# Patient Record
Sex: FEMALE | Race: WHITE | Employment: OTHER | URBAN - METROPOLITAN AREA
[De-identification: names, ages, dates, MRNs, and addresses within clinical notes are randomized per-mention and may not be internally consistent; named-entity substitution may affect disease eponyms.]

---

## 2019-06-03 ENCOUNTER — TELEPHONE (OUTPATIENT)
Dept: CARDIAC CATH/INVASIVE PROCEDURES | Age: 84
End: 2019-06-03

## 2019-06-04 ENCOUNTER — ANESTHESIA (OUTPATIENT)
Dept: CARDIAC CATH/INVASIVE PROCEDURES | Age: 84
End: 2019-06-04

## 2019-06-04 ENCOUNTER — HOSPITAL ENCOUNTER (OUTPATIENT)
Dept: CARDIAC CATH/INVASIVE PROCEDURES | Age: 84
Discharge: HOME | End: 2019-06-04
Payer: MEDICARE

## 2019-06-04 ENCOUNTER — ANESTHESIA EVENT (OUTPATIENT)
Dept: CARDIAC CATH/INVASIVE PROCEDURES | Age: 84
End: 2019-06-04

## 2019-06-04 VITALS — OXYGEN SATURATION: 91 % | DIASTOLIC BLOOD PRESSURE: 74 MMHG | SYSTOLIC BLOOD PRESSURE: 103 MMHG

## 2019-06-04 VITALS
WEIGHT: 234 LBS | HEART RATE: 84 BPM | TEMPERATURE: 98.3 F | DIASTOLIC BLOOD PRESSURE: 80 MMHG | SYSTOLIC BLOOD PRESSURE: 128 MMHG | HEIGHT: 60 IN | BODY MASS INDEX: 45.94 KG/M2 | RESPIRATION RATE: 20 BRPM

## 2019-06-04 LAB
INR BLD: 2.1
PROTHROMBIN TIME: 23.9 SEC (ref 9.3–12.4)

## 2019-06-04 PROCEDURE — 3700000000 HC ANESTHESIA ATTENDED CARE

## 2019-06-04 PROCEDURE — 85610 PROTHROMBIN TIME: CPT

## 2019-06-04 PROCEDURE — 36415 COLL VENOUS BLD VENIPUNCTURE: CPT

## 2019-06-04 PROCEDURE — C1786 PMKR, SINGLE, RATE-RESP: HCPCS

## 2019-06-04 PROCEDURE — 2580000003 HC RX 258

## 2019-06-04 PROCEDURE — 6360000002 HC RX W HCPCS: Performed by: NURSE ANESTHETIST, CERTIFIED REGISTERED

## 2019-06-04 PROCEDURE — 3700000001 HC ADD 15 MINUTES (ANESTHESIA)

## 2019-06-04 PROCEDURE — 2580000003 HC RX 258: Performed by: INTERNAL MEDICINE

## 2019-06-04 PROCEDURE — 2580000003 HC RX 258: Performed by: NURSE ANESTHETIST, CERTIFIED REGISTERED

## 2019-06-04 PROCEDURE — 6360000002 HC RX W HCPCS

## 2019-06-04 PROCEDURE — 33227 REMOVE&REPLACE PM GEN SINGL: CPT | Performed by: INTERNAL MEDICINE

## 2019-06-04 PROCEDURE — 2709999900 HC NON-CHARGEABLE SUPPLY

## 2019-06-04 PROCEDURE — 2500000003 HC RX 250 WO HCPCS

## 2019-06-04 RX ORDER — CEFAZOLIN SODIUM 1 G/3ML
INJECTION, POWDER, FOR SOLUTION INTRAMUSCULAR; INTRAVENOUS PRN
Status: DISCONTINUED | OUTPATIENT
Start: 2019-06-04 | End: 2019-06-04 | Stop reason: SDUPTHER

## 2019-06-04 RX ORDER — SODIUM CHLORIDE 9 MG/ML
INJECTION, SOLUTION INTRAVENOUS ONCE
Status: COMPLETED | OUTPATIENT
Start: 2019-06-04 | End: 2019-06-04

## 2019-06-04 RX ORDER — SODIUM CHLORIDE 9 MG/ML
INJECTION, SOLUTION INTRAVENOUS CONTINUOUS PRN
Status: DISCONTINUED | OUTPATIENT
Start: 2019-06-04 | End: 2019-06-04 | Stop reason: SDUPTHER

## 2019-06-04 RX ORDER — PROPOFOL 10 MG/ML
INJECTION, EMULSION INTRAVENOUS CONTINUOUS PRN
Status: DISCONTINUED | OUTPATIENT
Start: 2019-06-04 | End: 2019-06-04 | Stop reason: SDUPTHER

## 2019-06-04 RX ORDER — FENTANYL CITRATE 50 UG/ML
INJECTION, SOLUTION INTRAMUSCULAR; INTRAVENOUS PRN
Status: DISCONTINUED | OUTPATIENT
Start: 2019-06-04 | End: 2019-06-04 | Stop reason: SDUPTHER

## 2019-06-04 RX ORDER — PROPOFOL 10 MG/ML
INJECTION, EMULSION INTRAVENOUS PRN
Status: DISCONTINUED | OUTPATIENT
Start: 2019-06-04 | End: 2019-06-04 | Stop reason: SDUPTHER

## 2019-06-04 RX ADMIN — PROPOFOL 40 MG: 10 INJECTION, EMULSION INTRAVENOUS at 11:40

## 2019-06-04 RX ADMIN — CEFAZOLIN 2000 MG: 1 INJECTION, POWDER, FOR SOLUTION INTRAMUSCULAR; INTRAVENOUS; PARENTERAL at 11:40

## 2019-06-04 RX ADMIN — SODIUM CHLORIDE: 9 INJECTION, SOLUTION INTRAVENOUS at 10:21

## 2019-06-04 RX ADMIN — SODIUM CHLORIDE: 9 INJECTION, SOLUTION INTRAVENOUS at 09:57

## 2019-06-04 RX ADMIN — PROPOFOL 15 MCG/KG/MIN: 10 INJECTION, EMULSION INTRAVENOUS at 11:40

## 2019-06-04 RX ADMIN — FENTANYL CITRATE 50 MCG: 50 INJECTION, SOLUTION INTRAMUSCULAR; INTRAVENOUS at 11:40

## 2019-06-04 NOTE — PROCEDURES
Patient previously informed the risks discomfort benefits alternatives of single chamber permanent pacemaker generator replacement informed in the office and she agreed to proceed at that time. Today she again agrees to proceed. Throughout the procedure pulse ox telemetry lead pressure and telemetry monitor. Patient sedated by anesthesia. Patient sedated by anesthesia. The left subclavicular region had been prepped and draped in subcutaneous lidocaine . An incision was made in the old pacemaker was extracted from the pocket, it was implanted April 28, 2010 as was also the Guidant bipolar ventricular lead. The ventricular lead had appropriate pacing and sensing ,  is used with the new pacemaker. Pacemaker is a new single chamber [employer] Accolade SR I S1 L3 100 device. The pacemaker was placed in the pocket. Pocket was flushed with vancomycin solution and the subcutaneous tissue was closed with 2 layers of running Vicryl suture. Skin was closed by staples. #1 the new single chamber permanent pacemaker is a [employer] Accolade [address] [facility] serial [id number]    #2 previously placed Guidant lead was implanted April 28, 2010 in the right ventricular apex model 19 dextrose I S1 bipolar positive fixation right ventricular apex 53 cm. Model [id number] serial [id number]. Measured R-wave 21.5 mV threshold 1 V at 0.4 ms impedance 645 ohms. #3 the removed single-chamber permanent pacemaker was implanted April 28, 2010 and was a [employer] model I185 serial #[id number].

## 2019-06-04 NOTE — ANESTHESIA PRE PROCEDURE
Department of Anesthesiology  Preprocedure Note       Name:  Candida Fontana   Age:  81 y.o.  :  1935                                          MRN:  [id number]         Date:  2019      Surgeon: Aubrie Moyer    Procedure: PACEMAKER GENERATOR CHANGE W ANESTHESIA    Medications prior to admission:   Prior to Admission medications    Medication Sig Start Date End Date Taking? Authorizing Provider   metoprolol (LOPRESSOR) 100 MG tablet Take 100 mg by mouth 2 times daily   Yes Historical Provider, MD   simvastatin (ZOCOR) 20 MG tablet Take 20 mg by mouth nightly   Yes Historical Provider, MD   levothyroxine (SYNTHROID) 100 MCG tablet Take 100 mcg by mouth Daily   Yes Historical Provider, MD   WARFARIN SODIUM PO Take by mouth Alternate 3 mg and 4.5 mg daily   Yes Historical Provider, MD   furosemide (LASIX) 40 MG tablet Take 40 mg by mouth 2 times daily   Yes Historical Provider, MD   potassium chloride (KLOR-CON) 20 MEQ packet Take 20 mEq by mouth 2 times daily   Yes Historical Provider, MD   diltiazem (CARDIZEM) 120 MG tablet Take 120 mg by mouth daily   Yes Historical Provider, MD   metolazone (ZAROXOLYN) 2.5 MG tablet Take 2.5 mg by mouth daily   Yes Historical Provider, MD       Current medications:    Current Outpatient Medications   Medication Sig Dispense Refill    metoprolol (LOPRESSOR) 100 MG tablet Take 100 mg by mouth 2 times daily      simvastatin (ZOCOR) 20 MG tablet Take 20 mg by mouth nightly      levothyroxine (SYNTHROID) 100 MCG tablet Take 100 mcg by mouth Daily      WARFARIN SODIUM PO Take by mouth Alternate 3 mg and 4.5 mg daily      furosemide (LASIX) 40 MG tablet Take 40 mg by mouth 2 times daily      potassium chloride (KLOR-CON) 20 MEQ packet Take 20 mEq by mouth 2 times daily      diltiazem (CARDIZEM) 120 MG tablet Take 120 mg by mouth daily      metolazone (ZAROXOLYN) 2.5 MG tablet Take 2.5 mg by mouth daily       No current facility-administered medications for this encounter. Facility-Administered Medications Ordered in Other Encounters   Medication Dose Route Frequency Provider Last Rate Last Dose    0.9 % sodium chloride infusion    Continuous PRN Doyce Sessions, APRN - CRNA           Allergies:  No Known Allergies    Problem List:    Patient Active Problem List   Diagnosis Code    Venous ulcer of left lower extremity with varicose veins (HCC) I83.029, L97.929    Venous insufficiency (chronic) (peripheral) I87.2       Past Medical History:        Diagnosis Date    Arthritis     Atrial fibrillation ([address]     Hyperlipidemia     Hypertension     Thyroid disease        Past Surgical History:        Procedure Laterality Date    PACEMAKER INSERTION         Social History:    Social History     Tobacco Use    Smoking status: Never Smoker   Substance Use Topics    Alcohol use: No                                Counseling given: Not Answered      Vital Signs (Current):   Vitals:    06/04/19 0949   BP: 128/80   Pulse: 84   Resp: 20   Temp: 98.3 °F (36.8 °C)   Weight: 234 lb (106.1 kg)   Height: 5' (1.524 m)                                              BP Readings from Last 3 Encounters:   06/04/19 128/80   06/04/19 112/60   10/02/15 106/60       NPO Status:                            >8 hrs                                                      BMI:   Wt Readings from Last 3 Encounters:   06/04/19 234 lb (106.1 kg)   09/04/15 235 lb (106.6 kg)     Body mass index is 45.7 kg/m². CBC: No results found for: WBC, RBC, HGB, HCT, MCV, RDW, PLT    CMP: No results found for: NA, K, CL, CO2, BUN, CREATININE, GFRAA, AGRATIO, LABGLOM, GLUCOSE, PROT, CALCIUM, BILITOT, ALKPHOS, AST, ALT    POC Tests: No results for input(s): POCGLU, POCNA, POCK, POCCL, POCBUN, POCHEMO, POCHCT in the last 72 hours.     Coags:   Lab Results   Component Value Date    PROTIME 23.9 06/04/2019    INR 2.1 06/04/2019       HCG (If Applicable): No results found for: PREGTESTUR, PREGSERUM, HCG, HCGQUANT     ABGs: No results found for: PHART, PO2ART, WKH7VUH, IQY6MTG, BEART, F4CMLUYD     Type & Screen (If Applicable):  No results found for: [facility]    Anesthesia Evaluation  Patient summary reviewed and Nursing notes reviewed no history of anesthetic complications:   Airway: Mallampati: II  TM distance: >3 FB   Neck ROM: full  Mouth opening: > = 3 FB Dental: normal exam         Pulmonary:normal exam  breath sounds clear to auscultation                             Cardiovascular:  Exercise tolerance: poor (<4 METS),   (+) hypertension:, pacemaker (generator depleted): pacemaker, dysrhythmias: atrial fibrillation,         Rhythm: regular  Rate: normal                    Neuro/Psych:               GI/Hepatic/Renal:             Endo/Other:    (+) hypothyroidism, blood dyscrasia: anticoagulation therapy:., .          Pt had no PAT visit       Abdominal:   (+) obese,     Abdomen: soft. Vascular:           ROS comment: Peripheral venous insufficiency. Noninvasive bilateral lower extremity evaluation including:   segmental pressures and Doppler waveforms, ankle brachial and toe   brachial indices, and bilateral toe pulse volume recordings. Date of service: September 10, 2015. Comparison: None. History: Ulceration near the left ankle.  Hypertension       Findings:       Segmental pressures:      Right   Left   Brachial  161   155       Femoral  200   220       Popliteal  CNO   176       Posterior Tibial 191   169       Dorsalis Pedis 154   161       Great Toe  137   114       MELLISA   1.19   1.05       TBI   0.85   0.71           Doppler waveforms:      Right   Left   Common Femoral triphasic  triphasic       Superficial Femoral biphasic  monophasic       Popliteal  biphasic  biphasic       Posterior Tibial biphasic  monophasic       Dorsalis Pedis monophasic  biphasic           Pulse volume recordings (relative amplitude measurements):      Right   Left   Great toe  40   40 Impression   IMPRESSION:   1. Diffuse mild to moderate arterial vascular disease is suggested   within the right lower extremity based on the abnormal arterial   wave forms. 2. Diffuse moderate arterial vascular disease is suggested within   the left lower extremity based on the abnormal arterial waveforms. Anesthesia Plan      MAC     ASA 3       Induction: intravenous. Anesthetic plan and risks discussed with patient. Use of blood products discussed with patient whom. Plan discussed with attending.                   Misa Lopez DO   6/4/2019

## 2019-06-04 NOTE — ANESTHESIA POSTPROCEDURE EVALUATION
Department of Anesthesiology  Postprocedure Note    Patient: Liborio Conde  MRN: [id number]  YOB: 1935  Date of evaluation: 6/4/2019  Time:  1:31 PM     Procedure Summary     Date:  06/04/19 [place]  [facility] [place]    Anesthesia Start:  1130 Anesthesia Stop:  8571    Procedure:  PACEMAKER GENERATOR CHANGE W ANESTHESIA Diagnosis:  Chest pain    Scheduled Providers:  HIMANSHU Mattson - MARLYN; Alonso King DO Responsible Provider:  Alonso King DO    Anesthesia Type:  MAC ASA Status:  3          Anesthesia Type: MAC    Natali Phase I:      Natali Phase II:      Last vitals: Reviewed and per EMR flowsheets.        Anesthesia Post Evaluation    Patient location during evaluation: bedside  Patient participation: complete - patient cannot participate  Level of consciousness: awake and alert  Airway patency: patent  Nausea & Vomiting: no nausea and no vomiting  Complications: no  Cardiovascular status: blood pressure returned to baseline  Respiratory status: acceptable  Hydration status: euvolemic

## 2019-07-29 ENCOUNTER — PROCEDURE VISIT (OUTPATIENT)
Dept: PODIATRY | Age: 84
End: 2019-07-29
Payer: MEDICARE

## 2019-07-29 VITALS
BODY MASS INDEX: 50.92 KG/M2 | RESPIRATION RATE: 22 BRPM | SYSTOLIC BLOOD PRESSURE: 138 MMHG | DIASTOLIC BLOOD PRESSURE: 86 MMHG | HEIGHT: 57 IN | WEIGHT: 236 LBS

## 2019-07-29 DIAGNOSIS — I87.2 VENOUS INSUFFICIENCY (CHRONIC) (PERIPHERAL): Chronic | ICD-10-CM

## 2019-07-29 DIAGNOSIS — R26.2 DIFFICULTY WALKING: ICD-10-CM

## 2019-07-29 DIAGNOSIS — M79.675 PAIN OF TOE OF LEFT FOOT: ICD-10-CM

## 2019-07-29 DIAGNOSIS — M79.674 PAIN OF TOE OF RIGHT FOOT: ICD-10-CM

## 2019-07-29 DIAGNOSIS — B35.1 ONYCHOMYCOSIS: Primary | ICD-10-CM

## 2019-07-29 DIAGNOSIS — I73.9 PVD (PERIPHERAL VASCULAR DISEASE) (HCC): ICD-10-CM

## 2019-07-29 PROCEDURE — 11721 DEBRIDE NAIL 6 OR MORE: CPT | Performed by: PODIATRIST

## 2019-07-29 RX ORDER — TRIAMCINOLONE ACETONIDE 1 MG/G
CREAM TOPICAL
COMMUNITY
Start: 2017-04-19 | End: 2019-10-28 | Stop reason: SDUPTHER

## 2019-10-28 ENCOUNTER — PROCEDURE VISIT (OUTPATIENT)
Dept: PODIATRY | Age: 84
End: 2019-10-28
Payer: MEDICARE

## 2019-10-28 VITALS — WEIGHT: 235 LBS | RESPIRATION RATE: 20 BRPM | BODY MASS INDEX: 50.85 KG/M2

## 2019-10-28 DIAGNOSIS — I87.2 VENOUS INSUFFICIENCY (CHRONIC) (PERIPHERAL): Primary | Chronic | ICD-10-CM

## 2019-10-28 DIAGNOSIS — R26.2 DIFFICULTY WALKING: ICD-10-CM

## 2019-10-28 DIAGNOSIS — I73.9 PVD (PERIPHERAL VASCULAR DISEASE) (HCC): ICD-10-CM

## 2019-10-28 DIAGNOSIS — B35.1 ONYCHOMYCOSIS: ICD-10-CM

## 2019-10-28 DIAGNOSIS — M79.675 PAIN OF TOE OF LEFT FOOT: ICD-10-CM

## 2019-10-28 DIAGNOSIS — M79.674 PAIN OF TOE OF RIGHT FOOT: ICD-10-CM

## 2019-10-28 PROCEDURE — 11721 DEBRIDE NAIL 6 OR MORE: CPT | Performed by: PODIATRIST

## 2019-10-28 RX ORDER — TRIAMCINOLONE ACETONIDE 1 MG/G
CREAM TOPICAL
Qty: 5 G | Refills: 1 | Status: SHIPPED | OUTPATIENT
Start: 2019-10-28 | End: 2020-01-27

## 2019-12-30 ENCOUNTER — APPOINTMENT (OUTPATIENT)
Dept: CT IMAGING | Age: 84
End: 2019-12-30
Payer: MEDICARE

## 2019-12-30 ENCOUNTER — HOSPITAL ENCOUNTER (EMERGENCY)
Age: 84
Discharge: HOME | End: 2019-12-30
Attending: EMERGENCY MEDICINE
Payer: MEDICARE

## 2019-12-30 VITALS
TEMPERATURE: 96.3 F | HEIGHT: 57 IN | RESPIRATION RATE: 18 BRPM | WEIGHT: 235 LBS | SYSTOLIC BLOOD PRESSURE: 124 MMHG | HEART RATE: 86 BPM | DIASTOLIC BLOOD PRESSURE: 61 MMHG | BODY MASS INDEX: 50.7 KG/M2 | OXYGEN SATURATION: 94 %

## 2019-12-30 LAB
INR BLD: 2
PROTHROMBIN TIME: 23.1 SEC (ref 9.3–12.4)

## 2019-12-30 PROCEDURE — 70450 CT HEAD/BRAIN W/O DYE: CPT

## 2019-12-30 PROCEDURE — 85610 PROTHROMBIN TIME: CPT

## 2019-12-30 PROCEDURE — 99284 EMERGENCY DEPT VISIT MOD MDM: CPT

## 2019-12-30 PROCEDURE — 36415 COLL VENOUS BLD VENIPUNCTURE: CPT

## 2019-12-30 PROCEDURE — 72125 CT NECK SPINE W/O DYE: CPT

## 2019-12-30 ASSESSMENT — ENCOUNTER SYMPTOMS
ABDOMINAL PAIN: 0
COUGH: 0
TROUBLE SWALLOWING: 0
ABDOMINAL DISTENTION: 0
VOMITING: 0
WHEEZING: 0
BACK PAIN: 0
SHORTNESS OF BREATH: 0
STRIDOR: 0
SORE THROAT: 0
PHOTOPHOBIA: 0
NAUSEA: 0
COLOR CHANGE: 0
DIARRHEA: 0

## 2019-12-30 NOTE — ED PROVIDER NOTES
Chief Complaint   Patient presents with    Fall     Tripped and fell over curb with her walker at cardiologist office about 1 hr ago and hit her head, -LOC, +thinners (patient takes coumadin), +hematoma, denies any other injuries or pain. Abhishek Elias is a 78-year-old female who presents today after ground-level fall. Patient states she usually uses a walker for assistance. she was walking to her cardiologist office, when she states her walker got caught on something on the ground, she walked into it, and started to fall. Patient states she landed on her left side and bumped her head. Patient has a small hematoma on the left side. Patient states she is on blood thinners, takes Coumadin for A. fib. After the fall patient went up to her cardiologist for her appointment. He was concerned with the hematoma and the fact that the patient is on blood thinners, and called EMS for her to come to the hospital for further evaluation. Patient denies neck pain, chest pain, shortness of breath, changes in vision or weakness. Review of Systems   Constitutional: Negative for activity change, appetite change, chills and fever. HENT: Negative for congestion, drooling, sore throat and trouble swallowing. Eyes: Negative for photophobia and visual disturbance. Respiratory: Negative for cough, shortness of breath, wheezing and stridor. Cardiovascular: Negative for chest pain, palpitations and leg swelling. Gastrointestinal: Negative for abdominal distention, abdominal pain, diarrhea, nausea and vomiting. Genitourinary: Negative for dysuria. Musculoskeletal: Negative for arthralgias, back pain, neck pain and neck stiffness. Skin: Positive for wound (small scalp hematoma). Negative for color change and pallor. Neurological: Negative for dizziness, syncope, weakness, light-headedness, numbness and headaches. Psychiatric/Behavioral: Negative for behavioral problems and confusion.         Physical microvascular ischemic disease. Left maxillary   sinusitis. CT CERVICAL SPINE WO CONTRAST   Final Result   1. No fracture or joint dislocation. 2. Degenerative changes. Labs Reviewed   PROTIME-INR - Abnormal; Notable for the following components:       Result Value    Protime 23.1 (*)     All other components within normal limits       MDM  Number of Diagnoses or Management Options  Fall, initial encounter:   Injury of head, initial encounter:   Diagnosis management comments: Marco A Delgadillo is a 58-year-old female who presented today via EMS for a ground-level fall. Fall was mechanical in nature. Patient is on blood thinners. No midline tenderness, no weakness, no changes in vision. Physical exam positive for small hematoma on the left side of her head. Due patient's age and being on blood thinners head CT and neck was ordered. CT of the head and neck showed no acute processes, were unremarkable. PT/INR was obtained due to the fact the patient is on Coumadin, was therapeutic. Patient has no complaints in the department. Patient will be discharged home, follow-up with PCP. Patient understands and agrees this plan.                    --------------------------------------------- PAST HISTORY ---------------------------------------------  Past Medical History:  has a past medical history of Arthritis, Atrial fibrillation ([address] Hyperlipidemia, Hypertension, and Thyroid disease. Past Surgical History:  has a past surgical history that includes Pacemaker insertion (06/04/2019). Social History:  reports that she has never smoked. She has never used smokeless tobacco. She reports that she does not drink alcohol or use drugs. Family History: family history is not on file. The patients home medications have been reviewed. Allergies: Patient has no known allergies.     -------------------------------------------------- RESULTS -------------------------------------------------  Labs:  Results

## 2020-01-27 ENCOUNTER — OFFICE VISIT (OUTPATIENT)
Dept: PODIATRY | Age: 85
End: 2020-01-27
Payer: MEDICARE

## 2020-01-27 VITALS — WEIGHT: 235 LBS | HEIGHT: 60 IN | BODY MASS INDEX: 46.13 KG/M2

## 2020-01-27 PROCEDURE — 99999 PR OFFICE/OUTPT VISIT,PROCEDURE ONLY: CPT | Performed by: PODIATRIST

## 2020-01-27 PROCEDURE — 11721 DEBRIDE NAIL 6 OR MORE: CPT | Performed by: PODIATRIST

## 2020-01-27 RX ORDER — TRIAMCINOLONE ACETONIDE 1 MG/G
CREAM TOPICAL
Qty: 90 G | Refills: 5 | Status: ON HOLD
Start: 2020-01-27 | End: 2022-01-01 | Stop reason: HOSPADM

## 2020-01-27 NOTE — PROGRESS NOTES
20     Ketan Cox    : 1935  Sex: female  Age: 81 y.o. Subjective: The patient is seen today for evaluation regarding foot evaluation and mycotic nail care. No other complaints noted. Chief Complaint   Patient presents with    Nail Problem     nail care       Current Medications:    Current Outpatient Medications:     triamcinolone (KENALOG) 0.1 % cream, Apply topically 2 times daily. , Disp: 90 g, Rfl: 5    metoprolol (LOPRESSOR) 100 MG tablet, Take 100 mg by mouth 2 times daily, Disp: , Rfl:     simvastatin (ZOCOR) 20 MG tablet, Take 20 mg by mouth nightly, Disp: , Rfl:     levothyroxine (SYNTHROID) 100 MCG tablet, Take 100 mcg by mouth Daily, Disp: , Rfl:     WARFARIN SODIUM PO, Take by mouth Alternate 3 mg and 4.5 mg daily, Disp: , Rfl:     furosemide (LASIX) 40 MG tablet, Take 40 mg by mouth 2 times daily, Disp: , Rfl:     potassium chloride (KLOR-CON) 20 MEQ packet, Take 20 mEq by mouth 2 times daily, Disp: , Rfl:     diltiazem (CARDIZEM) 120 MG tablet, Take 120 mg by mouth daily, Disp: , Rfl:     metolazone (ZAROXOLYN) 2.5 MG tablet, Take 2.5 mg by mouth daily, Disp: , Rfl:     Allergies:  No Known Allergies    Past Surgical History:   Procedure Laterality Date    PACEMAKER INSERTION  2019    PPM [facility])    [facility]     Past Medical History:   Diagnosis Date    Arthritis     Atrial fibrillation ([facility] [address]     Hyperlipidemia     Hypertension     Thyroid disease        Vitals:    20 1346   Weight: 235 lb (106.6 kg)   Height: 5' (1.524 m)       Exam:  Neurovascular status unchanged. At this time the nail/s 1, 2, 3, 4, 5 right foot and nail/s 1, 2, 3, 4, 5 left foot are noted to be thickened, dystrophic and discolored with subungual debris present. Tenderness noted to palpation. Absence of hair growth is noted to both lower extremities. Edematous issues noted to both lower extremities with varicosities and stasis skin changes noted bilaterally. Coolness is noted to the digital regions to palpation. Capillary fill time delayed digital areas bilateral foot. No heel fissuring or macerations of the web spaces. No plantar calluses and/or ulcerative areas are noted. Patient is having difficulty with gait/walking. Plan Per Assessment  Alfredo Celestin was seen today for nail problem. Diagnoses and all orders for this visit:    Onychomycosis    Pain of toe of right foot    Pain of toe of left foot    PVD (peripheral vascular disease) (HCC)    Venous insufficiency (chronic) (peripheral)  -     triamcinolone (KENALOG) 0.1 % cream; Apply topically 2 times daily. Difficulty walking        1. Evaluation and Management  2. Manual and electrical debridement of the mycotic nails was performed for thickness and length to prevent injection and/or ulceration. 3. I recommended antifungal cream to the nails daily. 4. It was discussed in detail with the patient proper caring for the vascular compromised foot. The fact that they have compromised blood flow put the patient at risk for infection/gangrene/amputation. The patient should not walk barefoot. Shoe gear should fit properly and socks should be worn with shoes. Exercise is very important to prevent worsening of the disease process but before performing an exercise program should check with their family physician first.  If any skin lesions are noted, they are instructed to contact the office immediately. 5. We will see the patient back at a later date for continued podiatric management and care. Patient was advised to call the office with any questions or concerns prior to their next appointment if needed. Seen By:    Argentina Falk DPM    Electronically signed by Argentina Falk DPM on 1/27/2020 at 1:59 PM      This note was created using voice recognition software. The note was reviewed however may contain grammatical errors.

## 2020-07-27 ENCOUNTER — OFFICE VISIT (OUTPATIENT)
Dept: PODIATRY | Age: 85
End: 2020-07-27
Payer: MEDICARE

## 2020-07-27 VITALS — TEMPERATURE: 97.6 F

## 2020-07-27 PROCEDURE — G8427 DOCREV CUR MEDS BY ELIG CLIN: HCPCS | Performed by: PODIATRIST

## 2020-07-27 PROCEDURE — 11721 DEBRIDE NAIL 6 OR MORE: CPT | Performed by: PODIATRIST

## 2020-07-27 PROCEDURE — 1090F PRES/ABSN URINE INCON ASSESS: CPT | Performed by: PODIATRIST

## 2020-07-27 PROCEDURE — 1036F TOBACCO NON-USER: CPT | Performed by: PODIATRIST

## 2020-07-27 PROCEDURE — G8400 PT W/DXA NO RESULTS DOC: HCPCS | Performed by: PODIATRIST

## 2020-07-27 PROCEDURE — G8417 CALC BMI ABV UP PARAM F/U: HCPCS | Performed by: PODIATRIST

## 2020-07-27 PROCEDURE — 29580 STRAPPING UNNA BOOT: CPT | Performed by: PODIATRIST

## 2020-07-27 PROCEDURE — 99213 OFFICE O/P EST LOW 20 MIN: CPT | Performed by: PODIATRIST

## 2020-07-27 PROCEDURE — 4040F PNEUMOC VAC/ADMIN/RCVD: CPT | Performed by: PODIATRIST

## 2020-07-27 PROCEDURE — 1123F ACP DISCUSS/DSCN MKR DOCD: CPT | Performed by: PODIATRIST

## 2020-07-27 NOTE — PROGRESS NOTES
Patient in today for nail care. Patient also c/o blisters to bilateral lower legs.  Patient's PCP is Yovani Mejia MD date of last ov 6-2-2020   Barrett Uriarte LPN

## 2020-07-28 NOTE — PROGRESS NOTES
20     Tarik Molina    : 1935  Sex: female  Age: 81 y.o. Subjective: The patient is seen today for evaluation regarding foot and leg evaluation, mycotic nail care, and blister care to both lower extremities. No other complaints noted. Chief Complaint   Patient presents with    Nail Problem    Wound Check       Current Medications:    Current Outpatient Medications:     triamcinolone (KENALOG) 0.1 % cream, Apply topically 2 times daily. , Disp: 90 g, Rfl: 5    metoprolol (LOPRESSOR) 100 MG tablet, Take 100 mg by mouth 2 times daily, Disp: , Rfl:     simvastatin (ZOCOR) 20 MG tablet, Take 20 mg by mouth nightly, Disp: , Rfl:     levothyroxine (SYNTHROID) 100 MCG tablet, Take 100 mcg by mouth Daily, Disp: , Rfl:     WARFARIN SODIUM PO, Take by mouth Alternate 3 mg and 4.5 mg daily, Disp: , Rfl:     furosemide (LASIX) 40 MG tablet, Take 40 mg by mouth 2 times daily, Disp: , Rfl:     potassium chloride (KLOR-CON) 20 MEQ packet, Take 20 mEq by mouth 2 times daily, Disp: , Rfl:     diltiazem (CARDIZEM) 120 MG tablet, Take 120 mg by mouth daily, Disp: , Rfl:     metolazone (ZAROXOLYN) 2.5 MG tablet, Take 2.5 mg by mouth daily, Disp: , Rfl:     Allergies:  No Known Allergies    Past Surgical History:   Procedure Laterality Date    PACEMAKER INSERTION  2019    PPM [facility]ENT CHANGE  ([facility])    [facility]     Past Medical History:   Diagnosis Date    Arthritis     Atrial fibrillation [address] [address]     Hyperlipidemia     Hypertension     Thyroid disease        Vitals:    20 1421   Temp: 97.6 °F (36.4 °C)       Exam:  Neurovascular status unchanged. At this time the nail/s 1, 2, 3, 4, 5 right foot and nail/s 1, 2, 3, 4, 5 left foot are noted to be thickened, dystrophic and discolored with subungual debris present. Tenderness noted to palpation. Diminished hair growth is noted to both lower extremities.   Edema noted to both lower extremities with varicosities and stasis skin changes present bilaterally. Blister regions noted with mild serous drainage to both lower extremities, without signs of infection noted. Coolness is noted to the digital regions to palpation. Capillary fill time delayed digital areas bilateral foot. No heel fissuring or macerations of the web spaces. No plantar calluses and/or ulcerative areas are noted. Patient is having difficulty with gait/walking. Plan Per Assessment  Aziza Mckeon was seen today for nail problem and wound check. Diagnoses and all orders for this visit:    Onychomycosis    Pain of toe of right foot    Pain of toe of left foot    Blister (nonthermal), unspecified lower leg, initial encounter    Venous insufficiency (chronic) (peripheral)    Difficulty walking        1. Evaluation and Management  2. Manual and electrical debridement of the mycotic nails was performed for thickness and length to prevent injection and/or ulceration. 3. I recommended antifungal cream to the nails daily. 4. An unna boot  compressive wrap was applied to the bilateral lower extremity. It's purpose is to  decrease  the amount of edema present, and to allow proper healing of the soft tissues. The patient was instructed to keep the unna boot clean, dry and intact until the next follow up visit. The patient was instructed to look for signs of redness, irritation, blistering and pain. If these or any other symptoms were to develop, they were advised to contact the office immediately for reevaluation. 5. It was discussed in detail with the patient proper caring for the vascular compromised foot. The fact that they have compromised blood flow put the patient at risk for infection/gangrene/amputation. The patient should not walk barefoot. Shoe gear should fit properly and socks should be worn with shoes.   Exercise is very important to prevent worsening of the disease process but before performing an exercise program should check with their family physician first.  If any skin lesions are noted, they are instructed to contact the office immediately. 6. We will see the patient back at a later date for continued podiatric management and care. Patient was advised to call the office with any questions or concerns prior to their next appointment if needed. Seen By:    Diana Bowling DPM    Electronically signed by Diana Bowling DPM on 7/28/2020 at 8:32 AM      This note was created using voice recognition software. The note was reviewed however may contain grammatical errors.

## 2020-08-03 ENCOUNTER — OFFICE VISIT (OUTPATIENT)
Dept: PODIATRY | Age: 85
End: 2020-08-03
Payer: MEDICARE

## 2020-08-03 VITALS — TEMPERATURE: 97.6 F

## 2020-08-03 PROBLEM — S80.822A BLISTER (NONTHERMAL), LEFT LOWER LEG, INITIAL ENCOUNTER: Status: ACTIVE | Noted: 2020-08-03

## 2020-08-03 PROBLEM — S80.821A BLISTER (NONTHERMAL), RIGHT LOWER LEG, INITIAL ENCOUNTER: Status: ACTIVE | Noted: 2020-08-03

## 2020-08-03 PROCEDURE — 1090F PRES/ABSN URINE INCON ASSESS: CPT | Performed by: PODIATRIST

## 2020-08-03 PROCEDURE — 1036F TOBACCO NON-USER: CPT | Performed by: PODIATRIST

## 2020-08-03 PROCEDURE — 99213 OFFICE O/P EST LOW 20 MIN: CPT | Performed by: PODIATRIST

## 2020-08-03 PROCEDURE — G8400 PT W/DXA NO RESULTS DOC: HCPCS | Performed by: PODIATRIST

## 2020-08-03 PROCEDURE — 4040F PNEUMOC VAC/ADMIN/RCVD: CPT | Performed by: PODIATRIST

## 2020-08-03 PROCEDURE — G8417 CALC BMI ABV UP PARAM F/U: HCPCS | Performed by: PODIATRIST

## 2020-08-03 PROCEDURE — 1123F ACP DISCUSS/DSCN MKR DOCD: CPT | Performed by: PODIATRIST

## 2020-08-03 PROCEDURE — G8427 DOCREV CUR MEDS BY ELIG CLIN: HCPCS | Performed by: PODIATRIST

## 2020-08-03 NOTE — PROGRESS NOTES
Patient is here today for follow up evaluation of bilateral unna boots she states her sores are draining a lot and she does not elevate her legs at home. She states she does not have any way to elevate them in her chair. It was difficult for her to sleep in bed so she slept in her chair where her legs were not elevated as well.

## 2020-08-04 NOTE — PROGRESS NOTES
8/3/20     [facility]    : 1935   Sex: female    Age: 81 y.o. Patient's PCP/Provider is:  Rudi Kimball MD    Subjective:  Patient is seen today for follow-up regarding their blister areas to both lower extremities. Patient stated that the compression dressing applied last week did cause some mild irritation to the blister regions. She denies any nausea, vomiting, fever, chills. No other additional issues noted at this time. Chief Complaint   Patient presents with    Wound Check       ROS:  Const: Positives and pertinent negatives as per HPI. Musculo: Denies symptoms other than stated above. Neuro: Denies symptoms other than stated above. Skin: Denies symptoms other than stated above. Current Medications:    Current Outpatient Medications:     triamcinolone (KENALOG) 0.1 % cream, Apply topically 2 times daily. , Disp: 90 g, Rfl: 5    metoprolol (LOPRESSOR) 100 MG tablet, Take 100 mg by mouth 2 times daily, Disp: , Rfl:     simvastatin (ZOCOR) 20 MG tablet, Take 20 mg by mouth nightly, Disp: , Rfl:     levothyroxine (SYNTHROID) 100 MCG tablet, Take 100 mcg by mouth Daily, Disp: , Rfl:     WARFARIN SODIUM PO, Take by mouth Alternate 3 mg and 4.5 mg daily, Disp: , Rfl:     furosemide (LASIX) 40 MG tablet, Take 40 mg by mouth 2 times daily, Disp: , Rfl:     potassium chloride (KLOR-CON) 20 MEQ packet, Take 20 mEq by mouth 2 times daily, Disp: , Rfl:     diltiazem (CARDIZEM) 120 MG tablet, Take 120 mg by mouth daily, Disp: , Rfl:     metolazone (ZAROXOLYN) 2.5 MG tablet, Take 2.5 mg by mouth daily, Disp: , Rfl:     Allergies:  No Known Allergies    Vitals:    20 1558   Temp: 97.6 °F (36.4 °C)       Exam:  Neurovascular status unchanged. Blister areas are stable bilateral lower extremities with mild serous drainage noted from the areas. No erythema, purulence, odor, or any signs of infection noted bilateral lower extremities.     Diagnostic Studies:     No results found.      Procedures:    None    Plan Per Assessment  Leela Osorio was seen today for wound check. Diagnoses and all orders for this visit:    Blister (nonthermal), right lower leg, initial encounter    Blister (nonthermal), left lower leg, initial encounter    Venous insufficiency (chronic) (peripheral)      1. Evaluation and management  2. We did appropriately evaluate blister regions bilateral legs. We did cleanse areas with saline, applied topical alginate AG dressing to the areas followed by a dry padded dressing. Tubigrip stockings were applied overlying to keep dressings intact. 3. Patient was advised to elevate both lower extremities throughout the day to allow appropriate healing. 4. Patient will be followed up in 1 week's time or sooner if needed for reevaluation. She was advised to call the office with any questions or concerns in the interim. Seen By:    Ivon Ndiaye DPM    Electronically signed by Ivon Ndiaye DPM on 8/3/2020 at 8:24 PM    This note was created using voice recognition software. The note was reviewed however may contain grammatical errors.

## 2020-08-10 ENCOUNTER — OFFICE VISIT (OUTPATIENT)
Dept: PODIATRY | Age: 85
End: 2020-08-10
Payer: MEDICARE

## 2020-08-10 VITALS — TEMPERATURE: 97.1 F

## 2020-08-10 PROCEDURE — 1090F PRES/ABSN URINE INCON ASSESS: CPT | Performed by: PODIATRIST

## 2020-08-10 PROCEDURE — G8427 DOCREV CUR MEDS BY ELIG CLIN: HCPCS | Performed by: PODIATRIST

## 2020-08-10 PROCEDURE — G8400 PT W/DXA NO RESULTS DOC: HCPCS | Performed by: PODIATRIST

## 2020-08-10 PROCEDURE — G8417 CALC BMI ABV UP PARAM F/U: HCPCS | Performed by: PODIATRIST

## 2020-08-10 PROCEDURE — 99213 OFFICE O/P EST LOW 20 MIN: CPT | Performed by: PODIATRIST

## 2020-08-10 PROCEDURE — 1036F TOBACCO NON-USER: CPT | Performed by: PODIATRIST

## 2020-08-10 PROCEDURE — 4040F PNEUMOC VAC/ADMIN/RCVD: CPT | Performed by: PODIATRIST

## 2020-08-10 PROCEDURE — 1123F ACP DISCUSS/DSCN MKR DOCD: CPT | Performed by: PODIATRIST

## 2020-08-11 NOTE — PROGRESS NOTES
Patient is here today for follow up evaluation of blisters bilateral lower extremities. She states she did purchase a foot stool to elevate her legs but it has not been delivered yet. She can only elevate her legs at night.
garments. Diagnostic Studies:     No results found. Procedures:    None    Plan Per Assessment  Veronica Calvo was seen today for wound check. Diagnoses and all orders for this visit:    Blister (nonthermal), right lower leg, initial encounter    Blister (nonthermal), left lower leg, initial encounter    Venous insufficiency (chronic) (peripheral)      1. Evaluation and management  2. Maxorb alginate dressings were applied to all blister sites bilateral lower extremities as a primary dressing. Modified compression dressing was then applied overlying. Patient was to continue with her Tubigrip stockings for additional support to both lower extremities. 3. Patient was to elevate both lower extremities throughout the day to allow for continued healing. 4. Patient will be followed up in 1 week's time or sooner if needed for reevaluation. She was advised to call the office with any questions or concerns in the interim. Seen By:    Tiana Forte DPM    Electronically signed by Tiana Forte DPM on 8/11/2020 at 2:11 PM    This note was created using voice recognition software. The note was reviewed however may contain grammatical errors.

## 2020-08-17 ENCOUNTER — OFFICE VISIT (OUTPATIENT)
Dept: PODIATRY | Age: 85
End: 2020-08-17
Payer: MEDICARE

## 2020-08-17 VITALS — TEMPERATURE: 97.5 F

## 2020-08-17 PROCEDURE — 1036F TOBACCO NON-USER: CPT | Performed by: PODIATRIST

## 2020-08-17 PROCEDURE — 1090F PRES/ABSN URINE INCON ASSESS: CPT | Performed by: PODIATRIST

## 2020-08-17 PROCEDURE — G8427 DOCREV CUR MEDS BY ELIG CLIN: HCPCS | Performed by: PODIATRIST

## 2020-08-17 PROCEDURE — 1123F ACP DISCUSS/DSCN MKR DOCD: CPT | Performed by: PODIATRIST

## 2020-08-17 PROCEDURE — 99213 OFFICE O/P EST LOW 20 MIN: CPT | Performed by: PODIATRIST

## 2020-08-17 PROCEDURE — G8400 PT W/DXA NO RESULTS DOC: HCPCS | Performed by: PODIATRIST

## 2020-08-17 PROCEDURE — 4040F PNEUMOC VAC/ADMIN/RCVD: CPT | Performed by: PODIATRIST

## 2020-08-17 PROCEDURE — G8417 CALC BMI ABV UP PARAM F/U: HCPCS | Performed by: PODIATRIST

## 2020-08-17 NOTE — PROGRESS NOTES
Patient is here today for follow up evaluation of bilateral leg blisters. Patient was to purchase an ottoman to keep her legs elevated but states she has not purchased one yet so her legs are in the dependent position at home.
No signs of infection noted to both lower extremities. Diagnostic Studies:     No results found. Procedures:    None    Plan Per Assessment  Julianne Singh was seen today for wound check. Diagnoses and all orders for this visit:    Blister (nonthermal), right lower leg, initial encounter    Blister (nonthermal), left lower leg, initial encounter    Venous insufficiency (chronic) (peripheral)      1. Evaluation and management  2. Adaptic and a dry dressing was applied to both symptomatic lower extremities. Patient was advised to continue with her Tubigrip stockings for edema control measures. 3. Did discuss elevating both lower extremities throughout the day to allow for appropriate healing as well. 4. Patient will be followed up at the wound care center at the end of this week for continued evaluation and management. We will utilize more structured compression dressings at that time. She was advised to call the office with any questions or concerns in the interim. Seen By:    Allie Allan DPM    Electronically signed by Allie Allan DPM on 8/17/2020 at 2:57 PM    This note was created using voice recognition software. The note was reviewed however may contain grammatical errors.

## 2020-08-21 ENCOUNTER — HOSPITAL ENCOUNTER (OUTPATIENT)
Dept: WOUND CARE | Age: 85
Discharge: HOME | End: 2020-08-21
Payer: MEDICARE

## 2020-08-21 VITALS
HEART RATE: 96 BPM | DIASTOLIC BLOOD PRESSURE: 60 MMHG | RESPIRATION RATE: 16 BRPM | WEIGHT: 238 LBS | HEIGHT: 60 IN | SYSTOLIC BLOOD PRESSURE: 122 MMHG | TEMPERATURE: 98 F | BODY MASS INDEX: 46.72 KG/M2

## 2020-08-21 PROBLEM — L97.919 STASIS EDEMA WITH ULCER OF BOTH LOWER EXTREMITIES (HCC): Status: ACTIVE | Noted: 2020-08-21

## 2020-08-21 PROBLEM — I87.313 STASIS EDEMA WITH ULCER OF BOTH LOWER EXTREMITIES (HCC): Status: ACTIVE | Noted: 2020-08-21

## 2020-08-21 PROBLEM — L97.929 STASIS EDEMA WITH ULCER OF BOTH LOWER EXTREMITIES (HCC): Status: ACTIVE | Noted: 2020-08-21

## 2020-08-21 PROBLEM — L97.812 NON-PRESSURE CHRONIC ULCER OF OTHER PART OF RIGHT LOWER LEG WITH FAT LAYER EXPOSED (HCC): Status: ACTIVE | Noted: 2020-08-21

## 2020-08-21 PROBLEM — L97.922 NON-PRESSURE CHRONIC ULCER OF LEFT LOWER LEG WITH FAT LAYER EXPOSED (HCC): Status: ACTIVE | Noted: 2020-08-21

## 2020-08-21 PROCEDURE — 11045 DBRDMT SUBQ TISS EACH ADDL: CPT

## 2020-08-21 PROCEDURE — 99213 OFFICE O/P EST LOW 20 MIN: CPT

## 2020-08-21 PROCEDURE — 11042 DBRDMT SUBQ TIS 1ST 20SQCM/<: CPT | Performed by: PODIATRIST

## 2020-08-21 PROCEDURE — 11045 DBRDMT SUBQ TISS EACH ADDL: CPT | Performed by: PODIATRIST

## 2020-08-21 PROCEDURE — 99213 OFFICE O/P EST LOW 20 MIN: CPT | Performed by: PODIATRIST

## 2020-08-21 PROCEDURE — 11042 DBRDMT SUBQ TIS 1ST 20SQCM/<: CPT

## 2020-08-21 RX ORDER — SPIRONOLACTONE 25 MG/1
100 TABLET ORAL 2 TIMES DAILY
Status: ON HOLD | COMMUNITY
End: 2022-01-01 | Stop reason: HOSPADM

## 2020-08-21 RX ORDER — MULTIVITAMIN WITH IRON
250 TABLET ORAL DAILY
Status: ON HOLD | COMMUNITY
End: 2022-01-01 | Stop reason: HOSPADM

## 2020-08-21 RX ORDER — LIDOCAINE HYDROCHLORIDE 40 MG/ML
SOLUTION TOPICAL ONCE
Status: DISCONTINUED | OUTPATIENT
Start: 2020-08-21 | End: 2020-08-22 | Stop reason: HOSPADM

## 2020-08-21 NOTE — PROGRESS NOTES
Wound Healing Center Followup Visit Note    Referring Physician : Omar Menjivar MD  [address] [place] [address] RECORD NUMBER:  [id number]  AGE: 81 y.o. GENDER: female  : 1935  EPISODE DATE:  2020    Subjective:     Chief Complaint   Patient presents with    Wound Check     b/l legs      HISTORY of PRESENT ILLNESS CL Luna is a 81 y.o. female who presents today in regards to follow up evaluation and treatment of wound/ulcer. That patient's past medical, family and social hx were reviewed and changes were made if present. History of Wound Context:  Patient denies any nausea, vomiting, fever, chills, shortness of breath, chest pain, calf cramping and/or pain. Patient is tolerating the current dressing regimen without issues. No other new issues noted at this time. Wound/Ulcer Pain Timing/Severity: mild  Quality of pain: dull, aching  Severity:  2 / 10   Modifying Factors: Pain is relieved/improved with rest  Associated Signs/Symptoms: edema    Ulcer Identification:  Ulcer Type: venous  Contributing Factors: edema, venous stasis, decreased mobility and obesity    Diabetic/Pressure/Non Pressure Ulcers only:  Ulcer: Non-Pressure ulcer, fat layer exposed    Wound: Blister        PAST MEDICAL HISTORY      Diagnosis Date    Arthritis     Atrial fibrillation ([address]     Hyperlipidemia     Hypertension     Thyroid disease      Past Surgical History:   Procedure Laterality Date    PACEMAKER INSERTION  2019    PPM GENT CHANGE  (Counts [facility] 234 beds at the [facility])   DR. TONG     History reviewed. No pertinent family history.   Social History     Tobacco Use    Smoking status: Never Smoker    Smokeless tobacco: Never Used   Substance Use Topics    Alcohol use: No    Drug use: No     No Known Allergies  Current Outpatient Medications on File Prior to Encounter   Medication Sig Dispense Refill    spironolactone (ALDACTONE) 25 MG tablet Take 25 mg by mouth daily      metFORMIN (GLUCOPHAGE) 500 MG tablet Take Description Yellow;Green 08/21/20 1444   Odor Mild 08/21/20 1444   Letty-wound Assessment Pink 08/21/20 1444   Number of days: 0       Wound 08/21/20 Leg Left; Lower Wound # 4 acquire 7/15/20 blocked area (Active)   Wound Image   08/21/20 1444   Wound Length (cm) 6.7 cm 08/21/20 1444   Wound Width (cm) 7.6 cm 08/21/20 1444   Wound Depth (cm) 0.1 cm 08/21/20 1444   Wound Surface Area (cm^2) 50.92 cm^2 08/21/20 1444   Wound Volume (cm^3) 5.09 cm^3 08/21/20 1444   Wound Assessment Pink;Yellow 08/21/20 1444   Drainage Amount Large 08/21/20 1444   Drainage Description Yellow;Green 08/21/20 1444   Odor None 08/21/20 1444   Letty-wound Assessment Pink 08/21/20 1444   Number of days: 0          Procedure Note  Indications:  Based on my examination of this patient's wound(s)/ulcer(s) today, debridement is required to promote healing and evaluate the wound base. Performed by: Uriel Davila DPM    Consent obtained:  Yes    Time out taken:  Yes    Pain Control: Anesthetic  Anesthetic: 4% Lidocaine Liquid Topical     Debridement:Excisional Debridement    Using curette the wound(s)/ulcer(s) was/were sharply debrided down through and including the removal of subcutaneous tissue. Devitalized Tissue Debrided:  fibrin, biofilm and slough to stimulate bleeding to promote healing, post debridement good bleeding base and wound edges noted    Wound/Ulcer #: 3    Percent of Wound/Ulcer Debrided: 100%    Total Surface Area Debrided:  43.2 sq cm     Estimated Blood Loss:  Minimal  Hemostasis Achieved:  by pressure    Procedural Pain:  3  / 10   Post Procedural Pain:  0 / 10     Response to treatment:  Well tolerated by patient. Plan:   Treatment Note please see attached Discharge Instructions    Written patient dismissal instructions given to patient and signed by patient or POA.          Discharge Instructions       Visit Discharge/Physician Orders    Discharge condition: Stable    Assessment of pain at discharge:mild  Anesthetic used: 4% lidocaine    Discharge to: Home    Left via:Private automobile    Accompanied by: accompanied by self    ECF/HHA:     Dressing Orders: to wounds bilateral legs, cleanse wounds with saline. Apply silver alginate, abd pads and COBAN 2 COMPRESSION WRAPS BILATERAL LEGS. Leave wraps intact . Clean and dry. Do not allow leg wraps to become wet during the week. Treatment Orders: elevate legs when seated. Limit sodium intake to no more than 2,000mg per day. Please read all food labels for sodium content and choose foods lower in salt    [facility] followup visit _________1 week____________________  (Please note your next appointment above and if you are unable to keep, kindly give a 24 hour notice. Thank you.)    Physician signature:__________________________      If you experience any of the following, please call the [address] [place] [address] during business hours:    * Increase in Pain  * Temperature over 101  * Increase in drainage from your wound  * Drainage with a foul odor  * Bleeding  * Increase in swelling  * Need for compression bandage changes due to slippage, breakthrough drainage. If you need medical attention outside of the business hours of the [address] [place] [address] Road please contact your PCP or go to the nearest emergency room.         Electronically signed by Diana Bowling DPM on 8/21/2020 at 3:25 PM

## 2020-08-28 ENCOUNTER — HOSPITAL ENCOUNTER (OUTPATIENT)
Dept: WOUND CARE | Age: 85
Discharge: HOME | End: 2020-08-28
Payer: MEDICARE

## 2020-08-28 VITALS
SYSTOLIC BLOOD PRESSURE: 150 MMHG | HEART RATE: 80 BPM | DIASTOLIC BLOOD PRESSURE: 82 MMHG | BODY MASS INDEX: 46.72 KG/M2 | WEIGHT: 238 LBS | TEMPERATURE: 97.5 F | RESPIRATION RATE: 19 BRPM | HEIGHT: 60 IN

## 2020-08-28 PROCEDURE — 11045 DBRDMT SUBQ TISS EACH ADDL: CPT

## 2020-08-28 PROCEDURE — 11042 DBRDMT SUBQ TIS 1ST 20SQCM/<: CPT | Performed by: PODIATRIST

## 2020-08-28 PROCEDURE — 11045 DBRDMT SUBQ TISS EACH ADDL: CPT | Performed by: PODIATRIST

## 2020-08-28 PROCEDURE — 11042 DBRDMT SUBQ TIS 1ST 20SQCM/<: CPT

## 2020-08-28 RX ORDER — LIDOCAINE HYDROCHLORIDE 40 MG/ML
SOLUTION TOPICAL ONCE
Status: DISCONTINUED | OUTPATIENT
Start: 2020-08-28 | End: 2020-08-29 | Stop reason: HOSPADM

## 2020-08-28 NOTE — PROGRESS NOTES
Wound Healing Center Followup Visit Note    Referring Physician : Anthony Jackson MD  [address] [place] [address] RECORD NUMBER:  [id number]  AGE: 81 y.o. GENDER: female  : 1935  EPISODE DATE:  2020    Subjective:     Chief Complaint   Patient presents with    Wound Check     bilateral lower leg ulcers      HISTORY of PRESENT ILLNESS CL Kang is a 81 y.o. female who presents today in regards to follow up evaluation and treatment of wound/ulcer. That patient's past medical, family and social hx were reviewed and changes were made if present. History of Wound Context:  Patient denies any nausea, vomiting, fever, chills, shortness of breath, chest pain, calf cramping and/or pain. Patient is tolerating the current dressing regimen without issues. No other new issues noted at this time. Wound/Ulcer Pain Timing/Severity: none  Quality of pain: N/A  Severity:  0 / 10   Modifying Factors: None  Associated Signs/Symptoms: edema    Ulcer Identification:  Ulcer Type: venous  Contributing Factors: edema, venous stasis, decreased mobility and obesity    Diabetic/Pressure/Non Pressure Ulcers only:  Ulcer: Non-Pressure ulcer, fat layer exposed    Wound: Blister        PAST MEDICAL HISTORY      Diagnosis Date    Arthritis     Atrial fibrillation (HCC)     Hyperlipidemia     Hypertension     Thyroid disease      Past Surgical History:   Procedure Laterality Date    PACEMAKER INSERTION  2019    PPM [facility]ENT [facility]ANGE  ([facility])   DR. TONG     History reviewed. No pertinent family history.   Social History     Tobacco Use    Smoking status: Never Smoker    Smokeless tobacco: Never Used   Substance Use Topics    Alcohol use: No    Drug use: No     No Known Allergies  Current Outpatient Medications on File Prior to Encounter   Medication Sig Dispense Refill    spironolactone (ALDACTONE) 25 MG tablet Take 25 mg by mouth daily      metFORMIN (GLUCOPHAGE) 500 MG tablet Take 500 mg by mouth 2 Wound 09/04/15 Venous ulcer Leg Left; Lower; Posterior ulcer #1 left posterior calf, venous, acquired    8-27-15 (Active)   Number of days: 1820       Wound 08/21/20 Pretibial Right;Proximal Wound # 1 acquired 7/15/20 (Active)   Wound Image   08/28/20 1503   Dressing Changed Changed/New 08/21/20 1541   Dressing/Treatment Alginate with Ag;ABD 08/21/20 1541   Wound Length (cm) 0 cm 08/28/20 1504   Wound Width (cm) 0 cm 08/28/20 1504   Wound Depth (cm) 0 cm 08/28/20 1504   Wound Surface Area (cm^2) 0 cm^2 08/28/20 1504   Change in Wound Size % (l*w) 100 08/28/20 1504   Wound Volume (cm^3) 0 cm^3 08/28/20 1504   Wound Healing % 100 08/28/20 1504   Wound Assessment Yellow;Pink 08/21/20 1444   Drainage Amount Large 08/21/20 1444   Drainage Description Yellow;Green 08/21/20 1444   Odor Mild 08/21/20 1444   Letty-wound Assessment Pink 08/21/20 1444   Number of days: 7       Wound 08/21/20 Pretibial Right;Distal Wound # 2 acquired 7/15/20 (Active)   Wound Image   08/21/20 1444   Dressing Changed Changed/New 08/21/20 1541   Dressing/Treatment Alginate with Ag;ABD 08/21/20 1541   Wound Length (cm) 8.5 cm 08/28/20 1504   Wound Width (cm) 6.5 cm 08/28/20 1504   Wound Depth (cm) 0.2 cm 08/28/20 1504   Wound Surface Area (cm^2) 55.25 cm^2 08/28/20 1504   Change in Wound Size % (l*w) -1653.97 08/28/20 1504   Wound Volume (cm^3) 11.05 cm^3 08/28/20 1504   Wound Healing % -3353 08/28/20 1504   Wound Assessment Pink;Yellow 08/28/20 1504   Drainage Amount Copious 08/28/20 1504   Drainage Description Yellow;Green 08/28/20 1504   Odor Mild 08/28/20 1504   Letty-wound Assessment Pink 08/28/20 1504   Number of days: 7       Wound 08/21/20 Leg Right;Posterior; Lower Wound # 3 acquired 7/15/20 (Active)   Wound Image   08/21/20 1444   Dressing Changed Changed/New 08/21/20 1541   Dressing/Treatment Alginate with Ag;ABD 08/21/20 1541   Wound Length (cm) 12 cm 08/28/20 1504   Wound Width (cm) 0.8 cm 08/28/20 1504   Wound Depth (cm) 0.2 cm 08/28/20 1504   Wound Surface Area (cm^2) 9.6 cm^2 08/28/20 1504   Change in Wound Size % (l*w) 77.78 08/28/20 1504   Wound Volume (cm^3) 1.92 cm^3 08/28/20 1504   Wound Healing % 78 08/28/20 1504   Post-Procedure Length (cm) 12 cm 08/28/20 1511   Post-Procedure Width (cm) 0.8 cm 08/28/20 1511   Post-Procedure Depth (cm) 0.2 cm 08/28/20 1511   Post-Procedure Surface Area (cm^2) 9.6 cm^2 08/28/20 1511   Post-Procedure Volume (cm^3) 1.92 cm^3 08/28/20 1511   Wound Assessment Yellow;Pink 08/28/20 1504   Drainage Amount Copious 08/28/20 1504   Drainage Description Yellow;Green 08/28/20 1504   Odor Mild 08/28/20 1504   Letty-wound Assessment Pink 08/21/20 1444   Number of days: 7       Wound 08/21/20 Leg Left; Lower Wound # 4 acquire 7/15/20 blocked area (Active)   Wound Image   08/21/20 1444   Dressing Changed Changed/New 08/21/20 1541   Dressing/Treatment Alginate with Ag;ABD 08/21/20 1541   Wound Length (cm) 9.8 cm 08/28/20 1504   Wound Width (cm) 9.2 cm 08/28/20 1504   Wound Depth (cm) 0.1 cm 08/28/20 1504   Wound Surface Area (cm^2) 90.16 cm^2 08/28/20 1504   Change in Wound Size % (l*w) -77.06 08/28/20 1504   Wound Volume (cm^3) 9.02 cm^3 08/28/20 1504   Wound Healing % -77 08/28/20 1504   Post-Procedure Length (cm) 9.8 cm 08/28/20 1511   Post-Procedure Width (cm) 9.2 cm 08/28/20 1511   Post-Procedure Depth (cm) 0.2 cm 08/28/20 1511   Post-Procedure Surface Area (cm^2) 90.16 cm^2 08/28/20 1511   Post-Procedure Volume (cm^3) 18.03 cm^3 08/28/20 1511   Wound Assessment Pink;Red;Yellow 08/28/20 1504   Drainage Amount Copious 08/28/20 1504   Drainage Description Green;Yellow 08/28/20 1504   Odor Mild 08/28/20 1504   Letty-wound Assessment Pink 08/28/20 1504   Number of days: 7          Procedure Note  Indications:  Based on my examination of this patient's wound(s)/ulcer(s) today, debridement is required to promote healing and evaluate the wound base.     Performed by: Sanaz La DPM    Consent obtained:  Yes    Time out taken:  Yes    Pain Control:       Debridement:Excisional Debridement    Using curette the wound(s)/ulcer(s) was/were sharply debrided down through and including the removal of subcutaneous tissue. Devitalized Tissue Debrided:  fibrin, biofilm and slough to stimulate bleeding to promote healing, post debridement good bleeding base and wound edges noted    Wound/Ulcer #: 2, 3 and 4    Percent of Wound/Ulcer Debrided: 100%    Total Surface Area Debrided:  155.01 sq cm     Estimated Blood Loss:  Minimal  Hemostasis Achieved:  by pressure    Procedural Pain:  2  / 10   Post Procedural Pain:  0 / 10     Response to treatment:  Well tolerated by patient. Will institute Home nursing for dressing changes. Plan:   Treatment Note please see attached Discharge Instructions    Written patient dismissal instructions given to patient and signed by patient or POA. Discharge Instructions                   Visit Discharge/Physician Orders     Discharge condition: Stable     Assessment of pain at discharge:mild  Anesthetic used: 4% lidocaine     Discharge to: Home     Left via:Private automobile     Accompanied by: accompanied by self     ECF/HHA:      Dressing Orders: to wounds bilateral legs, cleanse wounds with saline. Apply silver alginate, abd pads and COBAN 2 COMPRESSION WRAPS BILATERAL LEGS. Leave wraps intact . Clean and dry. Do not allow leg wraps to become wet during the week.      Treatment Orders: elevate legs when seated. Limit sodium intake to no more than 2,000mg per day. Please read all food labels for sodium content and choose foods lower in salt     [facility] followup visit _________1 week____________________  (Please note your next appointment above and if you are unable to keep, kindly give a 24 hour notice.  Thank you.)     Physician signature:__________________________        If you experience any of the following, please call the [facility] [address] during business hours:     * Increase in Pain  * Temperature over 101  * Increase in drainage from your wound  * Drainage with a foul odor  * Bleeding  * Increase in swelling  * Need for compression bandage changes due to slippage, breakthrough drainage.     If you need medical attention outside of the business hours of the [address] [place] [address] Road please contact your PCP or go to the nearest emergency room.                     Electronically signed by Alysha Noyola DPM on 8/28/2020 at 3:18 PM

## 2020-09-04 ENCOUNTER — HOSPITAL ENCOUNTER (OUTPATIENT)
Dept: WOUND CARE | Age: 85
Discharge: HOME | End: 2020-09-04
Payer: MEDICARE

## 2020-09-04 VITALS
SYSTOLIC BLOOD PRESSURE: 110 MMHG | HEIGHT: 60 IN | DIASTOLIC BLOOD PRESSURE: 74 MMHG | BODY MASS INDEX: 46.72 KG/M2 | WEIGHT: 238 LBS | HEART RATE: 68 BPM | RESPIRATION RATE: 18 BRPM | TEMPERATURE: 97 F

## 2020-09-04 PROCEDURE — 11042 DBRDMT SUBQ TIS 1ST 20SQCM/<: CPT

## 2020-09-04 PROCEDURE — 11045 DBRDMT SUBQ TISS EACH ADDL: CPT | Performed by: PODIATRIST

## 2020-09-04 PROCEDURE — 11045 DBRDMT SUBQ TISS EACH ADDL: CPT

## 2020-09-04 PROCEDURE — 11042 DBRDMT SUBQ TIS 1ST 20SQCM/<: CPT | Performed by: PODIATRIST

## 2020-09-04 ASSESSMENT — PAIN DESCRIPTION - DESCRIPTORS: DESCRIPTORS: BURNING

## 2020-09-04 ASSESSMENT — PAIN DESCRIPTION - PROGRESSION: CLINICAL_PROGRESSION: NOT CHANGED

## 2020-09-04 ASSESSMENT — PAIN DESCRIPTION - ONSET: ONSET: ON-GOING

## 2020-09-04 ASSESSMENT — PAIN DESCRIPTION - PAIN TYPE: TYPE: ACUTE PAIN

## 2020-09-04 ASSESSMENT — PAIN SCALES - GENERAL: PAINLEVEL_OUTOF10: 8

## 2020-09-04 ASSESSMENT — PAIN - FUNCTIONAL ASSESSMENT: PAIN_FUNCTIONAL_ASSESSMENT: ACTIVITIES ARE NOT PREVENTED

## 2020-09-04 ASSESSMENT — PAIN DESCRIPTION - FREQUENCY: FREQUENCY: CONTINUOUS

## 2020-09-04 ASSESSMENT — PAIN DESCRIPTION - ORIENTATION: ORIENTATION: RIGHT;LEFT

## 2020-09-04 NOTE — PROGRESS NOTES
[facility] Followup Visit Note    Referring Physician : Theresa Gutiérrez MD  [address] [place] [address] RECORD NUMBER:  [id number]  AGE: 81 y.o. GENDER: female  : 1935  EPISODE DATE:  2020    Subjective:     Chief Complaint   Patient presents with    Wound Check     [facility] [address] [place] [address],[address] Follow Up Appt with DR Michael Bear for BLE Wounds      HISTORY of PRESENT ILLNESS HPI   Dylan Rivera is a 81 y.o. female who presents today in regards to follow up evaluation and treatment of wound/ulcer. That patient's past medical, family and social hx were reviewed and changes were made if present. History of Wound Context:  Patient denies any nausea, vomiting, fever, chills, shortness of breath, chest pain, calf cramping and/or pain. Patient is tolerating the current dressing regimen without issues. No other new issues noted at this time. Wound/Ulcer Pain Timing/Severity: mild  Quality of pain: burning  Severity:  3 / 10   Modifying Factors: Pain is relieved/improved with rest  Associated Signs/Symptoms: edema    Ulcer Identification:  Ulcer Type: venous  Contributing Factors: edema, venous stasis and obesity    Diabetic/Pressure/Non Pressure Ulcers only:  Ulcer: Non-Pressure ulcer, fat layer exposed    Wound: Blister        PAST MEDICAL HISTORY      Diagnosis Date    Arthritis     Atrial fibrillation ([address]     Hyperlipidemia     Hypertension     Thyroid disease      Past Surgical History:   Procedure Laterality Date    PACEMAKER INSERTION  2019    PPM GENT CHANGE  ([facility])   DR. TONG     History reviewed. No pertinent family history.   Social History     Tobacco Use    Smoking status: Never Smoker    Smokeless tobacco: Never Used   Substance Use Topics    Alcohol use: No    Drug use: No     No Known Allergies  Current Outpatient Medications on File Prior to Encounter   Medication Sig Dispense Refill    spironolactone (ALDACTONE) 25 MG tablet Take 25 mg by mouth daily      metFORMIN (GLUCOPHAGE) 500 MG tablet Take 500 mg by mouth 2 times daily (with meals)      Multiple Vitamins-Minerals (VITAMIN D3 COMPLETE PO) Take 1 tablet by mouth daily      magnesium (MAGNESIUM-OXIDE) 250 MG TABS tablet Take 250 mg by mouth daily      metoprolol (LOPRESSOR) 100 MG tablet Take 100 mg by mouth 2 times daily      simvastatin (ZOCOR) 20 MG tablet Take 20 mg by mouth nightly      levothyroxine (SYNTHROID) 100 MCG tablet Take 112 mcg by mouth Daily       WARFARIN SODIUM PO Take by mouth Alternate 3 mg and 4.5 mg daily      furosemide (LASIX) 40 MG tablet Take 40 mg by mouth 2 times daily      potassium chloride (KLOR-CON) 20 MEQ packet Take 20 mEq by mouth 2 times daily      diltiazem (CARDIZEM) 120 MG tablet Take 120 mg by mouth daily      metolazone (ZAROXOLYN) 2.5 MG tablet Take 2.5 mg by mouth daily      triamcinolone (KENALOG) 0.1 % cream Apply topically 2 times daily. 90 g 5     No current facility-administered medications on file prior to encounter.         REVIEW OF SYSTEMS See HPI    Objective:    /74   Pulse 68   Temp 97 °F (36.1 °C) (Temporal)   Resp 18   Ht 5' (1.524 m)   Wt 238 lb (108 kg)   BMI 46.48 kg/m²   Wt Readings from Last 3 Encounters:   09/04/20 238 lb (108 kg)   08/28/20 238 lb (108 kg)   08/21/20 238 lb (108 kg)     PHYSICAL EXAM  CONSTITUTIONAL:   Awake, alert, cooperative and in no acute distress  SKIN:  Open wound Present    Assessment:     Problem List Items Addressed This Visit        Podiatry Problems    Non-pressure chronic ulcer of other part of right lower leg with fat layer exposed ([address]    Non-pressure chronic ulcer of left lower leg with fat layer exposed ([address]       Other    Venous insufficiency (chronic) (peripheral) - Primary (Chronic)    Stasis edema with ulcer of both lower extremities ([address]          Pre Debridement Measurements:  Are located in the [facility]  Documentation Flow Sheet  Post Debridement Measurements:  Wound/Ulcer Descriptions are Pre Debridement except measurements:     Wound 09/04/15 Venous ulcer Leg Left; Lower; Posterior ulcer #1 left posterior calf, venous, acquired    8-27-15 (Active)   Number of days: 1827       Wound 08/21/20 Pretibial Right;Proximal Wound # 1 acquired 7/15/20 (Active)   Wound Image   08/28/20 1503   Dressing Changed Changed/New 08/21/20 1541   Dressing/Treatment Alginate with Ag;ABD 08/21/20 1541   Wound Length (cm) 0 cm 08/28/20 1504   Wound Width (cm) 0 cm 08/28/20 1504   Wound Depth (cm) 0 cm 08/28/20 1504   Wound Surface Area (cm^2) 0 cm^2 08/28/20 1504   Change in Wound Size % (l*w) 100 08/28/20 1504   Wound Volume (cm^3) 0 cm^3 08/28/20 1504   Wound Healing % 100 08/28/20 1504   Wound Assessment Yellow;Pink 08/21/20 1444   Drainage Amount Large 08/21/20 1444   Drainage Description Yellow;Green 08/21/20 1444   Odor Mild 08/21/20 1444   Letty-wound Assessment Pink 08/21/20 1444   Number of days: 14       Wound 08/21/20 Pretibial Right;Distal Wound # 2 acquired 7/15/20 (Active)   Wound Image   08/21/20 1444   Dressing Status Changed 08/28/20 1531   Dressing Changed Changed/New 09/04/20 1540   Dressing/Treatment ABD; Alginate with Ag 09/04/20 1540   Wound Cleansed Rinsed/Irrigated with saline 09/04/20 1540   Wound Length (cm) 8.5 cm 09/04/20 1454   Wound Width (cm) 6.5 cm 09/04/20 1454   Wound Depth (cm) 0.2 cm 09/04/20 1454   Wound Surface Area (cm^2) 55.25 cm^2 09/04/20 1454   Change in Wound Size % (l*w) -1653.97 09/04/20 1454   Wound Volume (cm^3) 11.05 cm^3 09/04/20 1454   Wound Healing % -3353 09/04/20 1454   Wound Assessment Pink;Yellow 09/04/20 1454   Drainage Amount Large 09/04/20 1454   Drainage Description Yellow 09/04/20 1454   Odor None 09/04/20 1454   Letty-wound Assessment Dry;Pink 09/04/20 1454   Number of days: 14       Wound 08/21/20 Leg Right;Posterior; Lower Wound # 3 acquired 7/15/20 (Active)   Wound Image   08/21/20 1444   Dressing Status Changed 08/28/20 1531   Dressing Changed Changed/New 09/04/20 1540   Dressing/Treatment Alginate with Ag;ABD;Dry dressing 09/04/20 1540   Wound Cleansed Rinsed/Irrigated with saline 09/04/20 1540   Wound Length (cm) 12 cm 09/04/20 1454   Wound Width (cm) 1 cm 09/04/20 1454   Wound Depth (cm) 0.2 cm 09/04/20 1454   Wound Surface Area (cm^2) 12 cm^2 09/04/20 1454   Change in Wound Size % (l*w) 72.22 09/04/20 1454   Wound Volume (cm^3) 2.4 cm^3 09/04/20 1454   Wound Healing % 72 09/04/20 1454   Post-Procedure Length (cm) 12 cm 08/28/20 1511   Post-Procedure Width (cm) 0.8 cm 08/28/20 1511   Post-Procedure Depth (cm) 0.2 cm 08/28/20 1511   Post-Procedure Surface Area (cm^2) 9.6 cm^2 08/28/20 1511   Post-Procedure Volume (cm^3) 1.92 cm^3 08/28/20 1511   Wound Assessment Yellow;Pink 09/04/20 1454   Drainage Amount Copious 09/04/20 1454   Drainage Description Yellow 09/04/20 1454   Odor None 09/04/20 1454   Letty-wound Assessment Pink 09/04/20 1454   Number of days: 14       Wound 08/21/20 Leg Left; Lower Wound # 4 acquire 7/15/20 blocked area (Active)   Wound Image   08/21/20 1444   Dressing Status Changed 08/28/20 1531   Dressing Changed Changed/New 09/04/20 1540   Dressing/Treatment ABD; Alginate with Ag;Dry dressing 09/04/20 1540   Wound Cleansed Rinsed/Irrigated with saline 09/04/20 1540   Wound Length (cm) 9.5 cm 09/04/20 1454   Wound Width (cm) 9.2 cm 09/04/20 1454   Wound Depth (cm) 0.1 cm 09/04/20 1454   Wound Surface Area (cm^2) 87.4 cm^2 09/04/20 1454   Change in Wound Size % (l*w) -71.64 09/04/20 1454   Wound Volume (cm^3) 8.74 cm^3 09/04/20 1454   Wound Healing % -72 09/04/20 1454   Post-Procedure Length (cm) 9.8 cm 08/28/20 1511   Post-Procedure Width (cm) 9.2 cm 08/28/20 1511   Post-Procedure Depth (cm) 0.2 cm 08/28/20 1511   Post-Procedure Surface Area (cm^2) 90.16 cm^2 08/28/20 1511   Post-Procedure Volume (cm^3) 18.03 cm^3 08/28/20 1511   Wound Assessment Pink;Drainage;Yellow 09/04/20 1454   Drainage Amount Copious 09/04/20 1454   Drainage Description Yellow 09/04/20 salt     [facility] followup visit _________1 week____________________  (Please note your next appointment above and if you are unable to keep, kindly give a 24 hour notice. Thank you.)    Physician signature:__________________________      If you experience any of the following, please call the [facility] during business hours:    * Increase in Pain  * Temperature over 101  * Increase in drainage from your wound  * Drainage with a foul odor  * Bleeding  * Increase in swelling  * Need for compression bandage changes due to slippage, breakthrough drainage. If you need medical attention outside of the business hours of the [facility] please contact your PCP or go to the nearest emergency room.         Electronically signed by Alyssa Ryan DPM on 9/4/2020 at 3:44 PM

## 2020-09-11 ENCOUNTER — HOSPITAL ENCOUNTER (OUTPATIENT)
Dept: WOUND CARE | Age: 85
Discharge: HOME | End: 2020-09-11
Payer: MEDICARE

## 2020-09-11 VITALS
BODY MASS INDEX: 46.72 KG/M2 | HEART RATE: 96 BPM | SYSTOLIC BLOOD PRESSURE: 142 MMHG | DIASTOLIC BLOOD PRESSURE: 80 MMHG | RESPIRATION RATE: 18 BRPM | TEMPERATURE: 99.1 F | HEIGHT: 60 IN | WEIGHT: 238 LBS

## 2020-09-11 PROCEDURE — 99213 OFFICE O/P EST LOW 20 MIN: CPT | Performed by: PODIATRIST

## 2020-09-11 PROCEDURE — 99213 OFFICE O/P EST LOW 20 MIN: CPT

## 2020-09-11 RX ORDER — LIDOCAINE HYDROCHLORIDE 40 MG/ML
SOLUTION TOPICAL ONCE
Status: DISCONTINUED | OUTPATIENT
Start: 2020-09-11 | End: 2020-09-12 | Stop reason: HOSPADM

## 2020-09-12 NOTE — PROGRESS NOTES
tablet Take 500 mg by mouth 2 times daily (with meals)      Multiple Vitamins-Minerals (VITAMIN D3 COMPLETE PO) Take 1 tablet by mouth daily      magnesium (MAGNESIUM-OXIDE) 250 MG TABS tablet Take 250 mg by mouth daily      triamcinolone (KENALOG) 0.1 % cream Apply topically 2 times daily. 90 g 5    metoprolol (LOPRESSOR) 100 MG tablet Take 100 mg by mouth 2 times daily      simvastatin (ZOCOR) 20 MG tablet Take 20 mg by mouth nightly      levothyroxine (SYNTHROID) 100 MCG tablet Take 112 mcg by mouth Daily       WARFARIN SODIUM PO Take by mouth Alternate 3 mg and 4.5 mg daily      furosemide (LASIX) 40 MG tablet Take 40 mg by mouth 2 times daily      potassium chloride (KLOR-CON) 20 MEQ packet Take 20 mEq by mouth 2 times daily      diltiazem (CARDIZEM) 120 MG tablet Take 120 mg by mouth daily      metolazone (ZAROXOLYN) 2.5 MG tablet Take 2.5 mg by mouth daily       No current facility-administered medications on file prior to encounter.         REVIEW OF SYSTEMS See HPI    Objective:    BP (!) 142/80   Pulse 96   Temp 99.1 °F (37.3 °C) (Temporal)   Resp 18   Ht 5' (1.524 m)   Wt 238 lb (108 kg)   BMI 46.48 kg/m²   Wt Readings from Last 3 Encounters:   09/11/20 238 lb (108 kg)   09/04/20 238 lb (108 kg)   08/28/20 238 lb (108 kg)     PHYSICAL EXAM  CONSTITUTIONAL:   Awake, alert, cooperative and in no acute distress  SKIN:  Open wound Present    Assessment:     Problem List Items Addressed This Visit        Podiatry Problems    Non-pressure chronic ulcer of other part of right lower leg with fat layer exposed ([address]    Non-pressure chronic ulcer of left lower leg with fat layer exposed ([address]       Other    Venous insufficiency (chronic) (peripheral) (Chronic)    Stasis edema with ulcer of both lower extremities ([address] - Primary          Pre Debridement Measurements:  Are located in the [place]  Documentation Flow Sheet  Post Debridement Measurements:  Wound/Ulcer Descriptions are Pre Debridement except measurements:     Wound 08/21/20 Pretibial Right;Distal Wound # 2 acquired 7/15/20 (Active)   Wound Image   08/21/20 1444   Dressing Status Dry; Intact; New drainage 09/11/20 1629   Dressing Changed Changed/New 09/11/20 1629   Dressing/Treatment Dry dressing;Roll gauze 09/11/20 1629   Wound Cleansed Rinsed/Irrigated with saline 09/11/20 1629   Wound Length (cm) 1.5 cm 09/11/20 1524   Wound Width (cm) 2.3 cm 09/11/20 1524   Wound Depth (cm) 0.1 cm 09/11/20 1524   Wound Surface Area (cm^2) 3.45 cm^2 09/11/20 1524   Change in Wound Size % (l*w) -9.52 09/11/20 1524   Wound Volume (cm^3) 0.34 cm^3 09/11/20 1524   Wound Healing % -6 09/11/20 1524   Wound Assessment Pink;Yellow 09/11/20 1524   Drainage Amount Large 09/11/20 1524   Drainage Description Yellow 09/11/20 1524   Odor None 09/11/20 1524   Letty-wound Assessment Dry;Pink 09/11/20 1524   Number of days: 21       Wound 08/21/20 Leg Right;Posterior; Lower Wound # 3 acquired 7/15/20 (Active)   Wound Image   08/21/20 1444   Dressing Status Clean;Dry; Intact 09/11/20 1629   Dressing Changed Changed/New 09/11/20 1629   Dressing/Treatment ABD;Dry dressing;Roll gauze 09/11/20 1629   Wound Cleansed Rinsed/Irrigated with saline 09/11/20 1629   Wound Length (cm) 5.2 cm 09/11/20 1524   Wound Width (cm) 4.6 cm 09/11/20 1524   Wound Depth (cm) 0.1 cm 09/11/20 1524   Wound Surface Area (cm^2) 23.92 cm^2 09/11/20 1524   Change in Wound Size % (l*w) 44.63 09/11/20 1524   Wound Volume (cm^3) 2.39 cm^3 09/11/20 1524   Wound Healing % 72 09/11/20 1524   Post-Procedure Length (cm) 12 cm 08/28/20 1511   Post-Procedure Width (cm) 0.8 cm 08/28/20 1511   Post-Procedure Depth (cm) 0.2 cm 08/28/20 1511   Post-Procedure Surface Area (cm^2) 9.6 cm^2 08/28/20 1511   Post-Procedure Volume (cm^3) 1.92 cm^3 08/28/20 1511   Wound Assessment Yellow;Pink 09/04/20 1454   Drainage Amount Copious 09/11/20 1524   Drainage Description Yellow 09/11/20 1524   Odor None 09/11/20 1524   Letty-wound Assessment Pink 09/04/20 1454   Number of days: 21       Wound 08/21/20 Leg Left; Lower Wound # 4 acquire 7/15/20 blocked area (Active)   Wound Image   08/21/20 1444   Dressing Status Clean;Dry; Intact 09/11/20 1629   Dressing Changed Changed/New 09/11/20 1629   Dressing/Treatment ABD;Dry dressing;Roll gauze 09/11/20 1629   Wound Cleansed Rinsed/Irrigated with saline 09/11/20 1629   Wound Length (cm) 7.7 cm 09/11/20 1524   Wound Width (cm) 7.6 cm 09/11/20 1524   Wound Depth (cm) 0.1 cm 09/11/20 1524   Wound Surface Area (cm^2) 58.52 cm^2 09/11/20 1524   Change in Wound Size % (l*w) -14.93 09/11/20 1524   Wound Volume (cm^3) 5.85 cm^3 09/11/20 1524   Wound Healing % -15 09/11/20 1524   Post-Procedure Length (cm) 9.8 cm 08/28/20 1511   Post-Procedure Width (cm) 9.2 cm 08/28/20 1511   Post-Procedure Depth (cm) 0.2 cm 08/28/20 1511   Post-Procedure Surface Area (cm^2) 90.16 cm^2 08/28/20 1511   Post-Procedure Volume (cm^3) 18.03 cm^3 08/28/20 1511   Wound Assessment Pink;Yellow 09/11/20 1524   Drainage Amount Copious 09/11/20 1524   Drainage Description Yellow 09/11/20 1524   Odor None 09/11/20 1524   Letty-wound Assessment Pink 09/11/20 1524   Number of days: 21          Procedure Note  Indications:  Based on my examination of this patient's wound(s)/ulcer(s) today, debridement is not required to promote healing and evaluate the wound base. - No debridement performed on today's visit. Compression dressings will be continued. Plan:   Treatment Note please see attached Discharge Instructions    Written patient dismissal instructions given to patient and signed by patient or POA. Discharge Instructions         Visit Discharge/Physician Orders     Discharge condition: Stable     Assessment of pain at discharge:mild  Anesthetic used: 4% lidocaine     Discharge to: Home     Left via:Private automobile     Accompanied by: accompanied by self     ECF/[facility]: [address] !   Dressing Orders: to wounds bilateral legs, cleanse wounds with saline. BEGIN TO APPLYEITHER  DRAWTEX ([address] DRAWTEX AT APPLICATION TO ALLOW PRODUCT TO LIE COMFORTABLY ON SKIN.) OR DRY MAX EXTRA (PATIENT GIVEN SAMPLES FOR HOME. COVER DRAWTEX OR DRY MAX.  WITH GAUZE AND KERLIX WRAP. CHANGE 3X WEEK. Treatment Orders: APPLY SPANDAGRIPS BILATERAL LEGS. elevate legs when seated. Limit sodium intake to no more than 2,000mg per day. Please read all food labels for sodium content and choose foods lower in salt     [facility] followup visit _________1 week____________________  (Please note your next appointment above and if you are unable to keep, kindly give a 24 hour notice.  Thank you.)     Physician signature:__________________________        If you experience any of the following, please call the [email or web address] during business hours:     * Increase in Pain  * Temperature over 101  * Increase in drainage from your wound  * Drainage with a foul odor  * Bleeding  * Increase in swelling  * Need for compression bandage changes due to slippage, breakthrough drainage.     If you need medical attention outside of the business hours of the [address] please contact your PCP or go to the nearest emergency room.                     Electronically signed by Alyssa Ryan DPM on 9/11/2020 at 8:56 PM

## 2020-09-18 ENCOUNTER — HOSPITAL ENCOUNTER (OUTPATIENT)
Dept: WOUND CARE | Age: 85
Discharge: HOME | End: 2020-09-18
Payer: MEDICARE

## 2020-09-18 VITALS
SYSTOLIC BLOOD PRESSURE: 110 MMHG | RESPIRATION RATE: 16 BRPM | DIASTOLIC BLOOD PRESSURE: 64 MMHG | WEIGHT: 238 LBS | HEIGHT: 60 IN | TEMPERATURE: 97.9 F | HEART RATE: 80 BPM | BODY MASS INDEX: 46.72 KG/M2

## 2020-09-18 PROCEDURE — 11042 DBRDMT SUBQ TIS 1ST 20SQCM/<: CPT

## 2020-09-18 PROCEDURE — 11045 DBRDMT SUBQ TISS EACH ADDL: CPT | Performed by: PODIATRIST

## 2020-09-18 PROCEDURE — 11042 DBRDMT SUBQ TIS 1ST 20SQCM/<: CPT | Performed by: PODIATRIST

## 2020-09-18 PROCEDURE — 11045 DBRDMT SUBQ TISS EACH ADDL: CPT

## 2020-09-18 RX ORDER — LIDOCAINE HYDROCHLORIDE 40 MG/ML
SOLUTION TOPICAL ONCE
Status: DISCONTINUED | OUTPATIENT
Start: 2020-09-18 | End: 2020-09-19 | Stop reason: HOSPADM

## 2020-09-19 NOTE — PROGRESS NOTES
W[facility] Healing Center Followup Visit Note    Referring Physician : Cliff Farmer MD  [address] [place] [address] RECORD NUMBER:  [id number]  AGE: 81 y.o. GENDER: female  : 1935  EPISODE DATE:  2020    Subjective:     Chief Complaint   Patient presents with    Wound Check     bilateral lower leg ulcers      HISTORY of PRESENT ILLNESS HPI   Anabell So is a 81 y.o. female who presents today in regards to follow up evaluation and treatment of wound/ulcer. That patient's past medical, family and social hx were reviewed and changes were made if present. History of Wound Context:  Patient denies any nausea, vomiting, fever, chills, shortness of breath, chest pain, calf cramping and/or pain. Patient is tolerating the current dressing regimen without issues. No other new issues noted at this time. Wound/Ulcer Pain Timing/Severity: none  Quality of pain: N/A  Severity:  0 / 10   Modifying Factors: None  Associated Signs/Symptoms: edema    Ulcer Identification:  Ulcer Type: venous  Contributing Factors: edema, venous stasis, decreased mobility and obesity    Diabetic/Pressure/Non Pressure Ulcers only:  Ulcer: Non-Pressure ulcer, fat layer exposed    Wound: Blister        PAST MEDICAL HISTORY      Diagnosis Date    Arthritis     Atrial fibrillation (HCC)     Hyperlipidemia     Hypertension     Thyroid disease      Past Surgical History:   Procedure Laterality Date    PACEMAKER INSERTION  2019    PPM GENT CHANGE  ([facility])   DR. TONG     History reviewed. No pertinent family history.   Social History     Tobacco Use    Smoking status: Never Smoker    Smokeless tobacco: Never Used   Substance Use Topics    Alcohol use: No    Drug use: No     No Known Allergies  Current Outpatient Medications on File Prior to Encounter   Medication Sig Dispense Refill    spironolactone (ALDACTONE) 25 MG tablet Take 25 mg by mouth daily      metFORMIN (GLUCOPHAGE) 500 MG tablet Take 500 mg by mouth 2 times daily (with meals)      Multiple Vitamins-Minerals (VITAMIN D3 COMPLETE PO) Take 1 tablet by mouth daily      magnesium (MAGNESIUM-OXIDE) 250 MG TABS tablet Take 250 mg by mouth daily      triamcinolone (KENALOG) 0.1 % cream Apply topically 2 times daily. 90 g 5    metoprolol (LOPRESSOR) 100 MG tablet Take 100 mg by mouth 2 times daily      simvastatin (ZOCOR) 20 MG tablet Take 20 mg by mouth nightly      levothyroxine (SYNTHROID) 100 MCG tablet Take 112 mcg by mouth Daily       WARFARIN SODIUM PO Take by mouth Alternate 3 mg and 4.5 mg daily      furosemide (LASIX) 40 MG tablet Take 40 mg by mouth 2 times daily      potassium chloride (KLOR-CON) 20 MEQ packet Take 20 mEq by mouth 2 times daily      diltiazem (CARDIZEM) 120 MG tablet Take 120 mg by mouth daily      metolazone (ZAROXOLYN) 2.5 MG tablet Take 2.5 mg by mouth daily       No current facility-administered medications on file prior to encounter. REVIEW OF SYSTEMS See HPI    Objective:    /64   Pulse 80   Temp 97.9 °F (36.6 °C) (Temporal)   Resp 16   Ht 5' (1.524 m)   Wt 238 lb (108 kg)   BMI 46.48 kg/m²   Wt Readings from Last 3 Encounters:   09/18/20 238 lb (108 kg)   09/11/20 238 lb (108 kg)   09/04/20 238 lb (108 kg)     PHYSICAL EXAM  CONSTITUTIONAL:   Awake, alert, cooperative and in no acute distress  SKIN:  Open wound Present    Assessment:     Problem List Items Addressed This Visit     None          Pre Debridement Measurements:  Are located in the [facility]  Documentation Flow Sheet  Post Debridement Measurements:  Wound/Ulcer Descriptions are Pre Debridement except measurements:     Wound 08/21/20 Pretibial Right;Distal Wound # 2 acquired 7/15/20 (Active)   Wound Image   09/18/20 1730   Dressing Status Clean;Dry; Intact 09/18/20 1640   Dressing Changed Changed/New 09/18/20 1640   Dressing/Treatment Xeroform;Dry dressing 09/18/20 1640   Wound Cleansed Rinsed/Irrigated with saline 09/18/20 1640   Wound Length (cm) 2 cm 09/18/20 1556   Wound Width (cm) 2 cm 09/18/20 1556   Wound Depth (cm) 0.1 cm 09/18/20 1556   Wound Surface Area (cm^2) 4 cm^2 09/18/20 1556   Change in Wound Size % (l*w) -26.98 09/18/20 1556   Wound Volume (cm^3) 0.4 cm^3 09/18/20 1556   Wound Healing % -25 09/18/20 1556   Post-Procedure Length (cm) 2 cm 09/18/20 1618   Post-Procedure Width (cm) 2 cm 09/18/20 1618   Post-Procedure Depth (cm) 0.1 cm 09/18/20 1618   Post-Procedure Surface Area (cm^2) 4 cm^2 09/18/20 1618   Post-Procedure Volume (cm^3) 0.4 cm^3 09/18/20 1618   Wound Assessment Pink;Yellow 09/18/20 1556   Drainage Amount Moderate 09/18/20 1556   Drainage Description Yellow 09/18/20 1556   Odor None 09/18/20 1556   Letty-wound Assessment Pink; Maceration 09/18/20 1556   Number of days: 28       Wound 08/21/20 Leg Right;Posterior; Lower Wound # 3 acquired 7/15/20 (Active)   Wound Image   09/18/20 1556   Dressing Status Clean;Dry; Intact 09/18/20 1640   Dressing Changed Changed/New 09/18/20 1640   Dressing/Treatment Dry dressing;Xeroform 09/18/20 1640   Wound Cleansed Rinsed/Irrigated with saline 09/18/20 1640   Wound Length (cm) 4.5 cm 09/18/20 1556   Wound Width (cm) 6 cm 09/18/20 1556   Wound Depth (cm) 0.1 cm 09/18/20 1556   Wound Surface Area (cm^2) 27 cm^2 09/18/20 1556   Change in Wound Size % (l*w) 37.5 09/18/20 1556   Wound Volume (cm^3) 2.7 cm^3 09/18/20 1556   Wound Healing % 69 09/18/20 1556   Post-Procedure Length (cm) 4.5 cm 09/18/20 1618   Post-Procedure Width (cm) 6 cm 09/18/20 1618   Post-Procedure Depth (cm) 0.1 cm 09/18/20 1618   Post-Procedure Surface Area (cm^2) 27 cm^2 09/18/20 1618   Post-Procedure Volume (cm^3) 2.7 cm^3 09/18/20 1618   Wound Assessment Yellow;Pink 09/18/20 1556   Drainage Amount Moderate 09/18/20 1556   Drainage Description Yellow 09/18/20 1556   Odor None 09/18/20 1556   Letty-wound Assessment Pink 09/18/20 1556   Number of days: 28       Wound 08/21/20 Leg Left; Lower Wound # 4 acquire 7/15/20 blocked area (Active)   Wound Image   09/18/20 1556   Dressing Status Clean;Dry; Intact 09/18/20 1640   Dressing Changed Changed/New 09/18/20 1640   Dressing/Treatment Xeroform;Dry dressing 09/18/20 1640   Wound Cleansed Rinsed/Irrigated with saline 09/18/20 1640   Wound Length (cm) 4 cm 09/18/20 1556   Wound Width (cm) 7 cm 09/18/20 1556   Wound Depth (cm) 0.1 cm 09/18/20 1556   Wound Surface Area (cm^2) 28 cm^2 09/18/20 1556   Change in Wound Size % (l*w) 45.01 09/18/20 1556   Wound Volume (cm^3) 2.8 cm^3 09/18/20 1556   Wound Healing % 45 09/18/20 1556   Post-Procedure Length (cm) 4 cm 09/18/20 1618   Post-Procedure Width (cm) 7 cm 09/18/20 1618   Post-Procedure Depth (cm) 0.1 cm 09/18/20 1618   Post-Procedure Surface Area (cm^2) 28 cm^2 09/18/20 1618   Post-Procedure Volume (cm^3) 2.8 cm^3 09/18/20 1618   Wound Assessment Pink;Yellow 09/18/20 1556   Drainage Amount Moderate 09/18/20 1556   Drainage Description Yellow 09/18/20 1556   Odor None 09/18/20 1556   Letty-wound Assessment Pink 09/18/20 1556   Number of days: 28          Procedure Note  Indications:  Based on my examination of this patient's wound(s)/ulcer(s) today, debridement is required to promote healing and evaluate the wound base. Performed by: Christiano Deluca DPM    Consent obtained:  Yes    Time out taken:  Yes    Pain Control: Anesthetic  Anesthetic: 4% Lidocaine Liquid Topical     Debridement:Excisional Debridement    Using curette the wound(s)/ulcer(s) was/were sharply debrided down through and including the removal of subcutaneous tissue.         Devitalized Tissue Debrided:  fibrin, biofilm and exudate to stimulate bleeding to promote healing, post debridement good bleeding base and wound edges noted    Wound/Ulcer #: 2, 3 and 4    Percent of Wound/Ulcer Debrided: 100%    Total Surface Area Debrided:  59.0 sq cm     Estimated Blood Loss:  Minimal  Hemostasis Achieved:  by pressure    Procedural Pain:  3  / 10   Post Procedural Pain:  0 / 10 Response to treatment:  Well tolerated by patient. Plan:   Treatment Note please see attached Discharge Instructions    Written patient dismissal instructions given to patient and signed by patient or POA. Discharge Instructions       Visit Discharge/Physician Orders    Discharge condition: Stable     Assessment of pain at discharge:mild  Anesthetic used: 4% lidocaine     Discharge to: Home     Left via:Private automobile     Accompanied by: accompanied by self     ECF/HHA: [address] ! Dressing Orders: to wounds bilateral legs, cleanse wounds with saline. Apply Xeroform to all open areas. PAD WELL WITH GAUZE AND KERLIX WRAP. CHANGE 3X WEEK. MAY MOISTURIZE INTACT SKIN WITH AQUAPHOR OR COMPARABLE EMOLLIENT DURING DRESSING CHANGES    Treatment Orders: APPLY SPANDAGRIPS BILATERAL LEGS. elevate legs when seated. Limit sodium intake to no more than 2,000mg per day. Please read all food labels for sodium content and choose foods lower in salt     [facility] followup visit _________1 week____________________  (Please note your next appointment above and if you are unable to keep, kindly give a 24 hour notice. Thank you.)    Physician signature:__________________________      If you experience any of the following, please call the [address] during business hours:    * Increase in Pain  * Temperature over 101  * Increase in drainage from your wound  * Drainage with a foul odor  * Bleeding  * Increase in swelling  * Need for compression bandage changes due to slippage, breakthrough drainage. If you need medical attention outside of the business hours of the [address] [place] [address] [address] please contact your PCP or go to the nearest emergency room.         Electronically signed by Di Benedict DPM on 9/19/2020 at 10:50 AM

## 2020-09-25 ENCOUNTER — HOSPITAL ENCOUNTER (OUTPATIENT)
Dept: WOUND CARE | Age: 85
Discharge: HOME | End: 2020-09-25
Payer: MEDICARE

## 2020-09-25 VITALS
HEIGHT: 60 IN | WEIGHT: 238 LBS | SYSTOLIC BLOOD PRESSURE: 132 MMHG | DIASTOLIC BLOOD PRESSURE: 76 MMHG | HEART RATE: 81 BPM | BODY MASS INDEX: 46.72 KG/M2 | TEMPERATURE: 98.1 F | RESPIRATION RATE: 16 BRPM

## 2020-09-25 PROCEDURE — 99213 OFFICE O/P EST LOW 20 MIN: CPT | Performed by: PODIATRIST

## 2020-09-25 PROCEDURE — 99213 OFFICE O/P EST LOW 20 MIN: CPT

## 2020-09-25 RX ORDER — LIDOCAINE HYDROCHLORIDE 40 MG/ML
SOLUTION TOPICAL ONCE
Status: DISCONTINUED | OUTPATIENT
Start: 2020-09-25 | End: 2020-09-26 | Stop reason: HOSPADM

## 2020-09-25 ASSESSMENT — PAIN SCALES - GENERAL: PAINLEVEL_OUTOF10: 0

## 2020-09-25 NOTE — PROGRESS NOTES
Wound Healing Center Followup Visit Note    Referring Physician : Yovani Mejia MD  [address] [place] [address] RECORD NUMBER:  [id number]  AGE: 81 y.o. GENDER: female  : 1935  EPISODE DATE:  2020    Subjective:     Chief Complaint   Patient presents with    Wound Check     bilateral leg wounds      HISTORY of PRESENT ILLNESS CL Cain is a 81 y.o. female who presents today in regards to follow up evaluation and treatment of wound/ulcer. That patient's past medical, family and social hx were reviewed and changes were made if present. History of Wound Context:  Patient denies any nausea, vomiting, fever, chills, shortness of breath, chest pain, calf cramping and/or pain. Patient is tolerating the current dressing regimen without issues. No other new issues noted at this time. Wound/Ulcer Pain Timing/Severity: none  Quality of pain: N/A  Severity:  0 / 10   Modifying Factors: None  Associated Signs/Symptoms: edema and drainage    Ulcer Identification:  Ulcer Type: venous  Contributing Factors: lymphedema, decreased mobility and obesity    Diabetic/Pressure/Non Pressure Ulcers only:  Ulcer: Non-Pressure ulcer, fat layer exposed    Wound: Blister        PAST MEDICAL HISTORY      Diagnosis Date    Arthritis     Atrial fibrillation (HCC)     Hyperlipidemia     Hypertension     Thyroid disease      Past Surgical History:   Procedure Laterality Date    PACEMAKER INSERTION  2019    PPM GENT CHANGE  ([facility])   DR. TONG     History reviewed. No pertinent family history.   Social History     Tobacco Use    Smoking status: Never Smoker    Smokeless tobacco: Never Used   Substance Use Topics    Alcohol use: No    Drug use: No     No Known Allergies  Current Outpatient Medications on File Prior to Encounter   Medication Sig Dispense Refill    spironolactone (ALDACTONE) 25 MG tablet Take 25 mg by mouth daily      metFORMIN (GLUCOPHAGE) 500 MG tablet Take 500 mg by mouth 2 times Pretibial Right;Distal Wound # 2 acquired 7/15/20 (Active)   Wound Image   09/18/20 1556   Dressing Status Clean;Dry; Intact 09/18/20 1640   Dressing Changed Changed/New 09/18/20 1640   Dressing/Treatment Xeroform;Dry dressing 09/18/20 1640   Wound Cleansed Rinsed/Irrigated with saline 09/18/20 1640   Wound Length (cm) 2.5 cm 09/25/20 1508   Wound Width (cm) 6 cm 09/25/20 1508   Wound Depth (cm) 0.1 cm 09/25/20 1508   Wound Surface Area (cm^2) 15 cm^2 09/25/20 1508   Change in Wound Size % (l*w) -376.19 09/25/20 1508   Wound Volume (cm^3) 1.5 cm^3 09/25/20 1508   Wound Healing % -369 09/25/20 1508   Post-Procedure Length (cm) 2 cm 09/18/20 1618   Post-Procedure Width (cm) 2 cm 09/18/20 1618   Post-Procedure Depth (cm) 0.1 cm 09/18/20 1618   Post-Procedure Surface Area (cm^2) 4 cm^2 09/18/20 1618   Post-Procedure Volume (cm^3) 0.4 cm^3 09/18/20 1618   Wound Assessment Pink;Yellow 09/25/20 1508   Drainage Amount Moderate 09/25/20 1508   Drainage Description Serous 09/25/20 1508   Odor None 09/25/20 1508   Letty-wound Assessment Pink 09/25/20 1508   Number of days: 35       Wound 08/21/20 Leg Right;Posterior; Lower Wound # 3 acquired 7/15/20 (Active)   Wound Image   09/18/20 1556   Dressing Status Clean;Dry; Intact 09/18/20 1640   Dressing Changed Changed/New 09/18/20 1640   Dressing/Treatment Dry dressing;Xeroform 09/18/20 1640   Wound Cleansed Rinsed/Irrigated with saline 09/18/20 1640   Wound Length (cm) 5 cm 09/25/20 1508   Wound Width (cm) 6.2 cm 09/25/20 1508   Wound Depth (cm) 0.1 cm 09/25/20 1508   Wound Surface Area (cm^2) 31 cm^2 09/25/20 1508   Change in Wound Size % (l*w) 28.24 09/25/20 1508   Wound Volume (cm^3) 3.1 cm^3 09/25/20 1508   Wound Healing % 64 09/25/20 1508   Post-Procedure Length (cm) 4.5 cm 09/18/20 1618   Post-Procedure Width (cm) 6 cm 09/18/20 1618   Post-Procedure Depth (cm) 0.1 cm 09/18/20 1618   Post-Procedure Surface Area (cm^2) 27 cm^2 09/18/20 1618   Post-Procedure Volume (cm^3) 2.7 cm^3 09/18/20 1618   Wound Assessment Pink;Yellow 09/25/20 1508   Drainage Amount Large 09/25/20 1508   Drainage Description Serous 09/25/20 1508   Odor None 09/25/20 1508   Letty-wound Assessment Pink 09/25/20 1508   Number of days: 35       Wound 08/21/20 Leg Left; Lower Wound # 4 acquire 7/15/20 blocked area (Active)   Wound Image   09/18/20 1556   Dressing Status Clean;Dry; Intact 09/18/20 1640   Dressing Changed Changed/New 09/18/20 1640   Dressing/Treatment Xeroform;Dry dressing 09/18/20 1640   Wound Cleansed Rinsed/Irrigated with saline 09/18/20 1640   Wound Length (cm) 4.7 cm 09/25/20 1508   Wound Width (cm) 8.8 cm 09/25/20 1508   Wound Depth (cm) 0.1 cm 09/25/20 1508   Wound Surface Area (cm^2) 41.36 cm^2 09/25/20 1508   Change in Wound Size % (l*w) 18.77 09/25/20 1508   Wound Volume (cm^3) 4.14 cm^3 09/25/20 1508   Wound Healing % 19 09/25/20 1508   Post-Procedure Length (cm) 4 cm 09/18/20 1618   Post-Procedure Width (cm) 7 cm 09/18/20 1618   Post-Procedure Depth (cm) 0.1 cm 09/18/20 1618   Post-Procedure Surface Area (cm^2) 28 cm^2 09/18/20 1618   Post-Procedure Volume (cm^3) 2.8 cm^3 09/18/20 1618   Wound Assessment Yellow;[place] 09/25/20 1508   Drainage Amount Moderate 09/25/20 1508   Drainage Description Serous 09/25/20 1508   Odor None 09/25/20 1508   Letty-wound Assessment Pink 09/25/20 1508   Number of days: 35          Procedure Note  Indications:  Based on my examination of this patient's wound(s)/ulcer(s) today, debridement is not required to promote healing and evaluate the wound base. - No debridement performed on today's visit. We will continue with current dressings and compression. Plan:   Treatment Note please see attached Discharge Instructions    Written patient dismissal instructions given to patient and signed by patient or POA.          Discharge Instructions       Visit Discharge/Physician Orders    Discharge condition: Stable     Assessment of pain at discharge:mild  Anesthetic used: 4% lidocaine     Discharge to: Home     Left via:Private automobile     Accompanied by: accompanied by self     ECF/HHA: [facility]!     Dressing Orders: to wounds bilateral legs, cleanse wounds with saline. BEGIN TO APPLY CALCIUM ALGINATE, GAUZE, 5X9 PAD AND KERLIX WRAP. CHANGE DRESSING EVERY 48 HRS. . . MAY MOISTURIZE INTACT SKIN WITH AQUAPHOR OR COMPARABLE EMOLLIENT DURING DRESSING CHANGES     Treatment Orders: APPLY SPANDAGRIPS BILATERAL LEGS. elevate legs when seated. Limit sodium intake to no more than 2,000mg per day. Please read all food labels for sodium content and choose foods lower in salt     [facility] followup visit _________1 week____________________  (Please note your next appointment above and if you are unable to keep, kindly give a 24 hour notice. Thank you.)    Physician signature:__________________________      If you experience any of the following, please call the [facility] during business hours:    * Increase in Pain  * Temperature over 101  * Increase in drainage from your wound  * Drainage with a foul odor  * Bleeding  * Increase in swelling  * Need for compression bandage changes due to slippage, breakthrough drainage. If you need medical attention outside of the business hours of the [facility] please contact your PCP or go to the nearest emergency room.         Electronically signed by Alyssa Ryan DPM on 9/25/2020 at 3:27 PM

## 2020-10-02 ENCOUNTER — HOSPITAL ENCOUNTER (OUTPATIENT)
Dept: WOUND CARE | Age: 85
Discharge: HOME | End: 2020-10-02
Payer: MEDICARE

## 2020-10-02 VITALS
WEIGHT: 238 LBS | DIASTOLIC BLOOD PRESSURE: 8 MMHG | BODY MASS INDEX: 46.72 KG/M2 | HEART RATE: 72 BPM | RESPIRATION RATE: 18 BRPM | SYSTOLIC BLOOD PRESSURE: 144 MMHG | HEIGHT: 60 IN | TEMPERATURE: 98.9 F

## 2020-10-02 PROCEDURE — 11042 DBRDMT SUBQ TIS 1ST 20SQCM/<: CPT | Performed by: PODIATRIST

## 2020-10-02 PROCEDURE — 11045 DBRDMT SUBQ TISS EACH ADDL: CPT | Performed by: PODIATRIST

## 2020-10-02 PROCEDURE — 11045 DBRDMT SUBQ TISS EACH ADDL: CPT

## 2020-10-02 PROCEDURE — 87070 CULTURE OTHR SPECIMN AEROBIC: CPT

## 2020-10-02 PROCEDURE — 87075 CULTR BACTERIA EXCEPT BLOOD: CPT

## 2020-10-02 PROCEDURE — 11042 DBRDMT SUBQ TIS 1ST 20SQCM/<: CPT

## 2020-10-02 PROCEDURE — 87077 CULTURE AEROBIC IDENTIFY: CPT

## 2020-10-02 PROCEDURE — 87186 SC STD MICRODIL/AGAR DIL: CPT

## 2020-10-02 RX ORDER — LIDOCAINE HYDROCHLORIDE 40 MG/ML
SOLUTION TOPICAL ONCE
Status: DISCONTINUED | OUTPATIENT
Start: 2020-10-02 | End: 2020-10-03 | Stop reason: HOSPADM

## 2020-10-02 ASSESSMENT — PAIN SCALES - GENERAL: PAINLEVEL_OUTOF10: 0

## 2020-10-02 NOTE — PROGRESS NOTES
[facility]aling Center Followup Visit Note    Referring Physician : Noemi Ayers MD  [address] [place] [address] RECORD NUMBER:  [id number]  AGE: 81 y.o. GENDER: female  : 1935  EPISODE DATE:  10/2/2020    Subjective:     Chief Complaint   Patient presents with    Wound Check     BILATERAL LEGS      HISTORY of PRESENT ILLNESS HPI   Bernie Conklin is a 81 y.o. female who presents today in regards to follow up evaluation and treatment of wound/ulcer. That patient's past medical, family and social hx were reviewed and changes were made if present. History of Wound Context:  Patient denies any nausea, vomiting, fever, chills, shortness of breath, chest pain, calf cramping and/or pain. Patient is tolerating the current dressing regimen without issues. No other new issues noted at this time. Wound/Ulcer Pain Timing/Severity: mild  Quality of pain: dull, aching  Severity:  2 / 10   Modifying Factors: Pain is relieved/improved with rest  Associated Signs/Symptoms: edema and drainage    Ulcer Identification:  Ulcer Type: venous  Contributing Factors: edema, venous stasis, decreased mobility and obesity    Diabetic/Pressure/Non Pressure Ulcers only:  Ulcer: Non-Pressure ulcer, fat layer exposed    Wound: Blister        PAST MEDICAL HISTORY      Diagnosis Date    Arthritis     Atrial fibrillation ([address]     Hyperlipidemia     Hypertension     Thyroid disease      Past Surgical History:   Procedure Laterality Date    PACEMAKER INSERTION  2019    PPM GENT CHANGE  ([facility])   DR. TONG     History reviewed. No pertinent family history.   Social History     Tobacco Use    Smoking status: Never Smoker    Smokeless tobacco: Never Used   Substance Use Topics    Alcohol use: No    Drug use: No     No Known Allergies  Current Outpatient Medications on File Prior to Encounter   Medication Sig Dispense Refill    spironolactone (ALDACTONE) 25 MG tablet Take 25 mg by mouth daily      metFORMIN (GLUCOPHAGE) 500 MG tablet Take 500 mg by mouth 2 times daily (with meals)      Multiple Vitamins-Minerals (VITAMIN D3 COMPLETE PO) Take 1 tablet by mouth daily      magnesium (MAGNESIUM-OXIDE) 250 MG TABS tablet Take 250 mg by mouth daily      triamcinolone (KENALOG) 0.1 % cream Apply topically 2 times daily. 90 g 5    metoprolol (LOPRESSOR) 100 MG tablet Take 100 mg by mouth 2 times daily      simvastatin (ZOCOR) 20 MG tablet Take 20 mg by mouth nightly      levothyroxine (SYNTHROID) 100 MCG tablet Take 112 mcg by mouth Daily       WARFARIN SODIUM PO Take by mouth Alternate 3 mg and 4.5 mg daily      furosemide (LASIX) 40 MG tablet Take 40 mg by mouth 2 times daily      potassium chloride (KLOR-CON) 20 MEQ packet Take 20 mEq by mouth 2 times daily      diltiazem (CARDIZEM) 120 MG tablet Take 120 mg by mouth daily      metolazone (ZAROXOLYN) 2.5 MG tablet Take 2.5 mg by mouth daily       No current facility-administered medications on file prior to encounter.         REVIEW OF SYSTEMS See HPI    Objective:    BP (!) 144/8   Pulse 72   Temp 98.9 °F (37.2 °C) (Temporal)   Resp 18   Ht 5' (1.524 m)   Wt 238 lb (108 kg)   BMI 46.48 kg/m²   Wt Readings from Last 3 Encounters:   10/02/20 238 lb (108 kg)   09/25/20 238 lb (108 kg)   09/18/20 238 lb (108 kg)     PHYSICAL EXAM  CONSTITUTIONAL:   Awake, alert, cooperative and in no acute distress  SKIN:  Open wound Present    Assessment:     Problem List Items Addressed This Visit        Podiatry Problems    Non-pressure chronic ulcer of other part of right lower leg with fat layer exposed ([address]    Non-pressure chronic ulcer of left lower leg with fat layer exposed ([address]       Other    Venous insufficiency (chronic) (peripheral) - Primary (Chronic)    Stasis edema with ulcer of both lower extremities ([address]          Pre Debridement Measurements:  Are located in the [place]  Documentation Flow Sheet  Post Debridement Measurements:  Wound/Ulcer Descriptions are Pre Debridement except measurements:     Wound 08/21/20 Pretibial Right;Distal Wound # 2 acquired 7/15/20 (Active)   Wound Image   09/18/20 1556   Dressing Status Clean;Dry; Intact 09/25/20 1553   Dressing Changed Changed/New 09/25/20 1553   Dressing/Treatment Alginate;ABD 09/25/20 1553   Wound Cleansed Rinsed/Irrigated with saline 09/25/20 1553   Wound Length (cm) 5 cm 10/02/20 1552   Wound Width (cm) 6.2 cm 10/02/20 1552   Wound Depth (cm) 0.1 cm 10/02/20 1552   Wound Surface Area (cm^2) 31 cm^2 10/02/20 1552   Change in Wound Size % (l*w) -884.13 10/02/20 1552   Wound Volume (cm^3) 3.1 cm^3 10/02/20 1552   Wound Healing % -869 10/02/20 1552   Post-Procedure Length (cm) 5 cm 10/02/20 1635   Post-Procedure Width (cm) 6.2 cm 10/02/20 1635   Post-Procedure Depth (cm) 0.1 cm 10/02/20 1635   Post-Procedure Surface Area (cm^2) 31 cm^2 10/02/20 1635   Post-Procedure Volume (cm^3) 3.1 cm^3 10/02/20 1635   Wound Assessment Pink;Yellow 10/02/20 1552   Drainage Amount Large 10/02/20 1552   Drainage Description Yellow;Green 10/02/20 1552   Odor None 10/02/20 1552   Letty-wound Assessment Pink 10/02/20 1552   Number of days: 42       Wound 08/21/20 Leg Right;Posterior; Lower Wound # 3 acquired 7/15/20 (Active)   Wound Image   09/18/20 1556   Dressing Status Clean;Dry; Intact 09/25/20 1553   Dressing Changed Changed/New 09/25/20 1553   Dressing/Treatment ABD; Alginate 09/25/20 1553   Wound Cleansed Rinsed/Irrigated with saline 09/25/20 1553   Wound Length (cm) 4.8 cm 10/02/20 1552   Wound Width (cm) 8 cm 10/02/20 1552   Wound Depth (cm) 0.1 cm 10/02/20 1552   Wound Surface Area (cm^2) 38.4 cm^2 10/02/20 1552   Change in Wound Size % (l*w) 11.11 10/02/20 1552   Wound Volume (cm^3) 3.84 cm^3 10/02/20 1552   Wound Healing % 56 10/02/20 1552   Post-Procedure Length (cm) 4.8 cm 10/02/20 1635   Post-Procedure Width (cm) 8 cm 10/02/20 1635   Post-Procedure Depth (cm) 0.1 cm 10/02/20 1635   Post-Procedure Surface Area (cm^2) 38.4 cm^2 10/02/20 1635   Post-Procedure Volume (cm^3) 3.84 cm^3 10/02/20 1635   Wound Assessment Pink;Yellow 10/02/20 1552   Drainage Amount Large 10/02/20 1552   Drainage Description Yellow;Green 10/02/20 1552   Odor None 10/02/20 1552   Letty-wound Assessment Pink 10/02/20 1552   Number of days: 42       Wound 08/21/20 Leg Left; Lower Wound # 4 acquire 7/15/20 blocked area (Active)   Wound Image   09/18/20 1556   Dressing Status Clean;Dry; Intact 09/25/20 1553   Dressing Changed Changed/New 09/25/20 1553   Dressing/Treatment Alginate;Dry dressing 09/25/20 1553   Wound Cleansed Rinsed/Irrigated with saline 09/25/20 1553   Wound Length (cm) 5.5 cm 10/02/20 1552   Wound Width (cm) 8.8 cm 10/02/20 1552   Wound Depth (cm) 0.1 cm 10/02/20 1552   Wound Surface Area (cm^2) 48.4 cm^2 10/02/20 1552   Change in Wound Size % (l*w) 4.95 10/02/20 1552   Wound Volume (cm^3) 4.84 cm^3 10/02/20 1552   Wound Healing % 5 10/02/20 1552   Post-Procedure Length (cm) 5.5 cm 10/02/20 1635   Post-Procedure Width (cm) 8.8 cm 10/02/20 1635   Post-Procedure Depth (cm) 0.2 cm 10/02/20 1635   Post-Procedure Surface Area (cm^2) 48.4 cm^2 10/02/20 1635   Post-Procedure Volume (cm^3) 9.68 cm^3 10/02/20 1635   Wound Assessment Yellow;Pink;Slough 10/02/20 1552   Drainage Amount Large 10/02/20 1552   Drainage Description Yellow;Green 10/02/20 1552   Odor Mild 10/02/20 1552   Letty-wound Assessment Pink 10/02/20 1552   Number of days: 42          Procedure Note  Indications:  Based on my examination of this patient's wound(s)/ulcer(s) today, debridement is required to promote healing and evaluate the wound base. Performed by: Lindy Zarate DPM    Consent obtained:  Yes    Time out taken:  Yes    Pain Control: Anesthetic  Anesthetic: 4% Lidocaine Liquid Topical     Debridement:Excisional Debridement    Using curette the wound(s)/ulcer(s) was/were sharply debrided down through and including the removal of subcutaneous tissue.         Devitalized Tissue Debrided: fibrin, biofilm and slough to stimulate bleeding to promote healing, post debridement good bleeding base and wound edges noted    Wound/Ulcer #: 2 and 3    Percent of Wound/Ulcer Debrided: 100%    Total Surface Area Debrided:  69.4 sq cm     Estimated Blood Loss:  Minimal  Hemostasis Achieved:  by pressure    Procedural Pain:  2  / 10   Post Procedural Pain:  0 / 10     Response to treatment:  Well tolerated by patient. Plan:   Treatment Note please see attached Discharge Instructions    Written patient dismissal instructions given to patient and signed by patient or POA. Discharge Instructions         Visit Discharge/Physician Orders     Discharge condition: Stable     Assessment of pain at discharge:mild  Anesthetic used: 4% lidocaine     Discharge to: Home     Left via:Private automobile     Accompanied by: accompanied by self     ECF/HHA: [address]     Dressing Orders: to wounds bilateral legs, cleanse wounds with saline. BEGIN TO APPLY CALCIUM ALGINATE, GAUZE, 5X9 PAD AND KERLIX WRAP. CHANGE DRESSING EVERY 48 HRS. . Alyson Grates MOISTURIZE INTACT SKIN WITH AQUAPHOR OR COMPARABLE EMOLLIENT DURING DRESSING CHANGES     Treatment Orders: APPLY SPANDAGRIPS BILATERAL LEGS. elevate legs when seated. Limit sodium intake to no more than 2,000mg per day. Please read all food labels for sodium content and choose foods lower in salt     [facility] followup visit _________1 week____________________  (Please note your next appointment above and if you are unable to keep, kindly give a 24 hour notice.  Thank you.)     Physician signature:__________________________        If you experience any of the following, please call the [address] [place] [address] Road during business hours:     * Increase in Pain  * Temperature over 101  * Increase in drainage from your wound  * Drainage with a foul odor  * Bleeding  * Increase in swelling  * Need for compression bandage changes due to slippage, breakthrough drainage.     If you need medical attention outside of the business hours of the [address] [place] [address] Road please contact your PCP or go to the nearest emergency room.                     Electronically signed by Mario Hernandez DPM on 10/2/2020 at 4:37 PM

## 2020-10-04 LAB — ANAEROBIC CULTURE: NORMAL

## 2020-10-05 RX ORDER — LEVOFLOXACIN 500 MG/1
500 TABLET, FILM COATED ORAL DAILY
Qty: 14 TABLET | Refills: 0 | Status: SHIPPED | OUTPATIENT
Start: 2020-10-05 | End: 2020-10-19

## 2020-10-06 LAB
ORGANISM: ABNORMAL
ORGANISM: ABNORMAL
WOUND/ABSCESS: ABNORMAL
WOUND/ABSCESS: ABNORMAL

## 2020-10-09 ENCOUNTER — HOSPITAL ENCOUNTER (OUTPATIENT)
Dept: WOUND CARE | Age: 85
Discharge: HOME | End: 2020-10-09
Payer: MEDICARE

## 2020-10-09 VITALS
SYSTOLIC BLOOD PRESSURE: 118 MMHG | RESPIRATION RATE: 18 BRPM | HEIGHT: 60 IN | HEART RATE: 91 BPM | DIASTOLIC BLOOD PRESSURE: 70 MMHG | BODY MASS INDEX: 46.72 KG/M2 | TEMPERATURE: 96.8 F | WEIGHT: 238 LBS

## 2020-10-09 PROCEDURE — 99213 OFFICE O/P EST LOW 20 MIN: CPT | Performed by: PODIATRIST

## 2020-10-09 PROCEDURE — 99213 OFFICE O/P EST LOW 20 MIN: CPT

## 2020-10-09 RX ORDER — LIDOCAINE HYDROCHLORIDE 40 MG/ML
SOLUTION TOPICAL ONCE
Status: DISCONTINUED | OUTPATIENT
Start: 2020-10-09 | End: 2020-10-10 | Stop reason: HOSPADM

## 2020-10-09 ASSESSMENT — PAIN SCALES - GENERAL: PAINLEVEL_OUTOF10: 0

## 2020-10-09 NOTE — PROGRESS NOTES
Wound Healing Center Followup Visit Note    Referring Physician : Valerie Yepez MD  [address] [place] [address] RECORD NUMBER:  [id number]  AGE: 81 y.o. GENDER: female  : 1935  EPISODE DATE:  10/9/2020    Subjective:     Chief Complaint   Patient presents with    Wound Check     both legs      HISTORY of PRESENT ILLNESS HPI   Adelaide Mora is a 81 y.o. female who presents today in regards to follow up evaluation and treatment of wound/ulcer. That patient's past medical, family and social hx were reviewed and changes were made if present. History of Wound Context:  Patient denies any nausea, vomiting, fever, chills, shortness of breath, chest pain, calf cramping and/or pain. Patient is tolerating the current dressing regimen without issues. No other new issues noted at this time. Wound/Ulcer Pain Timing/Severity: none  Quality of pain: N/A  Severity:  0 / 10   Modifying Factors: None  Associated Signs/Symptoms: edema and drainage    Ulcer Identification:  Ulcer Type: venous  Contributing Factors: edema, venous stasis, decreased mobility and obesity    Diabetic/Pressure/Non Pressure Ulcers only:  Ulcer: Non-Pressure ulcer, limited to breakdown of skin    Wound: Blister        PAST MEDICAL HISTORY      Diagnosis Date    Arthritis     Atrial fibrillation ([address]     Hyperlipidemia     Hypertension     Thyroid disease      Past Surgical History:   Procedure Laterality Date    PACEMAKER INSERTION  2019    PPM GENT CHANGE  ([facility])   DR. TONG     History reviewed. No pertinent family history.   Social History     Tobacco Use    Smoking status: Never Smoker    Smokeless tobacco: Never Used   Substance Use Topics    Alcohol use: No    Drug use: No     No Known Allergies  Current Outpatient Medications on File Prior to Encounter   Medication Sig Dispense Refill    levoFLOXacin (LEVAQUIN) 500 MG tablet Take 1 tablet by mouth daily for 14 days 14 tablet 0    spironolactone (ALDACTONE) 25 MG tablet Take 25 mg by mouth daily      metFORMIN (GLUCOPHAGE) 500 MG tablet Take 500 mg by mouth 2 times daily (with meals)      Multiple Vitamins-Minerals (VITAMIN D3 COMPLETE PO) Take 1 tablet by mouth daily      magnesium (MAGNESIUM-OXIDE) 250 MG TABS tablet Take 250 mg by mouth daily      triamcinolone (KENALOG) 0.1 % cream Apply topically 2 times daily. 90 g 5    metoprolol (LOPRESSOR) 100 MG tablet Take 100 mg by mouth 2 times daily      simvastatin (ZOCOR) 20 MG tablet Take 20 mg by mouth nightly      levothyroxine (SYNTHROID) 100 MCG tablet Take 112 mcg by mouth Daily       WARFARIN SODIUM PO Take by mouth Alternate 3 mg and 4.5 mg daily      furosemide (LASIX) 40 MG tablet Take 40 mg by mouth 2 times daily      potassium chloride (KLOR-CON) 20 MEQ packet Take 20 mEq by mouth 2 times daily      diltiazem (CARDIZEM) 120 MG tablet Take 120 mg by mouth daily      metolazone (ZAROXOLYN) 2.5 MG tablet Take 2.5 mg by mouth daily       No current facility-administered medications on file prior to encounter.         REVIEW OF SYSTEMS See HPI    Objective:    /70   Pulse 91   Temp 96.8 °F (36 °C) (Temporal)   Resp 18   Ht 5' (1.524 m)   Wt 238 lb (108 kg)   BMI 46.48 kg/m²   Wt Readings from Last 3 Encounters:   10/09/20 238 lb (108 kg)   10/02/20 238 lb (108 kg)   09/25/20 238 lb (108 kg)     PHYSICAL EXAM  CONSTITUTIONAL:   Awake, alert, cooperative and in no acute distress  SKIN:  Open wound Present    Assessment:     Problem List Items Addressed This Visit        Podiatry Problems    Non-pressure chronic ulcer of other part of right lower leg with fat layer exposed ([address]    Non-pressure chronic ulcer of left lower leg with fat layer exposed ([address]       Other    Stasis edema with ulcer of both lower extremities ([address]          Pre Debridement Measurements:  Are located in the [facility]  Documentation Flow Sheet  Post Debridement Measurements:  Wound/Ulcer Descriptions are Pre Debridement except measurements:     Wound 08/21/20 Pretibial Right;Distal Wound # 2 acquired 7/15/20 (Active)   Wound Image   09/18/20 1556   Wound Cleansed Rinsed/Irrigated with saline 10/02/20 1652   Dressing/Treatment Alginate;ABD;Roll gauze 10/02/20 1652   Wound Length (cm) 4.5 cm 10/09/20 1549   Wound Width (cm) 6.5 cm 10/09/20 1549   Wound Depth (cm) 0.1 cm 10/09/20 1549   Wound Surface Area (cm^2) 29.25 cm^2 10/09/20 1549   Change in Wound Size % (l*w) -828.57 10/09/20 1549   Wound Volume (cm^3) 2.92 cm^3 10/09/20 1549   Wound Healing % -812 10/09/20 1549   Post-Procedure Length (cm) 5 cm 10/02/20 1635   Post-Procedure Width (cm) 6.2 cm 10/02/20 1635   Post-Procedure Depth (cm) 0.1 cm 10/02/20 1635   Post-Procedure Surface Area (cm^2) 31 cm^2 10/02/20 1635   Post-Procedure Volume (cm^3) 3.1 cm^3 10/02/20 1635   Drainage Amount Moderate 10/09/20 1549   Drainage Description Serous 10/09/20 1549   Odor None 10/09/20 1549   Letty-wound Assessment Fragile 10/09/20 1549   Number of days: 49       Wound 08/21/20 Leg Right;Posterior; Lower Wound # 3 acquired 7/15/20 (Active)   Wound Image   09/18/20 1556   Wound Cleansed Rinsed/Irrigated with saline 10/02/20 1652   Dressing/Treatment ABD; Alginate;Roll gauze 10/02/20 1652   Wound Length (cm) 4.8 cm 10/09/20 1549   Wound Width (cm) 9 cm 10/09/20 1549   Wound Depth (cm) 0.1 cm 10/09/20 1549   Wound Surface Area (cm^2) 43.2 cm^2 10/09/20 1549   Change in Wound Size % (l*w) 0 10/09/20 1549   Wound Volume (cm^3) 4.32 cm^3 10/09/20 1549   Wound Healing % 50 10/09/20 1549   Post-Procedure Length (cm) 4.8 cm 10/02/20 1635   Post-Procedure Width (cm) 8 cm 10/02/20 1635   Post-Procedure Depth (cm) 0.1 cm 10/02/20 1635   Post-Procedure Surface Area (cm^2) 38.4 cm^2 10/02/20 1635   Post-Procedure Volume (cm^3) 3.84 cm^3 10/02/20 1635   Drainage Amount Moderate 10/09/20 1549   Drainage Description Serous 10/09/20 1549   Odor None 10/09/20 1549   Letty-wound Assessment Fragile 10/09/20 1549 Number of days: 49       Wound 08/21/20 Leg Left; Lower Wound # 4 acquire 7/15/20 blocked area (Active)   Wound Image   09/18/20 1556   Wound Cleansed Rinsed/Irrigated with saline 10/02/20 1652   Dressing/Treatment Alginate;Dry dressing;Roll gauze 10/02/20 1652   Wound Length (cm) 4 cm 10/09/20 1549   Wound Width (cm) 8.5 cm 10/09/20 1549   Wound Depth (cm) 0.1 cm 10/09/20 1549   Wound Surface Area (cm^2) 34 cm^2 10/09/20 1549   Change in Wound Size % (l*w) 33.23 10/09/20 1549   Wound Volume (cm^3) 3.4 cm^3 10/09/20 1549   Wound Healing % 33 10/09/20 1549   Post-Procedure Length (cm) 5.5 cm 10/02/20 1635   Post-Procedure Width (cm) 8.8 cm 10/02/20 1635   Post-Procedure Depth (cm) 0.2 cm 10/02/20 1635   Post-Procedure Surface Area (cm^2) 48.4 cm^2 10/02/20 1635   Post-Procedure Volume (cm^3) 9.68 cm^3 10/02/20 1635   Drainage Amount Large 10/09/20 1549   Drainage Description Serous 10/09/20 1549   Odor None 10/09/20 1549   Letty-wound Assessment Fragile 10/09/20 1549   Number of days: 49          Procedure Note  Indications:  Based on my examination of this patient's wound(s)/ulcer(s) today, debridement is not required to promote healing and evaluate the wound base. - No debridement performed on today's visit. We will continue with current dressings and compression. Plan:   Treatment Note please see attached Discharge Instructions    Written patient dismissal instructions given to patient and signed by patient or POA. Discharge Instructions       Visit Discharge/Physician Orders     Discharge condition: Stable     Assessment of pain at discharge:mild  Anesthetic used: 4% lidocaine     Discharge to: Home     Left via:Private automobile     Accompanied by: accompanied by self     ECF/HHA: [facility]     Dressing Orders: to wounds bilateral legs, cleanse wounds with saline. STOP SILVER CALCIUM ALGINATE AND BEGIN TO APPLY PLAIN CALCIUM ALGINATE,  GAUZE, 5X9 PAD AND KERLIX WRAP.  CHANGE DRESSING EVERY 48 HRS. . Edwin Alas MOISTURIZE INTACT SKIN WITH AQUAPHOR OR COMPARABLE EMOLLIENT DURING DRESSING CHANGES     Treatment Orders: PLAN NAIL CARE TRIMMING OCT. 16TH  complete course of antibiotics. APPLY SPANDAGRIPS BILATERAL LEGS. elevate legs when seated. Limit sodium intake to no more than 2,000mg per day. Please read all food labels for sodium content and choose foods lower in salt     [facility] followup visit _________1 week____________________  (Please note your next appointment above and if you are unable to keep, kindly give a 24 hour notice.  Thank you.)     Physician signature:__________________________        If you experience any of the following, please call the [facility] during business hours:     * Increase in Pain  * Temperature over 101  * Increase in drainage from your wound  * Drainage with a foul odor  * Bleeding  * Increase in swelling  * Need for compression bandage changes due to slippage, breakthrough drainage.     If you need medical attention outside of the business hours of the [facility] please contact your PCP or go to the nearest emergency room.                                Electronically signed by Brielle Louie DPM on 10/9/2020 at 4:11 PM

## 2020-10-16 ENCOUNTER — HOSPITAL ENCOUNTER (OUTPATIENT)
Dept: WOUND CARE | Age: 85
Discharge: HOME | End: 2020-10-16
Payer: MEDICARE

## 2020-10-16 VITALS
BODY MASS INDEX: 46.72 KG/M2 | SYSTOLIC BLOOD PRESSURE: 120 MMHG | WEIGHT: 238 LBS | TEMPERATURE: 96.9 F | HEIGHT: 60 IN | DIASTOLIC BLOOD PRESSURE: 62 MMHG | HEART RATE: 80 BPM | RESPIRATION RATE: 20 BRPM

## 2020-10-16 PROBLEM — L97.922 NON-PRESSURE CHRONIC ULCER OF LEFT LOWER LEG WITH FAT LAYER EXPOSED (HCC): Status: RESOLVED | Noted: 2020-08-21 | Resolved: 2020-10-16

## 2020-10-16 PROCEDURE — 11042 DBRDMT SUBQ TIS 1ST 20SQCM/<: CPT | Performed by: PODIATRIST

## 2020-10-16 PROCEDURE — 11042 DBRDMT SUBQ TIS 1ST 20SQCM/<: CPT

## 2020-10-16 RX ORDER — LIDOCAINE HYDROCHLORIDE 40 MG/ML
SOLUTION TOPICAL ONCE
Status: DISCONTINUED | OUTPATIENT
Start: 2020-10-16 | End: 2020-10-17 | Stop reason: HOSPADM

## 2020-10-16 ASSESSMENT — PAIN SCALES - GENERAL: PAINLEVEL_OUTOF10: 0

## 2020-10-16 NOTE — PROGRESS NOTES
W[facility] Healing Center Followup Visit Note    Referring Physician : John Sims MD  [address] [place] [address] RECORD NUMBER:  [id number]  AGE: 81 y.o. GENDER: female  : 1935  EPISODE DATE:  10/16/2020    Subjective:     Chief Complaint   Patient presents with    Wound Check      HISTORY of PRESENT ILLNESS HPI   Demetrice Rider is a 81 y.o. female who presents today in regards to follow up evaluation and treatment of wound/ulcer. That patient's past medical, family and social hx were reviewed and changes were made if present. History of Wound Context:  Patient denies any nausea, vomiting, fever, chills, shortness of breath, chest pain, calf cramping and/or pain. Patient is tolerating the current dressing regimen without issues. No other new issues noted at this time. Patient is tolerating the oral antibiotics. Wound/Ulcer Pain Timing/Severity: none  Quality of pain: N/A  Severity:  0 / 10   Modifying Factors: None  Associated Signs/Symptoms: edema    Ulcer Identification:  Ulcer Type: venous  Contributing Factors: edema, venous stasis, decreased mobility and obesity    Diabetic/Pressure/Non Pressure Ulcers only:  Ulcer: Non-Pressure ulcer, fat layer exposed    Wound: Blister        PAST MEDICAL HISTORY      Diagnosis Date    Arthritis     Atrial fibrillation (HCC)     Hyperlipidemia     Hypertension     Thyroid disease      Past Surgical History:   Procedure Laterality Date    PACEMAKER INSERTION  2019    PPM GENT [facility]ANGE  [facility])   DR. TONG     History reviewed. No pertinent family history.   Social History     Tobacco Use    Smoking status: Never Smoker    Smokeless tobacco: Never Used   Substance Use Topics    Alcohol use: No    Drug use: No     No Known Allergies  Current Outpatient Medications on File Prior to Encounter   Medication Sig Dispense Refill    levoFLOXacin (LEVAQUIN) 500 MG tablet Take 1 tablet by mouth daily for 14 days 14 tablet 0    spironolactone (ALDACTONE) 25 MG tablet Take 25 mg by mouth daily      metFORMIN (GLUCOPHAGE) 500 MG tablet Take 500 mg by mouth 2 times daily (with meals)      Multiple Vitamins-Minerals (VITAMIN D3 COMPLETE PO) Take 1 tablet by mouth daily      magnesium (MAGNESIUM-OXIDE) 250 MG TABS tablet Take 250 mg by mouth daily      triamcinolone (KENALOG) 0.1 % cream Apply topically 2 times daily. 90 g 5    metoprolol (LOPRESSOR) 100 MG tablet Take 100 mg by mouth 2 times daily      simvastatin (ZOCOR) 20 MG tablet Take 20 mg by mouth nightly      levothyroxine (SYNTHROID) 100 MCG tablet Take 125 mcg by mouth Daily       WARFARIN SODIUM PO Take by mouth Alternate 3 mg and 4.5 mg daily      furosemide (LASIX) 40 MG tablet Take 40 mg by mouth 2 times daily      potassium chloride (KLOR-CON) 20 MEQ packet Take 20 mEq by mouth 2 times daily      diltiazem (CARDIZEM) 120 MG tablet Take 120 mg by mouth daily      metolazone (ZAROXOLYN) 2.5 MG tablet Take 2.5 mg by mouth daily       No current facility-administered medications on file prior to encounter.         REVIEW OF SYSTEMS See HPI    Objective:    /62   Pulse 80   Temp 96.9 °F (36.1 °C)   Resp 20   Ht 5' (1.524 m)   Wt 238 lb (108 kg)   BMI 46.48 kg/m²   Wt Readings from Last 3 Encounters:   10/16/20 238 lb (108 kg)   10/09/20 238 lb (108 kg)   10/02/20 238 lb (108 kg)     PHYSICAL EXAM  CONSTITUTIONAL:   Awake, alert, cooperative and in no acute distress  SKIN:  Open wound Present    Assessment:     Problem List Items Addressed This Visit        Podiatry Problems    Non-pressure chronic ulcer of other part of right lower leg with fat layer exposed ([address]    RESOLVED: Non-pressure chronic ulcer of left lower leg with fat layer exposed ([address]       Other    Venous insufficiency (chronic) (peripheral) - Primary (Chronic)    Stasis edema with ulcer of both lower extremities ([address]          Pre Debridement Measurements:  Are located in the [place]  Documentation Flow Sheet  Post Debridement Measurements:  Wound/Ulcer Descriptions are Pre Debridement except measurements:     Wound 08/21/20 Pretibial Right;Distal Wound # 2 acquired 7/15/20 (Active)   Wound Image   10/16/20 1509   Dressing Status New dressing applied 10/09/20 1638   Wound Cleansed Cleansed with saline 10/09/20 1638   Dressing/Treatment Alginate;ABD;Roll gauze 10/09/20 1638   Offloading for Diabetic Foot Ulcers No offloading required 10/09/20 1638   Wound Length (cm) 0 cm 10/16/20 1509   Wound Width (cm) 0 cm 10/16/20 1509   Wound Depth (cm) 0 cm 10/16/20 1509   Wound Surface Area (cm^2) 0 cm^2 10/16/20 1509   Change in Wound Size % (l*w) 100 10/16/20 1509   Wound Volume (cm^3) 0 cm^3 10/16/20 1509   Wound Healing % 100 10/16/20 1509   Post-Procedure Length (cm) 5 cm 10/02/20 1635   Post-Procedure Width (cm) 6.2 cm 10/02/20 1635   Post-Procedure Depth (cm) 0.1 cm 10/02/20 1635   Post-Procedure Surface Area (cm^2) 31 cm^2 10/02/20 1635   Post-Procedure Volume (cm^3) 3.1 cm^3 10/02/20 1635   Drainage Amount Moderate 10/09/20 1549   Drainage Description Serous 10/09/20 1549   Odor None 10/09/20 1549   Letty-wound Assessment Fragile 10/09/20 1549   Number of days: 56       Wound 08/21/20 Leg Right;Posterior; Lower Wound # 3 acquired 7/15/20 (Active)   Wound Image   09/18/20 1556   Dressing Status New dressing applied 10/09/20 1638   Wound Cleansed Cleansed with saline 10/09/20 1638   Dressing/Treatment ABD; Alginate;Roll gauze 10/09/20 1638   Offloading for Diabetic Foot Ulcers No offloading required 10/09/20 1638   Wound Length (cm) 5 cm 10/16/20 1509   Wound Width (cm) 4.1 cm 10/16/20 1509   Wound Depth (cm) 0.1 cm 10/16/20 1509   Wound Surface Area (cm^2) 20.5 cm^2 10/16/20 1509   Change in Wound Size % (l*w) 52.55 10/16/20 1509   Wound Volume (cm^3) 2.05 cm^3 10/16/20 1509   Wound Healing % 76 10/16/20 1509   Post-Procedure Length (cm) 4.8 cm 10/02/20 1635   Post-Procedure Width (cm) 8 cm 10/02/20 1635   Post-Procedure Depth (cm) sharply debrided down through and including the removal of subcutaneous tissue. Devitalized Tissue Debrided:  fibrin and biofilm to stimulate bleeding to promote healing, post debridement good bleeding base and wound edges noted    Wound/Ulcer #: 3    Percent of Wound/Ulcer Debrided: 90%    Total Surface Area Debrided:  18.2 sq cm     Estimated Blood Loss:  Minimal  Hemostasis Achieved:  by pressure    Procedural Pain:  2  / 10   Post Procedural Pain:  0 / 10     Response to treatment:  Well tolerated by patient. We will continue with compression therapy. Plan:   Treatment Note please see attached Discharge Instructions    Written patient dismissal instructions given to patient and signed by patient or POA. Discharge Instructions       Visit Discharge/Physician Orders     Discharge condition: Stable     Assessment of pain at discharge:mild  Anesthetic used: 4% lidocaine     Discharge to: Home     Left via:Private automobile     Accompanied by: accompanied by self     ECF/HHA: [facility]     Dressing Orders: to wounds right leg, cleanse wounds with saline. APPLY PLAIN CALCIUM ALGINATE,  GAUZE, 5X9 PAD AND KERLIX WRAP. CHANGE DRESSING EVERY 48 HRS. . Rodrick Rafter MOISTURIZE INTACT SKIN WITH AQUAPHOR OR COMPARABLE EMOLLIENT DURING DRESSING CHANGES     spandigrips    Treatment Orders: PLAN NAIL CARE TRIMMING OCT. 16TH    APPLY SPANDAGRIPS BILATERAL LEGS. elevate legs when seated. Limit sodium intake to no more than 2,000mg per day. Please read all food labels for sodium content and choose foods lower in salt     [facility] followup visit _________1 week____________________  (Please note your next appointment above and if you are unable to keep, kindly give a 24 hour notice.  Thank you.)     Physician signature:__________________________        If you experience any of the following, please call the [facility] [address] during business hours:     * Increase in Pain  * Temperature over 101  * Increase in drainage from your wound  * Drainage with a foul odor  * Bleeding  * Increase in swelling  * Need for compression bandage changes due to slippage, breakthrough drainage.     If you need medical attention outside of the business hours of the [address] [place] [address] Road please contact your PCP or go to the nearest emergency room.                                                           Electronically signed by Sudhakar Healy DPM on 10/16/2020 at 3:28 PM

## 2020-10-23 ENCOUNTER — HOSPITAL ENCOUNTER (OUTPATIENT)
Dept: WOUND CARE | Age: 85
Discharge: HOME | End: 2020-10-23
Payer: MEDICARE

## 2020-10-23 VITALS
RESPIRATION RATE: 16 BRPM | HEART RATE: 68 BPM | TEMPERATURE: 98.4 F | BODY MASS INDEX: 43.59 KG/M2 | HEIGHT: 60 IN | DIASTOLIC BLOOD PRESSURE: 50 MMHG | SYSTOLIC BLOOD PRESSURE: 92 MMHG | WEIGHT: 222 LBS

## 2020-10-23 PROBLEM — I87.313 STASIS EDEMA WITH ULCER OF BOTH LOWER EXTREMITIES (HCC): Status: RESOLVED | Noted: 2020-08-21 | Resolved: 2020-10-23

## 2020-10-23 PROBLEM — L97.812 NON-PRESSURE CHRONIC ULCER OF OTHER PART OF RIGHT LOWER LEG WITH FAT LAYER EXPOSED (HCC): Status: RESOLVED | Noted: 2020-08-21 | Resolved: 2020-10-23

## 2020-10-23 PROBLEM — L97.929 STASIS EDEMA WITH ULCER OF BOTH LOWER EXTREMITIES (HCC): Status: RESOLVED | Noted: 2020-08-21 | Resolved: 2020-10-23

## 2020-10-23 PROBLEM — L97.919 STASIS EDEMA WITH ULCER OF BOTH LOWER EXTREMITIES (HCC): Status: RESOLVED | Noted: 2020-08-21 | Resolved: 2020-10-23

## 2020-10-23 PROCEDURE — 99213 OFFICE O/P EST LOW 20 MIN: CPT | Performed by: PODIATRIST

## 2020-10-23 PROCEDURE — 99213 OFFICE O/P EST LOW 20 MIN: CPT

## 2020-10-23 RX ORDER — LIDOCAINE HYDROCHLORIDE 40 MG/ML
SOLUTION TOPICAL ONCE
Status: DISCONTINUED | OUTPATIENT
Start: 2020-10-23 | End: 2020-10-23

## 2020-10-23 NOTE — PROGRESS NOTES
Wound Healing Center Followup Visit Note    Referring Physician : Debra Newby MD  [address] [place] [address] RECORD NUMBER:  [id number]  AGE: 81 y.o. GENDER: female  : 1935  EPISODE DATE:  10/23/2020    Subjective:     Chief Complaint   Patient presents with    Wound Check     right lower leg ulcer      HISTORY of PRESENT ILLNESS HPI   Laura Lira is a 81 y.o. female who presents today in regards to follow up evaluation and treatment of wound/ulcer. That patient's past medical, family and social hx were reviewed and changes were made if present. History of Wound Context:  Patient denies any nausea, vomiting, fever, chills, shortness of breath, chest pain, calf cramping and/or pain. Patient is tolerating the current dressing regimen without issues. No other new issues noted at this time. Wound/Ulcer Pain Timing/Severity: none  Quality of pain: N/A  Severity:  0 / 10   Modifying Factors: None  Associated Signs/Symptoms: edema    Ulcer Identification:  Ulcer Type: venous  Contributing Factors: edema, venous stasis, decreased mobility and obesity    Diabetic/Pressure/Non Pressure Ulcers only:  Ulcer: Ulcerations are healed bilateral lower extremities    Wound: Blister        PAST MEDICAL HISTORY      Diagnosis Date    Arthritis     Atrial fibrillation ([address]     Hyperlipidemia     Hypertension     Non-pressure chronic ulcer of other part of right lower leg with fat layer exposed ([facility] 2020    Thyroid disease      Past Surgical History:   Procedure Laterality Date    PACEMAKER INSERTION  2019    PPM GENT CHANGE  ([facility])   DR. TONG     History reviewed. No pertinent family history.   Social History     Tobacco Use    Smoking status: Never Smoker    Smokeless tobacco: Never Used   Substance Use Topics    Alcohol use: No    Drug use: No     No Known Allergies  Current Outpatient Medications on File Prior to Encounter   Medication Sig Dispense Refill    spironolactone (ALDACTONE) 25 MG tablet Take 25 mg by mouth daily      metFORMIN (GLUCOPHAGE) 500 MG tablet Take 500 mg by mouth 2 times daily (with meals)      Multiple Vitamins-Minerals (VITAMIN D3 COMPLETE PO) Take 1 tablet by mouth daily      magnesium (MAGNESIUM-OXIDE) 250 MG TABS tablet Take 250 mg by mouth daily      triamcinolone (KENALOG) 0.1 % cream Apply topically 2 times daily. 90 g 5    metoprolol (LOPRESSOR) 100 MG tablet Take 100 mg by mouth 2 times daily      simvastatin (ZOCOR) 20 MG tablet Take 20 mg by mouth nightly      levothyroxine (SYNTHROID) 100 MCG tablet Take 125 mcg by mouth Daily       WARFARIN SODIUM PO Take by mouth Alternate 3 mg and 4.5 mg daily      furosemide (LASIX) 40 MG tablet Take 40 mg by mouth 2 times daily      potassium chloride (KLOR-CON) 20 MEQ packet Take 20 mEq by mouth 2 times daily      diltiazem (CARDIZEM) 120 MG tablet Take 120 mg by mouth daily      metolazone (ZAROXOLYN) 2.5 MG tablet Take 2.5 mg by mouth daily       No current facility-administered medications on file prior to encounter.         REVIEW OF SYSTEMS See HPI    Objective:    BP (!) 92/50   Pulse 68   Temp 98.4 °F (36.9 °C) (Temporal)   Resp 16   Ht 5' (1.524 m)   Wt 222 lb (100.7 kg)   BMI 43.36 kg/m²   Wt Readings from Last 3 Encounters:   10/23/20 222 lb (100.7 kg)   10/16/20 238 lb (108 kg)   10/09/20 238 lb (108 kg)     PHYSICAL EXAM  CONSTITUTIONAL:   Awake, alert, cooperative and in no acute distress  SKIN:  Open wound absent    Assessment:     Problem List Items Addressed This Visit        Podiatry Problems    RESOLVED: Non-pressure chronic ulcer of other part of right lower leg with fat layer exposed ([address]       Other    Venous insufficiency (chronic) (peripheral) (Chronic)    RESOLVED: Stasis edema with ulcer of both lower extremities ([address] - Primary          Pre Debridement Measurements:  Are located in the [place]  Documentation Flow Sheet  Post Debridement Measurements:  Wound/Ulcer Descriptions are Pre Debridement except measurements:     Wound 08/21/20 Leg Right;Posterior; Lower Wound # 3 acquired 7/15/20 (Active)   Wound Image   10/23/20 1522   Dressing Status New dressing applied 10/16/20 1536   Wound Cleansed Cleansed with saline 10/16/20 1536   Dressing/Treatment Alginate;Roll gauze 10/16/20 1536   Offloading for Diabetic Foot Ulcers No offloading required 10/09/20 1638   Wound Length (cm) 0 cm 10/23/20 1522   Wound Width (cm) 0 cm 10/23/20 1522   Wound Depth (cm) 0 cm 10/23/20 1522   Wound Surface Area (cm^2) 0 cm^2 10/23/20 1522   Change in Wound Size % (l*w) 100 10/23/20 1522   Wound Volume (cm^3) 0 cm^3 10/23/20 1522   Wound Healing % 100 10/23/20 1522   Post-Procedure Length (cm) 4.8 cm 10/02/20 1635   Post-Procedure Width (cm) 8 cm 10/02/20 1635   Post-Procedure Depth (cm) 0.1 cm 10/02/20 1635   Post-Procedure Surface Area (cm^2) 38.4 cm^2 10/02/20 1635   Post-Procedure Volume (cm^3) 3.84 cm^3 10/02/20 1635   Wound Assessment Pale granulation tissue 10/16/20 1509   Drainage Amount Small 10/16/20 1509   Drainage Description Serous 10/16/20 1509   Odor None 10/16/20 1509   Letty-wound Assessment Fragile 10/16/20 1509   Number of days: 63          Procedure Note  Indications:  Based on my examination of this patient's wound(s)/ulcer(s) today, debridement is not required to promote healing and evaluate the wound base. 1. Evaluation & Management  2. No debridement performed, wound is healed. -  Patient to be discharged from the wound care center.   -  Patient was advised on proper skin care techniques to prevent reoccurrence. -  Patient was advised to notify the wound care center and/or health care professional if any new wounds arise. Plan:   Treatment Note please see attached Discharge Instructions    Written patient dismissal instructions given to patient and signed by patient or POA.          Discharge Instructions       Visit Discharge/Physician Orders     Discharge

## 2020-11-16 ENCOUNTER — TELEPHONE (OUTPATIENT)
Dept: PODIATRY | Age: 85
End: 2020-11-16

## 2020-11-16 RX ORDER — CEPHALEXIN 500 MG/1
500 CAPSULE ORAL 2 TIMES DAILY
Qty: 28 CAPSULE | Refills: 0 | Status: SHIPPED | OUTPATIENT
Start: 2020-11-16 | End: 2020-11-30

## 2020-11-16 RX ORDER — DOXYCYCLINE HYCLATE 100 MG
100 TABLET ORAL 2 TIMES DAILY
Qty: 28 TABLET | Refills: 0 | Status: CANCELLED | OUTPATIENT
Start: 2020-11-16 | End: 2020-11-30

## 2020-11-16 NOTE — TELEPHONE ENCOUNTER
Patient called in stating her legs are draining again and are painful. She is requesting an antibiotic.

## 2020-11-16 NOTE — TELEPHONE ENCOUNTER
You can send her in a prescription for doxycycline 100 mg twice a day for 14 days. If no improvement midweek get her in at the end of the week for evaluation.   Thanks

## 2021-01-01 ENCOUNTER — PROCEDURE VISIT (OUTPATIENT)
Dept: PODIATRY | Age: 86
End: 2021-01-01
Payer: MEDICARE

## 2021-01-01 VITALS — BODY MASS INDEX: 43.59 KG/M2 | HEIGHT: 60 IN | WEIGHT: 222 LBS

## 2021-01-01 VITALS — BODY MASS INDEX: 43.59 KG/M2 | WEIGHT: 222 LBS | HEIGHT: 60 IN

## 2021-01-01 VITALS — HEIGHT: 60 IN | WEIGHT: 222 LBS | BODY MASS INDEX: 43.59 KG/M2

## 2021-01-01 DIAGNOSIS — R26.2 DIFFICULTY WALKING: ICD-10-CM

## 2021-01-01 DIAGNOSIS — B35.1 ONYCHOMYCOSIS: Primary | ICD-10-CM

## 2021-01-01 DIAGNOSIS — M79.674 PAIN OF TOE OF RIGHT FOOT: ICD-10-CM

## 2021-01-01 DIAGNOSIS — I73.9 PVD (PERIPHERAL VASCULAR DISEASE) (HCC): ICD-10-CM

## 2021-01-01 DIAGNOSIS — I87.2 VENOUS INSUFFICIENCY (CHRONIC) (PERIPHERAL): Chronic | ICD-10-CM

## 2021-01-01 DIAGNOSIS — E11.51 TYPE II DIABETES MELLITUS WITH PERIPHERAL CIRCULATORY DISORDER (HCC): ICD-10-CM

## 2021-01-01 DIAGNOSIS — M79.675 PAIN OF TOE OF LEFT FOOT: ICD-10-CM

## 2021-01-01 DIAGNOSIS — I87.2 VENOUS INSUFFICIENCY (CHRONIC) (PERIPHERAL): ICD-10-CM

## 2021-01-01 PROCEDURE — 11721 DEBRIDE NAIL 6 OR MORE: CPT | Performed by: PODIATRIST

## 2021-03-22 NOTE — PROGRESS NOTES
Patient in today for nail care. Patient does not have any complaints of pain at this time.  Patient's PCP is Yuri Bryan MD date of last ov    3/1/21     Odalys Vazquez LPN

## 2021-03-24 NOTE — PROGRESS NOTES
3/23/21     Belem Verdugo    : 1935  Sex: female  Age: 81 y.o. Subjective: The patient is seen today for evaluation regarding foot evaluation and mycotic nail care. No other complaints noted. Chief Complaint   Patient presents with    Nail Problem       Current Medications:    Current Outpatient Medications:     spironolactone (ALDACTONE) 25 MG tablet, Take 100 mg by mouth 2 times daily , Disp: , Rfl:     metFORMIN (GLUCOPHAGE) 500 MG tablet, Take 500 mg by mouth 2 times daily (with meals), Disp: , Rfl:     Multiple Vitamins-Minerals (VITAMIN D3 COMPLETE PO), Take 1 tablet by mouth daily, Disp: , Rfl:     magnesium (MAGNESIUM-OXIDE) 250 MG TABS tablet, Take 250 mg by mouth daily, Disp: , Rfl:     triamcinolone (KENALOG) 0.1 % cream, Apply topically 2 times daily. , Disp: 90 g, Rfl: 5    metoprolol (LOPRESSOR) 100 MG tablet, Take 100 mg by mouth 2 times daily, Disp: , Rfl:     simvastatin (ZOCOR) 20 MG tablet, Take 20 mg by mouth nightly, Disp: , Rfl:     levothyroxine (SYNTHROID) 100 MCG tablet, Take 125 mcg by mouth Daily , Disp: , Rfl:     WARFARIN SODIUM PO, Take by mouth Alternate 3 mg and 4.5 mg daily, Disp: , Rfl:     furosemide (LASIX) 40 MG tablet, Take 40 mg by mouth 2 times daily, Disp: , Rfl:     potassium chloride (KLOR-CON) 20 MEQ packet, Take 20 mEq by mouth 2 times daily, Disp: , Rfl:     diltiazem (CARDIZEM) 120 MG tablet, Take 120 mg by mouth daily, Disp: , Rfl:     metolazone (ZAROXOLYN) 2.5 MG tablet, Take 2.5 mg by mouth daily, Disp: , Rfl:     Allergies:  No Known Allergies    Past Surgical History:   Procedure Laterality Date    PACEMAKER INSERTION  2019    PPM [facility]ENT CHANGE  ([facility])     [facility]     Past Medical History:   Diagnosis Date    Arthritis     At[facility] ([facility]     Hyperlipidemia     Hypertension     Non-pressure chronic ulcer of other part of right lower leg with fat layer exposed ([facility] 2020    Thyroid disease        Vitals: 03/22/21 1408   Weight: 222 lb (100.7 kg)   Height: 5' (1.524 m)       Exam:  Neurovascular status unchanged. At this time the nail/s 1, 2, 3, 4, 5 right foot and nail/s 1, 2, 3, 4, 5 left foot are noted to be thickened, dystrophic and discolored with subungual debris present. Tenderness noted to palpation. Minimal hair growth is noted to both lower extremities. Edema noted with both varicosities and stasis skin changes noted bilaterally. Coolness is noted to the digital regions to palpation. Capillary fill time delayed digital areas bilateral foot. No heel fissuring or macerations of the web spaces. No plantar calluses and/or ulcerative areas are noted. Patient is having difficulty with gait/walking. Plan Per Assessment  Rosas De Souza was seen today for nail problem. Diagnoses and all orders for this visit:    Onychomycosis    Pain of toe of right foot    Pain of toe of left foot    PVD (peripheral vascular disease) (HCC)    Venous insufficiency (chronic) (peripheral)    Difficulty walking        1. Evaluation and Management  2. Manual and electrical debridement of the mycotic nails was performed for thickness and length to prevent injection and/or ulceration. 3. I recommended antifungal cream to the nails daily. 4. It was discussed in detail with the patient proper caring for the vascular compromised foot. The fact that they have compromised blood flow put the patient at risk for infection/gangrene/amputation. The patient should not walk barefoot. Shoe gear should fit properly and socks should be worn with shoes. Exercise is very important to prevent worsening of the disease process but before performing an exercise program should check with their family physician first.  If any skin lesions are noted, they are instructed to contact the office immediately. 5. We will see the patient back at a later date for continued podiatric management and care.   Patient was advised to call the office with any questions or concerns prior to their next appointment if needed. Seen By:    Xi Simmons DPM    Electronically signed by Xi Simmons DPM on 3/23/2021 at 8:05 PM      This note was created using voice recognition software. The note was reviewed however may contain grammatical errors.

## 2021-08-02 NOTE — PROGRESS NOTES
Patient in today for nail care. Patient does not have any complaints of pain at this time.  Patient's PCP is Lewis Ochoa MD date of last ov  7/12/2021       Bhakti Chirinos LPN

## 2021-08-02 NOTE — PROGRESS NOTES
21     [facility]    : 1935  Sex: female  Age: 81 y.o. Subjective: The patient is seen today for evaluation regarding diabetic foot evaluation and mycotic nail care. No other complaints noted. Chief Complaint   Patient presents with    Nail Problem       Current Medications:    Current Outpatient Medications:     spironolactone (ALDACTONE) 25 MG tablet, Take 100 mg by mouth 2 times daily , Disp: , Rfl:     metFORMIN (GLUCOPHAGE) 500 MG tablet, Take 500 mg by mouth 2 times daily (with meals), Disp: , Rfl:     Multiple Vitamins-Minerals (VITAMIN D3 COMPLETE PO), Take 1 tablet by mouth daily, Disp: , Rfl:     magnesium (MAGNESIUM-OXIDE) 250 MG TABS tablet, Take 250 mg by mouth daily, Disp: , Rfl:     triamcinolone (KENALOG) 0.1 % cream, Apply topically 2 times daily. , Disp: 90 g, Rfl: 5    metoprolol (LOPRESSOR) 100 MG tablet, Take 100 mg by mouth 2 times daily, Disp: , Rfl:     simvastatin (ZOCOR) 20 MG tablet, Take 20 mg by mouth nightly, Disp: , Rfl:     levothyroxine (SYNTHROID) 100 MCG tablet, Take 125 mcg by mouth Daily , Disp: , Rfl:     WARFARIN SODIUM PO, Take by mouth Alternate 3 mg and 4.5 mg daily, Disp: , Rfl:     furosemide (LASIX) 40 MG tablet, Take 40 mg by mouth 2 times daily, Disp: , Rfl:     potassium chloride (KLOR-CON) 20 MEQ packet, Take 20 mEq by mouth 2 times daily, Disp: , Rfl:     diltiazem (CARDIZEM) 120 MG tablet, Take 120 mg by mouth daily, Disp: , Rfl:     metolazone (ZAROXOLYN) 2.5 MG tablet, Take 2.5 mg by mouth daily, Disp: , Rfl:     Allergies:  No Known Allergies    Past Surgical History:   Procedure Laterality Date    PACEMAKER INSERTION  2019    PPM G[facility] CHANGE  ([facility])     [facility]     Past Medical History:   Diagnosis Date    Arthritis     Atrial fibrillation ([facility] [address]     Hyperlipidemia     Hypertension     Non-pressure chronic ulcer of other part of right lower leg with fat layer exposed ([facility] [address] 2020    Thyroid disease Vitals:    08/02/21 1353   Weight: 222 lb (100.7 kg)   Height: 5' (1.524 m)       Exam:  Neurovascular status unchanged. At this time the nail/s 1, 2, 3, 4, 5 right foot and nail/s 1, 2, 3, 4, 5 left foot are noted to be thickened, dystrophic and discolored with subungual debris present. Tenderness noted to palpation. Diminished hair growth is noted to both lower extremities. Edema noted with both varicosities and stasis skin changes present bilaterally. Coolness is noted to the digital regions to palpation. Capillary fill time delayed digital areas bilateral foot. No heel fissuring or macerations of the web spaces. No plantar calluses and/or ulcerative areas are noted. Patient is having difficulty with gait/walking. Plan Per Assessment  Pawan Daigle was seen today for nail problem. Diagnoses and all orders for this visit:    Onychomycosis    Pain of toe of right foot    Pain of toe of left foot    Type II diabetes mellitus with peripheral circulatory disorder (HCC)    Venous insufficiency (chronic) (peripheral)    Difficulty walking        1. Evaluation and Management  2. Manual and electrical debridement of the mycotic nails was performed for thickness and length to prevent injection and/or ulceration. 3. I recommended antifungal cream to the nails daily. 4. It was discussed in detail with the patient proper hygiene for the diabetic foot. They are to get in the habit of looking at their feet or have someone look at them. If they are unable to do daily, they are to look for any signs of redness, blistering, cracking, swelling, drainage, open lesions, etc.  They are to dry in between the toes after each bath or shower gently. The water should be tested with the elbow to prevent burns. The patient is to refrain from soaking their feet unless instructed by myself to do otherwise. They are to refrain from going barefoot. Shoe gear should be inspected for any foreign objects.   Shoes should have a deep wide toe box. With any type of shoe, the feet should be inspected for any signs of pressure, i.e. redness, blistering, or open sores. Further instructional guidelines were dispensed to the patient. 5. We will see the patient back at a later date for continued podiatric management and care. Patient was advised to call the office with any questions or concerns prior to their next appointment if needed. Seen By:    Ana M Cox DPM    Electronically signed by Ana M Cox DPM on 8/2/2021 at 2:25 PM      This note was created using voice recognition software. The note was reviewed however may contain grammatical errors.

## 2021-11-01 NOTE — PROGRESS NOTES
21     Bobby Rodrigues    : 1935  Sex: female  Age: 81 y.o. Subjective: The patient is seen today for evaluation regarding diabetic foot evaluation and mycotic nail care. No other complaints noted. Chief Complaint   Patient presents with    Nail Problem       Current Medications:    Current Outpatient Medications:     spironolactone (ALDACTONE) 25 MG tablet, Take 100 mg by mouth 2 times daily , Disp: , Rfl:     metFORMIN (GLUCOPHAGE) 500 MG tablet, Take 500 mg by mouth 2 times daily (with meals), Disp: , Rfl:     Multiple Vitamins-Minerals (VITAMIN D3 COMPLETE PO), Take 1 tablet by mouth daily, Disp: , Rfl:     magnesium (MAGNESIUM-OXIDE) 250 MG TABS tablet, Take 250 mg by mouth daily, Disp: , Rfl:     triamcinolone (KENALOG) 0.1 % cream, Apply topically 2 times daily. , Disp: 90 g, Rfl: 5    metoprolol (LOPRESSOR) 100 MG tablet, Take 100 mg by mouth 2 times daily, Disp: , Rfl:     simvastatin (ZOCOR) 20 MG tablet, Take 20 mg by mouth nightly, Disp: , Rfl:     levothyroxine (SYNTHROID) 100 MCG tablet, Take 125 mcg by mouth Daily , Disp: , Rfl:     WARFARIN SODIUM PO, Take by mouth Alternate 3 mg and 4.5 mg daily, Disp: , Rfl:     furosemide (LASIX) 40 MG tablet, Take 40 mg by mouth 2 times daily, Disp: , Rfl:     potassium chloride (KLOR-CON) 20 MEQ packet, Take 20 mEq by mouth 2 times daily, Disp: , Rfl:     diltiazem (CARDIZEM) 120 MG tablet, Take 120 mg by mouth daily, Disp: , Rfl:     metolazone (ZAROXOLYN) 2.5 MG tablet, Take 2.5 mg by mouth daily, Disp: , Rfl:     Allergies:  No Known Allergies    Past Surgical History:   Procedure Laterality Date    PACEMAKER INSERTION  2019    PPM [facility]     [facility]     Past Medical History:   Diagnosis Date    Arthritis     Atrial fibrillation ([facility] [address]     Hyperlipidemia     Hypertension     Non-pressure chronic ulcer of other part of right lower leg with fat layer exposed ([facility] [address] 2020    Thyroid disease Vitals:    11/01/21 1409   Weight: 222 lb (100.7 kg)   Height: 5' (1.524 m)       Exam:  Pedal pulses diminished to palpation bilateral foot. Capillary refill time delayed digital regions bilateral foot. At this time the nail/s 1, 2, 3, 4, 5 right foot and nail/s 1, 2, 3, 4, 5 left foot are noted to be thickened, dystrophic and discolored with subungual debris present. Tenderness noted to palpation. Minimal hair growth is noted to both lower extremities. Edema noted with both varicosities and stasis skin changes present bilaterally. Coolness is noted to the digital regions to palpation. Capillary fill time delayed digital areas bilateral foot. No heel fissuring or macerations of the web spaces. No plantar calluses and/or ulcerative areas are noted. Patient is having difficulty with gait/walking. Plan Per Assessment  Lyssa Tamez was seen today for nail problem. Diagnoses and all orders for this visit:    Onychomycosis    Pain of toe of right foot    Pain of toe of left foot    PVD (peripheral vascular disease) (HCC)    Venous insufficiency (chronic) (peripheral)    Difficulty walking        1. Evaluation and Management  2. Manual and electrical debridement of the mycotic nails was performed for thickness and length to prevent injection and/or ulceration. 3. I recommended antifungal cream to the nails daily. 4. It was discussed in detail with the patient proper caring for the vascular compromised foot. The fact that they have compromised blood flow put the patient at risk for infection/gangrene/amputation. The patient should not walk barefoot. Shoe gear should fit properly and socks should be worn with shoes. Exercise is very important to prevent worsening of the disease process but before performing an exercise program should check with their family physician first.  If any skin lesions are noted, they are instructed to contact the office immediately.     5. We will see the patient back at a later date for continued podiatric management and care. Patient was advised to call the office with any questions or concerns prior to their next appointment if needed. Seen By:    Arline Guthrie DPM    Electronically signed by Arline Guthrie DPM on 11/1/2021 at 2:33 PM      This note was created using voice recognition software. The note was reviewed however may contain grammatical errors.

## 2021-11-01 NOTE — PROGRESS NOTES
Patient in today for nail care. Patient does not have any complaints of pain at this time.  Patient's PCP is Kaitlynn Travis MD date of last ov   10/28/2021      Giovany Chambers LPN

## 2022-01-01 ENCOUNTER — APPOINTMENT (OUTPATIENT)
Dept: GENERAL RADIOLOGY | Age: 87
DRG: 870 | End: 2022-01-01
Attending: INTERNAL MEDICINE
Payer: MEDICARE

## 2022-01-01 ENCOUNTER — APPOINTMENT (OUTPATIENT)
Dept: ULTRASOUND IMAGING | Age: 87
DRG: 871 | End: 2022-01-01
Payer: MEDICARE

## 2022-01-01 ENCOUNTER — APPOINTMENT (OUTPATIENT)
Dept: ULTRASOUND IMAGING | Age: 87
DRG: 870 | End: 2022-01-01
Attending: INTERNAL MEDICINE
Payer: MEDICARE

## 2022-01-01 ENCOUNTER — HOSPITAL ENCOUNTER (INPATIENT)
Age: 87
LOS: 13 days | Discharge: NURSING, REHAB OR LONG-TERM CARE | DRG: 870 | End: 2022-02-19
Attending: INTERNAL MEDICINE | Admitting: INTERNAL MEDICINE
Payer: MEDICARE

## 2022-01-01 ENCOUNTER — APPOINTMENT (OUTPATIENT)
Dept: GENERAL RADIOLOGY | Age: 87
DRG: 871 | End: 2022-01-01
Payer: MEDICARE

## 2022-01-01 ENCOUNTER — APPOINTMENT (OUTPATIENT)
Dept: CT IMAGING | Age: 87
DRG: 871 | End: 2022-01-01
Payer: MEDICARE

## 2022-01-01 ENCOUNTER — APPOINTMENT (OUTPATIENT)
Dept: NEUROLOGY | Age: 87
DRG: 870 | End: 2022-01-01
Attending: INTERNAL MEDICINE
Payer: MEDICARE

## 2022-01-01 ENCOUNTER — HOSPITAL ENCOUNTER (INPATIENT)
Age: 87
LOS: 2 days | Discharge: ANOTHER HOSPITAL | DRG: 871 | End: 2022-02-06
Attending: EMERGENCY MEDICINE | Admitting: FAMILY MEDICINE
Payer: MEDICARE

## 2022-01-01 ENCOUNTER — APPOINTMENT (OUTPATIENT)
Dept: GENERAL RADIOLOGY | Age: 87
DRG: 641 | End: 2022-01-01
Payer: MEDICARE

## 2022-01-01 ENCOUNTER — CLINICAL DOCUMENTATION (OUTPATIENT)
Dept: PRIMARY CARE CLINIC | Age: 87
End: 2022-01-01

## 2022-01-01 ENCOUNTER — OUTSIDE SERVICES (OUTPATIENT)
Dept: PRIMARY CARE CLINIC | Age: 87
End: 2022-01-01
Payer: MEDICARE

## 2022-01-01 ENCOUNTER — APPOINTMENT (OUTPATIENT)
Dept: CT IMAGING | Age: 87
DRG: 641 | End: 2022-01-01
Payer: MEDICARE

## 2022-01-01 ENCOUNTER — HOSPITAL ENCOUNTER (INPATIENT)
Age: 87
LOS: 6 days | Discharge: NURSING, REHAB OR LONG-TERM CARE | DRG: 641 | End: 2022-01-07
Attending: EMERGENCY MEDICINE | Admitting: INTERNAL MEDICINE
Payer: MEDICARE

## 2022-01-01 VITALS
BODY MASS INDEX: 44.41 KG/M2 | TEMPERATURE: 97.5 F | SYSTOLIC BLOOD PRESSURE: 135 MMHG | DIASTOLIC BLOOD PRESSURE: 96 MMHG | WEIGHT: 226.2 LBS | RESPIRATION RATE: 19 BRPM | OXYGEN SATURATION: 98 % | HEART RATE: 91 BPM | HEIGHT: 60 IN

## 2022-01-01 VITALS
OXYGEN SATURATION: 100 % | RESPIRATION RATE: 16 BRPM | SYSTOLIC BLOOD PRESSURE: 141 MMHG | DIASTOLIC BLOOD PRESSURE: 56 MMHG | BODY MASS INDEX: 39.85 KG/M2 | WEIGHT: 203 LBS | TEMPERATURE: 99.4 F | HEIGHT: 60 IN | HEART RATE: 77 BPM

## 2022-01-01 VITALS
HEART RATE: 117 BPM | BODY MASS INDEX: 39.85 KG/M2 | SYSTOLIC BLOOD PRESSURE: 116 MMHG | DIASTOLIC BLOOD PRESSURE: 81 MMHG | RESPIRATION RATE: 18 BRPM | HEIGHT: 60 IN | TEMPERATURE: 97.5 F | OXYGEN SATURATION: 95 % | WEIGHT: 203 LBS

## 2022-01-01 VITALS
HEART RATE: 72 BPM | RESPIRATION RATE: 18 BRPM | TEMPERATURE: 97.6 F | OXYGEN SATURATION: 98 % | SYSTOLIC BLOOD PRESSURE: 124 MMHG | DIASTOLIC BLOOD PRESSURE: 70 MMHG

## 2022-01-01 DIAGNOSIS — I63.40 CEREBROVASCULAR ACCIDENT (CVA) DUE TO EMBOLISM OF CEREBRAL ARTERY (HCC): ICD-10-CM

## 2022-01-01 DIAGNOSIS — E03.9 HYPOTHYROIDISM, UNSPECIFIED TYPE: ICD-10-CM

## 2022-01-01 DIAGNOSIS — U07.1 COVID-19: ICD-10-CM

## 2022-01-01 DIAGNOSIS — R56.9 SEIZURES (HCC): ICD-10-CM

## 2022-01-01 DIAGNOSIS — I10 ESSENTIAL HYPERTENSION: ICD-10-CM

## 2022-01-01 DIAGNOSIS — S81.801A WOUND OF RIGHT LOWER EXTREMITY, INITIAL ENCOUNTER: ICD-10-CM

## 2022-01-01 DIAGNOSIS — A41.9 SEPTICEMIA (HCC): ICD-10-CM

## 2022-01-01 DIAGNOSIS — R41.82 ALTERED MENTAL STATUS, UNSPECIFIED ALTERED MENTAL STATUS TYPE: ICD-10-CM

## 2022-01-01 DIAGNOSIS — J96.00 ACUTE RESPIRATORY FAILURE, UNSPECIFIED WHETHER WITH HYPOXIA OR HYPERCAPNIA (HCC): Primary | ICD-10-CM

## 2022-01-01 DIAGNOSIS — I48.0 PAROXYSMAL ATRIAL FIBRILLATION (HCC): ICD-10-CM

## 2022-01-01 DIAGNOSIS — E87.5 HYPERKALEMIA: Primary | ICD-10-CM

## 2022-01-01 DIAGNOSIS — I48.91 ATRIAL FIBRILLATION WITH RVR (HCC): Primary | ICD-10-CM

## 2022-01-01 DIAGNOSIS — R53.81 DEBILITY: ICD-10-CM
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
## 2022-01-01 DIAGNOSIS — Z51.5 ENCOUNTER FOR PALLIATIVE CARE: ICD-10-CM

## 2022-01-01 DIAGNOSIS — E11.59 TYPE 2 DIABETES MELLITUS WITH OTHER CIRCULATORY COMPLICATION, UNSPECIFIED WHETHER LONG TERM INSULIN USE (HCC): ICD-10-CM

## 2022-01-01 LAB
AADO2: 104 MMHG
AADO2: 124.4 MMHG
AADO2: 136.6 MMHG
AADO2: 332 MMHG
AADO2: 60.2 MMHG
AADO2: 73.5 MMHG
AADO2: 74.2 MMHG
AADO2: 77.6 MMHG
AADO2: 83.3 MMHG
AADO2: 85.7 MMHG
AADO2: 86.9 MMHG
AADO2: 87.2 MMHG
AADO2: 89 MMHG
ACINETOBACTER CALCOAC BAUMANNII COMPLEX BY PCR: NOT DETECTED
ADENOVIRUS BY PCR: NOT DETECTED
ADENOVIRUS BY PCR: NOT DETECTED
ALBUMIN SERPL-MCNC: 1.4 G/DL (ref 3.5–5.2)
ALBUMIN SERPL-MCNC: 1.6 G/DL (ref 3.5–5.2)
ALBUMIN SERPL-MCNC: 1.7 G/DL (ref 3.5–5.2)
ALBUMIN SERPL-MCNC: 1.9 G/DL (ref 3.5–5.2)
ALBUMIN SERPL-MCNC: 2 G/DL (ref 3.5–5.2)
ALBUMIN SERPL-MCNC: 3 G/DL (ref 3.5–5.2)
ALBUMIN SERPL-MCNC: 3.2 G/DL (ref 3.5–5.2)
ALBUMIN SERPL-MCNC: 3.4 G/DL (ref 3.5–5.2)
ALBUMIN SERPL-MCNC: 3.5 G/DL (ref 3.5–5.2)
ALBUMIN SERPL-MCNC: 3.5 G/DL (ref 3.5–5.2)
ALBUMIN SERPL-MCNC: 3.6 G/DL (ref 3.5–5.2)
ALBUMIN SERPL-MCNC: 3.7 G/DL (ref 3.5–5.2)
ALBUMIN SERPL-MCNC: 3.7 G/DL (ref 3.5–5.2)
ALP BLD-CCNC: 102 U/L (ref 35–104)
ALP BLD-CCNC: 103 U/L (ref 35–104)
ALP BLD-CCNC: 105 U/L (ref 35–104)
ALP BLD-CCNC: 112 U/L (ref 35–104)
ALP BLD-CCNC: 113 U/L (ref 35–104)
ALP BLD-CCNC: 134 U/L (ref 35–104)
ALP BLD-CCNC: 147 U/L (ref 35–104)
ALP BLD-CCNC: 184 U/L (ref 35–104)
ALP BLD-CCNC: 186 U/L (ref 35–104)
ALP BLD-CCNC: 392 U/L (ref 35–104)
ALT SERPL-CCNC: 11 U/L (ref 0–32)
ALT SERPL-CCNC: 13 U/L (ref 0–32)
ALT SERPL-CCNC: 15 U/L (ref 0–32)
ALT SERPL-CCNC: 16 U/L (ref 0–32)
ALT SERPL-CCNC: 18 U/L (ref 0–32)
ALT SERPL-CCNC: 20 U/L (ref 0–32)
ALT SERPL-CCNC: 48 U/L (ref 0–32)
ALT SERPL-CCNC: 50 U/L (ref 0–32)
ALT SERPL-CCNC: 53 U/L (ref 0–32)
ALT SERPL-CCNC: 57 U/L (ref 0–32)
ALT SERPL-CCNC: 59 U/L (ref 0–32)
ALT SERPL-CCNC: 60 U/L (ref 0–32)
ANION GAP SERPL CALCULATED.3IONS-SCNC: 11 MMOL/L (ref 7–16)
ANION GAP SERPL CALCULATED.3IONS-SCNC: 12 MMOL/L (ref 7–16)
ANION GAP SERPL CALCULATED.3IONS-SCNC: 13 MMOL/L (ref 7–16)
ANION GAP SERPL CALCULATED.3IONS-SCNC: 14 MMOL/L (ref 7–16)
ANION GAP SERPL CALCULATED.3IONS-SCNC: 15 MMOL/L (ref 7–16)
ANION GAP SERPL CALCULATED.3IONS-SCNC: 16 MMOL/L (ref 7–16)
ANION GAP SERPL CALCULATED.3IONS-SCNC: 16 MMOL/L (ref 7–16)
ANION GAP SERPL CALCULATED.3IONS-SCNC: 17 MMOL/L (ref 7–16)
ANION GAP SERPL CALCULATED.3IONS-SCNC: 19 MMOL/L (ref 7–16)
ANION GAP SERPL CALCULATED.3IONS-SCNC: 19 MMOL/L (ref 7–16)
ANION GAP SERPL CALCULATED.3IONS-SCNC: 21 MMOL/L (ref 7–16)
ANION GAP SERPL CALCULATED.3IONS-SCNC: 8 MMOL/L (ref 7–16)
ANISOCYTOSIS: ABNORMAL
ANTISTREPTOLYSIN-O: 47 IU/ML (ref 0–200)
APTT: 40.4 SEC (ref 24.5–35.1)
AST SERPL-CCNC: 112 U/L (ref 0–31)
AST SERPL-CCNC: 26 U/L (ref 0–31)
AST SERPL-CCNC: 29 U/L (ref 0–31)
AST SERPL-CCNC: 34 U/L (ref 0–31)
AST SERPL-CCNC: 38 U/L (ref 0–31)
AST SERPL-CCNC: 40 U/L (ref 0–31)
AST SERPL-CCNC: 46 U/L (ref 0–31)
AST SERPL-CCNC: 46 U/L (ref 0–31)
AST SERPL-CCNC: 47 U/L (ref 0–31)
AST SERPL-CCNC: 56 U/L (ref 0–31)
AST SERPL-CCNC: 63 U/L (ref 0–31)
AST SERPL-CCNC: 79 U/L (ref 0–31)
ATYPICAL LYMPHOCYTE RELATIVE PERCENT: 0.8 % (ref 0–4)
B.E.: -1.2 MMOL/L (ref -3–3)
B.E.: -2 MMOL/L (ref -3–3)
B.E.: -2.3 MMOL/L (ref -3–3)
B.E.: -2.7 MMOL/L (ref -3–3)
B.E.: -2.9 MMOL/L (ref -3–3)
B.E.: -3.6 MMOL/L (ref -3–3)
B.E.: -3.7 MMOL/L (ref -3–3)
B.E.: -3.8 MMOL/L (ref -3–3)
B.E.: -5.1 MMOL/L (ref -3–3)
B.E.: -5.8 MMOL/L (ref -3–3)
B.E.: -5.9 MMOL/L (ref -3–3)
B.E.: 1 MMOL/L (ref -3–3)
B.E.: 1.2 MMOL/L (ref -3–3)
B.E.: 1.3 MMOL/L (ref -3–3)
BACTERIA: ABNORMAL /HPF
BACTEROIDES FRAGILIS BY PCR: NOT DETECTED
BASOPHILIC STIPPLING: ABNORMAL
BASOPHILIC STIPPLING: ABNORMAL
BASOPHILS ABSOLUTE: 0 E9/L (ref 0–0.2)
BASOPHILS ABSOLUTE: 0.01 E9/L (ref 0–0.2)
BASOPHILS ABSOLUTE: 0.02 E9/L (ref 0–0.2)
BASOPHILS ABSOLUTE: 0.03 E9/L (ref 0–0.2)
BASOPHILS ABSOLUTE: 0.04 E9/L (ref 0–0.2)
BASOPHILS ABSOLUTE: 0.04 E9/L (ref 0–0.2)
BASOPHILS ABSOLUTE: 0.05 E9/L (ref 0–0.2)
BASOPHILS ABSOLUTE: 0.1 E9/L (ref 0–0.2)
BASOPHILS RELATIVE PERCENT: 0 % (ref 0–2)
BASOPHILS RELATIVE PERCENT: 0 % (ref 0–2)
BASOPHILS RELATIVE PERCENT: 0.1 % (ref 0–2)
BASOPHILS RELATIVE PERCENT: 0.2 % (ref 0–2)
BASOPHILS RELATIVE PERCENT: 0.2 % (ref 0–2)
BASOPHILS RELATIVE PERCENT: 0.3 % (ref 0–2)
BASOPHILS RELATIVE PERCENT: 0.4 % (ref 0–2)
BASOPHILS RELATIVE PERCENT: 0.4 % (ref 0–2)
BASOPHILS RELATIVE PERCENT: 0.5 % (ref 0–2)
BASOPHILS RELATIVE PERCENT: 0.7 % (ref 0–2)
BETA-HYDROXYBUTYRATE: 0.37 MMOL/L (ref 0.02–0.27)
BILIRUB SERPL-MCNC: 1 MG/DL (ref 0–1.2)
BILIRUB SERPL-MCNC: 1 MG/DL (ref 0–1.2)
BILIRUB SERPL-MCNC: 1.4 MG/DL (ref 0–1.2)
BILIRUB SERPL-MCNC: 1.6 MG/DL (ref 0–1.2)
BILIRUB SERPL-MCNC: 1.7 MG/DL (ref 0–1.2)
BILIRUB SERPL-MCNC: 1.8 MG/DL (ref 0–1.2)
BILIRUB SERPL-MCNC: 1.8 MG/DL (ref 0–1.2)
BILIRUB SERPL-MCNC: 2 MG/DL (ref 0–1.2)
BILIRUB SERPL-MCNC: 2 MG/DL (ref 0–1.2)
BILIRUB SERPL-MCNC: 2.1 MG/DL (ref 0–1.2)
BILIRUB SERPL-MCNC: 2.3 MG/DL (ref 0–1.2)
BILIRUB SERPL-MCNC: 2.4 MG/DL (ref 0–1.2)
BILIRUBIN URINE: NEGATIVE
BLOOD CULTURE, ROUTINE: ABNORMAL
BLOOD CULTURE, ROUTINE: NORMAL
BLOOD, URINE: ABNORMAL
BLOOD, URINE: NEGATIVE
BORDETELLA PARAPERTUSSIS BY PCR: NOT DETECTED
BORDETELLA PARAPERTUSSIS BY PCR: NOT DETECTED
BORDETELLA PERTUSSIS BY PCR: NOT DETECTED
BORDETELLA PERTUSSIS BY PCR: NOT DETECTED
BOTTLE TYPE: ABNORMAL
BUN BLDV-MCNC: 25 MG/DL (ref 6–23)
BUN BLDV-MCNC: 28 MG/DL (ref 6–23)
BUN BLDV-MCNC: 29 MG/DL (ref 6–23)
BUN BLDV-MCNC: 31 MG/DL (ref 6–23)
BUN BLDV-MCNC: 32 MG/DL (ref 6–23)
BUN BLDV-MCNC: 32 MG/DL (ref 6–23)
BUN BLDV-MCNC: 33 MG/DL (ref 6–23)
BUN BLDV-MCNC: 37 MG/DL (ref 6–23)
BUN BLDV-MCNC: 38 MG/DL (ref 6–23)
BUN BLDV-MCNC: 38 MG/DL (ref 6–23)
BUN BLDV-MCNC: 42 MG/DL (ref 6–23)
BUN BLDV-MCNC: 42 MG/DL (ref 6–23)
BUN BLDV-MCNC: 49 MG/DL (ref 6–23)
BUN BLDV-MCNC: 50 MG/DL (ref 6–23)
BUN BLDV-MCNC: 54 MG/DL (ref 6–23)
BUN BLDV-MCNC: 55 MG/DL (ref 6–23)
BUN BLDV-MCNC: 58 MG/DL (ref 6–23)
BUN BLDV-MCNC: 58 MG/DL (ref 6–23)
BUN BLDV-MCNC: 59 MG/DL (ref 6–23)
BUN BLDV-MCNC: 59 MG/DL (ref 6–23)
BUN BLDV-MCNC: 60 MG/DL (ref 6–23)
BUN BLDV-MCNC: 61 MG/DL (ref 6–23)
BUN BLDV-MCNC: 61 MG/DL (ref 6–23)
BUN BLDV-MCNC: 66 MG/DL (ref 6–23)
BUN BLDV-MCNC: 71 MG/DL (ref 6–23)
BUN BLDV-MCNC: 74 MG/DL (ref 6–23)
BUN BLDV-MCNC: 77 MG/DL (ref 6–23)
BURR CELLS: ABNORMAL
BURR CELLS: ABNORMAL
C-REACTIVE PROTEIN: 2 MG/DL (ref 0–0.4)
C-REACTIVE PROTEIN: 33.5 MG/DL (ref 0–0.4)
C-REACTIVE PROTEIN: 53.1 MG/DL (ref 0–0.4)
CALCIUM IONIZED: 1.21 MMOL/L (ref 1.15–1.33)
CALCIUM IONIZED: 1.26 MMOL/L (ref 1.15–1.33)
CALCIUM IONIZED: 1.27 MMOL/L (ref 1.15–1.33)
CALCIUM IONIZED: 1.29 MMOL/L (ref 1.15–1.33)
CALCIUM IONIZED: 1.29 MMOL/L (ref 1.15–1.33)
CALCIUM SERPL-MCNC: 7.7 MG/DL (ref 8.6–10.2)
CALCIUM SERPL-MCNC: 7.9 MG/DL (ref 8.6–10.2)
CALCIUM SERPL-MCNC: 8.2 MG/DL (ref 8.6–10.2)
CALCIUM SERPL-MCNC: 8.3 MG/DL (ref 8.6–10.2)
CALCIUM SERPL-MCNC: 8.3 MG/DL (ref 8.6–10.2)
CALCIUM SERPL-MCNC: 8.4 MG/DL (ref 8.6–10.2)
CALCIUM SERPL-MCNC: 8.5 MG/DL (ref 8.6–10.2)
CALCIUM SERPL-MCNC: 8.5 MG/DL (ref 8.6–10.2)
CALCIUM SERPL-MCNC: 8.7 MG/DL (ref 8.6–10.2)
CALCIUM SERPL-MCNC: 8.7 MG/DL (ref 8.6–10.2)
CALCIUM SERPL-MCNC: 8.8 MG/DL (ref 8.6–10.2)
CALCIUM SERPL-MCNC: 8.9 MG/DL (ref 8.6–10.2)
CALCIUM SERPL-MCNC: 8.9 MG/DL (ref 8.6–10.2)
CALCIUM SERPL-MCNC: 9 MG/DL (ref 8.6–10.2)
CALCIUM SERPL-MCNC: 9.1 MG/DL (ref 8.6–10.2)
CALCIUM SERPL-MCNC: 9.2 MG/DL (ref 8.6–10.2)
CALCIUM SERPL-MCNC: 9.2 MG/DL (ref 8.6–10.2)
CALCIUM SERPL-MCNC: 9.3 MG/DL (ref 8.6–10.2)
CALCIUM SERPL-MCNC: 9.4 MG/DL (ref 8.6–10.2)
CALCIUM SERPL-MCNC: 9.5 MG/DL (ref 8.6–10.2)
CALCIUM SERPL-MCNC: 9.5 MG/DL (ref 8.6–10.2)
CALCIUM SERPL-MCNC: 9.6 MG/DL (ref 8.6–10.2)
CALCIUM SERPL-MCNC: 9.9 MG/DL (ref 8.6–10.2)
CANDIDA ALBICANS BY PCR: NOT DETECTED
CANDIDA AURIS BY PCR: NOT DETECTED
CANDIDA GLABRATA BY PCR: NOT DETECTED
CANDIDA KRUSEI BY PCR: NOT DETECTED
CANDIDA PARAPSILOSIS BY PCR: NOT DETECTED
CANDIDA TROPICALIS BY PCR: NOT DETECTED
CHLAMYDOPHILIA PNEUMONIAE BY PCR: NOT DETECTED
CHLAMYDOPHILIA PNEUMONIAE BY PCR: NOT DETECTED
CHLORIDE BLD-SCNC: 102 MMOL/L (ref 98–107)
CHLORIDE BLD-SCNC: 102 MMOL/L (ref 98–107)
CHLORIDE BLD-SCNC: 106 MMOL/L (ref 98–107)
CHLORIDE BLD-SCNC: 107 MMOL/L (ref 98–107)
CHLORIDE BLD-SCNC: 108 MMOL/L (ref 98–107)
CHLORIDE BLD-SCNC: 108 MMOL/L (ref 98–107)
CHLORIDE BLD-SCNC: 109 MMOL/L (ref 98–107)
CHLORIDE BLD-SCNC: 110 MMOL/L (ref 98–107)
CHLORIDE BLD-SCNC: 112 MMOL/L (ref 98–107)
CHLORIDE BLD-SCNC: 86 MMOL/L (ref 98–107)
CHLORIDE BLD-SCNC: 87 MMOL/L (ref 98–107)
CHLORIDE BLD-SCNC: 88 MMOL/L (ref 98–107)
CHLORIDE BLD-SCNC: 91 MMOL/L (ref 98–107)
CHLORIDE BLD-SCNC: 91 MMOL/L (ref 98–107)
CHLORIDE BLD-SCNC: 92 MMOL/L (ref 98–107)
CHLORIDE BLD-SCNC: 93 MMOL/L (ref 98–107)
CHLORIDE BLD-SCNC: 94 MMOL/L (ref 98–107)
CHLORIDE BLD-SCNC: 95 MMOL/L (ref 98–107)
CHLORIDE BLD-SCNC: 95 MMOL/L (ref 98–107)
CHLORIDE BLD-SCNC: 96 MMOL/L (ref 98–107)
CHLORIDE BLD-SCNC: 96 MMOL/L (ref 98–107)
CHLORIDE BLD-SCNC: 97 MMOL/L (ref 98–107)
CHLORIDE BLD-SCNC: 98 MMOL/L (ref 98–107)
CHLORIDE BLD-SCNC: 99 MMOL/L (ref 98–107)
CHLORIDE BLD-SCNC: 99 MMOL/L (ref 98–107)
CHLORIDE URINE RANDOM: 31 MMOL/L
CHLORIDE URINE RANDOM: 67 MMOL/L
CHLORIDE URINE RANDOM: 85 MMOL/L
CHP ED QC CHECK: NORMAL
CHP ED QC CHECK: NORMAL
CHP ED QC CHECK: YES
CLARITY: ABNORMAL
CLARITY: CLEAR
CO2: 15 MMOL/L (ref 22–29)
CO2: 17 MMOL/L (ref 22–29)
CO2: 18 MMOL/L (ref 22–29)
CO2: 19 MMOL/L (ref 22–29)
CO2: 20 MMOL/L (ref 22–29)
CO2: 21 MMOL/L (ref 22–29)
CO2: 22 MMOL/L (ref 22–29)
CO2: 23 MMOL/L (ref 22–29)
CO2: 24 MMOL/L (ref 22–29)
CO2: 25 MMOL/L (ref 22–29)
CO2: 27 MMOL/L (ref 22–29)
COHB: 0.1 % (ref 0–1.5)
COHB: 0.1 % (ref 0–1.5)
COHB: 0.2 % (ref 0–1.5)
COHB: 0.3 % (ref 0–1.5)
COHB: 0.4 % (ref 0–1.5)
COHB: 0.5 % (ref 0–1.5)
COHB: 0.5 % (ref 0–1.5)
COHB: 0.6 % (ref 0–1.5)
COHB: 0.7 % (ref 0–1.5)
COHB: 0.7 % (ref 0–1.5)
COHB: 0.8 % (ref 0–1.5)
COHB: 0.9 % (ref 0–1.5)
COHB: 0.9 % (ref 0–1.5)
COHB: 1.1 % (ref 0–1.5)
COLOR: ABNORMAL
COLOR: YELLOW
CORONAVIRUS 229E BY PCR: NOT DETECTED
CORONAVIRUS 229E BY PCR: NOT DETECTED
CORONAVIRUS HKU1 BY PCR: NOT DETECTED
CORONAVIRUS HKU1 BY PCR: NOT DETECTED
CORONAVIRUS NL63 BY PCR: NOT DETECTED
CORONAVIRUS NL63 BY PCR: NOT DETECTED
CORONAVIRUS OC43 BY PCR: NOT DETECTED
CORONAVIRUS OC43 BY PCR: NOT DETECTED
CORTISOL TOTAL: 107 MCG/DL (ref 2.68–18.4)
CORTISOL TOTAL: 14.66 MCG/DL (ref 2.68–18.4)
CREAT SERPL-MCNC: 0.7 MG/DL (ref 0.5–1)
CREAT SERPL-MCNC: 0.8 MG/DL (ref 0.5–1)
CREAT SERPL-MCNC: 0.8 MG/DL (ref 0.5–1)
CREAT SERPL-MCNC: 1 MG/DL (ref 0.5–1)
CREAT SERPL-MCNC: 1 MG/DL (ref 0.5–1)
CREAT SERPL-MCNC: 1.1 MG/DL (ref 0.5–1)
CREAT SERPL-MCNC: 1.2 MG/DL (ref 0.5–1)
CREAT SERPL-MCNC: 1.3 MG/DL (ref 0.5–1)
CREAT SERPL-MCNC: 1.4 MG/DL (ref 0.5–1)
CREAT SERPL-MCNC: 1.4 MG/DL (ref 0.5–1)
CREAT SERPL-MCNC: 1.5 MG/DL (ref 0.5–1)
CREAT SERPL-MCNC: 1.5 MG/DL (ref 0.5–1)
CREAT SERPL-MCNC: 1.7 MG/DL (ref 0.5–1)
CREAT SERPL-MCNC: 1.8 MG/DL (ref 0.5–1)
CREATININE URINE: 63 MG/DL (ref 29–226)
CREATININE URINE: 63 MG/DL (ref 29–226)
CREATININE URINE: 89 MG/DL (ref 29–226)
CREATININE URINE: 99 MG/DL (ref 29–226)
CRITICAL: ABNORMAL
CRYPTOCOCCUS NEOFORMANS/GATTII BY PCR: NOT DETECTED
CULTURE, BLOOD 2: NORMAL
CULTURE, BLOOD 2: NORMAL
CULTURE, BLOOD 3: NORMAL
CULTURE, RESPIRATORY: ABNORMAL
D DIMER: 2330 NG/ML DDU
DATE ANALYZED: ABNORMAL
DATE OF COLLECTION: ABNORMAL
EKG ATRIAL RATE: 130 BPM
EKG ATRIAL RATE: 66 BPM
EKG Q-T INTERVAL: 250 MS
EKG Q-T INTERVAL: 456 MS
EKG QRS DURATION: 118 MS
EKG QRS DURATION: 142 MS
EKG QTC CALCULATION (BAZETT): 427 MS
EKG QTC CALCULATION (BAZETT): 492 MS
EKG R AXIS: -11 DEGREES
EKG R AXIS: -90 DEGREES
EKG T AXIS: 160 DEGREES
EKG T AXIS: 63 DEGREES
EKG VENTRICULAR RATE: 176 BPM
EKG VENTRICULAR RATE: 70 BPM
ENTEROBACTER CLOACAE COMPLEX BY PCR: NOT DETECTED
ENTEROBACTERALES BY PCR: NOT DETECTED
ENTEROCOCCUS FAECALIS BY PCR: NOT DETECTED
ENTEROCOCCUS FAECIUM BY PCR: NOT DETECTED
EOSINOPHILS ABSOLUTE: 0 E9/L (ref 0.05–0.5)
EOSINOPHILS ABSOLUTE: 0.03 E9/L (ref 0.05–0.5)
EOSINOPHILS ABSOLUTE: 0.08 E9/L (ref 0.05–0.5)
EOSINOPHILS ABSOLUTE: 0.08 E9/L (ref 0.05–0.5)
EOSINOPHILS ABSOLUTE: 0.09 E9/L (ref 0.05–0.5)
EOSINOPHILS ABSOLUTE: 0.11 E9/L (ref 0.05–0.5)
EOSINOPHILS ABSOLUTE: 0.11 E9/L (ref 0.05–0.5)
EOSINOPHILS RELATIVE PERCENT: 0 % (ref 0–6)
EOSINOPHILS RELATIVE PERCENT: 0.3 % (ref 0–6)
EOSINOPHILS RELATIVE PERCENT: 0.3 % (ref 0–6)
EOSINOPHILS RELATIVE PERCENT: 0.5 % (ref 0–6)
EOSINOPHILS RELATIVE PERCENT: 0.8 % (ref 0–6)
EOSINOPHILS RELATIVE PERCENT: 0.8 % (ref 0–6)
EOSINOPHILS RELATIVE PERCENT: 0.9 % (ref 0–6)
EOSINOPHILS RELATIVE PERCENT: 1.1 % (ref 0–6)
EOSINOPHILS RELATIVE PERCENT: 1.1 % (ref 0–6)
ESCHERICHIA COLI BY PCR: NOT DETECTED
FERRITIN: 806 NG/ML
FIBRINOGEN: 263 MG/DL (ref 225–540)
FIBRINOGEN: >700 MG/DL (ref 225–540)
FIO2: 100 %
FIO2: 30 %
FIO2: 40 %
FIO2: 40 %
GFR AFRICAN AMERICAN: 32
GFR AFRICAN AMERICAN: 34
GFR AFRICAN AMERICAN: 40
GFR AFRICAN AMERICAN: 40
GFR AFRICAN AMERICAN: 43
GFR AFRICAN AMERICAN: 43
GFR AFRICAN AMERICAN: 47
GFR AFRICAN AMERICAN: 52
GFR AFRICAN AMERICAN: 57
GFR AFRICAN AMERICAN: >60
GFR NON-AFRICAN AMERICAN: 27 ML/MIN/1.73
GFR NON-AFRICAN AMERICAN: 29 ML/MIN/1.73
GFR NON-AFRICAN AMERICAN: 33 ML/MIN/1.73
GFR NON-AFRICAN AMERICAN: 33 ML/MIN/1.73
GFR NON-AFRICAN AMERICAN: 36 ML/MIN/1.73
GFR NON-AFRICAN AMERICAN: 36 ML/MIN/1.73
GFR NON-AFRICAN AMERICAN: 39 ML/MIN/1.73
GFR NON-AFRICAN AMERICAN: 43 ML/MIN/1.73
GFR NON-AFRICAN AMERICAN: 47 ML/MIN/1.73
GFR NON-AFRICAN AMERICAN: 53 ML/MIN/1.73
GFR NON-AFRICAN AMERICAN: 53 ML/MIN/1.73
GFR NON-AFRICAN AMERICAN: >60 ML/MIN/1.73
GLUCOSE BLD-MCNC: 107 MG/DL (ref 74–99)
GLUCOSE BLD-MCNC: 107 MG/DL (ref 74–99)
GLUCOSE BLD-MCNC: 108 MG/DL (ref 74–99)
GLUCOSE BLD-MCNC: 112 MG/DL (ref 74–99)
GLUCOSE BLD-MCNC: 120 MG/DL (ref 74–99)
GLUCOSE BLD-MCNC: 121 MG/DL (ref 74–99)
GLUCOSE BLD-MCNC: 123 MG/DL (ref 74–99)
GLUCOSE BLD-MCNC: 130 MG/DL (ref 74–99)
GLUCOSE BLD-MCNC: 138 MG/DL (ref 74–99)
GLUCOSE BLD-MCNC: 138 MG/DL (ref 74–99)
GLUCOSE BLD-MCNC: 140 MG/DL (ref 74–99)
GLUCOSE BLD-MCNC: 144 MG/DL
GLUCOSE BLD-MCNC: 147 MG/DL (ref 74–99)
GLUCOSE BLD-MCNC: 158 MG/DL
GLUCOSE BLD-MCNC: 159 MG/DL (ref 74–99)
GLUCOSE BLD-MCNC: 178 MG/DL (ref 74–99)
GLUCOSE BLD-MCNC: 185 MG/DL (ref 74–99)
GLUCOSE BLD-MCNC: 186 MG/DL (ref 74–99)
GLUCOSE BLD-MCNC: 190 MG/DL (ref 74–99)
GLUCOSE BLD-MCNC: 190 MG/DL (ref 74–99)
GLUCOSE BLD-MCNC: 196 MG/DL (ref 74–99)
GLUCOSE BLD-MCNC: 224 MG/DL (ref 74–99)
GLUCOSE BLD-MCNC: 240 MG/DL (ref 74–99)
GLUCOSE BLD-MCNC: 245 MG/DL (ref 74–99)
GLUCOSE BLD-MCNC: 246 MG/DL (ref 74–99)
GLUCOSE BLD-MCNC: 247 MG/DL (ref 74–99)
GLUCOSE BLD-MCNC: 250 MG/DL
GLUCOSE BLD-MCNC: 250 MG/DL (ref 74–99)
GLUCOSE BLD-MCNC: 258 MG/DL (ref 74–99)
GLUCOSE BLD-MCNC: 357 MG/DL (ref 74–99)
GLUCOSE BLD-MCNC: 95 MG/DL (ref 74–99)
GLUCOSE BLD-MCNC: 97 MG/DL (ref 74–99)
GLUCOSE URINE: 250 MG/DL
GLUCOSE URINE: NEGATIVE MG/DL
HAEMOPHILUS INFLUENZAE BY PCR: NOT DETECTED
HBA1C MFR BLD: 7 % (ref 4–5.6)
HCO3: 16.3 MMOL/L (ref 22–26)
HCO3: 16.5 MMOL/L (ref 22–26)
HCO3: 18.2 MMOL/L (ref 22–26)
HCO3: 18.4 MMOL/L (ref 22–26)
HCO3: 18.9 MMOL/L (ref 22–26)
HCO3: 19.1 MMOL/L (ref 22–26)
HCO3: 19.7 MMOL/L (ref 22–26)
HCO3: 19.9 MMOL/L (ref 22–26)
HCO3: 20.3 MMOL/L (ref 22–26)
HCO3: 20.5 MMOL/L (ref 22–26)
HCO3: 20.5 MMOL/L (ref 22–26)
HCO3: 22.4 MMOL/L (ref 22–26)
HCO3: 22.4 MMOL/L (ref 22–26)
HCO3: 23.6 MMOL/L (ref 22–26)
HCT VFR BLD CALC: 27.2 % (ref 34–48)
HCT VFR BLD CALC: 27.6 % (ref 34–48)
HCT VFR BLD CALC: 29.4 % (ref 34–48)
HCT VFR BLD CALC: 29.9 % (ref 34–48)
HCT VFR BLD CALC: 30.9 % (ref 34–48)
HCT VFR BLD CALC: 33.2 % (ref 34–48)
HCT VFR BLD CALC: 33.3 % (ref 34–48)
HCT VFR BLD CALC: 33.7 % (ref 34–48)
HCT VFR BLD CALC: 34.2 % (ref 34–48)
HCT VFR BLD CALC: 34.4 % (ref 34–48)
HCT VFR BLD CALC: 34.5 % (ref 34–48)
HCT VFR BLD CALC: 34.8 % (ref 34–48)
HCT VFR BLD CALC: 35.4 % (ref 34–48)
HCT VFR BLD CALC: 35.8 % (ref 34–48)
HCT VFR BLD CALC: 36.8 % (ref 34–48)
HCT VFR BLD CALC: 37.6 % (ref 34–48)
HCT VFR BLD CALC: 38 % (ref 34–48)
HCT VFR BLD CALC: 38.2 % (ref 34–48)
HCT VFR BLD CALC: 38.8 % (ref 34–48)
HCT VFR BLD CALC: 39.8 % (ref 34–48)
HCT VFR BLD CALC: 42.5 % (ref 34–48)
HEMOGLOBIN: 10.4 G/DL (ref 11.5–15.5)
HEMOGLOBIN: 10.6 G/DL (ref 11.5–15.5)
HEMOGLOBIN: 10.7 G/DL (ref 11.5–15.5)
HEMOGLOBIN: 10.7 G/DL (ref 11.5–15.5)
HEMOGLOBIN: 10.8 G/DL (ref 11.5–15.5)
HEMOGLOBIN: 10.9 G/DL (ref 11.5–15.5)
HEMOGLOBIN: 11.5 G/DL (ref 11.5–15.5)
HEMOGLOBIN: 11.9 G/DL (ref 11.5–15.5)
HEMOGLOBIN: 11.9 G/DL (ref 11.5–15.5)
HEMOGLOBIN: 12.8 G/DL (ref 11.5–15.5)
HEMOGLOBIN: 12.8 G/DL (ref 11.5–15.5)
HEMOGLOBIN: 13.1 G/DL (ref 11.5–15.5)
HEMOGLOBIN: 13.4 G/DL (ref 11.5–15.5)
HEMOGLOBIN: 13.4 G/DL (ref 11.5–15.5)
HEMOGLOBIN: 14.1 G/DL (ref 11.5–15.5)
HEMOGLOBIN: 9.2 G/DL (ref 11.5–15.5)
HEMOGLOBIN: 9.2 G/DL (ref 11.5–15.5)
HEMOGLOBIN: 9.8 G/DL (ref 11.5–15.5)
HEMOGLOBIN: 9.8 G/DL (ref 11.5–15.5)
HHB: 0.2 % (ref 0–5)
HHB: 0.9 % (ref 0–5)
HHB: 0.9 % (ref 0–5)
HHB: 1.2 % (ref 0–5)
HHB: 2.3 % (ref 0–5)
HHB: 2.4 % (ref 0–5)
HHB: 2.9 % (ref 0–5)
HHB: 2.9 % (ref 0–5)
HHB: 3.4 % (ref 0–5)
HHB: 3.4 % (ref 0–5)
HHB: 3.8 % (ref 0–5)
HHB: 4.4 % (ref 0–5)
HHB: 4.7 % (ref 0–5)
HHB: 5.8 % (ref 0–5)
HUMAN METAPNEUMOVIRUS BY PCR: NOT DETECTED
HUMAN METAPNEUMOVIRUS BY PCR: NOT DETECTED
HUMAN RHINOVIRUS/ENTEROVIRUS BY PCR: NOT DETECTED
HUMAN RHINOVIRUS/ENTEROVIRUS BY PCR: NOT DETECTED
HYALINE CASTS: ABNORMAL /LPF (ref 0–2)
HYPOCHROMIA: ABNORMAL
IMMATURE GRANULOCYTES #: 0.11 E9/L
IMMATURE GRANULOCYTES #: 0.13 E9/L
IMMATURE GRANULOCYTES #: 0.18 E9/L
IMMATURE GRANULOCYTES #: 0.22 E9/L
IMMATURE GRANULOCYTES #: 0.3 E9/L
IMMATURE GRANULOCYTES #: 1.01 E9/L
IMMATURE GRANULOCYTES #: 1.07 E9/L
IMMATURE GRANULOCYTES %: 1.1 % (ref 0–5)
IMMATURE GRANULOCYTES %: 1.6 % (ref 0–5)
IMMATURE GRANULOCYTES %: 1.7 % (ref 0–5)
IMMATURE GRANULOCYTES %: 2.1 % (ref 0–5)
IMMATURE GRANULOCYTES %: 2.9 % (ref 0–5)
IMMATURE GRANULOCYTES %: 3.5 % (ref 0–5)
IMMATURE GRANULOCYTES %: 8.2 % (ref 0–5)
INFLUENZA A BY PCR: NOT DETECTED
INFLUENZA A BY PCR: NOT DETECTED
INFLUENZA B BY PCR: NOT DETECTED
INFLUENZA B BY PCR: NOT DETECTED
INR BLD: 1.5
INR BLD: 1.6
INR BLD: 1.7
INR BLD: 1.8
INR BLD: 1.9
INR BLD: 2
INR BLD: 2
INR BLD: 2.1
INR BLD: 2.1
INR BLD: 2.2
INR BLD: 2.2
INR BLD: 2.4
INR BLD: 2.5
INR BLD: 2.5
INR BLD: 2.6
INR BLD: 2.7
INR BLD: 2.8
INR BLD: 3.1
INR BLD: >10
KETONES, URINE: NEGATIVE MG/DL
KLEBSIELLA AEROGENES BY PCR: NOT DETECTED
KLEBSIELLA OXYTOCA BY PCR: NOT DETECTED
KLEBSIELLA PNEUMONIAE GROUP BY PCR: NOT DETECTED
L. PNEUMOPHILA SEROGP 1 UR AG: NORMAL
LAB: ABNORMAL
LACTATE DEHYDROGENASE: 338 U/L (ref 135–214)
LACTATE DEHYDROGENASE: 401 U/L (ref 135–214)
LACTIC ACID, SEPSIS: 1.6 MMOL/L (ref 0.5–1.9)
LACTIC ACID, SEPSIS: 2 MMOL/L (ref 0.5–1.9)
LACTIC ACID, SEPSIS: 4.7 MMOL/L (ref 0.5–1.9)
LACTIC ACID, SEPSIS: 6.3 MMOL/L (ref 0.5–1.9)
LACTIC ACID, SEPSIS: 7 MMOL/L (ref 0.5–1.9)
LACTIC ACID: 2 MMOL/L (ref 0.5–2.2)
LACTIC ACID: 2.1 MMOL/L (ref 0.5–2.2)
LACTIC ACID: 2.1 MMOL/L (ref 0.5–2.2)
LACTIC ACID: 2.4 MMOL/L (ref 0.5–2.2)
LACTIC ACID: 2.6 MMOL/L (ref 0.5–2.2)
LACTIC ACID: 3.8 MMOL/L (ref 0.5–2.2)
LACTIC ACID: 4.3 MMOL/L (ref 0.5–2.2)
LEUKOCYTE ESTERASE, URINE: ABNORMAL
LEUKOCYTE ESTERASE, URINE: NEGATIVE
LISTERIA MONOCYTOGENES BY PCR: NOT DETECTED
LV EF: 50 %
LVEF MODALITY: NORMAL
LYMPHOCYTES ABSOLUTE: 0 E9/L (ref 1.5–4)
LYMPHOCYTES ABSOLUTE: 0.22 E9/L (ref 1.5–4)
LYMPHOCYTES ABSOLUTE: 0.36 E9/L (ref 1.5–4)
LYMPHOCYTES ABSOLUTE: 0.37 E9/L (ref 1.5–4)
LYMPHOCYTES ABSOLUTE: 0.38 E9/L (ref 1.5–4)
LYMPHOCYTES ABSOLUTE: 0.38 E9/L (ref 1.5–4)
LYMPHOCYTES ABSOLUTE: 0.44 E9/L (ref 1.5–4)
LYMPHOCYTES ABSOLUTE: 0.48 E9/L (ref 1.5–4)
LYMPHOCYTES ABSOLUTE: 0.5 E9/L (ref 1.5–4)
LYMPHOCYTES ABSOLUTE: 0.51 E9/L (ref 1.5–4)
LYMPHOCYTES ABSOLUTE: 0.72 E9/L (ref 1.5–4)
LYMPHOCYTES ABSOLUTE: 0.78 E9/L (ref 1.5–4)
LYMPHOCYTES ABSOLUTE: 0.88 E9/L (ref 1.5–4)
LYMPHOCYTES ABSOLUTE: 1.07 E9/L (ref 1.5–4)
LYMPHOCYTES ABSOLUTE: 1.18 E9/L (ref 1.5–4)
LYMPHOCYTES ABSOLUTE: 1.39 E9/L (ref 1.5–4)
LYMPHOCYTES ABSOLUTE: 1.46 E9/L (ref 1.5–4)
LYMPHOCYTES RELATIVE PERCENT: 0.9 % (ref 20–42)
LYMPHOCYTES RELATIVE PERCENT: 1.8 % (ref 20–42)
LYMPHOCYTES RELATIVE PERCENT: 10.4 % (ref 20–42)
LYMPHOCYTES RELATIVE PERCENT: 10.6 % (ref 20–42)
LYMPHOCYTES RELATIVE PERCENT: 13.5 % (ref 20–42)
LYMPHOCYTES RELATIVE PERCENT: 2.6 % (ref 20–42)
LYMPHOCYTES RELATIVE PERCENT: 3.5 % (ref 20–42)
LYMPHOCYTES RELATIVE PERCENT: 4.1 % (ref 20–42)
LYMPHOCYTES RELATIVE PERCENT: 4.9 % (ref 20–42)
LYMPHOCYTES RELATIVE PERCENT: 5.2 % (ref 20–42)
LYMPHOCYTES RELATIVE PERCENT: 6 % (ref 20–42)
LYMPHOCYTES RELATIVE PERCENT: 6.1 % (ref 20–42)
LYMPHOCYTES RELATIVE PERCENT: 6.9 % (ref 20–42)
Lab: ABNORMAL
MAGNESIUM: 1.7 MG/DL (ref 1.6–2.6)
MAGNESIUM: 1.7 MG/DL (ref 1.6–2.6)
MAGNESIUM: 1.8 MG/DL (ref 1.6–2.6)
MAGNESIUM: 1.8 MG/DL (ref 1.6–2.6)
MAGNESIUM: 1.9 MG/DL (ref 1.6–2.6)
MAGNESIUM: 2 MG/DL (ref 1.6–2.6)
MAGNESIUM: 2.2 MG/DL (ref 1.6–2.6)
MAGNESIUM: 2.3 MG/DL (ref 1.6–2.6)
MAGNESIUM: 2.4 MG/DL (ref 1.6–2.6)
MAGNESIUM: 2.5 MG/DL (ref 1.6–2.6)
MCH RBC QN AUTO: 30.7 PG (ref 26–35)
MCH RBC QN AUTO: 30.9 PG (ref 26–35)
MCH RBC QN AUTO: 31.1 PG (ref 26–35)
MCH RBC QN AUTO: 31.2 PG (ref 26–35)
MCH RBC QN AUTO: 31.3 PG (ref 26–35)
MCH RBC QN AUTO: 31.3 PG (ref 26–35)
MCH RBC QN AUTO: 31.4 PG (ref 26–35)
MCH RBC QN AUTO: 31.5 PG (ref 26–35)
MCH RBC QN AUTO: 31.6 PG (ref 26–35)
MCH RBC QN AUTO: 31.7 PG (ref 26–35)
MCH RBC QN AUTO: 32 PG (ref 26–35)
MCH RBC QN AUTO: 32.1 PG (ref 26–35)
MCHC RBC AUTO-ENTMCNC: 31.2 % (ref 32–34.5)
MCHC RBC AUTO-ENTMCNC: 31.3 % (ref 32–34.5)
MCHC RBC AUTO-ENTMCNC: 31.6 % (ref 32–34.5)
MCHC RBC AUTO-ENTMCNC: 31.7 % (ref 32–34.5)
MCHC RBC AUTO-ENTMCNC: 31.8 % (ref 32–34.5)
MCHC RBC AUTO-ENTMCNC: 32.1 % (ref 32–34.5)
MCHC RBC AUTO-ENTMCNC: 32.2 % (ref 32–34.5)
MCHC RBC AUTO-ENTMCNC: 32.5 % (ref 32–34.5)
MCHC RBC AUTO-ENTMCNC: 32.8 % (ref 32–34.5)
MCHC RBC AUTO-ENTMCNC: 33.2 % (ref 32–34.5)
MCHC RBC AUTO-ENTMCNC: 33.3 % (ref 32–34.5)
MCHC RBC AUTO-ENTMCNC: 33.3 % (ref 32–34.5)
MCHC RBC AUTO-ENTMCNC: 33.5 % (ref 32–34.5)
MCHC RBC AUTO-ENTMCNC: 33.7 % (ref 32–34.5)
MCHC RBC AUTO-ENTMCNC: 33.8 % (ref 32–34.5)
MCHC RBC AUTO-ENTMCNC: 34 % (ref 32–34.5)
MCHC RBC AUTO-ENTMCNC: 34.2 % (ref 32–34.5)
MCHC RBC AUTO-ENTMCNC: 34.3 % (ref 32–34.5)
MCHC RBC AUTO-ENTMCNC: 34.5 % (ref 32–34.5)
MCV RBC AUTO: 101.2 FL (ref 80–99.9)
MCV RBC AUTO: 101.4 FL (ref 80–99.9)
MCV RBC AUTO: 102.5 FL (ref 80–99.9)
MCV RBC AUTO: 90.8 FL (ref 80–99.9)
MCV RBC AUTO: 90.9 FL (ref 80–99.9)
MCV RBC AUTO: 91.3 FL (ref 80–99.9)
MCV RBC AUTO: 91.7 FL (ref 80–99.9)
MCV RBC AUTO: 91.9 FL (ref 80–99.9)
MCV RBC AUTO: 92.8 FL (ref 80–99.9)
MCV RBC AUTO: 92.9 FL (ref 80–99.9)
MCV RBC AUTO: 93.2 FL (ref 80–99.9)
MCV RBC AUTO: 93.2 FL (ref 80–99.9)
MCV RBC AUTO: 94.5 FL (ref 80–99.9)
MCV RBC AUTO: 94.6 FL (ref 80–99.9)
MCV RBC AUTO: 95.3 FL (ref 80–99.9)
MCV RBC AUTO: 95.4 FL (ref 80–99.9)
MCV RBC AUTO: 95.5 FL (ref 80–99.9)
MCV RBC AUTO: 97 FL (ref 80–99.9)
MCV RBC AUTO: 98.6 FL (ref 80–99.9)
MCV RBC AUTO: 99.1 FL (ref 80–99.9)
MCV RBC AUTO: 99.7 FL (ref 80–99.9)
METAMYELOCYTES RELATIVE PERCENT: 0.9 % (ref 0–1)
METAMYELOCYTES RELATIVE PERCENT: 1.7 % (ref 0–1)
METAMYELOCYTES RELATIVE PERCENT: 1.8 % (ref 0–1)
METER GLUCOSE: 100 MG/DL (ref 74–99)
METER GLUCOSE: 103 MG/DL (ref 74–99)
METER GLUCOSE: 103 MG/DL (ref 74–99)
METER GLUCOSE: 104 MG/DL (ref 74–99)
METER GLUCOSE: 105 MG/DL (ref 74–99)
METER GLUCOSE: 109 MG/DL (ref 74–99)
METER GLUCOSE: 110 MG/DL (ref 74–99)
METER GLUCOSE: 110 MG/DL (ref 74–99)
METER GLUCOSE: 111 MG/DL (ref 74–99)
METER GLUCOSE: 112 MG/DL (ref 74–99)
METER GLUCOSE: 117 MG/DL (ref 74–99)
METER GLUCOSE: 118 MG/DL (ref 74–99)
METER GLUCOSE: 119 MG/DL (ref 74–99)
METER GLUCOSE: 121 MG/DL (ref 74–99)
METER GLUCOSE: 123 MG/DL (ref 74–99)
METER GLUCOSE: 124 MG/DL (ref 74–99)
METER GLUCOSE: 124 MG/DL (ref 74–99)
METER GLUCOSE: 125 MG/DL (ref 74–99)
METER GLUCOSE: 127 MG/DL (ref 74–99)
METER GLUCOSE: 134 MG/DL (ref 74–99)
METER GLUCOSE: 135 MG/DL (ref 74–99)
METER GLUCOSE: 135 MG/DL (ref 74–99)
METER GLUCOSE: 137 MG/DL (ref 74–99)
METER GLUCOSE: 137 MG/DL (ref 74–99)
METER GLUCOSE: 138 MG/DL (ref 74–99)
METER GLUCOSE: 144 MG/DL (ref 74–99)
METER GLUCOSE: 144 MG/DL (ref 74–99)
METER GLUCOSE: 148 MG/DL (ref 74–99)
METER GLUCOSE: 149 MG/DL (ref 74–99)
METER GLUCOSE: 150 MG/DL (ref 74–99)
METER GLUCOSE: 151 MG/DL (ref 74–99)
METER GLUCOSE: 153 MG/DL (ref 74–99)
METER GLUCOSE: 155 MG/DL (ref 74–99)
METER GLUCOSE: 156 MG/DL (ref 74–99)
METER GLUCOSE: 156 MG/DL (ref 74–99)
METER GLUCOSE: 157 MG/DL (ref 74–99)
METER GLUCOSE: 158 MG/DL (ref 74–99)
METER GLUCOSE: 158 MG/DL (ref 74–99)
METER GLUCOSE: 159 MG/DL (ref 74–99)
METER GLUCOSE: 161 MG/DL (ref 74–99)
METER GLUCOSE: 162 MG/DL (ref 74–99)
METER GLUCOSE: 167 MG/DL (ref 74–99)
METER GLUCOSE: 167 MG/DL (ref 74–99)
METER GLUCOSE: 173 MG/DL (ref 74–99)
METER GLUCOSE: 175 MG/DL (ref 74–99)
METER GLUCOSE: 177 MG/DL (ref 74–99)
METER GLUCOSE: 183 MG/DL (ref 74–99)
METER GLUCOSE: 185 MG/DL (ref 74–99)
METER GLUCOSE: 187 MG/DL (ref 74–99)
METER GLUCOSE: 191 MG/DL (ref 74–99)
METER GLUCOSE: 196 MG/DL (ref 74–99)
METER GLUCOSE: 196 MG/DL (ref 74–99)
METER GLUCOSE: 198 MG/DL (ref 74–99)
METER GLUCOSE: 201 MG/DL (ref 74–99)
METER GLUCOSE: 202 MG/DL (ref 74–99)
METER GLUCOSE: 203 MG/DL (ref 74–99)
METER GLUCOSE: 205 MG/DL (ref 74–99)
METER GLUCOSE: 206 MG/DL (ref 74–99)
METER GLUCOSE: 207 MG/DL (ref 74–99)
METER GLUCOSE: 209 MG/DL (ref 74–99)
METER GLUCOSE: 210 MG/DL (ref 74–99)
METER GLUCOSE: 213 MG/DL (ref 74–99)
METER GLUCOSE: 213 MG/DL (ref 74–99)
METER GLUCOSE: 215 MG/DL (ref 74–99)
METER GLUCOSE: 215 MG/DL (ref 74–99)
METER GLUCOSE: 218 MG/DL (ref 74–99)
METER GLUCOSE: 219 MG/DL (ref 74–99)
METER GLUCOSE: 220 MG/DL (ref 74–99)
METER GLUCOSE: 221 MG/DL (ref 74–99)
METER GLUCOSE: 226 MG/DL (ref 74–99)
METER GLUCOSE: 226 MG/DL (ref 74–99)
METER GLUCOSE: 233 MG/DL (ref 74–99)
METER GLUCOSE: 233 MG/DL (ref 74–99)
METER GLUCOSE: 234 MG/DL (ref 74–99)
METER GLUCOSE: 234 MG/DL (ref 74–99)
METER GLUCOSE: 235 MG/DL (ref 74–99)
METER GLUCOSE: 236 MG/DL (ref 74–99)
METER GLUCOSE: 236 MG/DL (ref 74–99)
METER GLUCOSE: 238 MG/DL (ref 74–99)
METER GLUCOSE: 240 MG/DL (ref 74–99)
METER GLUCOSE: 241 MG/DL (ref 74–99)
METER GLUCOSE: 242 MG/DL (ref 74–99)
METER GLUCOSE: 244 MG/DL (ref 74–99)
METER GLUCOSE: 249 MG/DL (ref 74–99)
METER GLUCOSE: 250 MG/DL (ref 74–99)
METER GLUCOSE: 255 MG/DL (ref 74–99)
METER GLUCOSE: 259 MG/DL (ref 74–99)
METER GLUCOSE: 85 MG/DL (ref 74–99)
METER GLUCOSE: 86 MG/DL (ref 74–99)
METER GLUCOSE: 89 MG/DL (ref 74–99)
METER GLUCOSE: 91 MG/DL (ref 74–99)
METER GLUCOSE: 93 MG/DL (ref 74–99)
METER GLUCOSE: 95 MG/DL (ref 74–99)
METER GLUCOSE: 97 MG/DL (ref 74–99)
METER GLUCOSE: 98 MG/DL (ref 74–99)
METHB: 0.1 % (ref 0–1.5)
METHB: 0.2 % (ref 0–1.5)
METHB: 0.3 % (ref 0–1.5)
MODE: ABNORMAL
MODE: AC
MONOCYTES ABSOLUTE: 0.28 E9/L (ref 0.1–0.95)
MONOCYTES ABSOLUTE: 0.33 E9/L (ref 0.1–0.95)
MONOCYTES ABSOLUTE: 0.35 E9/L (ref 0.1–0.95)
MONOCYTES ABSOLUTE: 0.43 E9/L (ref 0.1–0.95)
MONOCYTES ABSOLUTE: 0.6 E9/L (ref 0.1–0.95)
MONOCYTES ABSOLUTE: 0.62 E9/L (ref 0.1–0.95)
MONOCYTES ABSOLUTE: 0.75 E9/L (ref 0.1–0.95)
MONOCYTES ABSOLUTE: 0.89 E9/L (ref 0.1–0.95)
MONOCYTES ABSOLUTE: 0.94 E9/L (ref 0.1–0.95)
MONOCYTES ABSOLUTE: 0.98 E9/L (ref 0.1–0.95)
MONOCYTES ABSOLUTE: 1.23 E9/L (ref 0.1–0.95)
MONOCYTES ABSOLUTE: 1.25 E9/L (ref 0.1–0.95)
MONOCYTES ABSOLUTE: 1.28 E9/L (ref 0.1–0.95)
MONOCYTES ABSOLUTE: 1.32 E9/L (ref 0.1–0.95)
MONOCYTES ABSOLUTE: 1.4 E9/L (ref 0.1–0.95)
MONOCYTES ABSOLUTE: 1.88 E9/L (ref 0.1–0.95)
MONOCYTES ABSOLUTE: 2.67 E9/L (ref 0.1–0.95)
MONOCYTES RELATIVE PERCENT: 1.7 % (ref 2–12)
MONOCYTES RELATIVE PERCENT: 11.3 % (ref 2–12)
MONOCYTES RELATIVE PERCENT: 12 % (ref 2–12)
MONOCYTES RELATIVE PERCENT: 12.5 % (ref 2–12)
MONOCYTES RELATIVE PERCENT: 13.4 % (ref 2–12)
MONOCYTES RELATIVE PERCENT: 15 % (ref 2–12)
MONOCYTES RELATIVE PERCENT: 2.6 % (ref 2–12)
MONOCYTES RELATIVE PERCENT: 2.7 % (ref 2–12)
MONOCYTES RELATIVE PERCENT: 2.7 % (ref 2–12)
MONOCYTES RELATIVE PERCENT: 4.4 % (ref 2–12)
MONOCYTES RELATIVE PERCENT: 5.2 % (ref 2–12)
MONOCYTES RELATIVE PERCENT: 5.2 % (ref 2–12)
MONOCYTES RELATIVE PERCENT: 6.1 % (ref 2–12)
MONOCYTES RELATIVE PERCENT: 8 % (ref 2–12)
MONOCYTES RELATIVE PERCENT: 8.7 % (ref 2–12)
MONOCYTES RELATIVE PERCENT: 9 % (ref 2–12)
MONOCYTES RELATIVE PERCENT: 9.4 % (ref 2–12)
MYCOPLASMA PNEUMONIAE BY PCR: NOT DETECTED
MYCOPLASMA PNEUMONIAE BY PCR: NOT DETECTED
MYELOCYTE PERCENT: 0.9 % (ref 0–0)
MYELOCYTE PERCENT: 0.9 % (ref 0–0)
MYELOCYTE PERCENT: 1.7 % (ref 0–0)
MYELOCYTE PERCENT: 1.8 % (ref 0–0)
MYELOCYTE PERCENT: 1.8 % (ref 0–0)
MYELOCYTE PERCENT: 3.5 % (ref 0–0)
NEISSERIA MENINGITIDIS BY PCR: NOT DETECTED
NEUTROPHILS ABSOLUTE: 10.32 E9/L (ref 1.8–7.3)
NEUTROPHILS ABSOLUTE: 10.77 E9/L (ref 1.8–7.3)
NEUTROPHILS ABSOLUTE: 10.81 E9/L (ref 1.8–7.3)
NEUTROPHILS ABSOLUTE: 10.86 E9/L (ref 1.8–7.3)
NEUTROPHILS ABSOLUTE: 11.01 E9/L (ref 1.8–7.3)
NEUTROPHILS ABSOLUTE: 11.16 E9/L (ref 1.8–7.3)
NEUTROPHILS ABSOLUTE: 11.38 E9/L (ref 1.8–7.3)
NEUTROPHILS ABSOLUTE: 17.56 E9/L (ref 1.8–7.3)
NEUTROPHILS ABSOLUTE: 20.87 E9/L (ref 1.8–7.3)
NEUTROPHILS ABSOLUTE: 21.05 E9/L (ref 1.8–7.3)
NEUTROPHILS ABSOLUTE: 23.53 E9/L (ref 1.8–7.3)
NEUTROPHILS ABSOLUTE: 5.95 E9/L (ref 1.8–7.3)
NEUTROPHILS ABSOLUTE: 7.4 E9/L (ref 1.8–7.3)
NEUTROPHILS ABSOLUTE: 7.56 E9/L (ref 1.8–7.3)
NEUTROPHILS ABSOLUTE: 8.09 E9/L (ref 1.8–7.3)
NEUTROPHILS ABSOLUTE: 8.6 E9/L (ref 1.8–7.3)
NEUTROPHILS ABSOLUTE: 8.93 E9/L (ref 1.8–7.3)
NEUTROPHILS RELATIVE PERCENT: 71.6 % (ref 43–80)
NEUTROPHILS RELATIVE PERCENT: 71.8 % (ref 43–80)
NEUTROPHILS RELATIVE PERCENT: 73.8 % (ref 43–80)
NEUTROPHILS RELATIVE PERCENT: 77.5 % (ref 43–80)
NEUTROPHILS RELATIVE PERCENT: 79.1 % (ref 43–80)
NEUTROPHILS RELATIVE PERCENT: 82.1 % (ref 43–80)
NEUTROPHILS RELATIVE PERCENT: 82.6 % (ref 43–80)
NEUTROPHILS RELATIVE PERCENT: 82.8 % (ref 43–80)
NEUTROPHILS RELATIVE PERCENT: 83.5 % (ref 43–80)
NEUTROPHILS RELATIVE PERCENT: 87.6 % (ref 43–80)
NEUTROPHILS RELATIVE PERCENT: 89.6 % (ref 43–80)
NEUTROPHILS RELATIVE PERCENT: 90.3 % (ref 43–80)
NEUTROPHILS RELATIVE PERCENT: 90.4 % (ref 43–80)
NEUTROPHILS RELATIVE PERCENT: 95.7 % (ref 43–80)
NEUTROPHILS RELATIVE PERCENT: 97.3 % (ref 43–80)
NITRITE, URINE: NEGATIVE
NUCLEATED RED BLOOD CELLS: 0 /100 WBC
NUCLEATED RED BLOOD CELLS: 0.9 /100 WBC
NUCLEATED RED BLOOD CELLS: 1.7 /100 WBC
O2 CONTENT: 13.4 ML/DL
O2 CONTENT: 14.1 ML/DL
O2 CONTENT: 14.1 ML/DL
O2 CONTENT: 14.7 ML/DL
O2 CONTENT: 15.6 ML/DL
O2 CONTENT: 15.9 ML/DL
O2 CONTENT: 16.1 ML/DL
O2 CONTENT: 16.4 ML/DL
O2 CONTENT: 16.8 ML/DL
O2 CONTENT: 16.9 ML/DL
O2 CONTENT: 18 ML/DL
O2 CONTENT: 20.4 ML/DL
O2 CONTENT: 20.5 ML/DL
O2 SATURATION: 94.2 % (ref 92–98.5)
O2 SATURATION: 95.3 % (ref 92–98.5)
O2 SATURATION: 95.6 % (ref 92–98.5)
O2 SATURATION: 96.2 % (ref 92–98.5)
O2 SATURATION: 96.6 % (ref 92–98.5)
O2 SATURATION: 96.6 % (ref 92–98.5)
O2 SATURATION: 97.1 % (ref 92–98.5)
O2 SATURATION: 97.1 % (ref 92–98.5)
O2 SATURATION: 97.6 % (ref 92–98.5)
O2 SATURATION: 97.7 % (ref 92–98.5)
O2 SATURATION: 98.8 % (ref 92–98.5)
O2 SATURATION: 99.1 % (ref 92–98.5)
O2 SATURATION: 99.1 % (ref 92–98.5)
O2 SATURATION: 99.8 % (ref 92–98.5)
O2HB: 93.9 % (ref 94–97)
O2HB: 95 % (ref 94–97)
O2HB: 95.1 % (ref 94–97)
O2HB: 95.2 % (ref 94–97)
O2HB: 95.4 % (ref 94–97)
O2HB: 95.6 % (ref 94–97)
O2HB: 95.9 % (ref 94–97)
O2HB: 96.2 % (ref 94–97)
O2HB: 97.1 % (ref 94–97)
O2HB: 97.1 % (ref 94–97)
O2HB: 98 % (ref 94–97)
O2HB: 98.1 % (ref 94–97)
O2HB: 98.1 % (ref 94–97)
O2HB: 99 % (ref 94–97)
OPERATOR ID: 1721
OPERATOR ID: 1768
OPERATOR ID: 2485
OPERATOR ID: 2593
OPERATOR ID: 316
OPERATOR ID: 3342
OPERATOR ID: 7294
OPERATOR ID: ABNORMAL
OPERATOR ID: ABNORMAL
ORDER NUMBER: ABNORMAL
ORGANISM: ABNORMAL
OSMOLALITY URINE: 343 MOSM/KG (ref 300–900)
OSMOLALITY URINE: 511 MOSM/KG (ref 300–900)
OSMOLALITY URINE: 649 MOSM/KG (ref 300–900)
OSMOLALITY: 279 MOSM/KG (ref 285–310)
OSMOLALITY: 305 MOSM/KG (ref 285–310)
OVALOCYTES: ABNORMAL
PARAINFLUENZA VIRUS 1 BY PCR: NOT DETECTED
PARAINFLUENZA VIRUS 1 BY PCR: NOT DETECTED
PARAINFLUENZA VIRUS 2 BY PCR: NOT DETECTED
PARAINFLUENZA VIRUS 2 BY PCR: NOT DETECTED
PARAINFLUENZA VIRUS 3 BY PCR: NOT DETECTED
PARAINFLUENZA VIRUS 3 BY PCR: NOT DETECTED
PARAINFLUENZA VIRUS 4 BY PCR: NOT DETECTED
PARAINFLUENZA VIRUS 4 BY PCR: NOT DETECTED
PATIENT TEMP: 37 C
PCO2: 22.3 MMHG (ref 35–45)
PCO2: 23.6 MMHG (ref 35–45)
PCO2: 24.3 MMHG (ref 35–45)
PCO2: 25.3 MMHG (ref 35–45)
PCO2: 25.5 MMHG (ref 35–45)
PCO2: 27.1 MMHG (ref 35–45)
PCO2: 27.3 MMHG (ref 35–45)
PCO2: 27.3 MMHG (ref 35–45)
PCO2: 27.8 MMHG (ref 35–45)
PCO2: 28.4 MMHG (ref 35–45)
PCO2: 29.4 MMHG (ref 35–45)
PCO2: 29.5 MMHG (ref 35–45)
PCO2: 29.7 MMHG (ref 35–45)
PCO2: 31.3 MMHG (ref 35–45)
PDW BLD-RTO: 15.2 FL (ref 11.5–15)
PDW BLD-RTO: 15.3 FL (ref 11.5–15)
PDW BLD-RTO: 15.4 FL (ref 11.5–15)
PDW BLD-RTO: 15.5 FL (ref 11.5–15)
PDW BLD-RTO: 15.5 FL (ref 11.5–15)
PDW BLD-RTO: 15.6 FL (ref 11.5–15)
PDW BLD-RTO: 15.6 FL (ref 11.5–15)
PDW BLD-RTO: 15.8 FL (ref 11.5–15)
PDW BLD-RTO: 16 FL (ref 11.5–15)
PDW BLD-RTO: 16.7 FL (ref 11.5–15)
PDW BLD-RTO: 17.2 FL (ref 11.5–15)
PDW BLD-RTO: 17.7 FL (ref 11.5–15)
PDW BLD-RTO: 17.7 FL (ref 11.5–15)
PDW BLD-RTO: 17.9 FL (ref 11.5–15)
PDW BLD-RTO: 18.1 FL (ref 11.5–15)
PDW BLD-RTO: 18.6 FL (ref 11.5–15)
PEEP/CPAP: 5 CMH2O
PEEP/CPAP: 8 CMH2O
PFO2: 2.49 MMHG/%
PFO2: 2.65 MMHG/%
PFO2: 2.84 MMHG/%
PFO2: 2.88 MMHG/%
PFO2: 2.95 MMHG/%
PFO2: 2.95 MMHG/%
PFO2: 2.98 MMHG/%
PFO2: 3.18 MMHG/%
PFO2: 3.27 MMHG/%
PFO2: 3.32 MMHG/%
PFO2: 3.42 MMHG/%
PFO2: 3.52 MMHG/%
PFO2: 4.13 MMHG/%
PH BLOOD GAS: 7.41 (ref 7.35–7.45)
PH BLOOD GAS: 7.45 (ref 7.35–7.45)
PH BLOOD GAS: 7.46 (ref 7.35–7.45)
PH BLOOD GAS: 7.47 (ref 7.35–7.45)
PH BLOOD GAS: 7.48 (ref 7.35–7.45)
PH BLOOD GAS: 7.5 (ref 7.35–7.45)
PH BLOOD GAS: 7.53 (ref 7.35–7.45)
PH BLOOD GAS: 7.54 (ref 7.35–7.45)
PH BLOOD GAS: 7.57 (ref 7.35–7.45)
PH UA: 5.5 (ref 5–9)
PH UA: 7 (ref 5–9)
PHOSPHORUS: 2.3 MG/DL (ref 2.5–4.5)
PHOSPHORUS: 2.4 MG/DL (ref 2.5–4.5)
PHOSPHORUS: 2.6 MG/DL (ref 2.5–4.5)
PHOSPHORUS: 2.7 MG/DL (ref 2.5–4.5)
PHOSPHORUS: 3 MG/DL (ref 2.5–4.5)
PHOSPHORUS: 3 MG/DL (ref 2.5–4.5)
PHOSPHORUS: 3.1 MG/DL (ref 2.5–4.5)
PHOSPHORUS: 3.3 MG/DL (ref 2.5–4.5)
PHOSPHORUS: 3.3 MG/DL (ref 2.5–4.5)
PHOSPHORUS: 3.4 MG/DL (ref 2.5–4.5)
PHOSPHORUS: 3.5 MG/DL (ref 2.5–4.5)
PHOSPHORUS: 5 MG/DL (ref 2.5–4.5)
PLATELET # BLD: 101 E9/L (ref 130–450)
PLATELET # BLD: 123 E9/L (ref 130–450)
PLATELET # BLD: 125 E9/L (ref 130–450)
PLATELET # BLD: 128 E9/L (ref 130–450)
PLATELET # BLD: 132 E9/L (ref 130–450)
PLATELET # BLD: 135 E9/L (ref 130–450)
PLATELET # BLD: 161 E9/L (ref 130–450)
PLATELET # BLD: 165 E9/L (ref 130–450)
PLATELET # BLD: 171 E9/L (ref 130–450)
PLATELET # BLD: 180 E9/L (ref 130–450)
PLATELET # BLD: 181 E9/L (ref 130–450)
PLATELET # BLD: 191 E9/L (ref 130–450)
PLATELET # BLD: 198 E9/L (ref 130–450)
PLATELET # BLD: 199 E9/L (ref 130–450)
PLATELET # BLD: 201 E9/L (ref 130–450)
PLATELET # BLD: 211 E9/L (ref 130–450)
PLATELET # BLD: 214 E9/L (ref 130–450)
PLATELET # BLD: 221 E9/L (ref 130–450)
PLATELET # BLD: 246 E9/L (ref 130–450)
PLATELET # BLD: 82 E9/L (ref 130–450)
PLATELET # BLD: 91 E9/L (ref 130–450)
PLATELET CONFIRMATION: NORMAL
PLATELET CONFIRMATION: NORMAL
PMV BLD AUTO: 10.4 FL (ref 7–12)
PMV BLD AUTO: 10.5 FL (ref 7–12)
PMV BLD AUTO: 10.6 FL (ref 7–12)
PMV BLD AUTO: 10.7 FL (ref 7–12)
PMV BLD AUTO: 10.8 FL (ref 7–12)
PMV BLD AUTO: 10.9 FL (ref 7–12)
PMV BLD AUTO: 10.9 FL (ref 7–12)
PMV BLD AUTO: 11 FL (ref 7–12)
PMV BLD AUTO: 11.1 FL (ref 7–12)
PMV BLD AUTO: 11.1 FL (ref 7–12)
PMV BLD AUTO: 11.5 FL (ref 7–12)
PMV BLD AUTO: 9.6 FL (ref 7–12)
PMV BLD AUTO: 9.6 FL (ref 7–12)
PMV BLD AUTO: 9.7 FL (ref 7–12)
PMV BLD AUTO: 9.9 FL (ref 7–12)
PO2: 102.6 MMHG (ref 75–100)
PO2: 105.9 MMHG (ref 75–100)
PO2: 124 MMHG (ref 75–100)
PO2: 132.9 MMHG (ref 75–100)
PO2: 148.5 MMHG (ref 75–100)
PO2: 351.6 MMHG (ref 75–100)
PO2: 74.7 MMHG (ref 75–100)
PO2: 85.3 MMHG (ref 75–100)
PO2: 86.5 MMHG (ref 75–100)
PO2: 88.4 MMHG (ref 75–100)
PO2: 88.6 MMHG (ref 75–100)
PO2: 89.5 MMHG (ref 75–100)
PO2: 95.4 MMHG (ref 75–100)
PO2: 98 MMHG (ref 75–100)
POIKILOCYTES: ABNORMAL
POLYCHROMASIA: ABNORMAL
POTASSIUM REFLEX MAGNESIUM: 3.6 MMOL/L (ref 3.5–5)
POTASSIUM REFLEX MAGNESIUM: 3.6 MMOL/L (ref 3.5–5)
POTASSIUM REFLEX MAGNESIUM: 3.7 MMOL/L (ref 3.5–5)
POTASSIUM REFLEX MAGNESIUM: 3.8 MMOL/L (ref 3.5–5)
POTASSIUM REFLEX MAGNESIUM: 3.9 MMOL/L (ref 3.5–5)
POTASSIUM REFLEX MAGNESIUM: 4.1 MMOL/L (ref 3.5–5)
POTASSIUM REFLEX MAGNESIUM: 5 MMOL/L (ref 3.5–5)
POTASSIUM SERPL-SCNC: 2.8 MMOL/L (ref 3.5–5)
POTASSIUM SERPL-SCNC: 3.3 MMOL/L (ref 3.5–5)
POTASSIUM SERPL-SCNC: 3.6 MMOL/L (ref 3.5–5)
POTASSIUM SERPL-SCNC: 3.8 MMOL/L (ref 3.5–5)
POTASSIUM SERPL-SCNC: 3.9 MMOL/L (ref 3.5–5)
POTASSIUM SERPL-SCNC: 3.9 MMOL/L (ref 3.5–5)
POTASSIUM SERPL-SCNC: 4 MMOL/L (ref 3.5–5)
POTASSIUM SERPL-SCNC: 4.1 MMOL/L (ref 3.5–5)
POTASSIUM SERPL-SCNC: 4.3 MMOL/L (ref 3.5–5)
POTASSIUM SERPL-SCNC: 4.4 MMOL/L (ref 3.5–5)
POTASSIUM SERPL-SCNC: 4.4 MMOL/L (ref 3.5–5)
POTASSIUM SERPL-SCNC: 4.5 MMOL/L (ref 3.5–5)
POTASSIUM SERPL-SCNC: 4.6 MMOL/L (ref 3.5–5)
POTASSIUM SERPL-SCNC: 4.7 MMOL/L (ref 3.5–5)
POTASSIUM SERPL-SCNC: 4.7 MMOL/L (ref 3.5–5)
POTASSIUM SERPL-SCNC: 5.5 MMOL/L (ref 3.5–5)
POTASSIUM SERPL-SCNC: 7.2 MMOL/L (ref 3.5–5)
PRO-BNP: 2652 PG/ML (ref 0–450)
PRO-BNP: 4763 PG/ML (ref 0–450)
PRO-BNP: ABNORMAL PG/ML (ref 0–450)
PRO-BNP: ABNORMAL PG/ML (ref 0–450)
PROCALCITONIN: 0.45 NG/ML (ref 0–0.08)
PROCALCITONIN: 0.99 NG/ML (ref 0–0.08)
PROCALCITONIN: 4.55 NG/ML (ref 0–0.08)
PROCALCITONIN: 7.53 NG/ML (ref 0–0.08)
PROLACTIN: 16.95 NG/ML
PROMYELOCYTES PERCENT: 0.9 % (ref 0–0)
PROTEIN PROTEIN: 20 MG/DL (ref 0–12)
PROTEIN PROTEIN: 22 MG/DL (ref 0–12)
PROTEIN PROTEIN: 41 MG/DL (ref 0–12)
PROTEIN UA: 30 MG/DL
PROTEIN UA: ABNORMAL MG/DL
PROTEIN UA: NEGATIVE MG/DL
PROTEIN UA: NEGATIVE MG/DL
PROTEIN/CREAT RATIO: 0.2
PROTEIN/CREAT RATIO: 0.2 (ref 0–0.2)
PROTEIN/CREAT RATIO: 0.3
PROTEIN/CREAT RATIO: 0.3 (ref 0–0.2)
PROTEIN/CREAT RATIO: 0.4
PROTEIN/CREAT RATIO: 0.4 (ref 0–0.2)
PROTEUS SPECIES BY PCR: NOT DETECTED
PROTHROMBIN TIME: 119.7 SEC (ref 9.3–12.4)
PROTHROMBIN TIME: 16.2 SEC (ref 9.3–12.4)
PROTHROMBIN TIME: 18.2 SEC (ref 9.3–12.4)
PROTHROMBIN TIME: 18.8 SEC (ref 9.3–12.4)
PROTHROMBIN TIME: 19.6 SEC (ref 9.3–12.4)
PROTHROMBIN TIME: 20.4 SEC (ref 9.3–12.4)
PROTHROMBIN TIME: 20.5 SEC (ref 9.3–12.4)
PROTHROMBIN TIME: 20.9 SEC (ref 9.3–12.4)
PROTHROMBIN TIME: 21.8 SEC (ref 9.3–12.4)
PROTHROMBIN TIME: 21.9 SEC (ref 9.3–12.4)
PROTHROMBIN TIME: 22.6 SEC (ref 9.3–12.4)
PROTHROMBIN TIME: 23.7 SEC (ref 9.3–12.4)
PROTHROMBIN TIME: 24.1 SEC (ref 9.3–12.4)
PROTHROMBIN TIME: 24.5 SEC (ref 9.3–12.4)
PROTHROMBIN TIME: 26.4 SEC (ref 9.3–12.4)
PROTHROMBIN TIME: 27.7 SEC (ref 9.3–12.4)
PROTHROMBIN TIME: 27.7 SEC (ref 9.3–12.4)
PROTHROMBIN TIME: 27.8 SEC (ref 9.3–12.4)
PROTHROMBIN TIME: 28.4 SEC (ref 9.3–12.4)
PROTHROMBIN TIME: 28.8 SEC (ref 9.3–12.4)
PROTHROMBIN TIME: 29.5 SEC (ref 9.3–12.4)
PROTHROMBIN TIME: 29.9 SEC (ref 9.3–12.4)
PROTHROMBIN TIME: 31.8 SEC (ref 9.3–12.4)
PROTHROMBIN TIME: 34 SEC (ref 9.3–12.4)
PSEUDOMONAS AERUGINOSA BY PCR: NOT DETECTED
RBC # BLD: 2.92 E12/L (ref 3.5–5.5)
RBC # BLD: 2.96 E12/L (ref 3.5–5.5)
RBC # BLD: 3.11 E12/L (ref 3.5–5.5)
RBC # BLD: 3.13 E12/L (ref 3.5–5.5)
RBC # BLD: 3.25 E12/L (ref 3.5–5.5)
RBC # BLD: 3.38 E12/L (ref 3.5–5.5)
RBC # BLD: 3.38 E12/L (ref 3.5–5.5)
RBC # BLD: 3.4 E12/L (ref 3.5–5.5)
RBC # BLD: 3.47 E12/L (ref 3.5–5.5)
RBC # BLD: 3.48 E12/L (ref 3.5–5.5)
RBC # BLD: 3.63 E12/L (ref 3.5–5.5)
RBC # BLD: 3.63 E12/L (ref 3.5–5.5)
RBC # BLD: 3.65 E12/L (ref 3.5–5.5)
RBC # BLD: 3.8 E12/L (ref 3.5–5.5)
RBC # BLD: 3.81 E12/L (ref 3.5–5.5)
RBC # BLD: 4.04 E12/L (ref 3.5–5.5)
RBC # BLD: 4.09 E12/L (ref 3.5–5.5)
RBC # BLD: 4.1 E12/L (ref 3.5–5.5)
RBC # BLD: 4.18 E12/L (ref 3.5–5.5)
RBC # BLD: 4.33 E12/L (ref 3.5–5.5)
RBC # BLD: 4.46 E12/L (ref 3.5–5.5)
RBC # BLD: NORMAL 10*6/UL
RBC UA: ABNORMAL /HPF (ref 0–2)
REASON FOR REJECTION: NORMAL
REJECTED TEST: NORMAL
RESPIRATORY SYNCYTIAL VIRUS BY PCR: NOT DETECTED
RESPIRATORY SYNCYTIAL VIRUS BY PCR: NOT DETECTED
RI(T): 0.49
RI(T): 0.75
RI(T): 0.76
RI(T): 0.78
RI(T): 0.94
RI(T): 0.97
RI(T): 0.97
RI(T): 1.01
RI(T): 1.04
RI(T): 1.29
RI(T): 1.39
RR MECHANICAL: 10 B/MIN
RR MECHANICAL: 16 B/MIN
SALMONELLA SPECIES BY PCR: NOT DETECTED
SARS-COV-2, NAAT: NOT DETECTED
SARS-COV-2, NAAT: NOT DETECTED
SARS-COV-2, PCR: DETECTED
SARS-COV-2, PCR: DETECTED
SERRATIA MARCESCENS BY PCR: NOT DETECTED
SMEAR, RESPIRATORY: ABNORMAL
SMEAR, RESPIRATORY: ABNORMAL
SODIUM BLD-SCNC: 124 MMOL/L (ref 132–146)
SODIUM BLD-SCNC: 125 MMOL/L (ref 132–146)
SODIUM BLD-SCNC: 126 MMOL/L (ref 132–146)
SODIUM BLD-SCNC: 127 MMOL/L (ref 132–146)
SODIUM BLD-SCNC: 127 MMOL/L (ref 132–146)
SODIUM BLD-SCNC: 128 MMOL/L (ref 132–146)
SODIUM BLD-SCNC: 129 MMOL/L (ref 132–146)
SODIUM BLD-SCNC: 130 MMOL/L (ref 132–146)
SODIUM BLD-SCNC: 130 MMOL/L (ref 132–146)
SODIUM BLD-SCNC: 132 MMOL/L (ref 132–146)
SODIUM BLD-SCNC: 133 MMOL/L (ref 132–146)
SODIUM BLD-SCNC: 133 MMOL/L (ref 132–146)
SODIUM BLD-SCNC: 135 MMOL/L (ref 132–146)
SODIUM BLD-SCNC: 136 MMOL/L (ref 132–146)
SODIUM BLD-SCNC: 140 MMOL/L (ref 132–146)
SODIUM BLD-SCNC: 141 MMOL/L (ref 132–146)
SODIUM BLD-SCNC: 141 MMOL/L (ref 132–146)
SODIUM BLD-SCNC: 142 MMOL/L (ref 132–146)
SODIUM BLD-SCNC: 143 MMOL/L (ref 132–146)
SODIUM BLD-SCNC: 145 MMOL/L (ref 132–146)
SODIUM BLD-SCNC: 146 MMOL/L (ref 132–146)
SODIUM URINE: 65 MMOL/L
SODIUM URINE: 67 MMOL/L
SODIUM URINE: <20 MMOL/L
SOURCE OF BLOOD CULTURE: ABNORMAL
SOURCE, BLOOD GAS: ABNORMAL
SPECIFIC GRAVITY UA: 1.01 (ref 1–1.03)
SPECIFIC GRAVITY UA: 1.02 (ref 1–1.03)
STAPHYLOCOCCUS AUREUS BY PCR: NOT DETECTED
STAPHYLOCOCCUS EPIDERMIDIS BY PCR: NOT DETECTED
STAPHYLOCOCCUS LUGDUNENSIS BY PCR: NOT DETECTED
STAPHYLOCOCCUS SPECIES BY PCR: NOT DETECTED
STENOTROPHOMONAS MALTOPHILIA BY PCR: NOT DETECTED
STREP PNEUMONIAE ANTIGEN, URINE: NORMAL
STREPTOCOCCUS AGALACTIAE BY PCR: DETECTED
STREPTOCOCCUS PNEUMONIAE BY PCR: NOT DETECTED
STREPTOCOCCUS PYOGENES  BY PCR: NOT DETECTED
STREPTOCOCCUS SPECIES BY PCR: DETECTED
T4 FREE: 0.89 NG/DL (ref 0.93–1.7)
TEAR DROP CELLS: ABNORMAL
THB: 10.1 G/DL (ref 11.5–16.5)
THB: 10.2 G/DL (ref 11.5–16.5)
THB: 10.5 G/DL (ref 11.5–16.5)
THB: 10.9 G/DL (ref 11.5–16.5)
THB: 11.3 G/DL (ref 11.5–16.5)
THB: 11.4 G/DL (ref 11.5–16.5)
THB: 11.9 G/DL (ref 11.5–16.5)
THB: 11.9 G/DL (ref 11.5–16.5)
THB: 12.1 G/DL (ref 11.5–16.5)
THB: 12.3 G/DL (ref 11.5–16.5)
THB: 12.5 G/DL (ref 11.5–16.5)
THB: 12.9 G/DL (ref 11.5–16.5)
THB: 14.1 G/DL (ref 11.5–16.5)
THB: 14.6 G/DL (ref 11.5–16.5)
TIME ANALYZED: 1859
TIME ANALYZED: 2028
TIME ANALYZED: 417
TIME ANALYZED: 419
TIME ANALYZED: 426
TIME ANALYZED: 429
TIME ANALYZED: 446
TIME ANALYZED: 506
TIME ANALYZED: 508
TIME ANALYZED: 510
TIME ANALYZED: 543
TIME ANALYZED: 546
TIME ANALYZED: 604
TIME ANALYZED: 943
TOTAL CK: 185 U/L (ref 20–180)
TOTAL PROTEIN: 5 G/DL (ref 6.4–8.3)
TOTAL PROTEIN: 5.2 G/DL (ref 6.4–8.3)
TOTAL PROTEIN: 5.3 G/DL (ref 6.4–8.3)
TOTAL PROTEIN: 5.5 G/DL (ref 6.4–8.3)
TOTAL PROTEIN: 5.6 G/DL (ref 6.4–8.3)
TOTAL PROTEIN: 5.6 G/DL (ref 6.4–8.3)
TOTAL PROTEIN: 5.7 G/DL (ref 6.4–8.3)
TOTAL PROTEIN: 5.7 G/DL (ref 6.4–8.3)
TOTAL PROTEIN: 6.4 G/DL (ref 6.4–8.3)
TOTAL PROTEIN: 6.6 G/DL (ref 6.4–8.3)
TOTAL PROTEIN: 6.8 G/DL (ref 6.4–8.3)
TOTAL PROTEIN: 8.1 G/DL (ref 6.4–8.3)
TROPONIN, HIGH SENSITIVITY: 20 NG/L (ref 0–9)
TROPONIN, HIGH SENSITIVITY: 49 NG/L (ref 0–9)
TROPONIN, HIGH SENSITIVITY: 57 NG/L (ref 0–9)
TROPONIN, HIGH SENSITIVITY: 57 NG/L (ref 0–9)
TSH SERPL DL<=0.05 MIU/L-ACNC: 1.79 UIU/ML (ref 0.27–4.2)
UREA NITROGEN, UR: 491 MG/DL (ref 800–1666)
URINE CULTURE, ROUTINE: NORMAL
URINE CULTURE, ROUTINE: NORMAL
UROBILINOGEN, URINE: 0.2 E.U./DL
UROBILINOGEN, URINE: 1 E.U./DL
VANCOMYCIN TROUGH: 17.2 MCG/ML (ref 5–16)
VT MECHANICAL: 450 ML
WBC # BLD: 10.3 E9/L (ref 4.5–11.5)
WBC # BLD: 10.3 E9/L (ref 4.5–11.5)
WBC # BLD: 10.4 E9/L (ref 4.5–11.5)
WBC # BLD: 10.5 E9/L (ref 4.5–11.5)
WBC # BLD: 11.1 E9/L (ref 4.5–11.5)
WBC # BLD: 12 E9/L (ref 4.5–11.5)
WBC # BLD: 12.1 E9/L (ref 4.5–11.5)
WBC # BLD: 12.2 E9/L (ref 4.5–11.5)
WBC # BLD: 12.4 E9/L (ref 4.5–11.5)
WBC # BLD: 12.5 E9/L (ref 4.5–11.5)
WBC # BLD: 13.1 E9/L (ref 4.5–11.5)
WBC # BLD: 20.9 E9/L (ref 4.5–11.5)
WBC # BLD: 21.7 E9/L (ref 4.5–11.5)
WBC # BLD: 22.2 E9/L (ref 4.5–11.5)
WBC # BLD: 28.5 E9/L (ref 4.5–11.5)
WBC # BLD: 39.1 E9/L (ref 4.5–11.5)
WBC # BLD: 5.5 E9/L (ref 4.5–11.5)
WBC # BLD: 8.3 E9/L (ref 4.5–11.5)
WBC # BLD: 8.3 E9/L (ref 4.5–11.5)
WBC # BLD: 8.5 E9/L (ref 4.5–11.5)
WBC # BLD: 9.5 E9/L (ref 4.5–11.5)
WBC UA: >20 /HPF (ref 0–5)
WBC UA: ABNORMAL /HPF (ref 0–5)
WBC UA: ABNORMAL /HPF (ref 0–5)
WOUND/ABSCESS: ABNORMAL
YEAST: PRESENT /HPF

## 2022-01-01 PROCEDURE — 2000000000 HC ICU R&B

## 2022-01-01 PROCEDURE — 2580000003 HC RX 258: Performed by: INTERNAL MEDICINE

## 2022-01-01 PROCEDURE — 87040 BLOOD CULTURE FOR BACTERIA: CPT

## 2022-01-01 PROCEDURE — 6370000000 HC RX 637 (ALT 250 FOR IP): Performed by: INTERNAL MEDICINE

## 2022-01-01 PROCEDURE — 6360000002 HC RX W HCPCS: Performed by: NURSE PRACTITIONER

## 2022-01-01 PROCEDURE — 85610 PROTHROMBIN TIME: CPT

## 2022-01-01 PROCEDURE — 85027 COMPLETE CBC AUTOMATED: CPT

## 2022-01-01 PROCEDURE — 94003 VENT MGMT INPAT SUBQ DAY: CPT

## 2022-01-01 PROCEDURE — 87186 SC STD MICRODIL/AGAR DIL: CPT

## 2022-01-01 PROCEDURE — 83735 ASSAY OF MAGNESIUM: CPT

## 2022-01-01 PROCEDURE — 6370000000 HC RX 637 (ALT 250 FOR IP): Performed by: NURSE PRACTITIONER

## 2022-01-01 PROCEDURE — 80053 COMPREHEN METABOLIC PANEL: CPT

## 2022-01-01 PROCEDURE — 87088 URINE BACTERIA CULTURE: CPT

## 2022-01-01 PROCEDURE — 36415 COLL VENOUS BLD VENIPUNCTURE: CPT

## 2022-01-01 PROCEDURE — 83615 LACTATE (LD) (LDH) ENZYME: CPT

## 2022-01-01 PROCEDURE — 6360000002 HC RX W HCPCS: Performed by: INTERNAL MEDICINE

## 2022-01-01 PROCEDURE — 84439 ASSAY OF FREE THYROXINE: CPT

## 2022-01-01 PROCEDURE — 70450 CT HEAD/BRAIN W/O DYE: CPT

## 2022-01-01 PROCEDURE — 84484 ASSAY OF TROPONIN QUANT: CPT

## 2022-01-01 PROCEDURE — 99291 CRITICAL CARE FIRST HOUR: CPT

## 2022-01-01 PROCEDURE — 97535 SELF CARE MNGMENT TRAINING: CPT

## 2022-01-01 PROCEDURE — 99233 SBSQ HOSP IP/OBS HIGH 50: CPT | Performed by: NURSE PRACTITIONER

## 2022-01-01 PROCEDURE — 2500000003 HC RX 250 WO HCPCS: Performed by: INTERNAL MEDICINE

## 2022-01-01 PROCEDURE — 2700000000 HC OXYGEN THERAPY PER DAY

## 2022-01-01 PROCEDURE — 83605 ASSAY OF LACTIC ACID: CPT

## 2022-01-01 PROCEDURE — 82962 GLUCOSE BLOOD TEST: CPT

## 2022-01-01 PROCEDURE — 99231 SBSQ HOSP IP/OBS SF/LOW 25: CPT | Performed by: NURSE PRACTITIONER

## 2022-01-01 PROCEDURE — 2580000003 HC RX 258: Performed by: NURSE PRACTITIONER

## 2022-01-01 PROCEDURE — 87070 CULTURE OTHR SPECIMN AEROBIC: CPT

## 2022-01-01 PROCEDURE — 93005 ELECTROCARDIOGRAM TRACING: CPT | Performed by: STUDENT IN AN ORGANIZED HEALTH CARE EDUCATION/TRAINING PROGRAM

## 2022-01-01 PROCEDURE — 0202U NFCT DS 22 TRGT SARS-COV-2: CPT

## 2022-01-01 PROCEDURE — 99306 1ST NF CARE HIGH MDM 50: CPT | Performed by: STUDENT IN AN ORGANIZED HEALTH CARE EDUCATION/TRAINING PROGRAM

## 2022-01-01 PROCEDURE — 74018 RADEX ABDOMEN 1 VIEW: CPT

## 2022-01-01 PROCEDURE — 85025 COMPLETE CBC W/AUTO DIFF WBC: CPT

## 2022-01-01 PROCEDURE — S5553 INSULIN LONG ACTING 5 U: HCPCS | Performed by: NURSE PRACTITIONER

## 2022-01-01 PROCEDURE — 80048 BASIC METABOLIC PNL TOTAL CA: CPT

## 2022-01-01 PROCEDURE — 37799 UNLISTED PX VASCULAR SURGERY: CPT

## 2022-01-01 PROCEDURE — 87077 CULTURE AEROBIC IDENTIFY: CPT

## 2022-01-01 PROCEDURE — 2500000003 HC RX 250 WO HCPCS: Performed by: STUDENT IN AN ORGANIZED HEALTH CARE EDUCATION/TRAINING PROGRAM

## 2022-01-01 PROCEDURE — 83930 ASSAY OF BLOOD OSMOLALITY: CPT

## 2022-01-01 PROCEDURE — 6360000002 HC RX W HCPCS: Performed by: STUDENT IN AN ORGANIZED HEALTH CARE EDUCATION/TRAINING PROGRAM

## 2022-01-01 PROCEDURE — 84100 ASSAY OF PHOSPHORUS: CPT

## 2022-01-01 PROCEDURE — 94640 AIRWAY INHALATION TREATMENT: CPT

## 2022-01-01 PROCEDURE — 1200000000 HC SEMI PRIVATE

## 2022-01-01 PROCEDURE — 6370000000 HC RX 637 (ALT 250 FOR IP): Performed by: STUDENT IN AN ORGANIZED HEALTH CARE EDUCATION/TRAINING PROGRAM

## 2022-01-01 PROCEDURE — 87449 NOS EACH ORGANISM AG IA: CPT

## 2022-01-01 PROCEDURE — 87147 CULTURE TYPE IMMUNOLOGIC: CPT

## 2022-01-01 PROCEDURE — C9113 INJ PANTOPRAZOLE SODIUM, VIA: HCPCS | Performed by: INTERNAL MEDICINE

## 2022-01-01 PROCEDURE — 83880 ASSAY OF NATRIURETIC PEPTIDE: CPT

## 2022-01-01 PROCEDURE — 2500000003 HC RX 250 WO HCPCS

## 2022-01-01 PROCEDURE — 87081 CULTURE SCREEN ONLY: CPT

## 2022-01-01 PROCEDURE — 84300 ASSAY OF URINE SODIUM: CPT

## 2022-01-01 PROCEDURE — 71045 X-RAY EXAM CHEST 1 VIEW: CPT

## 2022-01-01 PROCEDURE — 99233 SBSQ HOSP IP/OBS HIGH 50: CPT | Performed by: INTERNAL MEDICINE

## 2022-01-01 PROCEDURE — 73620 X-RAY EXAM OF FOOT: CPT

## 2022-01-01 PROCEDURE — 83935 ASSAY OF URINE OSMOLALITY: CPT

## 2022-01-01 PROCEDURE — A4216 STERILE WATER/SALINE, 10 ML: HCPCS | Performed by: NURSE PRACTITIONER

## 2022-01-01 PROCEDURE — 82805 BLOOD GASES W/O2 SATURATION: CPT

## 2022-01-01 PROCEDURE — 87635 SARS-COV-2 COVID-19 AMP PRB: CPT

## 2022-01-01 PROCEDURE — 82436 ASSAY OF URINE CHLORIDE: CPT

## 2022-01-01 PROCEDURE — 83036 HEMOGLOBIN GLYCOSYLATED A1C: CPT

## 2022-01-01 PROCEDURE — 93010 ELECTROCARDIOGRAM REPORT: CPT | Performed by: INTERNAL MEDICINE

## 2022-01-01 PROCEDURE — 81001 URINALYSIS AUTO W/SCOPE: CPT

## 2022-01-01 PROCEDURE — 82010 KETONE BODYS QUAN: CPT

## 2022-01-01 PROCEDURE — 73030 X-RAY EXAM OF SHOULDER: CPT

## 2022-01-01 PROCEDURE — 82330 ASSAY OF CALCIUM: CPT

## 2022-01-01 PROCEDURE — 72131 CT LUMBAR SPINE W/O DYE: CPT

## 2022-01-01 PROCEDURE — 96375 TX/PRO/DX INJ NEW DRUG ADDON: CPT

## 2022-01-01 PROCEDURE — 99221 1ST HOSP IP/OBS SF/LOW 40: CPT | Performed by: NURSE PRACTITIONER

## 2022-01-01 PROCEDURE — 86060 ANTISTREPTOLYSIN O TITER: CPT

## 2022-01-01 PROCEDURE — 51702 INSERT TEMP BLADDER CATH: CPT

## 2022-01-01 PROCEDURE — 99285 EMERGENCY DEPT VISIT HI MDM: CPT

## 2022-01-01 PROCEDURE — C9113 INJ PANTOPRAZOLE SODIUM, VIA: HCPCS | Performed by: NURSE PRACTITIONER

## 2022-01-01 PROCEDURE — 84540 ASSAY OF URINE/UREA-N: CPT

## 2022-01-01 PROCEDURE — 85378 FIBRIN DEGRADE SEMIQUANT: CPT

## 2022-01-01 PROCEDURE — 82570 ASSAY OF URINE CREATININE: CPT

## 2022-01-01 PROCEDURE — 97161 PT EVAL LOW COMPLEX 20 MIN: CPT

## 2022-01-01 PROCEDURE — 72125 CT NECK SPINE W/O DYE: CPT

## 2022-01-01 PROCEDURE — 99222 1ST HOSP IP/OBS MODERATE 55: CPT | Performed by: PSYCHIATRY & NEUROLOGY

## 2022-01-01 PROCEDURE — 5A1955Z RESPIRATORY VENTILATION, GREATER THAN 96 CONSECUTIVE HOURS: ICD-10-PCS | Performed by: INTERNAL MEDICINE

## 2022-01-01 PROCEDURE — 96365 THER/PROPH/DIAG IV INF INIT: CPT

## 2022-01-01 PROCEDURE — 6360000002 HC RX W HCPCS

## 2022-01-01 PROCEDURE — 97530 THERAPEUTIC ACTIVITIES: CPT

## 2022-01-01 PROCEDURE — 93005 ELECTROCARDIOGRAM TRACING: CPT | Performed by: EMERGENCY MEDICINE

## 2022-01-01 PROCEDURE — 5A1945Z RESPIRATORY VENTILATION, 24-96 CONSECUTIVE HOURS: ICD-10-PCS | Performed by: INTERNAL MEDICINE

## 2022-01-01 PROCEDURE — 6360000002 HC RX W HCPCS: Performed by: EMERGENCY MEDICINE

## 2022-01-01 PROCEDURE — 73630 X-RAY EXAM OF FOOT: CPT

## 2022-01-01 PROCEDURE — 87206 SMEAR FLUORESCENT/ACID STAI: CPT

## 2022-01-01 PROCEDURE — 99223 1ST HOSP IP/OBS HIGH 75: CPT | Performed by: INTERNAL MEDICINE

## 2022-01-01 PROCEDURE — 31500 INSERT EMERGENCY AIRWAY: CPT

## 2022-01-01 PROCEDURE — 73590 X-RAY EXAM OF LOWER LEG: CPT

## 2022-01-01 PROCEDURE — 36592 COLLECT BLOOD FROM PICC: CPT

## 2022-01-01 PROCEDURE — 85730 THROMBOPLASTIN TIME PARTIAL: CPT

## 2022-01-01 PROCEDURE — 80202 ASSAY OF VANCOMYCIN: CPT

## 2022-01-01 PROCEDURE — 93970 EXTREMITY STUDY: CPT

## 2022-01-01 PROCEDURE — 84146 ASSAY OF PROLACTIN: CPT

## 2022-01-01 PROCEDURE — 95822 EEG COMA OR SLEEP ONLY: CPT | Performed by: PSYCHIATRY & NEUROLOGY

## 2022-01-01 PROCEDURE — 84443 ASSAY THYROID STIM HORMONE: CPT

## 2022-01-01 PROCEDURE — 84156 ASSAY OF PROTEIN URINE: CPT

## 2022-01-01 PROCEDURE — 86140 C-REACTIVE PROTEIN: CPT

## 2022-01-01 PROCEDURE — 84145 PROCALCITONIN (PCT): CPT

## 2022-01-01 PROCEDURE — P9047 ALBUMIN (HUMAN), 25%, 50ML: HCPCS | Performed by: INTERNAL MEDICINE

## 2022-01-01 PROCEDURE — 76705 ECHO EXAM OF ABDOMEN: CPT

## 2022-01-01 PROCEDURE — 94799 UNLISTED PULMONARY SVC/PX: CPT

## 2022-01-01 PROCEDURE — 95822 EEG COMA OR SLEEP ONLY: CPT

## 2022-01-01 PROCEDURE — 99291 CRITICAL CARE FIRST HOUR: CPT | Performed by: NURSE PRACTITIONER

## 2022-01-01 PROCEDURE — 36620 INSERTION CATHETER ARTERY: CPT

## 2022-01-01 PROCEDURE — 6370000000 HC RX 637 (ALT 250 FOR IP): Performed by: EMERGENCY MEDICINE

## 2022-01-01 PROCEDURE — P9047 ALBUMIN (HUMAN), 25%, 50ML: HCPCS | Performed by: NURSE PRACTITIONER

## 2022-01-01 PROCEDURE — 2709999900 HC NON-CHARGEABLE SUPPLY

## 2022-01-01 PROCEDURE — 0BH18EZ INSERTION OF ENDOTRACHEAL AIRWAY INTO TRACHEA, VIA NATURAL OR ARTIFICIAL OPENING ENDOSCOPIC: ICD-10-PCS | Performed by: INTERNAL MEDICINE

## 2022-01-01 PROCEDURE — 85384 FIBRINOGEN ACTIVITY: CPT

## 2022-01-01 PROCEDURE — XW033H5 INTRODUCTION OF TOCILIZUMAB INTO PERIPHERAL VEIN, PERCUTANEOUS APPROACH, NEW TECHNOLOGY GROUP 5: ICD-10-PCS | Performed by: INTERNAL MEDICINE

## 2022-01-01 PROCEDURE — 2580000003 HC RX 258: Performed by: STUDENT IN AN ORGANIZED HEALTH CARE EDUCATION/TRAINING PROGRAM

## 2022-01-01 PROCEDURE — 74176 CT ABD & PELVIS W/O CONTRAST: CPT

## 2022-01-01 PROCEDURE — 94002 VENT MGMT INPAT INIT DAY: CPT

## 2022-01-01 PROCEDURE — 82728 ASSAY OF FERRITIN: CPT

## 2022-01-01 PROCEDURE — 96376 TX/PRO/DX INJ SAME DRUG ADON: CPT

## 2022-01-01 PROCEDURE — 82533 TOTAL CORTISOL: CPT

## 2022-01-01 PROCEDURE — 2500000003 HC RX 250 WO HCPCS: Performed by: NURSE PRACTITIONER

## 2022-01-01 PROCEDURE — 36556 INSERT NON-TUNNEL CV CATH: CPT

## 2022-01-01 PROCEDURE — 2500000003 HC RX 250 WO HCPCS: Performed by: EMERGENCY MEDICINE

## 2022-01-01 PROCEDURE — 93306 TTE W/DOPPLER COMPLETE: CPT

## 2022-01-01 PROCEDURE — 02HV33Z INSERTION OF INFUSION DEVICE INTO SUPERIOR VENA CAVA, PERCUTANEOUS APPROACH: ICD-10-PCS | Performed by: INTERNAL MEDICINE

## 2022-01-01 PROCEDURE — C1751 CATH, INF, PER/CENT/MIDLINE: HCPCS

## 2022-01-01 PROCEDURE — 96374 THER/PROPH/DIAG INJ IV PUSH: CPT

## 2022-01-01 PROCEDURE — 99291 CRITICAL CARE FIRST HOUR: CPT | Performed by: INTERNAL MEDICINE

## 2022-01-01 PROCEDURE — 87150 DNA/RNA AMPLIFIED PROBE: CPT

## 2022-01-01 PROCEDURE — 97165 OT EVAL LOW COMPLEX 30 MIN: CPT

## 2022-01-01 PROCEDURE — 81003 URINALYSIS AUTO W/O SCOPE: CPT

## 2022-01-01 PROCEDURE — 71250 CT THORAX DX C-: CPT

## 2022-01-01 PROCEDURE — 82040 ASSAY OF SERUM ALBUMIN: CPT

## 2022-01-01 PROCEDURE — 82550 ASSAY OF CK (CPK): CPT

## 2022-01-01 RX ORDER — NICOTINE POLACRILEX 4 MG
15 LOZENGE BUCCAL PRN
Status: DISCONTINUED | OUTPATIENT
Start: 2022-01-01 | End: 2022-01-01 | Stop reason: HOSPADM

## 2022-01-01 RX ORDER — WARFARIN SODIUM 3 MG/1
3 TABLET ORAL
Status: COMPLETED | OUTPATIENT
Start: 2022-01-01 | End: 2022-01-01

## 2022-01-01 RX ORDER — ACETAMINOPHEN 650 MG/1
650 SUPPOSITORY RECTAL EVERY 6 HOURS PRN
Status: DISCONTINUED | OUTPATIENT
Start: 2022-01-01 | End: 2022-01-01 | Stop reason: HOSPADM

## 2022-01-01 RX ORDER — BENZONATATE 200 MG/1
200 CAPSULE ORAL 3 TIMES DAILY
Status: ON HOLD | COMMUNITY
Start: 2022-01-01 | End: 2022-01-01 | Stop reason: HOSPADM

## 2022-01-01 RX ORDER — SODIUM CHLORIDE 9 MG/ML
INJECTION, SOLUTION INTRAVENOUS CONTINUOUS
Status: DISCONTINUED | OUTPATIENT
Start: 2022-01-01 | End: 2022-01-01 | Stop reason: HOSPADM

## 2022-01-01 RX ORDER — ACETAMINOPHEN 325 MG/1
650 TABLET ORAL EVERY 6 HOURS PRN
Status: DISCONTINUED | OUTPATIENT
Start: 2022-01-01 | End: 2022-01-01 | Stop reason: HOSPADM

## 2022-01-01 RX ORDER — DIGOXIN 0.25 MG/ML
250 INJECTION INTRAMUSCULAR; INTRAVENOUS ONCE
Status: COMPLETED | OUTPATIENT
Start: 2022-01-01 | End: 2022-01-01

## 2022-01-01 RX ORDER — WARFARIN SODIUM 4 MG/1
4 TABLET ORAL
Status: COMPLETED | OUTPATIENT
Start: 2022-01-01 | End: 2022-01-01

## 2022-01-01 RX ORDER — SODIUM CHLORIDE 9 MG/ML
25 INJECTION, SOLUTION INTRAVENOUS PRN
Status: DISCONTINUED | OUTPATIENT
Start: 2022-01-01 | End: 2022-01-01 | Stop reason: SDUPTHER

## 2022-01-01 RX ORDER — SODIUM CHLORIDE 9 MG/ML
10 INJECTION INTRAVENOUS DAILY
Status: DISCONTINUED | OUTPATIENT
Start: 2022-01-01 | End: 2022-01-01

## 2022-01-01 RX ORDER — DILTIAZEM HYDROCHLORIDE 120 MG/1
120 CAPSULE, COATED, EXTENDED RELEASE ORAL DAILY
Status: DISCONTINUED | OUTPATIENT
Start: 2022-01-01 | End: 2022-01-01 | Stop reason: HOSPADM

## 2022-01-01 RX ORDER — BUMETANIDE 0.25 MG/ML
1 INJECTION, SOLUTION INTRAMUSCULAR; INTRAVENOUS EVERY 12 HOURS
Status: COMPLETED | OUTPATIENT
Start: 2022-01-01 | End: 2022-01-01

## 2022-01-01 RX ORDER — SPIRONOLACTONE 25 MG/1
25 TABLET ORAL DAILY
Status: DISCONTINUED | OUTPATIENT
Start: 2022-01-01 | End: 2022-01-01 | Stop reason: HOSPADM

## 2022-01-01 RX ORDER — POTASSIUM CHLORIDE 7.45 MG/ML
10 INJECTION INTRAVENOUS
Status: COMPLETED | OUTPATIENT
Start: 2022-01-01 | End: 2022-01-01

## 2022-01-01 RX ORDER — SODIUM CHLORIDE 0.9 % (FLUSH) 0.9 %
5-40 SYRINGE (ML) INJECTION PRN
Status: DISCONTINUED | OUTPATIENT
Start: 2022-01-01 | End: 2022-01-01 | Stop reason: HOSPADM

## 2022-01-01 RX ORDER — DILTIAZEM HYDROCHLORIDE 5 MG/ML
10 INJECTION INTRAVENOUS ONCE
Status: COMPLETED | OUTPATIENT
Start: 2022-01-01 | End: 2022-01-01

## 2022-01-01 RX ORDER — ALBUMIN (HUMAN) 12.5 G/50ML
50 SOLUTION INTRAVENOUS EVERY 6 HOURS
Status: COMPLETED | OUTPATIENT
Start: 2022-01-01 | End: 2022-01-01

## 2022-01-01 RX ORDER — SODIUM CHLORIDE 9 MG/ML
25 INJECTION, SOLUTION INTRAVENOUS PRN
Status: DISCONTINUED | OUTPATIENT
Start: 2022-01-01 | End: 2022-01-01 | Stop reason: HOSPADM

## 2022-01-01 RX ORDER — LORAZEPAM 2 MG/ML
2 INJECTION INTRAMUSCULAR ONCE
Status: COMPLETED | OUTPATIENT
Start: 2022-01-01 | End: 2022-01-01

## 2022-01-01 RX ORDER — WARFARIN SODIUM 4 MG/1
4 TABLET ORAL
Status: DISCONTINUED | OUTPATIENT
Start: 2022-01-01 | End: 2022-01-01

## 2022-01-01 RX ORDER — LEVOFLOXACIN 500 MG/1
500 TABLET, FILM COATED ORAL EVERY 24 HOURS
Status: DISCONTINUED | OUTPATIENT
Start: 2022-01-01 | End: 2022-01-01 | Stop reason: SDUPTHER

## 2022-01-01 RX ORDER — LORAZEPAM 0.5 MG/1
0.25 TABLET ORAL EVERY 4 HOURS PRN
Qty: 30 TABLET | Refills: 2 | Status: SHIPPED | OUTPATIENT
Start: 2022-01-01 | End: 2022-03-23

## 2022-01-01 RX ORDER — DEXTROSE MONOHYDRATE 50 MG/ML
100 INJECTION, SOLUTION INTRAVENOUS PRN
Status: DISCONTINUED | OUTPATIENT
Start: 2022-01-01 | End: 2022-01-01 | Stop reason: HOSPADM

## 2022-01-01 RX ORDER — BUMETANIDE 1 MG/1
1 TABLET ORAL 2 TIMES DAILY
Status: DISCONTINUED | OUTPATIENT
Start: 2022-01-01 | End: 2022-01-01 | Stop reason: HOSPADM

## 2022-01-01 RX ORDER — BISACODYL 10 MG
10 SUPPOSITORY, RECTAL RECTAL ONCE
Status: DISCONTINUED | OUTPATIENT
Start: 2022-01-01 | End: 2022-01-01 | Stop reason: HOSPADM

## 2022-01-01 RX ORDER — ALBUMIN (HUMAN) 12.5 G/50ML
25 SOLUTION INTRAVENOUS ONCE
Status: COMPLETED | OUTPATIENT
Start: 2022-01-01 | End: 2022-01-01

## 2022-01-01 RX ORDER — LORAZEPAM 2 MG/ML
1 INJECTION INTRAMUSCULAR EVERY 5 MIN PRN
Status: DISCONTINUED | OUTPATIENT
Start: 2022-01-01 | End: 2022-01-01 | Stop reason: HOSPADM

## 2022-01-01 RX ORDER — WARFARIN SODIUM 2 MG/1
2 TABLET ORAL
Status: ACTIVE | OUTPATIENT
Start: 2022-01-01 | End: 2022-01-01

## 2022-01-01 RX ORDER — DEXTROSE MONOHYDRATE 25 G/50ML
12.5 INJECTION, SOLUTION INTRAVENOUS PRN
Status: DISCONTINUED | OUTPATIENT
Start: 2022-01-01 | End: 2022-01-01 | Stop reason: HOSPADM

## 2022-01-01 RX ORDER — CHLORHEXIDINE GLUCONATE 0.12 MG/ML
15 RINSE ORAL 2 TIMES DAILY
Status: DISCONTINUED | OUTPATIENT
Start: 2022-01-01 | End: 2022-01-01

## 2022-01-01 RX ORDER — LEVOFLOXACIN 500 MG/1
500 TABLET, FILM COATED ORAL DAILY
Status: DISCONTINUED | OUTPATIENT
Start: 2022-01-01 | End: 2022-01-01 | Stop reason: HOSPADM

## 2022-01-01 RX ORDER — LEVETIRACETAM 10 MG/ML
1000 INJECTION INTRAVASCULAR ONCE
Status: COMPLETED | OUTPATIENT
Start: 2022-01-01 | End: 2022-01-01

## 2022-01-01 RX ORDER — WARFARIN SODIUM 2 MG/1
2 TABLET ORAL
Status: COMPLETED | OUTPATIENT
Start: 2022-01-01 | End: 2022-01-01

## 2022-01-01 RX ORDER — ACETAMINOPHEN 325 MG/1
650 TABLET ORAL EVERY 6 HOURS PRN
Status: DISCONTINUED | OUTPATIENT
Start: 2022-01-01 | End: 2022-01-01 | Stop reason: SDUPTHER

## 2022-01-01 RX ORDER — LEVETIRACETAM 5 MG/ML
500 INJECTION INTRAVASCULAR EVERY 12 HOURS
Status: DISCONTINUED | OUTPATIENT
Start: 2022-01-01 | End: 2022-01-01

## 2022-01-01 RX ORDER — 0.9 % SODIUM CHLORIDE 0.9 %
500 INTRAVENOUS SOLUTION INTRAVENOUS ONCE
Status: COMPLETED | OUTPATIENT
Start: 2022-01-01 | End: 2022-01-01

## 2022-01-01 RX ORDER — ACETYLCYSTEINE 200 MG/ML
600 SOLUTION ORAL; RESPIRATORY (INHALATION) 2 TIMES DAILY
Status: DISCONTINUED | OUTPATIENT
Start: 2022-01-01 | End: 2022-01-01

## 2022-01-01 RX ORDER — MAGNESIUM SULFATE 1 G/100ML
1000 INJECTION INTRAVENOUS ONCE
Status: COMPLETED | OUTPATIENT
Start: 2022-01-01 | End: 2022-01-01

## 2022-01-01 RX ORDER — INSULIN GLARGINE-YFGN 100 [IU]/ML
15 INJECTION, SOLUTION SUBCUTANEOUS NIGHTLY
Status: DISCONTINUED | OUTPATIENT
Start: 2022-01-01 | End: 2022-01-01

## 2022-01-01 RX ORDER — INSULIN GLARGINE-YFGN 100 [IU]/ML
10 INJECTION, SOLUTION SUBCUTANEOUS NIGHTLY
Status: DISCONTINUED | OUTPATIENT
Start: 2022-01-01 | End: 2022-01-01

## 2022-01-01 RX ORDER — PROPOFOL 10 MG/ML
5-50 INJECTION, EMULSION INTRAVENOUS
Status: DISCONTINUED | OUTPATIENT
Start: 2022-01-01 | End: 2022-01-01 | Stop reason: HOSPADM

## 2022-01-01 RX ORDER — DOCUSATE SODIUM 50 MG/5ML
100 LIQUID ORAL 2 TIMES DAILY
Status: DISCONTINUED | OUTPATIENT
Start: 2022-01-01 | End: 2022-01-01 | Stop reason: HOSPADM

## 2022-01-01 RX ORDER — DOCUSATE SODIUM 100 MG/1
100 CAPSULE, LIQUID FILLED ORAL DAILY
COMMUNITY

## 2022-01-01 RX ORDER — SODIUM CHLORIDE 0.9 % (FLUSH) 0.9 %
5-40 SYRINGE (ML) INJECTION EVERY 12 HOURS SCHEDULED
Status: DISCONTINUED | OUTPATIENT
Start: 2022-01-01 | End: 2022-01-01 | Stop reason: SDUPTHER

## 2022-01-01 RX ORDER — DEXAMETHASONE SODIUM PHOSPHATE 10 MG/ML
10 INJECTION INTRAMUSCULAR; INTRAVENOUS EVERY 24 HOURS
Status: COMPLETED | OUTPATIENT
Start: 2022-01-01 | End: 2022-01-01

## 2022-01-01 RX ORDER — POTASSIUM CHLORIDE 29.8 MG/ML
40 INJECTION INTRAVENOUS EVERY 4 HOURS
Status: DISCONTINUED | OUTPATIENT
Start: 2022-01-01 | End: 2022-01-01

## 2022-01-01 RX ORDER — FUROSEMIDE 10 MG/ML
40 INJECTION INTRAMUSCULAR; INTRAVENOUS ONCE
Status: COMPLETED | OUTPATIENT
Start: 2022-01-01 | End: 2022-01-01

## 2022-01-01 RX ORDER — LIDOCAINE HYDROCHLORIDE 10 MG/ML
5 INJECTION, SOLUTION EPIDURAL; INFILTRATION; INTRACAUDAL; PERINEURAL ONCE
Status: DISCONTINUED | OUTPATIENT
Start: 2022-01-01 | End: 2022-01-01 | Stop reason: HOSPADM

## 2022-01-01 RX ORDER — SODIUM CHLORIDE 9 MG/ML
INJECTION, SOLUTION INTRAVENOUS CONTINUOUS
Status: DISCONTINUED | OUTPATIENT
Start: 2022-01-01 | End: 2022-01-01

## 2022-01-01 RX ORDER — SPIRONOLACTONE 25 MG/1
25 TABLET ORAL DAILY
Qty: 30 TABLET | Refills: 3 | Status: ON HOLD | DISCHARGE
Start: 2022-01-01 | End: 2022-01-01 | Stop reason: HOSPADM

## 2022-01-01 RX ORDER — LEVOFLOXACIN 5 MG/ML
500 INJECTION, SOLUTION INTRAVENOUS EVERY 24 HOURS
Status: DISCONTINUED | OUTPATIENT
Start: 2022-01-01 | End: 2022-01-01

## 2022-01-01 RX ORDER — POTASSIUM CHLORIDE 20 MEQ/1
40 TABLET, EXTENDED RELEASE ORAL ONCE
Status: COMPLETED | OUTPATIENT
Start: 2022-01-01 | End: 2022-01-01

## 2022-01-01 RX ORDER — ACETAMINOPHEN 650 MG/1
650 SUPPOSITORY RECTAL EVERY 4 HOURS PRN
Status: DISCONTINUED | OUTPATIENT
Start: 2022-01-01 | End: 2022-01-01 | Stop reason: HOSPADM

## 2022-01-01 RX ORDER — FENTANYL CITRATE 50 UG/ML
INJECTION, SOLUTION INTRAMUSCULAR; INTRAVENOUS
Status: COMPLETED
Start: 2022-01-01 | End: 2022-01-01

## 2022-01-01 RX ORDER — ACETAMINOPHEN 325 MG/1
650 TABLET ORAL ONCE
Status: COMPLETED | OUTPATIENT
Start: 2022-01-01 | End: 2022-01-01

## 2022-01-01 RX ORDER — LEVETIRACETAM 100 MG/ML
750 SOLUTION ORAL 2 TIMES DAILY
Status: DISCONTINUED | OUTPATIENT
Start: 2022-01-01 | End: 2022-01-01 | Stop reason: HOSPADM

## 2022-01-01 RX ORDER — BUMETANIDE 1 MG/1
1 TABLET ORAL 2 TIMES DAILY
Qty: 30 TABLET | Refills: 3 | Status: ON HOLD | DISCHARGE
Start: 2022-01-01 | End: 2022-01-01 | Stop reason: HOSPADM

## 2022-01-01 RX ORDER — MIDAZOLAM HYDROCHLORIDE 1 MG/ML
INJECTION INTRAMUSCULAR; INTRAVENOUS
Status: COMPLETED
Start: 2022-01-01 | End: 2022-01-01

## 2022-01-01 RX ORDER — SODIUM CHLORIDE 0.9 % (FLUSH) 0.9 %
5-40 SYRINGE (ML) INJECTION EVERY 12 HOURS SCHEDULED
Status: DISCONTINUED | OUTPATIENT
Start: 2022-01-01 | End: 2022-01-01 | Stop reason: HOSPADM

## 2022-01-01 RX ORDER — DEXTROSE MONOHYDRATE 25 G/50ML
25 INJECTION, SOLUTION INTRAVENOUS ONCE
Status: COMPLETED | OUTPATIENT
Start: 2022-01-01 | End: 2022-01-01

## 2022-01-01 RX ORDER — LEVOFLOXACIN 500 MG/1
500 TABLET, FILM COATED ORAL DAILY
Status: DISCONTINUED | OUTPATIENT
Start: 2022-01-01 | End: 2022-01-01

## 2022-01-01 RX ORDER — IPRATROPIUM BROMIDE AND ALBUTEROL SULFATE 2.5; .5 MG/3ML; MG/3ML
1 SOLUTION RESPIRATORY (INHALATION) EVERY 4 HOURS PRN
Status: DISCONTINUED | OUTPATIENT
Start: 2022-01-01 | End: 2022-01-01 | Stop reason: HOSPADM

## 2022-01-01 RX ORDER — LEVETIRACETAM 15 MG/ML
750 INJECTION INTRAVASCULAR EVERY 12 HOURS
Status: DISCONTINUED | OUTPATIENT
Start: 2022-01-01 | End: 2022-01-01

## 2022-01-01 RX ORDER — METOPROLOL TARTRATE 50 MG/1
100 TABLET, FILM COATED ORAL 2 TIMES DAILY
Status: DISCONTINUED | OUTPATIENT
Start: 2022-01-01 | End: 2022-01-01 | Stop reason: HOSPADM

## 2022-01-01 RX ORDER — ACETAMINOPHEN 650 MG/1
650 SUPPOSITORY RECTAL EVERY 6 HOURS PRN
Status: DISCONTINUED | OUTPATIENT
Start: 2022-01-01 | End: 2022-01-01 | Stop reason: SDUPTHER

## 2022-01-01 RX ORDER — LORAZEPAM 2 MG/ML
INJECTION INTRAMUSCULAR
Status: COMPLETED
Start: 2022-01-01 | End: 2022-01-01

## 2022-01-01 RX ORDER — DIGOXIN 0.25 MG/ML
125 INJECTION INTRAMUSCULAR; INTRAVENOUS DAILY
Status: DISCONTINUED | OUTPATIENT
Start: 2022-01-01 | End: 2022-01-01

## 2022-01-01 RX ORDER — HEPARIN SODIUM (PORCINE) LOCK FLUSH IV SOLN 100 UNIT/ML 100 UNIT/ML
3 SOLUTION INTRAVENOUS EVERY 12 HOURS SCHEDULED
Status: DISCONTINUED | OUTPATIENT
Start: 2022-01-01 | End: 2022-01-01 | Stop reason: HOSPADM

## 2022-01-01 RX ORDER — POTASSIUM CHLORIDE 29.8 MG/ML
20 INJECTION INTRAVENOUS
Status: DISCONTINUED | OUTPATIENT
Start: 2022-01-01 | End: 2022-01-01 | Stop reason: HOSPADM

## 2022-01-01 RX ORDER — FAMOTIDINE 20 MG/1
20 TABLET, FILM COATED ORAL DAILY
Status: DISCONTINUED | OUTPATIENT
Start: 2022-01-01 | End: 2022-01-01

## 2022-01-01 RX ORDER — HEPARIN SODIUM 10000 [USP'U]/ML
5000 INJECTION, SOLUTION INTRAVENOUS; SUBCUTANEOUS EVERY 8 HOURS
Status: DISCONTINUED | OUTPATIENT
Start: 2022-01-01 | End: 2022-01-01

## 2022-01-01 RX ORDER — ROCURONIUM BROMIDE 10 MG/ML
INJECTION, SOLUTION INTRAVENOUS
Status: DISPENSED
Start: 2022-01-01 | End: 2022-01-01

## 2022-01-01 RX ORDER — ACETAMINOPHEN 650 MG/1
650 SUPPOSITORY RECTAL ONCE
Status: COMPLETED | OUTPATIENT
Start: 2022-01-01 | End: 2022-01-01

## 2022-01-01 RX ORDER — POLYETHYLENE GLYCOL 3350 17 G/17G
17 POWDER, FOR SOLUTION ORAL DAILY PRN
Qty: 527 G | Refills: 1 | Status: ON HOLD | DISCHARGE
Start: 2022-01-01 | End: 2022-01-01 | Stop reason: HOSPADM

## 2022-01-01 RX ORDER — 0.9 % SODIUM CHLORIDE 0.9 %
2000 INTRAVENOUS SOLUTION INTRAVENOUS ONCE
Status: COMPLETED | OUTPATIENT
Start: 2022-01-01 | End: 2022-01-01

## 2022-01-01 RX ORDER — METOPROLOL TARTRATE 50 MG/1
50 TABLET, FILM COATED ORAL 2 TIMES DAILY
Qty: 60 TABLET | Refills: 3 | Status: SHIPPED | OUTPATIENT
Start: 2022-01-01

## 2022-01-01 RX ORDER — FUROSEMIDE 10 MG/ML
40 INJECTION INTRAMUSCULAR; INTRAVENOUS ONCE
Status: DISCONTINUED | OUTPATIENT
Start: 2022-01-01 | End: 2022-01-01

## 2022-01-01 RX ORDER — MIDAZOLAM HYDROCHLORIDE 2 MG/2ML
2 INJECTION, SOLUTION INTRAMUSCULAR; INTRAVENOUS EVERY 4 HOURS PRN
Status: DISCONTINUED | OUTPATIENT
Start: 2022-01-01 | End: 2022-01-01 | Stop reason: HOSPADM

## 2022-01-01 RX ORDER — POTASSIUM CHLORIDE 7.45 MG/ML
10 INJECTION INTRAVENOUS ONCE
Status: COMPLETED | OUTPATIENT
Start: 2022-01-01 | End: 2022-01-01

## 2022-01-01 RX ORDER — WARFARIN SODIUM 3 MG/1
3 TABLET ORAL DAILY
Status: DISCONTINUED | OUTPATIENT
Start: 2022-01-01 | End: 2022-01-01

## 2022-01-01 RX ORDER — LEVETIRACETAM 500 MG/1
500 TABLET ORAL 2 TIMES DAILY
Status: DISCONTINUED | OUTPATIENT
Start: 2022-01-01 | End: 2022-01-01

## 2022-01-01 RX ORDER — WARFARIN SODIUM 4 MG/1
4 TABLET ORAL DAILY
Qty: 30 TABLET | Refills: 3 | Status: ON HOLD | DISCHARGE
Start: 2022-01-01 | End: 2022-01-01 | Stop reason: HOSPADM

## 2022-01-01 RX ORDER — NICOTINE POLACRILEX 4 MG
15 LOZENGE BUCCAL PRN
Status: DISCONTINUED | OUTPATIENT
Start: 2022-01-01 | End: 2022-01-01

## 2022-01-01 RX ORDER — BUMETANIDE 0.25 MG/ML
1 INJECTION, SOLUTION INTRAMUSCULAR; INTRAVENOUS EVERY 8 HOURS
Status: COMPLETED | OUTPATIENT
Start: 2022-01-01 | End: 2022-01-01

## 2022-01-01 RX ORDER — WARFARIN SODIUM 3 MG/1
3 TABLET ORAL
Status: DISCONTINUED | OUTPATIENT
Start: 2022-01-01 | End: 2022-01-01 | Stop reason: HOSPADM

## 2022-01-01 RX ORDER — MORPHINE SULFATE 100 MG/5ML
5 SOLUTION ORAL EVERY 4 HOURS PRN
Qty: 30 ML | Refills: 0 | Status: SHIPPED | OUTPATIENT
Start: 2022-01-01 | End: 2022-03-23

## 2022-01-01 RX ORDER — ETOMIDATE 2 MG/ML
INJECTION INTRAVENOUS
Status: COMPLETED
Start: 2022-01-01 | End: 2022-01-01

## 2022-01-01 RX ORDER — POLYETHYLENE GLYCOL 3350 17 G/17G
17 POWDER, FOR SOLUTION ORAL DAILY PRN
Status: DISCONTINUED | OUTPATIENT
Start: 2022-01-01 | End: 2022-01-01 | Stop reason: HOSPADM

## 2022-01-01 RX ORDER — POTASSIUM CHLORIDE 29.8 MG/ML
40 INJECTION INTRAVENOUS ONCE
Status: COMPLETED | OUTPATIENT
Start: 2022-01-01 | End: 2022-01-01

## 2022-01-01 RX ORDER — LANSOPRAZOLE
30 KIT
Status: DISCONTINUED | OUTPATIENT
Start: 2022-01-01 | End: 2022-01-01 | Stop reason: HOSPADM

## 2022-01-01 RX ORDER — CALCIUM GLUCONATE 94 MG/ML
1000 INJECTION, SOLUTION INTRAVENOUS ONCE
Status: COMPLETED | OUTPATIENT
Start: 2022-01-01 | End: 2022-01-01

## 2022-01-01 RX ORDER — SODIUM CHLORIDE 9 MG/ML
10 INJECTION INTRAVENOUS DAILY
Status: DISCONTINUED | OUTPATIENT
Start: 2022-01-01 | End: 2022-01-01 | Stop reason: HOSPADM

## 2022-01-01 RX ORDER — LEVOTHYROXINE SODIUM 0.12 MG/1
125 TABLET ORAL DAILY
Status: DISCONTINUED | OUTPATIENT
Start: 2022-01-01 | End: 2022-01-01 | Stop reason: HOSPADM

## 2022-01-01 RX ORDER — ONDANSETRON 2 MG/ML
4 INJECTION INTRAMUSCULAR; INTRAVENOUS EVERY 6 HOURS PRN
Status: DISCONTINUED | OUTPATIENT
Start: 2022-01-01 | End: 2022-01-01 | Stop reason: HOSPADM

## 2022-01-01 RX ORDER — ATORVASTATIN CALCIUM 10 MG/1
10 TABLET, FILM COATED ORAL DAILY
Status: DISCONTINUED | OUTPATIENT
Start: 2022-01-01 | End: 2022-01-01 | Stop reason: HOSPADM

## 2022-01-01 RX ORDER — DEXAMETHASONE SODIUM PHOSPHATE 10 MG/ML
10 INJECTION INTRAMUSCULAR; INTRAVENOUS 2 TIMES DAILY
Status: COMPLETED | OUTPATIENT
Start: 2022-01-01 | End: 2022-01-01

## 2022-01-01 RX ORDER — LEVETIRACETAM 5 MG/ML
500 INJECTION INTRAVASCULAR EVERY 12 HOURS
Status: DISCONTINUED | OUTPATIENT
Start: 2022-01-01 | End: 2022-01-01 | Stop reason: CLARIF

## 2022-01-01 RX ORDER — PANTOPRAZOLE SODIUM 40 MG/10ML
40 INJECTION, POWDER, LYOPHILIZED, FOR SOLUTION INTRAVENOUS DAILY
Status: DISCONTINUED | OUTPATIENT
Start: 2022-01-01 | End: 2022-01-01

## 2022-01-01 RX ORDER — DILTIAZEM HYDROCHLORIDE 120 MG/1
120 CAPSULE, COATED, EXTENDED RELEASE ORAL DAILY
Status: ON HOLD | COMMUNITY
End: 2022-01-01 | Stop reason: HOSPADM

## 2022-01-01 RX ORDER — HEPARIN SODIUM (PORCINE) LOCK FLUSH IV SOLN 100 UNIT/ML 100 UNIT/ML
3 SOLUTION INTRAVENOUS PRN
Status: DISCONTINUED | OUTPATIENT
Start: 2022-01-01 | End: 2022-01-01 | Stop reason: HOSPADM

## 2022-01-01 RX ORDER — DIGOXIN 0.25 MG/ML
INJECTION INTRAMUSCULAR; INTRAVENOUS
Status: COMPLETED
Start: 2022-01-01 | End: 2022-01-01

## 2022-01-01 RX ORDER — POTASSIUM CHLORIDE 29.8 MG/ML
40 INJECTION INTRAVENOUS
Status: COMPLETED | OUTPATIENT
Start: 2022-01-01 | End: 2022-01-01

## 2022-01-01 RX ORDER — PANTOPRAZOLE SODIUM 40 MG/10ML
40 INJECTION, POWDER, LYOPHILIZED, FOR SOLUTION INTRAVENOUS DAILY
Status: DISCONTINUED | OUTPATIENT
Start: 2022-01-01 | End: 2022-01-01 | Stop reason: HOSPADM

## 2022-01-01 RX ORDER — DEXAMETHASONE SODIUM PHOSPHATE 10 MG/ML
10 INJECTION, SOLUTION INTRAMUSCULAR; INTRAVENOUS ONCE
Status: COMPLETED | OUTPATIENT
Start: 2022-01-01 | End: 2022-01-01

## 2022-01-01 RX ORDER — WARFARIN SODIUM 4 MG/1
4 TABLET ORAL
Status: DISCONTINUED | OUTPATIENT
Start: 2022-01-01 | End: 2022-01-01 | Stop reason: SDUPTHER

## 2022-01-01 RX ORDER — FUROSEMIDE 10 MG/ML
20 INJECTION INTRAMUSCULAR; INTRAVENOUS ONCE
Status: COMPLETED | OUTPATIENT
Start: 2022-01-01 | End: 2022-01-01

## 2022-01-01 RX ORDER — NYSTATIN 100000 U/G
CREAM TOPICAL 2 TIMES DAILY
Status: DISCONTINUED | OUTPATIENT
Start: 2022-01-01 | End: 2022-01-01 | Stop reason: HOSPADM

## 2022-01-01 RX ORDER — WARFARIN SODIUM 4 MG/1
4 TABLET ORAL DAILY
Status: DISCONTINUED | OUTPATIENT
Start: 2022-01-01 | End: 2022-01-01 | Stop reason: HOSPADM

## 2022-01-01 RX ORDER — METOLAZONE 2.5 MG/1
2.5 TABLET ORAL EVERY OTHER DAY
Status: ON HOLD | COMMUNITY
Start: 2022-01-01 | End: 2022-01-01 | Stop reason: HOSPADM

## 2022-01-01 RX ORDER — DEXAMETHASONE SODIUM PHOSPHATE 10 MG/ML
10 INJECTION INTRAMUSCULAR; INTRAVENOUS EVERY 6 HOURS
Status: DISCONTINUED | OUTPATIENT
Start: 2022-01-01 | End: 2022-01-01

## 2022-01-01 RX ORDER — METOPROLOL TARTRATE 50 MG/1
50 TABLET, FILM COATED ORAL 2 TIMES DAILY
Status: DISCONTINUED | OUTPATIENT
Start: 2022-01-01 | End: 2022-01-01 | Stop reason: HOSPADM

## 2022-01-01 RX ORDER — DIGOXIN 0.25 MG/ML
125 INJECTION INTRAMUSCULAR; INTRAVENOUS ONCE
Status: COMPLETED | OUTPATIENT
Start: 2022-01-01 | End: 2022-01-01

## 2022-01-01 RX ORDER — ROCURONIUM BROMIDE 10 MG/ML
INJECTION, SOLUTION INTRAVENOUS
Status: COMPLETED
Start: 2022-01-01 | End: 2022-01-01

## 2022-01-01 RX ORDER — BISACODYL 10 MG
10 SUPPOSITORY, RECTAL RECTAL DAILY PRN
COMMUNITY

## 2022-01-01 RX ORDER — SODIUM CHLORIDE 0.9 % (FLUSH) 0.9 %
5-40 SYRINGE (ML) INJECTION PRN
Status: DISCONTINUED | OUTPATIENT
Start: 2022-01-01 | End: 2022-01-01 | Stop reason: SDUPTHER

## 2022-01-01 RX ORDER — ACETAMINOPHEN 325 MG/1
650 TABLET ORAL EVERY 6 HOURS PRN
COMMUNITY

## 2022-01-01 RX ORDER — ONDANSETRON 4 MG/1
4 TABLET, ORALLY DISINTEGRATING ORAL EVERY 8 HOURS PRN
Status: DISCONTINUED | OUTPATIENT
Start: 2022-01-01 | End: 2022-01-01 | Stop reason: HOSPADM

## 2022-01-01 RX ORDER — LEVETIRACETAM 100 MG/ML
750 SOLUTION ORAL 2 TIMES DAILY
Qty: 360 ML | Refills: 3 | Status: SHIPPED | OUTPATIENT
Start: 2022-01-01

## 2022-01-01 RX ORDER — SODIUM PHOSPHATE, DIBASIC AND SODIUM PHOSPHATE, MONOBASIC 7; 19 G/133ML; G/133ML
1 ENEMA RECTAL DAILY PRN
COMMUNITY

## 2022-01-01 RX ORDER — PROPOFOL 10 MG/ML
INJECTION, EMULSION INTRAVENOUS
Status: DISPENSED
Start: 2022-01-01 | End: 2022-01-01

## 2022-01-01 RX ORDER — SODIUM CHLORIDE 9 MG/ML
INJECTION, SOLUTION INTRAVENOUS CONTINUOUS
Status: DISCONTINUED | OUTPATIENT
Start: 2022-01-01 | End: 2022-01-01 | Stop reason: DRUGHIGH

## 2022-01-01 RX ORDER — M-VIT,TX,IRON,MINS/CALC/FOLIC 27MG-0.4MG
1 TABLET ORAL DAILY
COMMUNITY

## 2022-01-01 RX ADMIN — METOPROLOL TARTRATE 50 MG: 50 TABLET, FILM COATED ORAL at 09:09

## 2022-01-01 RX ADMIN — CHLORHEXIDINE GLUCONATE 0.12% ORAL RINSE 15 ML: 1.2 LIQUID ORAL at 09:29

## 2022-01-01 RX ADMIN — WARFARIN SODIUM 2 MG: 2 TABLET ORAL at 17:33

## 2022-01-01 RX ADMIN — ACETAMINOPHEN 650 MG: 325 TABLET ORAL at 22:23

## 2022-01-01 RX ADMIN — DEXAMETHASONE SODIUM PHOSPHATE 10 MG: 10 INJECTION INTRAMUSCULAR; INTRAVENOUS at 01:00

## 2022-01-01 RX ADMIN — SODIUM BICARBONATE 50 MEQ: 84 INJECTION, SOLUTION INTRAVENOUS at 17:46

## 2022-01-01 RX ADMIN — METOPROLOL TARTRATE 100 MG: 50 TABLET, FILM COATED ORAL at 20:00

## 2022-01-01 RX ADMIN — PANTOPRAZOLE SODIUM 40 MG: 40 INJECTION, POWDER, FOR SOLUTION INTRAVENOUS at 09:30

## 2022-01-01 RX ADMIN — PIPERACILLIN AND TAZOBACTAM 3375 MG: 3; .375 INJECTION, POWDER, LYOPHILIZED, FOR SOLUTION INTRAVENOUS at 17:43

## 2022-01-01 RX ADMIN — METOPROLOL TARTRATE 50 MG: 50 TABLET, FILM COATED ORAL at 20:54

## 2022-01-01 RX ADMIN — SODIUM CHLORIDE: 9 INJECTION, SOLUTION INTRAVENOUS at 17:10

## 2022-01-01 RX ADMIN — PIPERACILLIN AND TAZOBACTAM 3375 MG: 3; .375 INJECTION, POWDER, LYOPHILIZED, FOR SOLUTION INTRAVENOUS at 02:45

## 2022-01-01 RX ADMIN — INSULIN LISPRO 2 UNITS: 100 INJECTION, SOLUTION INTRAVENOUS; SUBCUTANEOUS at 07:45

## 2022-01-01 RX ADMIN — ACETAMINOPHEN 650 MG: 325 TABLET ORAL at 09:44

## 2022-01-01 RX ADMIN — LEVOFLOXACIN 500 MG: 500 TABLET, FILM COATED ORAL at 10:11

## 2022-01-01 RX ADMIN — CHLORHEXIDINE GLUCONATE 0.12% ORAL RINSE 15 ML: 1.2 LIQUID ORAL at 08:19

## 2022-01-01 RX ADMIN — DILTIAZEM HYDROCHLORIDE 120 MG: 120 CAPSULE, COATED, EXTENDED RELEASE ORAL at 09:10

## 2022-01-01 RX ADMIN — CHLORHEXIDINE GLUCONATE 0.12% ORAL RINSE 15 ML: 1.2 LIQUID ORAL at 09:11

## 2022-01-01 RX ADMIN — METOPROLOL TARTRATE 50 MG: 50 TABLET, FILM COATED ORAL at 19:39

## 2022-01-01 RX ADMIN — INSULIN LISPRO 2 UNITS: 100 INJECTION, SOLUTION INTRAVENOUS; SUBCUTANEOUS at 00:36

## 2022-01-01 RX ADMIN — BUMETANIDE 1 MG: 1 TABLET ORAL at 08:00

## 2022-01-01 RX ADMIN — Medication 10 ML: at 20:12

## 2022-01-01 RX ADMIN — DEXAMETHASONE SODIUM PHOSPHATE 10 MG: 10 INJECTION INTRAMUSCULAR; INTRAVENOUS at 01:54

## 2022-01-01 RX ADMIN — INSULIN HUMAN 10 UNITS: 100 INJECTION, SOLUTION PARENTERAL at 17:44

## 2022-01-01 RX ADMIN — HEPARIN 300 UNITS: 100 SYRINGE at 20:03

## 2022-01-01 RX ADMIN — METOPROLOL TARTRATE 100 MG: 50 TABLET, FILM COATED ORAL at 08:43

## 2022-01-01 RX ADMIN — PANTOPRAZOLE SODIUM 40 MG: 40 INJECTION, POWDER, FOR SOLUTION INTRAVENOUS at 11:54

## 2022-01-01 RX ADMIN — ACETAMINOPHEN 650 MG: 325 TABLET ORAL at 03:03

## 2022-01-01 RX ADMIN — POTASSIUM CHLORIDE 10 MEQ: 7.46 INJECTION, SOLUTION INTRAVENOUS at 13:54

## 2022-01-01 RX ADMIN — Medication 10 ML: at 08:21

## 2022-01-01 RX ADMIN — INSULIN LISPRO 4 UNITS: 100 INJECTION, SOLUTION INTRAVENOUS; SUBCUTANEOUS at 22:45

## 2022-01-01 RX ADMIN — Medication 10 ML: at 20:00

## 2022-01-01 RX ADMIN — VANCOMYCIN HYDROCHLORIDE 1250 MG: 500 INJECTION, POWDER, LYOPHILIZED, FOR SOLUTION INTRAVENOUS at 11:57

## 2022-01-01 RX ADMIN — WARFARIN SODIUM 4 MG: 4 TABLET ORAL at 18:22

## 2022-01-01 RX ADMIN — INSULIN GLARGINE-YFGN 15 UNITS: 100 INJECTION, SOLUTION SUBCUTANEOUS at 19:38

## 2022-01-01 RX ADMIN — Medication 10 ML: at 08:02

## 2022-01-01 RX ADMIN — METOPROLOL TARTRATE 100 MG: 50 TABLET, FILM COATED ORAL at 20:12

## 2022-01-01 RX ADMIN — ALBUMIN (HUMAN) 25 G: 0.25 INJECTION, SOLUTION INTRAVENOUS at 13:49

## 2022-01-01 RX ADMIN — INSULIN LISPRO 4 UNITS: 100 INJECTION, SOLUTION INTRAVENOUS; SUBCUTANEOUS at 04:13

## 2022-01-01 RX ADMIN — SODIUM CHLORIDE: 9 INJECTION, SOLUTION INTRAVENOUS at 16:04

## 2022-01-01 RX ADMIN — SENNOSIDES 5 ML: 8.8 SYRUP ORAL at 21:16

## 2022-01-01 RX ADMIN — MIDAZOLAM 2 MG: 1 INJECTION INTRAMUSCULAR; INTRAVENOUS at 08:57

## 2022-01-01 RX ADMIN — METOPROLOL TARTRATE 50 MG: 50 TABLET, FILM COATED ORAL at 21:03

## 2022-01-01 RX ADMIN — INSULIN LISPRO 2 UNITS: 100 INJECTION, SOLUTION INTRAVENOUS; SUBCUTANEOUS at 22:58

## 2022-01-01 RX ADMIN — PIPERACILLIN AND TAZOBACTAM 3375 MG: 3; .375 INJECTION, POWDER, LYOPHILIZED, FOR SOLUTION INTRAVENOUS at 20:30

## 2022-01-01 RX ADMIN — Medication 10 ML: at 08:01

## 2022-01-01 RX ADMIN — Medication 10 ML: at 19:39

## 2022-01-01 RX ADMIN — CHLORHEXIDINE GLUCONATE 0.12% ORAL RINSE 15 ML: 1.2 LIQUID ORAL at 19:38

## 2022-01-01 RX ADMIN — METOPROLOL TARTRATE 100 MG: 50 TABLET, FILM COATED ORAL at 08:38

## 2022-01-01 RX ADMIN — ACETYLCYSTEINE 600 MG: 200 SOLUTION ORAL; RESPIRATORY (INHALATION) at 10:05

## 2022-01-01 RX ADMIN — PIPERACILLIN AND TAZOBACTAM 3375 MG: 3; .375 INJECTION, POWDER, LYOPHILIZED, FOR SOLUTION INTRAVENOUS at 18:13

## 2022-01-01 RX ADMIN — DEXTROSE MONOHYDRATE 25 G: 25 INJECTION, SOLUTION INTRAVENOUS at 17:40

## 2022-01-01 RX ADMIN — SODIUM CHLORIDE, PRESERVATIVE FREE 10 ML: 5 INJECTION INTRAVENOUS at 07:55

## 2022-01-01 RX ADMIN — PIPERACILLIN AND TAZOBACTAM 3375 MG: 3; .375 INJECTION, POWDER, LYOPHILIZED, FOR SOLUTION INTRAVENOUS at 02:30

## 2022-01-01 RX ADMIN — INSULIN LISPRO 3 UNITS: 100 INJECTION, SOLUTION INTRAVENOUS; SUBCUTANEOUS at 18:30

## 2022-01-01 RX ADMIN — PANTOPRAZOLE SODIUM 40 MG: 40 INJECTION, POWDER, FOR SOLUTION INTRAVENOUS at 07:59

## 2022-01-01 RX ADMIN — METOPROLOL TARTRATE 50 MG: 50 TABLET, FILM COATED ORAL at 21:51

## 2022-01-01 RX ADMIN — NYSTATIN: 100000 CREAM TOPICAL at 20:01

## 2022-01-01 RX ADMIN — LEVETIRACETAM 750 MG: 1500 INJECTION, SOLUTION INTRAVENOUS at 02:15

## 2022-01-01 RX ADMIN — ACETAMINOPHEN 650 MG: 325 TABLET ORAL at 01:33

## 2022-01-01 RX ADMIN — LEVOFLOXACIN 500 MG: 500 TABLET, FILM COATED ORAL at 09:19

## 2022-01-01 RX ADMIN — SODIUM ZIRCONIUM CYCLOSILICATE 10 G: 10 POWDER, FOR SUSPENSION ORAL at 19:01

## 2022-01-01 RX ADMIN — INSULIN LISPRO 2 UNITS: 100 INJECTION, SOLUTION INTRAVENOUS; SUBCUTANEOUS at 02:38

## 2022-01-01 RX ADMIN — LEVETIRACETAM 750 MG: 1500 INJECTION, SOLUTION INTRAVENOUS at 15:06

## 2022-01-01 RX ADMIN — Medication 10 ML: at 21:51

## 2022-01-01 RX ADMIN — METOPROLOL TARTRATE 25 MG: 25 TABLET, FILM COATED ORAL at 21:28

## 2022-01-01 RX ADMIN — PIPERACILLIN AND TAZOBACTAM 3375 MG: 3; .375 INJECTION, POWDER, LYOPHILIZED, FOR SOLUTION INTRAVENOUS at 10:21

## 2022-01-01 RX ADMIN — NYSTATIN: 100000 CREAM TOPICAL at 15:24

## 2022-01-01 RX ADMIN — FUROSEMIDE 40 MG: 10 INJECTION, SOLUTION INTRAMUSCULAR; INTRAVENOUS at 11:53

## 2022-01-01 RX ADMIN — INSULIN LISPRO 4 UNITS: 100 INJECTION, SOLUTION INTRAVENOUS; SUBCUTANEOUS at 15:10

## 2022-01-01 RX ADMIN — PIPERACILLIN AND TAZOBACTAM 3375 MG: 3; .375 INJECTION, POWDER, LYOPHILIZED, FOR SOLUTION INTRAVENOUS at 09:09

## 2022-01-01 RX ADMIN — METOPROLOL TARTRATE 50 MG: 50 TABLET, FILM COATED ORAL at 08:48

## 2022-01-01 RX ADMIN — MUPIROCIN: 20 OINTMENT TOPICAL at 09:09

## 2022-01-01 RX ADMIN — METOPROLOL TARTRATE 100 MG: 50 TABLET, FILM COATED ORAL at 08:00

## 2022-01-01 RX ADMIN — LEVOTHYROXINE SODIUM 125 MCG: 125 TABLET ORAL at 09:21

## 2022-01-01 RX ADMIN — POTASSIUM CHLORIDE 10 MEQ: 7.46 INJECTION, SOLUTION INTRAVENOUS at 14:02

## 2022-01-01 RX ADMIN — MIDAZOLAM 2 MG: 1 INJECTION INTRAMUSCULAR; INTRAVENOUS at 12:36

## 2022-01-01 RX ADMIN — LEVETIRACETAM 750 MG: 1500 INJECTION, SOLUTION INTRAVENOUS at 02:16

## 2022-01-01 RX ADMIN — ENOXAPARIN SODIUM 40 MG: 100 INJECTION SUBCUTANEOUS at 17:17

## 2022-01-01 RX ADMIN — INSULIN LISPRO 2 UNITS: 100 INJECTION, SOLUTION INTRAVENOUS; SUBCUTANEOUS at 04:52

## 2022-01-01 RX ADMIN — HEPARIN 300 UNITS: 100 SYRINGE at 21:54

## 2022-01-01 RX ADMIN — PIPERACILLIN AND TAZOBACTAM 3375 MG: 3; .375 INJECTION, POWDER, LYOPHILIZED, FOR SOLUTION INTRAVENOUS at 18:23

## 2022-01-01 RX ADMIN — IPRATROPIUM BROMIDE AND ALBUTEROL SULFATE 1 AMPULE: .5; 2.5 SOLUTION RESPIRATORY (INHALATION) at 10:05

## 2022-01-01 RX ADMIN — LEVOFLOXACIN 500 MG: 5 INJECTION, SOLUTION INTRAVENOUS at 12:52

## 2022-01-01 RX ADMIN — CHLORHEXIDINE GLUCONATE 0.12% ORAL RINSE 15 ML: 1.2 LIQUID ORAL at 21:03

## 2022-01-01 RX ADMIN — WARFARIN SODIUM 3 MG: 3 TABLET ORAL at 16:37

## 2022-01-01 RX ADMIN — MUPIROCIN: 20 OINTMENT TOPICAL at 08:02

## 2022-01-01 RX ADMIN — METOPROLOL TARTRATE 50 MG: 50 TABLET, FILM COATED ORAL at 08:20

## 2022-01-01 RX ADMIN — DILTIAZEM HYDROCHLORIDE 120 MG: 120 CAPSULE, COATED, EXTENDED RELEASE ORAL at 09:44

## 2022-01-01 RX ADMIN — PIPERACILLIN AND TAZOBACTAM 3375 MG: 3; .375 INJECTION, POWDER, LYOPHILIZED, FOR SOLUTION INTRAVENOUS at 17:33

## 2022-01-01 RX ADMIN — INSULIN LISPRO 4 UNITS: 100 INJECTION, SOLUTION INTRAVENOUS; SUBCUTANEOUS at 09:00

## 2022-01-01 RX ADMIN — CHLORHEXIDINE GLUCONATE 0.12% ORAL RINSE 15 ML: 1.2 LIQUID ORAL at 08:02

## 2022-01-01 RX ADMIN — DEXAMETHASONE SODIUM PHOSPHATE 10 MG: 10 INJECTION INTRAMUSCULAR; INTRAVENOUS at 17:50

## 2022-01-01 RX ADMIN — PIPERACILLIN AND TAZOBACTAM 3375 MG: 3; .375 INJECTION, POWDER, LYOPHILIZED, FOR SOLUTION INTRAVENOUS at 09:31

## 2022-01-01 RX ADMIN — PHYTONADIONE 10 MG: 10 INJECTION, EMULSION INTRAMUSCULAR; INTRAVENOUS; SUBCUTANEOUS at 14:03

## 2022-01-01 RX ADMIN — POTASSIUM BICARBONATE 40 MEQ: 782 TABLET, EFFERVESCENT ORAL at 09:21

## 2022-01-01 RX ADMIN — METOPROLOL TARTRATE 100 MG: 50 TABLET, FILM COATED ORAL at 20:34

## 2022-01-01 RX ADMIN — Medication 10 ML: at 21:30

## 2022-01-01 RX ADMIN — ACETAMINOPHEN 650 MG: 325 TABLET ORAL at 21:50

## 2022-01-01 RX ADMIN — Medication 10 ML: at 08:12

## 2022-01-01 RX ADMIN — PIPERACILLIN AND TAZOBACTAM 3375 MG: 3; .375 INJECTION, POWDER, LYOPHILIZED, FOR SOLUTION INTRAVENOUS at 09:53

## 2022-01-01 RX ADMIN — ROCURONIUM BROMIDE 100 MG: 50 INJECTION, SOLUTION INTRAVENOUS at 12:36

## 2022-01-01 RX ADMIN — METOPROLOL TARTRATE 100 MG: 50 TABLET, FILM COATED ORAL at 21:23

## 2022-01-01 RX ADMIN — NYSTATIN: 100000 CREAM TOPICAL at 09:20

## 2022-01-01 RX ADMIN — INSULIN LISPRO 2 UNITS: 100 INJECTION, SOLUTION INTRAVENOUS; SUBCUTANEOUS at 08:16

## 2022-01-01 RX ADMIN — MIDAZOLAM 2 MG: 1 INJECTION INTRAMUSCULAR; INTRAVENOUS at 14:16

## 2022-01-01 RX ADMIN — PIPERACILLIN AND TAZOBACTAM 3375 MG: 3; .375 INJECTION, POWDER, LYOPHILIZED, FOR SOLUTION INTRAVENOUS at 09:52

## 2022-01-01 RX ADMIN — LORAZEPAM 2 MG: 2 INJECTION INTRAMUSCULAR; INTRAVENOUS at 02:00

## 2022-01-01 RX ADMIN — SENNOSIDES 5 ML: 8.8 SYRUP ORAL at 22:51

## 2022-01-01 RX ADMIN — PANTOPRAZOLE SODIUM 40 MG: 40 INJECTION, POWDER, FOR SOLUTION INTRAVENOUS at 09:16

## 2022-01-01 RX ADMIN — INSULIN LISPRO 4 UNITS: 100 INJECTION, SOLUTION INTRAVENOUS; SUBCUTANEOUS at 22:51

## 2022-01-01 RX ADMIN — MUPIROCIN: 20 OINTMENT TOPICAL at 08:08

## 2022-01-01 RX ADMIN — PANTOPRAZOLE SODIUM 40 MG: 40 INJECTION, POWDER, FOR SOLUTION INTRAVENOUS at 09:09

## 2022-01-01 RX ADMIN — PROPOFOL 30 MCG/KG/MIN: 10 INJECTION, EMULSION INTRAVENOUS at 06:40

## 2022-01-01 RX ADMIN — BUMETANIDE 1 MG: 1 TABLET ORAL at 18:18

## 2022-01-01 RX ADMIN — INSULIN LISPRO 2 UNITS: 100 INJECTION, SOLUTION INTRAVENOUS; SUBCUTANEOUS at 22:15

## 2022-01-01 RX ADMIN — FUROSEMIDE 40 MG: 10 INJECTION, SOLUTION INTRAMUSCULAR; INTRAVENOUS at 11:21

## 2022-01-01 RX ADMIN — LORAZEPAM 1 MG: 2 INJECTION INTRAMUSCULAR; INTRAVENOUS at 03:31

## 2022-01-01 RX ADMIN — SODIUM ZIRCONIUM CYCLOSILICATE 10 G: 10 POWDER, FOR SUSPENSION ORAL at 22:05

## 2022-01-01 RX ADMIN — CHLORHEXIDINE GLUCONATE 0.12% ORAL RINSE 15 ML: 1.2 LIQUID ORAL at 20:01

## 2022-01-01 RX ADMIN — LEVETIRACETAM 750 MG: 1500 INJECTION, SOLUTION INTRAVENOUS at 04:13

## 2022-01-01 RX ADMIN — INSULIN GLARGINE-YFGN 15 UNITS: 100 INJECTION, SOLUTION SUBCUTANEOUS at 21:40

## 2022-01-01 RX ADMIN — Medication 10 ML: at 20:48

## 2022-01-01 RX ADMIN — DEXAMETHASONE SODIUM PHOSPHATE 10 MG: 10 INJECTION INTRAMUSCULAR; INTRAVENOUS at 06:29

## 2022-01-01 RX ADMIN — SODIUM CHLORIDE, PRESERVATIVE FREE 10 ML: 5 INJECTION INTRAVENOUS at 09:21

## 2022-01-01 RX ADMIN — DIGOXIN 250 MCG: 250 INJECTION, SOLUTION INTRAMUSCULAR; INTRAVENOUS; PARENTERAL at 18:35

## 2022-01-01 RX ADMIN — Medication 10 ML: at 20:52

## 2022-01-01 RX ADMIN — Medication 10 ML: at 20:21

## 2022-01-01 RX ADMIN — PIPERACILLIN AND TAZOBACTAM 3375 MG: 3; .375 INJECTION, POWDER, LYOPHILIZED, FOR SOLUTION INTRAVENOUS at 02:17

## 2022-01-01 RX ADMIN — METOPROLOL TARTRATE 50 MG: 50 TABLET, FILM COATED ORAL at 08:02

## 2022-01-01 RX ADMIN — FUROSEMIDE 40 MG: 10 INJECTION, SOLUTION INTRAMUSCULAR; INTRAVENOUS at 09:16

## 2022-01-01 RX ADMIN — LEVETIRACETAM 750 MG: 1500 INJECTION, SOLUTION INTRAVENOUS at 14:05

## 2022-01-01 RX ADMIN — INSULIN LISPRO 4 UNITS: 100 INJECTION, SOLUTION INTRAVENOUS; SUBCUTANEOUS at 19:30

## 2022-01-01 RX ADMIN — Medication 10 ML: at 08:03

## 2022-01-01 RX ADMIN — POTASSIUM CHLORIDE 20 MEQ: 29.8 INJECTION INTRAVENOUS at 13:41

## 2022-01-01 RX ADMIN — METOPROLOL TARTRATE 50 MG: 50 TABLET, FILM COATED ORAL at 09:19

## 2022-01-01 RX ADMIN — Medication 10 ML: at 09:08

## 2022-01-01 RX ADMIN — ACETYLCYSTEINE 600 MG: 200 SOLUTION ORAL; RESPIRATORY (INHALATION) at 08:40

## 2022-01-01 RX ADMIN — CHLORHEXIDINE GLUCONATE 0.12% ORAL RINSE 15 ML: 1.2 LIQUID ORAL at 07:59

## 2022-01-01 RX ADMIN — METOPROLOL TARTRATE 50 MG: 50 TABLET, FILM COATED ORAL at 20:51

## 2022-01-01 RX ADMIN — LEVETIRACETAM 750 MG: 1500 INJECTION, SOLUTION INTRAVENOUS at 13:55

## 2022-01-01 RX ADMIN — INSULIN LISPRO 2 UNITS: 100 INJECTION, SOLUTION INTRAVENOUS; SUBCUTANEOUS at 12:23

## 2022-01-01 RX ADMIN — WARFARIN SODIUM 4 MG: 4 TABLET ORAL at 20:51

## 2022-01-01 RX ADMIN — DIGOXIN 125 MCG: 0.25 INJECTION INTRAMUSCULAR; INTRAVENOUS at 19:04

## 2022-01-01 RX ADMIN — METOPROLOL TARTRATE 100 MG: 50 TABLET, FILM COATED ORAL at 01:30

## 2022-01-01 RX ADMIN — Medication 10 ML: at 20:04

## 2022-01-01 RX ADMIN — INSULIN LISPRO 4 UNITS: 100 INJECTION, SOLUTION INTRAVENOUS; SUBCUTANEOUS at 18:37

## 2022-01-01 RX ADMIN — MIDAZOLAM 2 MG: 1 INJECTION INTRAMUSCULAR; INTRAVENOUS at 16:50

## 2022-01-01 RX ADMIN — CHLORHEXIDINE GLUCONATE 0.12% ORAL RINSE 15 ML: 1.2 LIQUID ORAL at 12:51

## 2022-01-01 RX ADMIN — PIPERACILLIN AND TAZOBACTAM 3375 MG: 3; .375 INJECTION, POWDER, LYOPHILIZED, FOR SOLUTION INTRAVENOUS at 12:59

## 2022-01-01 RX ADMIN — DEXAMETHASONE SODIUM PHOSPHATE 10 MG: 10 INJECTION INTRAMUSCULAR; INTRAVENOUS at 06:30

## 2022-01-01 RX ADMIN — DILTIAZEM HYDROCHLORIDE 30 MG: 30 TABLET, FILM COATED ORAL at 05:55

## 2022-01-01 RX ADMIN — Medication 10 ML: at 20:05

## 2022-01-01 RX ADMIN — SODIUM CHLORIDE 500 ML: 9 INJECTION, SOLUTION INTRAVENOUS at 13:59

## 2022-01-01 RX ADMIN — INSULIN LISPRO 2 UNITS: 100 INJECTION, SOLUTION INTRAVENOUS; SUBCUTANEOUS at 22:53

## 2022-01-01 RX ADMIN — LEVETIRACETAM 750 MG: 1500 INJECTION, SOLUTION INTRAVENOUS at 02:42

## 2022-01-01 RX ADMIN — PROPOFOL 40 MCG/KG/MIN: 10 INJECTION, EMULSION INTRAVENOUS at 03:00

## 2022-01-01 RX ADMIN — INSULIN LISPRO 2 UNITS: 100 INJECTION, SOLUTION INTRAVENOUS; SUBCUTANEOUS at 10:00

## 2022-01-01 RX ADMIN — ACETAMINOPHEN 650 MG: 325 TABLET ORAL at 22:07

## 2022-01-01 RX ADMIN — HEPARIN 300 UNITS: 100 SYRINGE at 21:16

## 2022-01-01 RX ADMIN — INSULIN LISPRO 4 UNITS: 100 INJECTION, SOLUTION INTRAVENOUS; SUBCUTANEOUS at 14:38

## 2022-01-01 RX ADMIN — ACETYLCYSTEINE 600 MG: 200 SOLUTION ORAL; RESPIRATORY (INHALATION) at 08:35

## 2022-01-01 RX ADMIN — CHLORHEXIDINE GLUCONATE 0.12% ORAL RINSE 15 ML: 1.2 LIQUID ORAL at 21:40

## 2022-01-01 RX ADMIN — METOPROLOL TARTRATE 50 MG: 50 TABLET, FILM COATED ORAL at 08:16

## 2022-01-01 RX ADMIN — ATORVASTATIN CALCIUM 10 MG: 10 TABLET, FILM COATED ORAL at 23:44

## 2022-01-01 RX ADMIN — PIPERACILLIN AND TAZOBACTAM 3375 MG: 3; .375 INJECTION, POWDER, LYOPHILIZED, FOR SOLUTION INTRAVENOUS at 01:59

## 2022-01-01 RX ADMIN — DEXAMETHASONE SODIUM PHOSPHATE 10 MG: 10 INJECTION INTRAMUSCULAR; INTRAVENOUS at 05:11

## 2022-01-01 RX ADMIN — VANCOMYCIN HYDROCHLORIDE 1250 MG: 500 INJECTION, POWDER, LYOPHILIZED, FOR SOLUTION INTRAVENOUS at 13:54

## 2022-01-01 RX ADMIN — SODIUM CHLORIDE: 9 INJECTION, SOLUTION INTRAVENOUS at 23:12

## 2022-01-01 RX ADMIN — NYSTATIN: 100000 CREAM TOPICAL at 21:54

## 2022-01-01 RX ADMIN — SODIUM CHLORIDE: 9 INJECTION, SOLUTION INTRAVENOUS at 06:24

## 2022-01-01 RX ADMIN — DEXAMETHASONE SODIUM PHOSPHATE 10 MG: 10 INJECTION INTRAMUSCULAR; INTRAVENOUS at 13:50

## 2022-01-01 RX ADMIN — CHLORHEXIDINE GLUCONATE 0.12% ORAL RINSE 15 ML: 1.2 LIQUID ORAL at 20:03

## 2022-01-01 RX ADMIN — WARFARIN SODIUM 4 MG: 4 TABLET ORAL at 18:49

## 2022-01-01 RX ADMIN — LEVOTHYROXINE SODIUM 125 MCG: 125 TABLET ORAL at 05:57

## 2022-01-01 RX ADMIN — HEPARIN 300 UNITS: 100 SYRINGE at 09:29

## 2022-01-01 RX ADMIN — Medication 10 ML: at 09:11

## 2022-01-01 RX ADMIN — WARFARIN SODIUM 3 MG: 3 TABLET ORAL at 16:35

## 2022-01-01 RX ADMIN — SODIUM CHLORIDE, PRESERVATIVE FREE 10 ML: 5 INJECTION INTRAVENOUS at 09:41

## 2022-01-01 RX ADMIN — INSULIN LISPRO 2 UNITS: 100 INJECTION, SOLUTION INTRAVENOUS; SUBCUTANEOUS at 03:20

## 2022-01-01 RX ADMIN — Medication 10 ML: at 07:58

## 2022-01-01 RX ADMIN — FENTANYL CITRATE 100 MCG: 0.05 INJECTION, SOLUTION INTRAMUSCULAR; INTRAVENOUS at 16:50

## 2022-01-01 RX ADMIN — PANTOPRAZOLE SODIUM 40 MG: 40 INJECTION, POWDER, FOR SOLUTION INTRAVENOUS at 07:56

## 2022-01-01 RX ADMIN — CHLORHEXIDINE GLUCONATE 0.12% ORAL RINSE 15 ML: 1.2 LIQUID ORAL at 20:51

## 2022-01-01 RX ADMIN — Medication 10 ML: at 08:20

## 2022-01-01 RX ADMIN — METOPROLOL TARTRATE 100 MG: 50 TABLET, FILM COATED ORAL at 09:44

## 2022-01-01 RX ADMIN — DEXAMETHASONE SODIUM PHOSPHATE 10 MG: 10 INJECTION INTRAMUSCULAR; INTRAVENOUS at 05:06

## 2022-01-01 RX ADMIN — PIPERACILLIN AND TAZOBACTAM 3375 MG: 3; .375 INJECTION, POWDER, LYOPHILIZED, FOR SOLUTION INTRAVENOUS at 02:09

## 2022-01-01 RX ADMIN — DIGOXIN 250 MCG: 250 INJECTION, SOLUTION INTRAMUSCULAR; INTRAVENOUS; PARENTERAL at 11:54

## 2022-01-01 RX ADMIN — CHLORHEXIDINE GLUCONATE 0.12% ORAL RINSE 15 ML: 1.2 LIQUID ORAL at 20:40

## 2022-01-01 RX ADMIN — DEXAMETHASONE SODIUM PHOSPHATE 10 MG: 10 INJECTION INTRAMUSCULAR; INTRAVENOUS at 18:10

## 2022-01-01 RX ADMIN — INSULIN LISPRO 1 UNITS: 100 INJECTION, SOLUTION INTRAVENOUS; SUBCUTANEOUS at 08:49

## 2022-01-01 RX ADMIN — DILTIAZEM HYDROCHLORIDE 120 MG: 120 CAPSULE, COATED, EXTENDED RELEASE ORAL at 08:11

## 2022-01-01 RX ADMIN — Medication 10 ML: at 09:20

## 2022-01-01 RX ADMIN — PIPERACILLIN AND TAZOBACTAM 3375 MG: 3; .375 INJECTION, POWDER, LYOPHILIZED, FOR SOLUTION INTRAVENOUS at 01:58

## 2022-01-01 RX ADMIN — INSULIN LISPRO 2 UNITS: 100 INJECTION, SOLUTION INTRAVENOUS; SUBCUTANEOUS at 06:30

## 2022-01-01 RX ADMIN — LEVETIRACETAM 500 MG: 500 TABLET, FILM COATED ORAL at 20:21

## 2022-01-01 RX ADMIN — SODIUM CHLORIDE 500 ML: 9 INJECTION, SOLUTION INTRAVENOUS at 11:09

## 2022-01-01 RX ADMIN — METOPROLOL TARTRATE 100 MG: 50 TABLET, FILM COATED ORAL at 09:11

## 2022-01-01 RX ADMIN — INSULIN LISPRO 4 UNITS: 100 INJECTION, SOLUTION INTRAVENOUS; SUBCUTANEOUS at 18:35

## 2022-01-01 RX ADMIN — PHENYLEPHRINE HYDROCHLORIDE 50 MCG/MIN: 10 INJECTION INTRAVENOUS at 12:24

## 2022-01-01 RX ADMIN — CHLORHEXIDINE GLUCONATE 0.12% ORAL RINSE 15 ML: 1.2 LIQUID ORAL at 08:57

## 2022-01-01 RX ADMIN — ALBUMIN (HUMAN) 25 G: 0.25 INJECTION, SOLUTION INTRAVENOUS at 11:23

## 2022-01-01 RX ADMIN — INSULIN LISPRO 2 UNITS: 100 INJECTION, SOLUTION INTRAVENOUS; SUBCUTANEOUS at 16:20

## 2022-01-01 RX ADMIN — LEVETIRACETAM 750 MG: 1500 INJECTION, SOLUTION INTRAVENOUS at 14:48

## 2022-01-01 RX ADMIN — METOPROLOL TARTRATE 50 MG: 50 TABLET, FILM COATED ORAL at 20:04

## 2022-01-01 RX ADMIN — SODIUM CHLORIDE: 9 INJECTION, SOLUTION INTRAVENOUS at 21:15

## 2022-01-01 RX ADMIN — MUPIROCIN: 20 OINTMENT TOPICAL at 12:51

## 2022-01-01 RX ADMIN — INSULIN LISPRO 4 UNITS: 100 INJECTION, SOLUTION INTRAVENOUS; SUBCUTANEOUS at 15:30

## 2022-01-01 RX ADMIN — HEPARIN 300 UNITS: 100 SYRINGE at 07:58

## 2022-01-01 RX ADMIN — ACETAMINOPHEN 650 MG: 325 TABLET ORAL at 09:04

## 2022-01-01 RX ADMIN — CHLORHEXIDINE GLUCONATE 0.12% ORAL RINSE 15 ML: 1.2 LIQUID ORAL at 21:17

## 2022-01-01 RX ADMIN — VANCOMYCIN HYDROCHLORIDE 1250 MG: 500 INJECTION, POWDER, LYOPHILIZED, FOR SOLUTION INTRAVENOUS at 12:13

## 2022-01-01 RX ADMIN — PANTOPRAZOLE SODIUM 40 MG: 40 INJECTION, POWDER, FOR SOLUTION INTRAVENOUS at 09:38

## 2022-01-01 RX ADMIN — SODIUM CHLORIDE, PRESERVATIVE FREE 10 ML: 5 INJECTION INTRAVENOUS at 08:20

## 2022-01-01 RX ADMIN — ETOMIDATE 20 MG: 2 INJECTION, SOLUTION INTRAVENOUS at 12:36

## 2022-01-01 RX ADMIN — SPIRONOLACTONE 25 MG: 25 TABLET ORAL at 18:18

## 2022-01-01 RX ADMIN — PIPERACILLIN AND TAZOBACTAM 3375 MG: 3; .375 INJECTION, POWDER, LYOPHILIZED, FOR SOLUTION INTRAVENOUS at 09:43

## 2022-01-01 RX ADMIN — INSULIN LISPRO 6 UNITS: 100 INJECTION, SOLUTION INTRAVENOUS; SUBCUTANEOUS at 16:08

## 2022-01-01 RX ADMIN — LEVETIRACETAM 750 MG: 100 SOLUTION ORAL at 09:19

## 2022-01-01 RX ADMIN — BUMETANIDE 1 MG: 0.25 INJECTION, SOLUTION INTRAMUSCULAR; INTRAVENOUS at 23:44

## 2022-01-01 RX ADMIN — LORAZEPAM 1 MG: 2 INJECTION INTRAMUSCULAR; INTRAVENOUS at 11:31

## 2022-01-01 RX ADMIN — INSULIN LISPRO 4 UNITS: 100 INJECTION, SOLUTION INTRAVENOUS; SUBCUTANEOUS at 18:32

## 2022-01-01 RX ADMIN — INSULIN LISPRO 4 UNITS: 100 INJECTION, SOLUTION INTRAVENOUS; SUBCUTANEOUS at 10:32

## 2022-01-01 RX ADMIN — ALBUMIN (HUMAN) 50 G: 0.25 INJECTION, SOLUTION INTRAVENOUS at 22:51

## 2022-01-01 RX ADMIN — WARFARIN SODIUM 2 MG: 2 TABLET ORAL at 21:00

## 2022-01-01 RX ADMIN — INSULIN LISPRO 6 UNITS: 100 INJECTION, SOLUTION INTRAVENOUS; SUBCUTANEOUS at 14:54

## 2022-01-01 RX ADMIN — BUMETANIDE 1 MG: 0.25 INJECTION INTRAMUSCULAR; INTRAVENOUS at 02:00

## 2022-01-01 RX ADMIN — BUMETANIDE 1 MG: 0.25 INJECTION INTRAMUSCULAR; INTRAVENOUS at 14:48

## 2022-01-01 RX ADMIN — PIPERACILLIN AND TAZOBACTAM 3375 MG: 3; .375 INJECTION, POWDER, LYOPHILIZED, FOR SOLUTION INTRAVENOUS at 08:57

## 2022-01-01 RX ADMIN — DOCUSATE SODIUM LIQUID 100 MG: 100 LIQUID ORAL at 10:09

## 2022-01-01 RX ADMIN — INSULIN LISPRO 4 UNITS: 100 INJECTION, SOLUTION INTRAVENOUS; SUBCUTANEOUS at 11:29

## 2022-01-01 RX ADMIN — ATORVASTATIN CALCIUM 10 MG: 10 TABLET, FILM COATED ORAL at 21:23

## 2022-01-01 RX ADMIN — DEXTROSE MONOHYDRATE 10 MG/HR: 50 INJECTION, SOLUTION INTRAVENOUS at 09:14

## 2022-01-01 RX ADMIN — LEVOTHYROXINE SODIUM 125 MCG: 125 TABLET ORAL at 07:02

## 2022-01-01 RX ADMIN — POTASSIUM CHLORIDE 20 MEQ: 29.8 INJECTION INTRAVENOUS at 10:35

## 2022-01-01 RX ADMIN — WARFARIN SODIUM 4 MG: 4 TABLET ORAL at 17:11

## 2022-01-01 RX ADMIN — DILTIAZEM HYDROCHLORIDE 30 MG: 30 TABLET, FILM COATED ORAL at 00:03

## 2022-01-01 RX ADMIN — IPRATROPIUM BROMIDE AND ALBUTEROL SULFATE 1 AMPULE: .5; 2.5 SOLUTION RESPIRATORY (INHALATION) at 08:40

## 2022-01-01 RX ADMIN — ACETAMINOPHEN 650 MG: 325 TABLET ORAL at 04:52

## 2022-01-01 RX ADMIN — LORAZEPAM: 2 INJECTION INTRAMUSCULAR; INTRAVENOUS at 23:00

## 2022-01-01 RX ADMIN — INSULIN LISPRO 2 UNITS: 100 INJECTION, SOLUTION INTRAVENOUS; SUBCUTANEOUS at 10:31

## 2022-01-01 RX ADMIN — MIDAZOLAM 2 MG: 1 INJECTION INTRAMUSCULAR; INTRAVENOUS at 23:24

## 2022-01-01 RX ADMIN — SODIUM CHLORIDE: 9 INJECTION, SOLUTION INTRAVENOUS at 12:04

## 2022-01-01 RX ADMIN — INSULIN LISPRO 3 UNITS: 100 INJECTION, SOLUTION INTRAVENOUS; SUBCUTANEOUS at 18:55

## 2022-01-01 RX ADMIN — WARFARIN SODIUM 2 MG: 2 TABLET ORAL at 17:43

## 2022-01-01 RX ADMIN — POTASSIUM CHLORIDE 40 MEQ: 29.8 INJECTION, SOLUTION INTRAVENOUS at 06:24

## 2022-01-01 RX ADMIN — DOCUSATE SODIUM LIQUID 100 MG: 100 LIQUID ORAL at 09:29

## 2022-01-01 RX ADMIN — INSULIN LISPRO 4 UNITS: 100 INJECTION, SOLUTION INTRAVENOUS; SUBCUTANEOUS at 07:48

## 2022-01-01 RX ADMIN — ATORVASTATIN CALCIUM 10 MG: 10 TABLET, FILM COATED ORAL at 20:00

## 2022-01-01 RX ADMIN — HEPARIN 300 UNITS: 100 SYRINGE at 20:52

## 2022-01-01 RX ADMIN — METOPROLOL TARTRATE 100 MG: 50 TABLET, FILM COATED ORAL at 08:11

## 2022-01-01 RX ADMIN — METFORMIN HYDROCHLORIDE 500 MG: 500 TABLET ORAL at 08:00

## 2022-01-01 RX ADMIN — POLYETHYLENE GLYCOL 3350 17 G: 17 POWDER, FOR SOLUTION ORAL at 09:20

## 2022-01-01 RX ADMIN — HEPARIN 300 UNITS: 100 SYRINGE at 20:31

## 2022-01-01 RX ADMIN — Medication 10 ML: at 21:16

## 2022-01-01 RX ADMIN — SODIUM CHLORIDE, PRESERVATIVE FREE 10 ML: 5 INJECTION INTRAVENOUS at 09:12

## 2022-01-01 RX ADMIN — PIPERACILLIN AND TAZOBACTAM 3375 MG: 3; .375 INJECTION, POWDER, LYOPHILIZED, FOR SOLUTION INTRAVENOUS at 04:17

## 2022-01-01 RX ADMIN — LEVETIRACETAM 750 MG: 1500 INJECTION, SOLUTION INTRAVENOUS at 16:10

## 2022-01-01 RX ADMIN — LEVETIRACETAM 750 MG: 100 SOLUTION ORAL at 21:59

## 2022-01-01 RX ADMIN — DILTIAZEM HYDROCHLORIDE 120 MG: 120 CAPSULE, COATED, EXTENDED RELEASE ORAL at 08:38

## 2022-01-01 RX ADMIN — INSULIN LISPRO 2 UNITS: 100 INJECTION, SOLUTION INTRAVENOUS; SUBCUTANEOUS at 02:54

## 2022-01-01 RX ADMIN — SODIUM CHLORIDE, PRESERVATIVE FREE 10 ML: 5 INJECTION INTRAVENOUS at 07:59

## 2022-01-01 RX ADMIN — LEVOFLOXACIN 500 MG: 5 INJECTION, SOLUTION INTRAVENOUS at 11:53

## 2022-01-01 RX ADMIN — SODIUM CHLORIDE, PRESERVATIVE FREE 10 ML: 5 INJECTION INTRAVENOUS at 08:59

## 2022-01-01 RX ADMIN — LEVETIRACETAM 750 MG: 1500 INJECTION, SOLUTION INTRAVENOUS at 15:07

## 2022-01-01 RX ADMIN — IPRATROPIUM BROMIDE AND ALBUTEROL SULFATE 1 AMPULE: .5; 2.5 SOLUTION RESPIRATORY (INHALATION) at 20:17

## 2022-01-01 RX ADMIN — ACETYLCYSTEINE 600 MG: 200 SOLUTION ORAL; RESPIRATORY (INHALATION) at 20:17

## 2022-01-01 RX ADMIN — POLYETHYLENE GLYCOL 3350 17 G: 17 POWDER, FOR SOLUTION ORAL at 21:23

## 2022-01-01 RX ADMIN — POTASSIUM CHLORIDE 20 MEQ: 29.8 INJECTION INTRAVENOUS at 11:53

## 2022-01-01 RX ADMIN — DILTIAZEM HYDROCHLORIDE 30 MG: 30 TABLET, FILM COATED ORAL at 17:49

## 2022-01-01 RX ADMIN — PIPERACILLIN AND TAZOBACTAM 3375 MG: 3; .375 INJECTION, POWDER, LYOPHILIZED, FOR SOLUTION INTRAVENOUS at 18:40

## 2022-01-01 RX ADMIN — MIDAZOLAM 2 MG: 1 INJECTION INTRAMUSCULAR; INTRAVENOUS at 22:38

## 2022-01-01 RX ADMIN — ACETAMINOPHEN 650 MG: 325 TABLET ORAL at 20:42

## 2022-01-01 RX ADMIN — ACETAMINOPHEN 650 MG: 325 TABLET ORAL at 18:40

## 2022-01-01 RX ADMIN — FAMOTIDINE 20 MG: 20 TABLET ORAL at 17:18

## 2022-01-01 RX ADMIN — PROPOFOL 10 MCG/KG/MIN: 10 INJECTION, EMULSION INTRAVENOUS at 23:15

## 2022-01-01 RX ADMIN — WARFARIN SODIUM 3 MG: 3 TABLET ORAL at 18:28

## 2022-01-01 RX ADMIN — LEVOTHYROXINE SODIUM 125 MCG: 125 TABLET ORAL at 11:54

## 2022-01-01 RX ADMIN — METOPROLOL TARTRATE 25 MG: 25 TABLET, FILM COATED ORAL at 09:40

## 2022-01-01 RX ADMIN — LEVOTHYROXINE SODIUM 125 MCG: 125 TABLET ORAL at 06:07

## 2022-01-01 RX ADMIN — HEPARIN 300 UNITS: 100 SYRINGE at 20:57

## 2022-01-01 RX ADMIN — METOPROLOL TARTRATE 50 MG: 50 TABLET, FILM COATED ORAL at 10:11

## 2022-01-01 RX ADMIN — CHLORHEXIDINE GLUCONATE 0.12% ORAL RINSE 15 ML: 1.2 LIQUID ORAL at 20:47

## 2022-01-01 RX ADMIN — LEVETIRACETAM 750 MG: 100 SOLUTION ORAL at 20:57

## 2022-01-01 RX ADMIN — HEPARIN 300 UNITS: 100 SYRINGE at 10:11

## 2022-01-01 RX ADMIN — SODIUM CHLORIDE, PRESERVATIVE FREE 10 ML: 5 INJECTION INTRAVENOUS at 08:01

## 2022-01-01 RX ADMIN — INSULIN LISPRO 4 UNITS: 100 INJECTION, SOLUTION INTRAVENOUS; SUBCUTANEOUS at 13:03

## 2022-01-01 RX ADMIN — ACETAMINOPHEN 650 MG: 325 TABLET ORAL at 08:00

## 2022-01-01 RX ADMIN — BUMETANIDE 1 MG: 0.25 INJECTION, SOLUTION INTRAMUSCULAR; INTRAVENOUS at 15:49

## 2022-01-01 RX ADMIN — METFORMIN HYDROCHLORIDE 500 MG: 500 TABLET ORAL at 09:44

## 2022-01-01 RX ADMIN — WARFARIN SODIUM 2 MG: 2 TABLET ORAL at 17:46

## 2022-01-01 RX ADMIN — VANCOMYCIN HYDROCHLORIDE 1250 MG: 500 INJECTION, POWDER, LYOPHILIZED, FOR SOLUTION INTRAVENOUS at 11:29

## 2022-01-01 RX ADMIN — VANCOMYCIN HYDROCHLORIDE 1250 MG: 500 INJECTION, POWDER, LYOPHILIZED, FOR SOLUTION INTRAVENOUS at 12:27

## 2022-01-01 RX ADMIN — FUROSEMIDE 40 MG: 10 INJECTION, SOLUTION INTRAMUSCULAR; INTRAVENOUS at 13:49

## 2022-01-01 RX ADMIN — POTASSIUM PHOSPHATE, MONOBASIC POTASSIUM PHOSPHATE, DIBASIC 10 MMOL: 224; 236 INJECTION, SOLUTION, CONCENTRATE INTRAVENOUS at 11:15

## 2022-01-01 RX ADMIN — LEVOTHYROXINE SODIUM 125 MCG: 125 TABLET ORAL at 07:00

## 2022-01-01 RX ADMIN — INSULIN LISPRO 2 UNITS: 100 INJECTION, SOLUTION INTRAVENOUS; SUBCUTANEOUS at 03:33

## 2022-01-01 RX ADMIN — METFORMIN HYDROCHLORIDE 500 MG: 500 TABLET ORAL at 08:39

## 2022-01-01 RX ADMIN — INSULIN LISPRO 2 UNITS: 100 INJECTION, SOLUTION INTRAVENOUS; SUBCUTANEOUS at 11:56

## 2022-01-01 RX ADMIN — PANTOPRAZOLE SODIUM 40 MG: 40 INJECTION, POWDER, FOR SOLUTION INTRAVENOUS at 08:57

## 2022-01-01 RX ADMIN — INSULIN LISPRO 4 UNITS: 100 INJECTION, SOLUTION INTRAVENOUS; SUBCUTANEOUS at 06:30

## 2022-01-01 RX ADMIN — HEPARIN 300 UNITS: 100 SYRINGE at 09:11

## 2022-01-01 RX ADMIN — SODIUM CHLORIDE, PRESERVATIVE FREE 10 ML: 5 INJECTION INTRAVENOUS at 12:36

## 2022-01-01 RX ADMIN — POTASSIUM BICARBONATE 20 MEQ: 782 TABLET, EFFERVESCENT ORAL at 09:58

## 2022-01-01 RX ADMIN — SENNOSIDES 5 ML: 8.8 SYRUP ORAL at 22:16

## 2022-01-01 RX ADMIN — ALBUMIN (HUMAN) 50 G: 0.25 INJECTION, SOLUTION INTRAVENOUS at 04:57

## 2022-01-01 RX ADMIN — INSULIN LISPRO 4 UNITS: 100 INJECTION, SOLUTION INTRAVENOUS; SUBCUTANEOUS at 22:59

## 2022-01-01 RX ADMIN — DEXAMETHASONE SODIUM PHOSPHATE 10 MG: 10 INJECTION INTRAMUSCULAR; INTRAVENOUS at 18:53

## 2022-01-01 RX ADMIN — HEPARIN 300 UNITS: 100 SYRINGE at 08:19

## 2022-01-01 RX ADMIN — INSULIN GLARGINE-YFGN 15 UNITS: 100 INJECTION, SOLUTION SUBCUTANEOUS at 20:52

## 2022-01-01 RX ADMIN — METOPROLOL TARTRATE 50 MG: 50 TABLET, FILM COATED ORAL at 09:29

## 2022-01-01 RX ADMIN — Medication 10 ML: at 09:31

## 2022-01-01 RX ADMIN — LEVETIRACETAM 500 MG: 5 INJECTION INTRAVENOUS at 02:13

## 2022-01-01 RX ADMIN — INSULIN GLARGINE-YFGN 10 UNITS: 100 INJECTION, SOLUTION SUBCUTANEOUS at 20:04

## 2022-01-01 RX ADMIN — MIDAZOLAM 2 MG: 1 INJECTION INTRAMUSCULAR; INTRAVENOUS at 02:08

## 2022-01-01 RX ADMIN — MIDAZOLAM 2 MG: 1 INJECTION INTRAMUSCULAR; INTRAVENOUS at 22:57

## 2022-01-01 RX ADMIN — ACETYLCYSTEINE 600 MG: 200 SOLUTION ORAL; RESPIRATORY (INHALATION) at 09:57

## 2022-01-01 RX ADMIN — LEVETIRACETAM 750 MG: 1500 INJECTION, SOLUTION INTRAVENOUS at 13:36

## 2022-01-01 RX ADMIN — WARFARIN SODIUM 2 MG: 2 TABLET ORAL at 18:39

## 2022-01-01 RX ADMIN — Medication 20 ML: at 08:59

## 2022-01-01 RX ADMIN — POTASSIUM CHLORIDE 40 MEQ: 1500 TABLET, EXTENDED RELEASE ORAL at 14:48

## 2022-01-01 RX ADMIN — Medication 10 ML: at 20:01

## 2022-01-01 RX ADMIN — MIDAZOLAM 2 MG: 1 INJECTION INTRAMUSCULAR; INTRAVENOUS at 05:51

## 2022-01-01 RX ADMIN — NYSTATIN: 100000 CREAM TOPICAL at 09:11

## 2022-01-01 RX ADMIN — PIPERACILLIN AND TAZOBACTAM 3375 MG: 3; .375 INJECTION, POWDER, LYOPHILIZED, FOR SOLUTION INTRAVENOUS at 17:45

## 2022-01-01 RX ADMIN — MAGNESIUM SULFATE HEPTAHYDRATE 1000 MG: 1 INJECTION, SOLUTION INTRAVENOUS at 10:35

## 2022-01-01 RX ADMIN — PANTOPRAZOLE SODIUM 40 MG: 40 INJECTION, POWDER, FOR SOLUTION INTRAVENOUS at 09:20

## 2022-01-01 RX ADMIN — MUPIROCIN: 20 OINTMENT TOPICAL at 08:58

## 2022-01-01 RX ADMIN — PIPERACILLIN AND TAZOBACTAM 3375 MG: 3; .375 INJECTION, POWDER, LYOPHILIZED, FOR SOLUTION INTRAVENOUS at 19:50

## 2022-01-01 RX ADMIN — PIPERACILLIN AND TAZOBACTAM 3375 MG: 3; .375 INJECTION, POWDER, LYOPHILIZED, FOR SOLUTION INTRAVENOUS at 18:10

## 2022-01-01 RX ADMIN — LEVETIRACETAM 500 MG: 5 INJECTION INTRAVENOUS at 14:36

## 2022-01-01 RX ADMIN — INSULIN GLARGINE-YFGN 15 UNITS: 100 INJECTION, SOLUTION SUBCUTANEOUS at 19:59

## 2022-01-01 RX ADMIN — INSULIN LISPRO 4 UNITS: 100 INJECTION, SOLUTION INTRAVENOUS; SUBCUTANEOUS at 20:04

## 2022-01-01 RX ADMIN — METOPROLOL TARTRATE 50 MG: 50 TABLET, FILM COATED ORAL at 08:04

## 2022-01-01 RX ADMIN — LEVETIRACETAM 750 MG: 1500 INJECTION, SOLUTION INTRAVENOUS at 01:46

## 2022-01-01 RX ADMIN — DILTIAZEM HYDROCHLORIDE 10 MG: 5 INJECTION INTRAVENOUS at 11:07

## 2022-01-01 RX ADMIN — PIPERACILLIN AND TAZOBACTAM 3375 MG: 3; .375 INJECTION, POWDER, LYOPHILIZED, FOR SOLUTION INTRAVENOUS at 08:36

## 2022-01-01 RX ADMIN — Medication 10 ML: at 16:08

## 2022-01-01 RX ADMIN — IPRATROPIUM BROMIDE AND ALBUTEROL SULFATE 1 AMPULE: .5; 2.5 SOLUTION RESPIRATORY (INHALATION) at 09:57

## 2022-01-01 RX ADMIN — LEVETIRACETAM 750 MG: 100 SOLUTION ORAL at 10:10

## 2022-01-01 RX ADMIN — CALCIUM GLUCONATE 1000 MG: 98 INJECTION, SOLUTION INTRAVENOUS at 17:33

## 2022-01-01 RX ADMIN — HEPARIN 300 UNITS: 100 SYRINGE at 21:42

## 2022-01-01 RX ADMIN — CHLORHEXIDINE GLUCONATE 0.12% ORAL RINSE 15 ML: 1.2 LIQUID ORAL at 08:08

## 2022-01-01 RX ADMIN — VANCOMYCIN HYDROCHLORIDE 2000 MG: 10 INJECTION, POWDER, LYOPHILIZED, FOR SOLUTION INTRAVENOUS at 19:04

## 2022-01-01 RX ADMIN — INSULIN LISPRO 6 UNITS: 100 INJECTION, SOLUTION INTRAVENOUS; SUBCUTANEOUS at 22:35

## 2022-01-01 RX ADMIN — Medication 10 ML: at 21:24

## 2022-01-01 RX ADMIN — NYSTATIN: 100000 CREAM TOPICAL at 21:13

## 2022-01-01 RX ADMIN — SODIUM CHLORIDE, PRESERVATIVE FREE 10 ML: 5 INJECTION INTRAVENOUS at 09:30

## 2022-01-01 RX ADMIN — POTASSIUM CHLORIDE 40 MEQ: 29.8 INJECTION, SOLUTION INTRAVENOUS at 18:53

## 2022-01-01 RX ADMIN — FUROSEMIDE 40 MG: 10 INJECTION, SOLUTION INTRAMUSCULAR; INTRAVENOUS at 09:21

## 2022-01-01 RX ADMIN — ALBUMIN (HUMAN) 50 G: 0.25 INJECTION, SOLUTION INTRAVENOUS at 22:54

## 2022-01-01 RX ADMIN — ALBUMIN (HUMAN) 50 G: 0.25 INJECTION, SOLUTION INTRAVENOUS at 17:18

## 2022-01-01 RX ADMIN — MUPIROCIN: 20 OINTMENT TOPICAL at 08:21

## 2022-01-01 RX ADMIN — PIPERACILLIN AND TAZOBACTAM 3375 MG: 3; .375 INJECTION, POWDER, LYOPHILIZED, FOR SOLUTION INTRAVENOUS at 17:50

## 2022-01-01 RX ADMIN — Medication 10 ML: at 21:40

## 2022-01-01 RX ADMIN — LEVETIRACETAM 750 MG: 1500 INJECTION, SOLUTION INTRAVENOUS at 03:30

## 2022-01-01 RX ADMIN — LANSOPRAZOLE 30 MG: KIT at 11:09

## 2022-01-01 RX ADMIN — DILTIAZEM HYDROCHLORIDE 10 MG: 5 INJECTION INTRAVENOUS at 09:46

## 2022-01-01 RX ADMIN — METOPROLOL TARTRATE 50 MG: 50 TABLET, FILM COATED ORAL at 20:47

## 2022-01-01 RX ADMIN — Medication 10 ML: at 22:51

## 2022-01-01 RX ADMIN — SODIUM CHLORIDE, PRESERVATIVE FREE 10 ML: 5 INJECTION INTRAVENOUS at 09:15

## 2022-01-01 RX ADMIN — LEVETIRACETAM 1000 MG: 10 INJECTION INTRAVENOUS at 21:45

## 2022-01-01 RX ADMIN — HEPARIN 300 UNITS: 100 SYRINGE at 09:18

## 2022-01-01 RX ADMIN — PROPOFOL 30 MCG/KG/MIN: 10 INJECTION, EMULSION INTRAVENOUS at 12:24

## 2022-01-01 RX ADMIN — LEVETIRACETAM 750 MG: 1500 INJECTION, SOLUTION INTRAVENOUS at 15:44

## 2022-01-01 RX ADMIN — LEVOTHYROXINE SODIUM 125 MCG: 125 TABLET ORAL at 06:30

## 2022-01-01 RX ADMIN — INSULIN LISPRO 2 UNITS: 100 INJECTION, SOLUTION INTRAVENOUS; SUBCUTANEOUS at 17:15

## 2022-01-01 RX ADMIN — SPIRONOLACTONE 25 MG: 25 TABLET ORAL at 08:00

## 2022-01-01 RX ADMIN — NYSTATIN: 100000 CREAM TOPICAL at 10:13

## 2022-01-01 RX ADMIN — IPRATROPIUM BROMIDE AND ALBUTEROL SULFATE 1 AMPULE: .5; 2.5 SOLUTION RESPIRATORY (INHALATION) at 21:12

## 2022-01-01 RX ADMIN — INSULIN LISPRO 2 UNITS: 100 INJECTION, SOLUTION INTRAVENOUS; SUBCUTANEOUS at 21:27

## 2022-01-01 RX ADMIN — Medication 30 ML: at 08:35

## 2022-01-01 RX ADMIN — ACETAMINOPHEN 650 MG: 650 SUPPOSITORY RECTAL at 09:47

## 2022-01-01 RX ADMIN — ACETYLCYSTEINE 600 MG: 200 SOLUTION ORAL; RESPIRATORY (INHALATION) at 21:12

## 2022-01-01 RX ADMIN — SODIUM CHLORIDE, PRESERVATIVE FREE 10 ML: 5 INJECTION INTRAVENOUS at 08:09

## 2022-01-01 RX ADMIN — SODIUM CHLORIDE 500 ML: 9 INJECTION, SOLUTION INTRAVENOUS at 19:05

## 2022-01-01 RX ADMIN — WARFARIN SODIUM 3 MG: 3 TABLET ORAL at 20:12

## 2022-01-01 RX ADMIN — INSULIN LISPRO 2 UNITS: 100 INJECTION, SOLUTION INTRAVENOUS; SUBCUTANEOUS at 05:10

## 2022-01-01 RX ADMIN — NYSTATIN: 100000 CREAM TOPICAL at 09:30

## 2022-01-01 RX ADMIN — PANTOPRAZOLE SODIUM 40 MG: 40 INJECTION, POWDER, FOR SOLUTION INTRAVENOUS at 08:20

## 2022-01-01 RX ADMIN — METOPROLOL TARTRATE 50 MG: 50 TABLET, FILM COATED ORAL at 09:55

## 2022-01-01 RX ADMIN — DEXAMETHASONE SODIUM PHOSPHATE 10 MG: 10 INJECTION INTRAMUSCULAR; INTRAVENOUS at 06:32

## 2022-01-01 RX ADMIN — WARFARIN SODIUM 4 MG: 4 TABLET ORAL at 18:35

## 2022-01-01 RX ADMIN — Medication 30 ML: at 09:41

## 2022-01-01 RX ADMIN — WARFARIN SODIUM 3 MG: 3 TABLET ORAL at 18:02

## 2022-01-01 RX ADMIN — PIPERACILLIN AND TAZOBACTAM 3375 MG: 3; .375 INJECTION, POWDER, LYOPHILIZED, FOR SOLUTION INTRAVENOUS at 01:33

## 2022-01-01 RX ADMIN — INSULIN LISPRO 2 UNITS: 100 INJECTION, SOLUTION INTRAVENOUS; SUBCUTANEOUS at 03:57

## 2022-01-01 RX ADMIN — ATORVASTATIN CALCIUM 10 MG: 10 TABLET, FILM COATED ORAL at 20:34

## 2022-01-01 RX ADMIN — Medication 10 ML: at 10:11

## 2022-01-01 RX ADMIN — Medication 10 ML: at 21:03

## 2022-01-01 RX ADMIN — DOCUSATE SODIUM LIQUID 100 MG: 100 LIQUID ORAL at 21:16

## 2022-01-01 RX ADMIN — LEVETIRACETAM 500 MG: 5 INJECTION INTRAVENOUS at 02:00

## 2022-01-01 RX ADMIN — LEVOFLOXACIN 500 MG: 500 TABLET, FILM COATED ORAL at 12:56

## 2022-01-01 RX ADMIN — METOPROLOL TARTRATE 50 MG: 50 TABLET, FILM COATED ORAL at 21:40

## 2022-01-01 RX ADMIN — DIGOXIN 250 MCG: 0.25 INJECTION INTRAMUSCULAR; INTRAVENOUS at 09:04

## 2022-01-01 RX ADMIN — SODIUM CHLORIDE 2000 ML: 9 INJECTION, SOLUTION INTRAVENOUS at 09:42

## 2022-01-01 RX ADMIN — DEXAMETHASONE SODIUM PHOSPHATE 10 MG: 10 INJECTION INTRAMUSCULAR; INTRAVENOUS at 06:31

## 2022-01-01 RX ADMIN — LEVETIRACETAM 750 MG: 1500 INJECTION, SOLUTION INTRAVENOUS at 02:38

## 2022-01-01 RX ADMIN — DOCUSATE SODIUM LIQUID 100 MG: 100 LIQUID ORAL at 20:57

## 2022-01-01 RX ADMIN — CHLORHEXIDINE GLUCONATE 0.12% ORAL RINSE 15 ML: 1.2 LIQUID ORAL at 09:09

## 2022-01-01 RX ADMIN — PIPERACILLIN AND TAZOBACTAM 3375 MG: 3; .375 INJECTION, POWDER, LYOPHILIZED, FOR SOLUTION INTRAVENOUS at 02:24

## 2022-01-01 RX ADMIN — LEVETIRACETAM 750 MG: 1500 INJECTION, SOLUTION INTRAVENOUS at 02:34

## 2022-01-01 RX ADMIN — LEVETIRACETAM 750 MG: 1500 INJECTION, SOLUTION INTRAVENOUS at 14:25

## 2022-01-01 RX ADMIN — Medication 10 ML: at 08:09

## 2022-01-01 RX ADMIN — Medication 10 ML: at 20:57

## 2022-01-01 RX ADMIN — INSULIN LISPRO 2 UNITS: 100 INJECTION, SOLUTION INTRAVENOUS; SUBCUTANEOUS at 06:32

## 2022-01-01 RX ADMIN — Medication 10 ML: at 20:40

## 2022-01-01 RX ADMIN — METOPROLOL TARTRATE 50 MG: 50 TABLET, FILM COATED ORAL at 20:39

## 2022-01-01 RX ADMIN — DEXTROSE MONOHYDRATE 5 MG/HR: 50 INJECTION, SOLUTION INTRAVENOUS at 11:09

## 2022-01-01 RX ADMIN — DEXTROSE MONOHYDRATE 5.33 MCG/MIN: 50 INJECTION, SOLUTION INTRAVENOUS at 16:00

## 2022-01-01 RX ADMIN — PIPERACILLIN AND TAZOBACTAM 3375 MG: 3; .375 INJECTION, POWDER, LYOPHILIZED, FOR SOLUTION INTRAVENOUS at 02:11

## 2022-01-01 RX ADMIN — ALBUMIN (HUMAN) 50 G: 0.25 INJECTION, SOLUTION INTRAVENOUS at 17:07

## 2022-01-01 RX ADMIN — VANCOMYCIN HYDROCHLORIDE 1500 MG: 500 INJECTION, POWDER, LYOPHILIZED, FOR SOLUTION INTRAVENOUS at 17:55

## 2022-01-01 RX ADMIN — DEXAMETHASONE SODIUM PHOSPHATE 10 MG: 10 INJECTION INTRAMUSCULAR; INTRAVENOUS at 05:55

## 2022-01-01 RX ADMIN — DOCUSATE SODIUM LIQUID 100 MG: 100 LIQUID ORAL at 09:18

## 2022-01-01 RX ADMIN — PIPERACILLIN AND TAZOBACTAM 3375 MG: 3; .375 INJECTION, POWDER, LYOPHILIZED, FOR SOLUTION INTRAVENOUS at 04:30

## 2022-01-01 RX ADMIN — DEXAMETHASONE SODIUM PHOSPHATE 10 MG: 10 INJECTION INTRAMUSCULAR; INTRAVENOUS at 05:58

## 2022-01-01 RX ADMIN — POTASSIUM CHLORIDE 10 MEQ: 7.46 INJECTION, SOLUTION INTRAVENOUS at 11:53

## 2022-01-01 RX ADMIN — DEXTROSE MONOHYDRATE 15 MG/HR: 50 INJECTION, SOLUTION INTRAVENOUS at 14:00

## 2022-01-01 RX ADMIN — DILTIAZEM HYDROCHLORIDE 120 MG: 120 CAPSULE, COATED, EXTENDED RELEASE ORAL at 08:00

## 2022-01-01 RX ADMIN — METOPROLOL TARTRATE 100 MG: 50 TABLET, FILM COATED ORAL at 23:44

## 2022-01-01 RX ADMIN — PIPERACILLIN AND TAZOBACTAM 3375 MG: 3; .375 INJECTION, POWDER, LYOPHILIZED, FOR SOLUTION INTRAVENOUS at 11:54

## 2022-01-01 RX ADMIN — SODIUM CHLORIDE, PRESERVATIVE FREE 10 ML: 5 INJECTION INTRAVENOUS at 08:34

## 2022-01-01 RX ADMIN — NYSTATIN: 100000 CREAM TOPICAL at 07:59

## 2022-01-01 RX ADMIN — DOCUSATE SODIUM LIQUID 100 MG: 100 LIQUID ORAL at 07:59

## 2022-01-01 RX ADMIN — NYSTATIN: 100000 CREAM TOPICAL at 20:51

## 2022-01-01 RX ADMIN — PANTOPRAZOLE SODIUM 40 MG: 40 INJECTION, POWDER, FOR SOLUTION INTRAVENOUS at 08:10

## 2022-01-01 RX ADMIN — PIPERACILLIN AND TAZOBACTAM 3375 MG: 3; .375 INJECTION, POWDER, LYOPHILIZED, FOR SOLUTION INTRAVENOUS at 17:56

## 2022-01-01 RX ADMIN — PIPERACILLIN SODIUM AND TAZOBACTAM SODIUM 4500 MG: 4; .5 INJECTION, POWDER, LYOPHILIZED, FOR SOLUTION INTRAVENOUS at 13:59

## 2022-01-01 RX ADMIN — DILTIAZEM HYDROCHLORIDE 120 MG: 120 CAPSULE, COATED, EXTENDED RELEASE ORAL at 08:43

## 2022-01-01 RX ADMIN — ATORVASTATIN CALCIUM 10 MG: 10 TABLET, FILM COATED ORAL at 20:12

## 2022-01-01 RX ADMIN — LEVETIRACETAM: 100 INJECTION INTRAVENOUS at 11:48

## 2022-01-01 RX ADMIN — MIDAZOLAM 2 MG: 1 INJECTION INTRAMUSCULAR; INTRAVENOUS at 16:09

## 2022-01-01 RX ADMIN — INSULIN GLARGINE-YFGN 10 UNITS: 100 INJECTION, SOLUTION SUBCUTANEOUS at 20:38

## 2022-01-01 RX ADMIN — FUROSEMIDE 20 MG: 10 INJECTION INTRAMUSCULAR; INTRAVENOUS at 12:51

## 2022-01-01 RX ADMIN — TOCILIZUMAB 800 MG: 20 INJECTION, SOLUTION, CONCENTRATE INTRAVENOUS at 16:00

## 2022-01-01 RX ADMIN — MUPIROCIN: 20 OINTMENT TOPICAL at 07:55

## 2022-01-01 RX ADMIN — PANTOPRAZOLE SODIUM 40 MG: 40 INJECTION, POWDER, FOR SOLUTION INTRAVENOUS at 08:36

## 2022-01-01 RX ADMIN — SODIUM CHLORIDE, PRESERVATIVE FREE 10 ML: 5 INJECTION INTRAVENOUS at 11:54

## 2022-01-01 RX ADMIN — POTASSIUM CHLORIDE 40 MEQ: 29.8 INJECTION, SOLUTION INTRAVENOUS at 17:11

## 2022-01-01 RX ADMIN — NYSTATIN: 100000 CREAM TOPICAL at 21:16

## 2022-01-01 RX ADMIN — IPRATROPIUM BROMIDE AND ALBUTEROL SULFATE 1 AMPULE: .5; 2.5 SOLUTION RESPIRATORY (INHALATION) at 08:35

## 2022-01-01 RX ADMIN — LEVOTHYROXINE SODIUM 125 MCG: 125 TABLET ORAL at 05:58

## 2022-01-01 RX ADMIN — INSULIN LISPRO 4 UNITS: 100 INJECTION, SOLUTION INTRAVENOUS; SUBCUTANEOUS at 03:30

## 2022-01-01 RX ADMIN — Medication 10 ML: at 09:44

## 2022-01-01 RX ADMIN — INSULIN LISPRO 2 UNITS: 100 INJECTION, SOLUTION INTRAVENOUS; SUBCUTANEOUS at 17:59

## 2022-01-01 RX ADMIN — DEXTROSE MONOHYDRATE 15 MG/HR: 50 INJECTION, SOLUTION INTRAVENOUS at 20:30

## 2022-01-01 RX ADMIN — ALBUMIN (HUMAN) 50 G: 0.25 INJECTION, SOLUTION INTRAVENOUS at 09:55

## 2022-01-01 RX ADMIN — LEVETIRACETAM 750 MG: 1500 INJECTION, SOLUTION INTRAVENOUS at 02:45

## 2022-01-01 RX ADMIN — PANTOPRAZOLE SODIUM 40 MG: 40 INJECTION, POWDER, FOR SOLUTION INTRAVENOUS at 08:01

## 2022-01-01 RX ADMIN — INSULIN LISPRO 2 UNITS: 100 INJECTION, SOLUTION INTRAVENOUS; SUBCUTANEOUS at 01:44

## 2022-01-01 RX ADMIN — INSULIN LISPRO 4 UNITS: 100 INJECTION, SOLUTION INTRAVENOUS; SUBCUTANEOUS at 11:21

## 2022-01-01 RX ADMIN — PHENYLEPHRINE HYDROCHLORIDE 150 MCG/MIN: 10 INJECTION INTRAVENOUS at 17:44

## 2022-01-01 ASSESSMENT — PAIN SCALES - GENERAL
PAINLEVEL_OUTOF10: 0
PAINLEVEL_OUTOF10: 2
PAINLEVEL_OUTOF10: 0
PAINLEVEL_OUTOF10: 0
PAINLEVEL_OUTOF10: 5
PAINLEVEL_OUTOF10: 0
PAINLEVEL_OUTOF10: 6
PAINLEVEL_OUTOF10: 0
PAINLEVEL_OUTOF10: 1
PAINLEVEL_OUTOF10: 9
PAINLEVEL_OUTOF10: 0
PAINLEVEL_OUTOF10: 0
PAINLEVEL_OUTOF10: 3
PAINLEVEL_OUTOF10: 0
PAINLEVEL_OUTOF10: 0
PAINLEVEL_OUTOF10: 1
PAINLEVEL_OUTOF10: 9
PAINLEVEL_OUTOF10: 3
PAINLEVEL_OUTOF10: 0
PAINLEVEL_OUTOF10: 0
PAINLEVEL_OUTOF10: 5
PAINLEVEL_OUTOF10: 0
PAINLEVEL_OUTOF10: 0
PAINLEVEL_OUTOF10: 3
PAINLEVEL_OUTOF10: 0
PAINLEVEL_OUTOF10: 9
PAINLEVEL_OUTOF10: 0
PAINLEVEL_OUTOF10: 8
PAINLEVEL_OUTOF10: 0
PAINLEVEL_OUTOF10: 1
PAINLEVEL_OUTOF10: 1
PAINLEVEL_OUTOF10: 0
PAINLEVEL_OUTOF10: 1
PAINLEVEL_OUTOF10: 0
PAINLEVEL_OUTOF10: 4
PAINLEVEL_OUTOF10: 0
PAINLEVEL_OUTOF10: 4
PAINLEVEL_OUTOF10: 0
PAINLEVEL_OUTOF10: 0
PAINLEVEL_OUTOF10: 4

## 2022-01-01 ASSESSMENT — PULMONARY FUNCTION TESTS
PIF_VALUE: 23
PIF_VALUE: 19
PIF_VALUE: 40
PIF_VALUE: 22
PIF_VALUE: 28
PIF_VALUE: 24
PIF_VALUE: 36
PIF_VALUE: 18
PIF_VALUE: 18
PIF_VALUE: 32
PIF_VALUE: 35
PIF_VALUE: 21
PIF_VALUE: 22
PIF_VALUE: 21
PIF_VALUE: 18
PIF_VALUE: 37
PIF_VALUE: 29
PIF_VALUE: 23
PIF_VALUE: 47
PIF_VALUE: 28
PIF_VALUE: 29
PIF_VALUE: 28
PIF_VALUE: 26
PIF_VALUE: 24
PIF_VALUE: 34
PIF_VALUE: 31
PIF_VALUE: 22
PIF_VALUE: 18
PIF_VALUE: 23
PIF_VALUE: 21
PIF_VALUE: 31
PIF_VALUE: 23
PIF_VALUE: 30
PIF_VALUE: 30
PIF_VALUE: 18
PIF_VALUE: 31
PIF_VALUE: 32
PIF_VALUE: 17
PIF_VALUE: 21
PIF_VALUE: 18
PIF_VALUE: 22
PIF_VALUE: 35
PIF_VALUE: 23
PIF_VALUE: 27
PIF_VALUE: 26
PIF_VALUE: 29
PIF_VALUE: 26
PIF_VALUE: 22
PIF_VALUE: 18
PIF_VALUE: 17
PIF_VALUE: 22
PIF_VALUE: 28
PIF_VALUE: 31
PIF_VALUE: 21
PIF_VALUE: 28
PIF_VALUE: 20
PIF_VALUE: 21
PIF_VALUE: 20
PIF_VALUE: 34
PIF_VALUE: 26
PIF_VALUE: 32
PIF_VALUE: 30
PIF_VALUE: 28
PIF_VALUE: 19
PIF_VALUE: 24
PIF_VALUE: 18
PIF_VALUE: 36
PIF_VALUE: 27
PIF_VALUE: 35
PIF_VALUE: 23
PIF_VALUE: 16
PIF_VALUE: 23
PIF_VALUE: 18
PIF_VALUE: 22
PIF_VALUE: 20
PIF_VALUE: 30
PIF_VALUE: 28
PIF_VALUE: 22
PIF_VALUE: 16
PIF_VALUE: 20
PIF_VALUE: 17
PIF_VALUE: 18
PIF_VALUE: 21
PIF_VALUE: 22
PIF_VALUE: 33
PIF_VALUE: 22
PIF_VALUE: 22
PIF_VALUE: 25
PIF_VALUE: 23
PIF_VALUE: 23
PIF_VALUE: 15
PIF_VALUE: 17
PIF_VALUE: 25
PIF_VALUE: 18
PIF_VALUE: 20
PIF_VALUE: 26
PIF_VALUE: 30
PIF_VALUE: 26
PIF_VALUE: 29
PIF_VALUE: 27
PIF_VALUE: 29
PIF_VALUE: 30
PIF_VALUE: 22
PIF_VALUE: 31
PIF_VALUE: 30
PIF_VALUE: 27
PIF_VALUE: 23
PIF_VALUE: 28
PIF_VALUE: 20
PIF_VALUE: 55
PIF_VALUE: 24
PIF_VALUE: 25
PIF_VALUE: 18
PIF_VALUE: 29
PIF_VALUE: 36
PIF_VALUE: 34
PIF_VALUE: 29
PIF_VALUE: 22
PIF_VALUE: 24
PIF_VALUE: 18
PIF_VALUE: 26
PIF_VALUE: 45
PIF_VALUE: 32
PIF_VALUE: 54
PIF_VALUE: 18
PIF_VALUE: 20
PIF_VALUE: 39
PIF_VALUE: 27
PIF_VALUE: 29
PIF_VALUE: 21
PIF_VALUE: 28
PIF_VALUE: 26
PIF_VALUE: 29
PIF_VALUE: 16
PIF_VALUE: 50
PIF_VALUE: 30
PIF_VALUE: 22
PIF_VALUE: 29
PIF_VALUE: 30
PIF_VALUE: 20
PIF_VALUE: 33
PIF_VALUE: 30
PIF_VALUE: 24
PIF_VALUE: 30
PIF_VALUE: 23
PIF_VALUE: 28
PIF_VALUE: 28
PIF_VALUE: 29
PIF_VALUE: 30
PIF_VALUE: 25
PIF_VALUE: 31
PIF_VALUE: 33
PIF_VALUE: 32
PIF_VALUE: 45
PIF_VALUE: 30
PIF_VALUE: 21
PIF_VALUE: 18
PIF_VALUE: 28
PIF_VALUE: 17
PIF_VALUE: 26
PIF_VALUE: 34
PIF_VALUE: 34
PIF_VALUE: 26
PIF_VALUE: 33
PIF_VALUE: 28
PIF_VALUE: 21
PIF_VALUE: 32
PIF_VALUE: 29
PIF_VALUE: 17
PIF_VALUE: 31
PIF_VALUE: 30
PIF_VALUE: 30
PIF_VALUE: 22
PIF_VALUE: 18
PIF_VALUE: 24
PIF_VALUE: 16
PIF_VALUE: 29
PIF_VALUE: 29
PIF_VALUE: 26
PIF_VALUE: 34
PIF_VALUE: 20
PIF_VALUE: 29
PIF_VALUE: 24
PIF_VALUE: 20
PIF_VALUE: 30
PIF_VALUE: 30
PIF_VALUE: 26
PIF_VALUE: 22
PIF_VALUE: 23
PIF_VALUE: 18
PIF_VALUE: 21
PIF_VALUE: 33
PIF_VALUE: 24
PIF_VALUE: 37
PIF_VALUE: 29
PIF_VALUE: 18
PIF_VALUE: 30
PIF_VALUE: 21
PIF_VALUE: 21
PIF_VALUE: 30
PIF_VALUE: 30
PIF_VALUE: 22
PIF_VALUE: 27
PIF_VALUE: 37
PIF_VALUE: 23
PIF_VALUE: 33
PIF_VALUE: 21
PIF_VALUE: 23
PIF_VALUE: 18
PIF_VALUE: 28
PIF_VALUE: 22
PIF_VALUE: 22
PIF_VALUE: 18
PIF_VALUE: 30
PIF_VALUE: 21
PIF_VALUE: 17
PIF_VALUE: 28
PIF_VALUE: 22
PIF_VALUE: 27
PIF_VALUE: 28
PIF_VALUE: 33
PIF_VALUE: 21
PIF_VALUE: 21
PIF_VALUE: 48
PIF_VALUE: 49
PIF_VALUE: 22
PIF_VALUE: 28
PIF_VALUE: 34
PIF_VALUE: 31
PIF_VALUE: 32
PIF_VALUE: 22
PIF_VALUE: 22
PIF_VALUE: 27
PIF_VALUE: 26
PIF_VALUE: 23
PIF_VALUE: 29
PIF_VALUE: 31
PIF_VALUE: 34
PIF_VALUE: 30
PIF_VALUE: 35
PIF_VALUE: 26
PIF_VALUE: 27
PIF_VALUE: 23
PIF_VALUE: 24
PIF_VALUE: 28
PIF_VALUE: 30
PIF_VALUE: 18
PIF_VALUE: 19
PIF_VALUE: 28
PIF_VALUE: 26
PIF_VALUE: 32
PIF_VALUE: 45
PIF_VALUE: 22
PIF_VALUE: 30
PIF_VALUE: 31
PIF_VALUE: 18
PIF_VALUE: 31
PIF_VALUE: 39
PIF_VALUE: 21
PIF_VALUE: 28
PIF_VALUE: 32
PIF_VALUE: 20
PIF_VALUE: 18
PIF_VALUE: 32
PIF_VALUE: 22
PIF_VALUE: 18
PIF_VALUE: 21
PIF_VALUE: 18
PIF_VALUE: 21
PIF_VALUE: 30
PIF_VALUE: 32
PIF_VALUE: 23
PIF_VALUE: 31
PIF_VALUE: 29
PIF_VALUE: 20
PIF_VALUE: 22
PIF_VALUE: 38
PIF_VALUE: 27
PIF_VALUE: 51
PIF_VALUE: 19
PIF_VALUE: 22
PIF_VALUE: 32
PIF_VALUE: 31
PIF_VALUE: 23
PIF_VALUE: 34

## 2022-01-01 ASSESSMENT — PAIN DESCRIPTION - ORIENTATION
ORIENTATION: LEFT
ORIENTATION: RIGHT
ORIENTATION: RIGHT;LEFT

## 2022-01-01 ASSESSMENT — PAIN SCALES - WONG BAKER

## 2022-01-01 ASSESSMENT — PAIN DESCRIPTION - PAIN TYPE
TYPE: CHRONIC PAIN
TYPE: CHRONIC PAIN

## 2022-01-01 ASSESSMENT — PAIN DESCRIPTION - PROGRESSION
CLINICAL_PROGRESSION: NOT CHANGED
CLINICAL_PROGRESSION: NOT CHANGED

## 2022-01-01 ASSESSMENT — PAIN DESCRIPTION - FREQUENCY
FREQUENCY: INTERMITTENT
FREQUENCY: CONTINUOUS

## 2022-01-01 ASSESSMENT — PAIN DESCRIPTION - LOCATION
LOCATION: SHOULDER
LOCATION: LEG
LOCATION: LEG

## 2022-01-01 ASSESSMENT — PAIN DESCRIPTION - ONSET
ONSET: GRADUAL
ONSET: ON-GOING

## 2022-01-01 ASSESSMENT — PAIN - FUNCTIONAL ASSESSMENT
PAIN_FUNCTIONAL_ASSESSMENT: PREVENTS OR INTERFERES SOME ACTIVE ACTIVITIES AND ADLS
PAIN_FUNCTIONAL_ASSESSMENT: PREVENTS OR INTERFERES SOME ACTIVE ACTIVITIES AND ADLS

## 2022-01-01 ASSESSMENT — PAIN DESCRIPTION - DESCRIPTORS
DESCRIPTORS: NUMBNESS;CRAMPING
DESCRIPTORS: NUMBNESS

## 2022-01-01 NOTE — ED PROVIDER NOTES
[facility]  Department of Emergency Medicine   ED  Encounter Note  Admit Date/RoomTime: 2022  2:53 PM  ED Room:     NAME: Grzegorz Weber  : 1935  MRN: [id number]     Chief Complaint:  Fall (hit head on walker, on thinners, no LOC), Shoulder Pain (R), and Leg Swelling (R)    History of Present Illness       Grzegorz Weber is a 81 y.o. old female who presents to the emergency department for evaluation after a fall. Patient lives at home alone and uses a walker to get around her house. She has been having issues with leg edema and weeping from the legs. She states that her family doctor put her on a water pill as well as potassium supplements. She stopped taking the Lasix due to the side effects of frequent urination but continue taking the potassium supplements. Today, she stated that she was trying to get into bed but it was too high and her legs felt weak. She decided to go sit in the recliner but while she was walking, she stated that she felt dizzy. She is unsure if she lost consciousness but she did fall down to the ground. She struck her right shoulder and lower back and complains of pain to these regions. She currently denies any chest pain or shortness of breath. She has had no recent fever, chills or abdominal pain. Currently no headache or obvious head trauma. Syncopal Phase:     []   At Rest     [x]   With Exetion     []   With Standing     []   Incontinence     []   Seizure Activity           Post-Syncopal / Recovery Phase:  rapid. ..  ROS   Pertinent positives and negatives are stated within HPI, all other systems reviewed and are negative. Past Medical History:  has a past medical history of Arthritis, Atrial fibrillation ([address] Hyperlipidemia, Hypertension, Non-pressure chronic ulcer of other part of right lower leg with fat layer exposed ([address] and Thyroid disease.     Surgical History:  has a past surgical history that includes Pacemaker insertion (06/04/2019). Social History:  reports that she has never smoked. She has never used smokeless tobacco. She reports that she does not drink alcohol and does not use drugs. Family History: family history is not on file. Allergies: Patient has no known allergies. Physical Exam   Oxygen Saturation Interpretation: Normal.        ED Triage Vitals [01/01/22 1500]   BP Temp Temp src Pulse Resp SpO2 Height Weight   (!) 111/56 97.5 °F (36.4 °C) -- 71 18 97 % 5' (1.524 m) 222 lb (100.7 kg)         Constitutional:  Alert, development consistent with age. HEENT:  NC/NT. Airway patent. Neck:  Normal ROM. Supple. Respiratory:  Clear to auscultation and breath sounds equal.  CV:  Regular rate and rhythm, normal heart sounds, without pathological murmurs, ectopy, gallops, or rubs. GI:  Abdomen Soft, nontender, good bowel sounds. No firm or pulsatile mass. Back:  No costovertebral tenderness. Mild tenderness to palpation to right shoulder. No obvious deformity or trauma. Mild tenderness to palpation to lumbar region  Integument:  Normal turgor. Leg edema with chronic venous stasis changes, please see photo below  Lymphatics: No lymphangitis or adenopathy noted. Neurological:  Oriented. Motor functions intact.                 Lab / Imaging Results   (All laboratory and radiology results have been personally reviewed by myself)  Labs:  Results for orders placed or performed during the hospital encounter of 01/01/22   COVID-19, Rapid    Specimen: Nasopharyngeal Swab   Result Value Ref Range    SARS-CoV-2, NAAT Not Detected Not Detected   CBC Auto Differential   Result Value Ref Range    WBC 10.3 4.5 - 11.5 E9/L    RBC 4.46 3.50 - 5.50 E12/L    Hemoglobin 14.1 11.5 - 15.5 g/dL    Hematocrit 42.5 34.0 - 48.0 %    MCV 95.3 80.0 - 99.9 fL    MCH 31.6 26.0 - 35.0 pg    MCHC 33.2 32.0 - 34.5 %    RDW 15.2 (H) 11.5 - 15.0 fL    Platelets 607 364 - 817 E9/L    MPV 9.7 7.0 - 12.0 fL    Neutrophils % 73.8 43.0 - 80.0 %    Immature Granulocytes % 2.1 0.0 - 5.0 %    Lymphocytes % 10.4 (L) 20.0 - 42.0 %    Monocytes % 12.5 (H) 2.0 - 12.0 %    Eosinophils % 0.8 0.0 - 6.0 %    Basophils % 0.4 0.0 - 2.0 %    Neutrophils Absolute 7.56 (H) 1.80 - 7.30 E9/L    Immature Granulocytes # 0.22 E9/L    Lymphocytes Absolute 1.07 (L) 1.50 - 4.00 E9/L    Monocytes Absolute 1.28 (H) 0.10 - 0.95 E9/L    Eosinophils Absolute 0.08 0.05 - 0.50 E9/L    Basophils Absolute 0.04 0.00 - 0.20 E9/L   Comprehensive Metabolic Panel   Result Value Ref Range    Sodium 126 (L) 132 - 146 mmol/L    Potassium 7.2 (HH) 3.5 - 5.0 mmol/L    Chloride 93 (L) 98 - 107 mmol/L    CO2 22 22 - 29 mmol/L    Anion Gap 11 7 - 16 mmol/L    Glucose 140 (H) 74 - 99 mg/dL    BUN 37 (H) 6 - 23 mg/dL    CREATININE 1.4 (H) 0.5 - 1.0 mg/dL    GFR Non-African American 36 >=60 mL/min/1.73    GFR African American 43     Calcium 9.9 8.6 - 10.2 mg/dL    Total Protein 8.1 6.4 - 8.3 g/dL    Albumin 3.7 3.5 - 5.2 g/dL    Total Bilirubin 1.0 0.0 - 1.2 mg/dL    Alkaline Phosphatase 102 35 - 104 U/L    ALT 16 0 - 32 U/L    AST 26 0 - 31 U/L   Troponin   Result Value Ref Range    Troponin, High Sensitivity 20 (H) 0 - 9 ng/L   Brain Natriuretic Peptide   Result Value Ref Range    Pro-BNP 2,652 (H) 0 - 450 pg/mL   Urinalysis   Result Value Ref Range    Color, UA DARK YELLOW (A) Straw/Yellow    Clarity, UA Clear Clear    Glucose, Ur 250 (A) Negative mg/dL    Bilirubin Urine Negative Negative    Ketones, Urine Negative Negative mg/dL    Specific Gravity, UA 1.010 1.005 - 1.030    Blood, Urine Negative Negative    pH, UA 7.0 5.0 - 9.0    Protein, UA Negative Negative mg/dL    Urobilinogen, Urine 1.0 <2.0 E.U./dL    Nitrite, Urine Negative Negative    Leukocyte Esterase, Urine Negative Negative   Protime-INR   Result Value Ref Range    Protime 34.0 (H) 9.3 - 12.4 sec    INR 3.1    Basic metabolic panel   Result Value Ref Range    Sodium 127 (L) 132 - 146 mmol/L    Potassium 5.5 (H) 3.5 - 5.0 mmol/L    Chloride 94 (L) 98 - 107 mmol/L    CO2 22 22 - 29 mmol/L    Anion Gap 11 7 - 16 mmol/L    Glucose 97 74 - 99 mg/dL    BUN 33 (H) 6 - 23 mg/dL    CREATININE 1.3 (H) 0.5 - 1.0 mg/dL    GFR Non-African American 39 >=60 mL/min/1.73    GFR African American 47     Calcium 9.5 8.6 - 10.2 mg/dL   POCT Glucose   Result Value Ref Range    Glucose 144 mg/dL    QC OK? ok    POCT Glucose   Result Value Ref Range    Meter Glucose 144 (H) 74 - 99 mg/dL   EKG 12 Lead   Result Value Ref Range    Ventricular Rate 70 BPM    Atrial Rate 66 BPM    QRS Duration 142 ms    Q-T Interval 456 ms    QTc Calculation (Bazett) 492 ms    R Axis -90 degrees    T Axis 63 degrees     Imaging: All Radiology results interpreted by Radiologist unless otherwise noted. XR CHEST PORTABLE   Final Result   Pulmonary vascular congestion. XR SHOULDER RIGHT (MIN 2 VIEWS)   Final Result   No acute abnormality. AC joint arthrosis. CT HEAD WO CONTRAST   Final Result   1. No acute intracranial abnormality. 2.  Prominent underlying signs of cerebral and cerebellar atrophy. 3.  High density within the left maxillary antrum with mucosal thickening,   suggestive of fungal sinusitis      RECOMMENDATIONS:   Unavailable         CT CERVICAL SPINE WO CONTRAST   Final Result   CERVICAL:      No acute fracture or traumatic malalignment. Degenerative changes result in likely moderate spinal stenosis at C5-6 and   multilevel likely high-grade neural foraminal stenosis. LUMBAR:      No evidence of vertebral compression. Degenerative changes result in likely mild to moderate multilevel spinal   stenosis and neural foraminal stenosis as detailed above. 3.2 cm x 2.5 cm saccular aneurysm of the infrarenal abdominal aorta. See   recommendation below. Colonic diverticulosis. Cholelithiasis. RECOMMENDATIONS:   3.2 cm infrarenal saccular abdominal aortic aneurysm. Recommend vascular   consultation. Reference: J Vasc Surg 3732;18:0-06. CT LUMBAR SPINE WO CONTRAST   Final Result   CERVICAL:      No acute fracture or traumatic malalignment. Degenerative changes result in likely moderate spinal stenosis at C5-6 and   multilevel likely high-grade neural foraminal stenosis. LUMBAR:      No evidence of vertebral compression. Degenerative changes result in likely mild to moderate multilevel spinal   stenosis and neural foraminal stenosis as detailed above. 3.2 cm x 2.5 cm saccular aneurysm of the infrarenal abdominal aorta. See   recommendation below. Colonic diverticulosis. Cholelithiasis. RECOMMENDATIONS:   3.2 cm infrarenal saccular abdominal aortic aneurysm. Recommend vascular   consultation. Reference: J Vasc Surg 2628;89:7-74. EKG #1:  Interpreted by emergency department attending physician unless otherwise noted. 1/1/22  Time: 16:35    Rhythm: paced  Rate: normal  Axis: normal  Conduction: normal  ST Segments: nonspecific changes  T Waves: non specific changes    Clinical Impression: paced rhythm  Comparison to Prior tracings: There are no previous tracings available for comparison.     ED Course / Medical Decision Making     Medications   glucose (GLUTOSE) 40 % oral gel 15 g (has no administration in time range)   dextrose 50 % IV solution (has no administration in time range)   glucagon (rDNA) injection 1 mg (has no administration in time range)   dextrose 5 % solution (has no administration in time range)   sodium zirconium cyclosilicate (LOKELMA) oral suspension 10 g (has no administration in time range)   acetaminophen (TYLENOL) tablet 650 mg (has no administration in time range)   insulin regular (HUMULIN R;NOVOLIN R) injection 10 Units (10 Units IntraVENous Given 1/1/22 1744)     And   dextrose 50 % IV solution (25 g IntraVENous Given 1/1/22 1740)   sodium bicarbonate 8.4 % injection 50 mEq (50 mEq IntraVENous Given 1/1/22 1746)   calcium gluconate 10 % injection 1,000 mg (1,000 mg IntraVENous Given 1/1/22 1733)   sodium zirconium cyclosilicate (LOKELMA) oral suspension 10 g (10 g Oral Given 1/1/22 1901)        Re-Evaluations:  1/1/22      Patients condition remains unchanged. Consultations:             IP CONSULT TO NEPHROLOGY  IP CONSULT TO INTERNAL MEDICINE    Procedures:   none    MDM: Patient presents to the ED after getting dizzy and falling at home. She has been taking potassium supplements but not the Lasix that was also prescribed. She was found to be hyperkalemic with a potassium of 7.2. A Licona catheter was placed and she has about 100 cc/h urine output in her catheter bag. She had no abdominal pain or evidence of obstruction. She was treated with Lokelma, sodium bicarb, insulin. Repeat potassium much improved at 5.5. Case discussed with nephrology, Dr. Kumar Foreman who will provide consultation. He did request a repeat dose of Yadira Living which was ordered. Patient will be admitted to telemetry for further management. CRITICAL CARE:  45 MINUTES. Please note that the withdrawal or failure to initiate urgent interventions for this patient would likely result in a life threatening deterioration or permanent disability. Accordingly this patient received the above mentioned time, excluding separately billable procedures. Plan of Care/Counseling:  Ori Olivera DO reviewed today's visit with the patient in addition to providing specific details for the plan of care and counseling regarding the diagnosis and prognosis. Questions are answered at this time and are agreeable with the plan. Assessment      1. Hyperkalemia    2. Fall, initial encounter      This patient's ED course included: a personal history and physicial examination  This patient has remained hemodynamically stable during their ED course. Plan   Admit to Telemetry Unit. Patient condition is stable.     New Medications     New Prescriptions    No medications on file     Electronically signed by Ariel Veloz DO   DD: 1/1/22  **This report was transcribed using voice recognition software. Every effort was made to ensure accuracy; however, inadvertent computerized transcription errors may be present.   END OF PROVIDER NOTE        Ariel Veloz DO  01/01/22 1822

## 2022-01-02 NOTE — PROGRESS NOTES
Patient is asking why her coumadin hasn't been ordered this admission- this RN called the answering service of Dr. Rebeca Mcnamara. Awaiting callback.

## 2022-01-02 NOTE — CONSULTS
[facility] [address] [place] [address]    Name: Antwon Shaw    Age: 81 y.o. Date of Admission: 1/1/2022  2:53 PM    Date of Service: 1/2/2022    Reason for Consultation: HF    Referring Physician: Dr Francisca Guerra  Primary Care Physician: Ciarra Joseph MD    History of Present Illness: The patient is a 81y.o. year old female who follows with Dr. Otis Rizzo in the office with hypertension, hyperlipidemia, non-insulin requiring diabetes mellitus, chronic atrial fibrillation and remains on lifelong anticoagulation therapy of daily dose adjusted Warfarin, sinus node dysfunction with placement of single-chamber [employer] permanent pacemaker in 2010 and rgenerator change June 4, 2019. Echocardiogram in 2012 revealed LVEF 45-50%, mild mitral regurgitation, moderate tricuspid regurgitation, RVSP 48 mmHg. Telephonic monitoring report of pacemaker completed 7/21 revealed normal functioning pacemaker and estimated remaining longevity 7 years. .    States she was doing well up until yesterday when she was trying to go to the bathroom but just collapsed due to weakness in her legs. She had no syncope, did not feel any chest pains or palpitations, no new shortness of breath. She states she recently had adjustments of her diuretics within the past week by PCP. States her diuretics were stopped except for Aldactone, she was still taking her potassium. She came with a potassium of 7. States she has been having problems with lower extremity edema and weeping of her legs recently. She was on furosemide, Bumex, metolazone and spironolactone but was having significant cramping last week, states she was taking spoonfuls of mustard to try to get the cramping to go away. adjustments were made, apparently stopped some on her own but continued on with her potassium, states she frequently has V8 juice and bananas as well during the day.     Past Medical History:   Past Medical History:   Diagnosis Date    Arthritis     Atrial fibrillation ([facility]     Hyperlipidemia     Hypertension     Non-pressure chronic ulcer of other part of right lower leg with fat layer exposed ([facility] 8/21/2020    Thyroid disease        Review of Systems:   Constitutional: No fever, chills, sweats  Cardiac: As per HPI  Pulmonary: No cough, wheeze, hemoptysis  HEENT: No visual disturbances, difficult swallowing  GI: No nausea, vomiting, diarrhea, abdominal pain, rectal bleeding  : No dysuria or hematuria  Endocrine: No excessive thirst, heat or cold intolerance. Musculoskeletal: No joint pain or muscle aches. No claudication, but frequent cramps, gets around with a walker. Skin: No skin breakdown or rashes  Neuro: No headache, confusion, or seizures  Psych: No depression, anxiety    Family History:  No family history on file. Social History:  Social History     Socioeconomic History    Marital status: Single     Spouse name: Not on file    Number of children: Not on file    Years of education: Not on file    Highest education level: Not on file   Occupational History    Not on file   Tobacco Use    Smoking status: Never Smoker    Smokeless tobacco: Never Used   Vaping Use    Vaping Use: Never used   Substance and Sexual Activity    Alcohol use: No    Drug use: No    Sexual activity: Not on file   Other Topics Concern    Not on file   Social History Narrative    Not on file     Social Determinants of Health     Financial Resource Strain:     Difficulty of Paying Living Expenses: Not on file   Food Insecurity:     Worried About Running Out of Food in the Last Year: Not on file    Kennedy of Food in the Last Year: Not on file   Transportation Needs:     Lack of Transportation (Medical): Not on file    Lack of Transportation (Non-Medical):  Not on file   Physical Activity:     Days of Exercise per Week: Not on file    Minutes of Exercise per Session: Not on file   Stress:     Feeling of Stress : Not on file   Social Connections:     Frequency of Communication with Friends and Family: Not on file    Frequency of Social Gatherings with Friends and Family: Not on file    Attends Alevism Services: Not on file    Active Member of Clubs or Organizations: Not on file    Attends Club or Organization Meetings: Not on file    Marital Status: Not on file   Intimate Partner Violence:     Fear of Current or Ex-Partner: Not on file    Emotionally Abused: Not on file    Physically Abused: Not on file    Sexually Abused: Not on file   Housing Stability:     Unable to Pay for Housing in the Last Year: Not on file    Number of [place] in the Last Year: Not on file    Unstable Housing in the Last Year: Not on file       Allergies:  No Known Allergies    Home Medications:  Prior to Admission medications    Medication Sig Start Date End Date Taking? Authorizing Provider   dilTIAZem (DILTIAZEM CD) 120 MG extended release capsule Take 120 mg by mouth daily   Yes Historical Provider, MD   spironolactone (ALDACTONE) 25 MG tablet Take 100 mg by mouth 2 times daily     Historical Provider, MD   metFORMIN (GLUCOPHAGE) 500 MG tablet Take 500 mg by mouth daily (with breakfast)     Historical Provider, MD   Multiple Vitamins-Minerals (VITAMIN D3 COMPLETE PO) Take 1 tablet by mouth daily    Historical Provider, MD   magnesium (MAGNESIUM-OXIDE) 250 MG TABS tablet Take 250 mg by mouth daily    Historical Provider, MD   triamcinolone (KENALOG) 0.1 % cream Apply topically 2 times daily.  1/27/20   Danyelle Chapman DPM   metoprolol (LOPRESSOR) 100 MG tablet Take 200 mg by mouth 2 times daily     Historical Provider, MD   simvastatin (ZOCOR) 20 MG tablet Take 20 mg by mouth nightly    Historical Provider, MD   levothyroxine (SYNTHROID) 100 MCG tablet Take 125 mcg by mouth Daily     Historical Provider, MD   WARFARIN SODIUM PO Take 3 mg by mouth daily     Historical Provider, MD   potassium chloride (KLOR-CON) 20 MEQ packet Take 20 mEq by mouth 2 times daily    Historical Provider, MD   diltiazem (CARDIZEM) 120 MG tablet Take 120 mg by mouth daily    Historical Provider, MD   metolazone (ZAROXOLYN) 2.5 MG tablet Take 5 mg by mouth daily     Historical Provider, MD       Current Medications:  Current Facility-Administered Medications   Medication Dose Route Frequency Provider Last Rate Last Admin    glucose (GLUTOSE) 40 % oral gel 15 g  15 g Oral PRN Ayah Waller, DO        dextrose 50 % IV solution  12.5 g IntraVENous PRN Kym Quiet, DO        glucagon (rDNA) injection 1 mg  1 mg IntraMUSCular PRN Ayah Waller, DO        dextrose 5 % solution  100 mL/hr IntraVENous PRN Kym Quiet, DO        dilTIAZem (CARDIZEM CD) extended release capsule 120 mg  120 mg Oral Daily John Ga MD   120 mg at 01/02/22 0910    levothyroxine (SYNTHROID) tablet 125 mcg  125 mcg Oral Daily John Ga MD   125 mcg at 01/02/22 0700    metoprolol tartrate (LOPRESSOR) tablet 100 mg  100 mg Oral BID John Ga MD   100 mg at 01/02/22 0911    atorvastatin (LIPITOR) tablet 10 mg  10 mg Oral Daily John Ga MD        sodium chloride flush 0.9 % injection 5-40 mL  5-40 mL IntraVENous 2 times per day John Ga MD   10 mL at 01/02/22 0911    sodium chloride flush 0.9 % injection 5-40 mL  5-40 mL IntraVENous PRN John Ga MD        0.9 % sodium chloride infusion  25 mL IntraVENous PRN John Ga MD        polyethylene glycol [facility]) packet 17 g  17 g Oral Daily PRN John Ga MD        acetaminophen (TYLENOL) tablet 650 mg  650 mg Oral Q6H PRN John Ga MD   650 mg at 01/02/22 0133    Or    acetaminophen (TYLENOL) suppository 650 mg  650 mg Rectal Q6H PRN John Ga MD        insulin lispro (HUMALOG) injection vial 0-6 Units  0-6 Units SubCUTAneous TID WC John Ga MD        insulin lispro (HUMALOG) injection vial 0-3 Units  0-3 Units SubCUTAneous Nightly John Ga MD        glucose (GLUTOSE) 40 % oral gel 15 g  15 g Oral PRN Roderick Quinn MD        dextrose 50 % IV solution  12.5 g IntraVENous PRN Roderick Quinn MD        glucagon (rDNA) injection 1 mg  1 mg IntraMUSCular PRN Roderick Quinn MD        dextrose 5 % solution  100 mL/hr IntraVENous PRN Roderick Quinn MD          dextrose      sodium chloride      dextrose         Physical Exam:  /61   Pulse 87   Temp 97.9 °F (36.6 °C) (Oral)   Resp 16   Ht 5' (1.524 m)   Wt 222 lb (100.7 kg)   SpO2 98%   BMI 43.36 kg/m²   Weight change: Wt Readings from Last 3 Encounters:   01/01/22 222 lb (100.7 kg)   11/01/21 222 lb (100.7 kg)   08/02/21 222 lb (100.7 kg)     General: Awake, alert, oriented x3, obese elderly woman laying in bed no acute distress  HEENT: Normocephalic and atraumatic, extraocular movements intact, pupils equal and round, moist mucus membranes, sclera anicteric  Neck: No JVD, no carotid bruits, no thyromegaly, no adenopathy  Cardiac: Regular rate and rhythm, normal S1 and physiologically split S2, no S3, no S4. Apical impulse is nondisplaced. No murmurs, no pericardial rubs, no clicks. Carotid upstrokes brisk. Respiratory: Clear bilaterally; no wheezes, no rales, no rhonchi. Unlabored respirations  Abdomen: Soft, nontender, nondistended, bowel sounds+, no hepatomegaly, no masses, no abdominal bruits  Extremities edema chronic venous stasis changes, no cyanosis, no clubbing. Skin: Intact, warm and dry, no rashes, no breakdown  Musculoskeletal: normal tone and strength in the upper and lower extremities bilaterally  Neuro: No focal deficits. Moves all extremities appropriately to command. Normal sensation in the upper and lower extremities bilaterally  Psychiatric: Cooperative, and normal affect    Intake/Output:  No intake or output data in the 24 hours ending 01/02/22 0922  No intake/output data recorded.     Laboratory Tests:  Last 3 CBC:  Recent Labs     01/01/22  1626 01/02/22  0420   WBC 10.3 10.3   RBC 4.46 4.04   HGB 14.1 12.8   HCT 42.5 38.2   MCV 95.3 94.6   MCH 31.6 31.7   MCHC 33.2 33.5   RDW 15.2* 15.3*    199   MPV 9.7 9.6       Last 3 CMP:    Recent Labs     01/01/22  1626 01/01/22  1838 01/01/22 2007 01/01/22 2210 01/02/22  0420   *  --  127*  --  127*   K 7.2*  --  5.5*  --  5.0   CL 93*  --  94*  --  95*   CO2 22 -- 22  --  19*   BUN 37*  --  33*  --  37*   CREATININE 1.4*  --  1.3*  --  1.4*   GLUCOSE 140*   < > 97 158 112*   CALCIUM 9.9  --  9.5  --  9.5   PROT 8.1  --   --   --   --    LABALBU 3.7  --   --   --   --    BILITOT 1.0  --   --   --   --    ALKPHOS 102  --   --   --   --    AST 26  --   --   --   --    ALT 16  --   --   --   --     < > = values in this interval not displayed. Last 3 Mag/Phos:  Recent Labs     01/02/22  0420   MG 1.8   PHOS 3.5       Last 3 CK, CKMB, Troponin  No results for input(s): CKTOTAL, CKMB, TROPONINI in the last 72 hours. Last 3 Pro-BNP:  Recent Labs     01/01/22  1626   PROBNP 2,652*     Lab Results   Component Value Date    PROBNP 2,652 (H) 01/01/2022       Last 3 Glucose:     Recent Labs     01/01/22 2007 01/01/22 2210 01/02/22  0420   GLUCOSE 97 158 112*       Last 3 Coags:  Recent Labs     01/01/22 1626 01/02/22  0420   PROTIME 34.0* 27.8*   INR 3.1 2.5     Lab Results   Component Value Date    PROTIME 27.8 01/02/2022    INR 2.5 01/02/2022       Last 3 Lipid Panel:  No results for input(s): LDLCALC, HDL, TRIG in the last 72 hours. Invalid input(s): CHLPL  No results found for: LDLCALC  No results found for: HDL  No results found for: TRIG  No components found for: CHLPL    TSH:  No results for input(s): TSH in the last 72 hours. No results found for: TSH    ABGs:  No results for input(s): PH, PO2, PCO2, HCO3, BE, O2SAT in the last 72 hours. Lactic Acid:  No results for input(s): LACTA in the last 72 hours. Radiology:  RAD Results:  XR CHEST PORTABLE   Final Result   Pulmonary vascular congestion.          XR SHOULDER RIGHT (MIN 2 VIEWS) underlying atrial fibrillation with ventricular pacing    Telemetry personally reviewed and shows not on telemetry        ASSESSMENT / PLAN:    1. Chronic atrial fibrillation on anticoagulation, has sinus node dysfunction as well with a backup pacemaker. Rate iss controlled  2 permanent pacemaker working appropriately luckily, and about 7 years of battery life. 3 oral anticoagulation with warfarin    4  chronic venous stasis    5 chronic diastolic heart failure and right-sided heart failure with moderately elevated RVSP. BNP only mildly elevated. Will be difficult to find a happy medium with her diuretics -  6 hyperkalemia-too many adjustments recently. Holding potassium obviously, spironolactone at least temporarily. Renal being consult as well. Thank you for consultation.     Yoly Ramos MD, MD, [address] [place] [address] at [facility]    Electronically signed by Yoly Ramos MD on 1/2/2022 at 9:22 AM

## 2022-01-02 NOTE — PROGRESS NOTES
Perfect serve message sent to [facility] with afternoon BMP results. No new orders at this time.

## 2022-01-02 NOTE — H&P
CHIEF COMPLAINT:  Fall/weakness/lower extremity weakness and swelling      HISTORY OF PRESENT ILLNESS:      The patient is a 81 y.o. female patient of Dr. Sudhir Casanova who presents with the above. This whole presentation was the proverbial \"accident waiting to happen. \"  For years, she has been plagued by refractory lower extremity edema and generalized fluid/weight gain to the point of chronic venous changes and stasis ulcers. I have tried partly successfully to treat the edema in concert with Cardiology. Multiple attempts at diuretic jockeying with lasix/metolazone/spironolactone/bumex all have partial success but cause debilitating muscle cramps. Admission hyperkalemia secondary to her [address] her Bumex on her own(secondary to cramping) has resolved. Past Medical History:    Past Medical History:   Diagnosis Date    Arthritis     Atrial fibrillation ([facility] [address]     Hyperlipidemia     Hypertension     Non-pressure chronic ulcer of other part of right lower leg with fat layer [facility] 8/21/2020    Thyroid disease        Past Surgical History:    Past Surgical History:   Procedure Laterality Date    PACEMAKER INSERTION  06/04/2019    PPM GENT CHANGE  ([facility])   DR. TONG       Medications Prior to Admission:    Medications Prior to Admission: dilTIAZem (DILTIAZEM CD) 120 MG extended release capsule, Take 120 mg by mouth daily  spironolactone (ALDACTONE) 25 MG tablet, Take 100 mg by mouth 2 times daily   metFORMIN (GLUCOPHAGE) 500 MG tablet, Take 500 mg by mouth daily (with breakfast)   Multiple Vitamins-Minerals (VITAMIN D3 COMPLETE PO), Take 1 tablet by mouth daily  magnesium (MAGNESIUM-OXIDE) 250 MG TABS tablet, Take 250 mg by mouth daily  triamcinolone (KENALOG) 0.1 % cream, Apply topically 2 times daily.   metoprolol (LOPRESSOR) 100 MG tablet, Take 200 mg by mouth 2 times daily   simvastatin (ZOCOR) 20 MG tablet, Take 20 mg by mouth nightly  levothyroxine (SYNTHROID) 100 MCG tablet, Take 125 mcg by mouth Daily   WARFARIN SODIUM PO, Take 3 mg by mouth daily   potassium chloride (KLOR-CON) 20 MEQ packet, Take 20 mEq by mouth 2 times daily  diltiazem (CARDIZEM) 120 MG tablet, Take 120 mg by mouth daily  metolazone (ZAROXOLYN) 2.5 MG tablet, Take 5 mg by mouth daily   [DISCONTINUED] furosemide (LASIX) 40 MG tablet, Take 40 mg by mouth 2 times daily    Allergies:    Patient has no known allergies. Social History:    reports that she has never smoked. She has never used smokeless tobacco. She reports that she does not drink alcohol and does not use drugs. Family History:   family history is not on file. REVIEW OF SYSTEMS:  As above in the HPI, otherwise negative    PHYSICAL EXAM:    Vitals:  /65   Pulse 95   Temp 98.5 °F (36.9 °C) (Oral)   Resp 18   Ht 5' (1.524 m)   Wt 222 lb (100.7 kg)   SpO2 97%   BMI 43.36 kg/m²     General:  Awake, alert, oriented X 3. Well developed, well nourished, well groomed. No apparent distress. HEENT:  Normocephalic, atraumatic. Pupils equal, round, reactive to light. No scleral icterus. No conjunctival injection. Normal lips, teeth, and gums. No nasal discharge. Neck:  Supple  Heart:  RRR, no murmurs, gallops, rubs  Lungs:  CTA bilaterally, bilat symmetrical expansion, no wheeze, rales, or rhonchi  Abdomen:  Marked fat apron. Bowel sounds present, soft, nontender, no masses, no organomegaly, no peritoneal signs  Extremities:   Thickened hardened indurated legs to the upper calf bilaterally  Skin:  Warm and dry, no open lesions or rash  Neuro:  Cranial nerves 2-12 intact, no focal deficits  Breast: deferred  Rectal: deferred  Genitalia:  deferred    LABS:  CBC:   Lab Results   Component Value Date    WBC 10.3 01/02/2022    RBC 4.04 01/02/2022    HGB 12.8 01/02/2022    HCT 38.2 01/02/2022    MCV 94.6 01/02/2022    MCH 31.7 01/02/2022    MCHC 33.5 01/02/2022    RDW 15.3 01/02/2022     01/02/2022    MPV 9.6 01/02/2022     BMP:    Lab Results Component Value Date     01/02/2022    K 5.0 01/02/2022    CL 95 01/02/2022    CO2 19 01/02/2022    BUN 37 01/02/2022    LABALBU 3.7 01/01/2022    CREATININE 1.4 01/02/2022    CALCIUM 9.5 01/02/2022    GFRAA 43 01/02/2022    LABGLOM 36 01/02/2022    GLUCOSE 112 01/02/2022         ASSESSMENT:      Active Problems:    Venous stasis dermatitis of both lower extremities  Plan: Diuresis and compression stocking(she has deferred secondary to discomfort)    Difficulty walking  Plan: PT needed    Hyperkalemia  Plan: Reverted. Renal to see    Hypertension  Plan: Control in progress    Atrial fibrillation ([address]  Plan: Rate control adequate. PLAN:    See orders.     Kim Rivera MD  7:08 AM  1/2/2022

## 2022-01-03 PROBLEM — I50.32 CHRONIC DIASTOLIC HEART FAILURE (HCC): Status: ACTIVE | Noted: 2022-01-01

## 2022-01-03 PROBLEM — I50.812 CHRONIC RIGHT-SIDED HEART FAILURE (HCC): Status: ACTIVE | Noted: 2022-01-01

## 2022-01-03 NOTE — CARE COORDINATION
Introduced my self and provided explanation of CM role to patient. Patient is awake, alert, and aware of current diagnosis and treatment plan including renal and cardiology consults, pending pt ot evals and discharge planning. Patient states she resides with her niece Lisa Liu. Lisa Liu is employed and patient is alone at times. She has hx of falls and Medication non-compliance. PAtient voices that she would like to go to [place] post discharge and \"arrangments have been made\"  Cannot verify with niece - patient cannot recall her phone number nor is it on medical record. Call placed to [facility] the [facility] with [place], left VM. Will complete referral on her retrun call. PT/OT evals pending. Continue diuresis per cardio and renal.    Will follow along with  and assist with discharge planning as necessary. Jared Mohan.  Juana, MSN, RN  [facility] Case Management  [phone]

## 2022-01-03 NOTE — PROGRESS NOTES
Subjective: The patient is awake and alert. No problems overnight. Denies chest pain, angina, and dyspnea. Denies abdominal pain. Tolerating diet. No nausea or vomiting. Objective:  Very nice diuresis in progress. Lower ext edema improved. Renal fxn stable and K+ WNL    BP (!) 137/56   Pulse 88   Temp 97.6 °F (36.4 °C) (Oral)   Resp 18   Ht 5' (1.524 m)   Wt 222 lb (100.7 kg)   SpO2 94%   BMI 43.36 kg/m²     Heart:  RRR, no murmurs, gallops, or rubs. Lungs:  CTA bilaterally, no wheeze, rales or rhonchi  Abd: bowel sounds present, nontender, nondistended, no masses  Extrem:  No clubbing, cyanosis. Chronic stasis changes. Neuro intact    CBC:   Lab Results   Component Value Date    WBC 10.3 01/02/2022    RBC 4.04 01/02/2022    HGB 12.8 01/02/2022    HCT 38.2 01/02/2022    MCV 94.6 01/02/2022    MCH 31.7 01/02/2022    MCHC 33.5 01/02/2022    RDW 15.3 01/02/2022     01/02/2022    MPV 9.6 01/02/2022     BMP:    Lab Results   Component Value Date     01/02/2022    K 4.7 01/02/2022    K 5.0 01/02/2022    CL 94 01/02/2022    CO2 20 01/02/2022    BUN 32 01/02/2022    LABALBU 3.7 01/01/2022    CREATININE 1.2 01/02/2022    CALCIUM 9.0 01/02/2022    GFRAA 52 01/02/2022    LABGLOM 43 01/02/2022    GLUCOSE 138 01/02/2022        Assessment:    Patient Active Problem List   Diagnosis    Venous stasis dermatitis of both lower extremities    Onychomycosis    Pain of toe of right foot    Pain of toe of left foot    PVD (peripheral vascular disease) ([address]    Difficulty walking    Hyperkalemia    Hypertension    Atrial fibrillation ([address]       Plan:  Present tx. Fluid mobilization.           Mary Desai MD  6:46 AM  1/3/2022

## 2022-01-03 NOTE — PROGRESS NOTES
[facility] [address] progress    Name: Demetri Ta    Age: 81 y.o. Date of Admission: 1/1/2022  2:53 PM    Date of Service: 1/3/2022    Reason for Consultation: HF    Referring Physician: Dr Connor Prakash  Primary Care Physician: Margart Snellen, MD    History of Present Illness: The patient is a 81y.o. year old female who follows with Dr. Andrew Roberts in the office with hypertension, hyperlipidemia, non-insulin requiring diabetes mellitus, chronic atrial fibrillation and remains on lifelong anticoagulation therapy of daily dose adjusted Warfarin, sinus node dysfunction with placement of single-chamber [employer] permanent pacemaker in 2010 and rgenerator change June 4, 2019. Echocardiogram in 2012 revealed LVEF 45-50%, mild mitral regurgitation, moderate tricuspid regurgitation, RVSP 48 mmHg. Telephonic monitoring report of pacemaker completed 7/21 revealed normal functioning pacemaker and estimated remaining longevity 7 years. .    States she was doing well up until yesterday when she was trying to go to the bathroom but just collapsed due to weakness in her legs. She had no syncope, did not feel any chest pains or palpitations, no new shortness of breath. She states she recently had adjustments of her diuretics within the past week by PCP. States her diuretics were stopped except for Aldactone, she was still taking her potassium. She came with a potassium of 7. States she has been having problems with lower extremity edema and weeping of her legs recently. She was on furosemide, Bumex, metolazone and spironolactone but was having significant cramping last week, states she was taking spoonfuls of mustard to try to get the cramping to go away. adjustments were made, apparently stopped some on her own but continued on with her potassium, states she frequently has V8 juice and bananas as well during the day. 1/3/22:  Qiet night, no cramping- she blames the bumex.  Has diuresed 2+ L , K+ improved, Na+ still low. C/o right leg weeping. Past Medical History:   Past Medical History:   Diagnosis Date    Arthritis     [facility]     Hyperlipidemia     Hypertension     Non-pressure chronic ulcer of other part of right lower leg with fat layer exposed ([facility] [address] 8/21/2020    Thyroid disease        Review of Systems:   Constitutional: No fever, chills, sweats  Cardiac: As per HPI  Pulmonary: No cough, wheeze, hemoptysis  HEENT: No visual disturbances, difficult swallowing  GI: No nausea, vomiting, diarrhea, abdominal pain, rectal bleeding  : No dysuria or hematuria  Endocrine: No excessive thirst, heat or cold intolerance. Musculoskeletal: No joint pain or muscle aches. No claudication, but frequent cramps, gets around with a walker. Skin: No skin breakdown or rashes  Neuro: No headache, confusion, or seizures  Psych: No depression, anxiety    Allergies:  No Known Allergies    Home Medications:  Prior to Admission medications    Medication Sig Start Date End Date Taking? Authorizing Provider   dilTIAZem (DILTIAZEM CD) 120 MG extended release capsule Take 120 mg by mouth daily   Yes Historical Provider, MD   spironolactone (ALDACTONE) 25 MG tablet Take 100 mg by mouth 2 times daily     Historical Provider, MD   metFORMIN (GLUCOPHAGE) 500 MG tablet Take 500 mg by mouth daily (with breakfast)     Historical Provider, MD   Multiple Vitamins-Minerals (VITAMIN D3 COMPLETE PO) Take 1 tablet by mouth daily    Historical Provider, MD   magnesium (MAGNESIUM-OXIDE) 250 MG TABS tablet Take 250 mg by mouth daily    Historical Provider, MD   triamcinolone (KENALOG) 0.1 % cream Apply topically 2 times daily.  1/27/20   Kevin Loera., CRISTOFER   metoprolol (LOPRESSOR) 100 MG tablet Take 200 mg by mouth 2 times daily     Historical Provider, MD   simvastatin (ZOCOR) 20 MG tablet Take 20 mg by mouth nightly    Historical Provider, MD   levothyroxine (SYNTHROID) 100 MCG tablet Take 125 mcg by mouth Daily     Historical Provider, MD   WARFARIN SODIUM PO Take 3 mg by mouth daily     Historical Provider, MD   potassium chloride (KLOR-CON) 20 MEQ packet Take 20 mEq by mouth 3 times daily     Historical Provider, MD   diltiazem (CARDIZEM) 120 MG tablet Take 120 mg by mouth daily    Historical Provider, MD   metolazone (ZAROXOLYN) 2.5 MG tablet Take 5 mg by mouth daily     Historical Provider, MD       Current Medications:  Current Facility-Administered Medications   Medication Dose Route Frequency Provider Last Rate Last Admin    warfarin (COUMADIN) tablet 3 mg  3 mg Oral Daily Marquise Mauricio MD   3 mg at 01/02/22 2012    0.9 % sodium chloride infusion   IntraVENous Continuous Svetlana Eisenberg MD 70 mL/hr at 01/02/22 2312 New Bag at 01/02/22 2312    glucose (GLUTOSE) 40 % oral gel 15 g  15 g Oral PRN Lisa Savers, DO        dextrose 50 % IV solution  12.5 g IntraVENous PRN Lisa Savers, DO        glucagon (rDNA) injection 1 mg  1 mg IntraMUSCular PRN Ayah Waller, DO        dextrose 5 % solution  100 mL/hr IntraVENous PRN Lisa Savers, DO        dilTIAZem (CARDIZEM CD) extended release capsule 120 mg  120 mg Oral Daily Marquise Mauricio MD   120 mg at 01/02/22 0910    levothyroxine (SYNTHROID) tablet 125 mcg  125 mcg Oral Daily Marquise Mauricio MD   125 mcg at 01/03/22 0630    metoprolol tartrate (LOPRESSOR) tablet 100 mg  100 mg Oral BID Marquise Mauricio MD   100 mg at 01/02/22 2012    atorvastatin (LIPITOR) tablet 10 mg  10 mg Oral Daily Marquise Mauricio MD   10 mg at 01/02/22 2012    sodium chloride flush 0.9 % injection 5-40 mL  5-40 mL IntraVENous 2 times per day Marquise Mauricio MD   10 mL at 01/02/22 2012    sodium chloride flush 0.9 % injection 5-40 mL  5-40 mL IntraVENous PRN Marquise Mauricio MD        0.9 % sodium chloride infusion  25 mL IntraVENous PRN Marquise Mauricio MD        polyethylene glycol [facility]) packet 17 g  17 g Oral Daily PRN Marquise Mauricio MD        acetaminophen (TYLENOL) tablet 650 mg  650 mg Oral Q6H PRN Haidee Curling, MD   650 mg at 01/02/22 2223    Or    acetaminophen (TYLENOL) suppository 650 mg  650 mg Rectal Q6H PRN Haidee Curling, MD        insulin lispro (HUMALOG) injection vial 0-6 Units  0-6 Units SubCUTAneous TID WC Haidee Curling, MD        insulin lispro (HUMALOG) injection vial 0-3 Units  0-3 Units SubCUTAneous Nightly Haidee Curling, MD        glucose (GLUTOSE) 40 % oral gel 15 g  15 g Oral PRN Haidee Curling, MD        dextrose 50 % IV solution  12.5 g IntraVENous PRN Haidee Curling, MD        glucagon (rDNA) injection 1 mg  1 mg IntraMUSCular PRN Haidee Curling, MD        dextrose 5 % solution  100 mL/hr IntraVENous PRN Haidee Curling, MD          sodium chloride 70 mL/hr at 01/02/22 2312    dextrose      sodium chloride      dextrose         Physical Exam:  BP (!) 111/56   Pulse 85   Temp 97.8 °F (36.6 °C) (Oral)   Resp 18   Ht 5' (1.524 m)   Wt 222 lb (100.7 kg)   SpO2 94%   BMI 43.36 kg/m²   Weight change: Wt Readings from Last 3 Encounters:   01/01/22 222 lb (100.7 kg)   11/01/21 222 lb (100.7 kg)   08/02/21 222 lb (100.7 kg)     General: Awake, alert, oriented x3, obese elderly woman laying in bed no acute distress  HEENT: Normocephalic and atraumatic, extraocular movements intact, pupils equal and round, moist mucus membranes, sclera anicteric  Neck: No JVD, no carotid bruits, no thyromegaly, no adenopathy  Cardiac: Regular rate and rhythm, normal S1 and physiologically split S2, no S3, no S4. Apical impulse is nondisplaced. No murmurs, no pericardial rubs, no clicks. Carotid upstrokes brisk. Respiratory: Clear bilaterally; no wheezes, no rales, no rhonchi. Unlabored respirations  Abdomen: Soft, nontender, nondistended, bowel sounds+, no hepatomegaly, no masses, no abdominal bruits  Extremities edema chronic venous stasis changes, no cyanosis, no clubbing.     Skin: Intact, warm and dry, no rashes, no breakdown  Musculoskeletal: normal tone and strength in the upper and lower extremities bilaterally  Neuro: No focal deficits. Moves all extremities appropriately to command. Normal sensation in the upper and lower extremities bilaterally  Psychiatric: Cooperative, and normal affect    Intake/Output:    Intake/Output Summary (Last 24 hours) at 1/3/2022 0735  Last data filed at 1/3/2022 0452  Gross per 24 hour   Intake    Output 2775 ml   Net -2775 ml     No intake/output data recorded. Laboratory Tests:  Last 3 CBC:  Recent Labs     01/01/22  1626 01/02/22  0420   WBC 10.3 10.3   RBC 4.46 4.04   HGB 14.1 12.8   HCT 42.5 38.2   MCV 95.3 94.6   MCH 31.6 31.7   MCHC 33.2 33.5   RDW 15.2* 15.3*    199   MPV 9.7 9.6       Last 3 CMP:    Recent Labs     01/01/22  1626 01/01/22  1838 01/02/22  0420 01/02/22  1222 01/02/22  2100   *   < > 127* 128* 128*   K 7.2*   < > 5.0 4.5 4.7   CL 93*   < > 95* 93* 94*   CO2 22   < > 19* 21* 20*   BUN 37*   < > 37* 33* 32*   CREATININE 1.4*   < > 1.4* 1.3* 1.2*   GLUCOSE 140*   < > 112* 147* 138*   CALCIUM 9.9   < > 9.5 9.3 9.0   PROT 8.1  --   --   --   --    LABALBU 3.7  --   --   --   --    BILITOT 1.0  --   --   --   --    ALKPHOS 102  --   --   --   --    AST 26  --   --   --   --    ALT 16  --   --   --   --     < > = values in this interval not displayed. Last 3 Mag/Phos:  Recent Labs     01/02/22  0420   MG 1.8   PHOS 3.5       Last 3 CK, CKMB, Troponin  No results for input(s): CKTOTAL, CKMB, TROPONINI in the last 72 hours.     Last 3 Pro-BNP:  Recent Labs     01/01/22  1626   PROBNP 2,652*     Lab Results   Component Value Date    PROBNP 2,652 (H) 01/01/2022       Last 3 Glucose:     Recent Labs     01/02/22  0420 01/02/22  1222 01/02/22  2100   GLUCOSE 112* 147* 138*       Last 3 Coags:  Recent Labs     01/01/22  1626 01/02/22  0420   PROTIME 34.0* 27.8*   INR 3.1 2.5     Lab Results   Component Value Date    PROTIME 27.8 01/02/2022    INR 2.5 01/02/2022       Last 3 Lipid Panel:  No results for input(s): LDLCALC, HDL, TRIG in the last 72 hours. Invalid input(s): CHLPL  No results found for: LDLCALC  No results found for: HDL  No results found for: TRIG  No components found for: CHLPL    TSH:  No results for input(s): TSH in the last 72 hours. No results found for: TSH    ABGs:  No results for input(s): PH, PO2, PCO2, HCO3, BE, O2SAT in the last 72 hours. Lactic Acid:  No results for input(s): LACTA in the last 72 hours. Radiology:  RAD Results:  XR CHEST PORTABLE   Final Result   Pulmonary vascular congestion. XR SHOULDER RIGHT (MIN 2 VIEWS)   Final Result   No acute abnormality. AC joint arthrosis. CT HEAD WO CONTRAST   Final Result   1. No acute intracranial abnormality. 2.  Prominent underlying signs of cerebral and cerebellar atrophy. 3.  High density within the left maxillary antrum with mucosal thickening,   suggestive of fungal sinusitis      RECOMMENDATIONS:   Unavailable         CT CERVICAL SPINE WO CONTRAST   Final Result   CERVICAL:      No acute fracture or traumatic malalignment. Degenerative changes result in likely moderate spinal stenosis at C5-6 and   multilevel likely high-grade neural foraminal stenosis. LUMBAR:      No evidence of vertebral compression. Degenerative changes result in likely mild to moderate multilevel spinal   stenosis and neural foraminal stenosis as detailed above. 3.2 cm x 2.5 cm saccular aneurysm of the infrarenal abdominal aorta. See   recommendation below. Colonic diverticulosis. Cholelithiasis. RECOMMENDATIONS:   3.2 cm infrarenal saccular abdominal aortic aneurysm. Recommend vascular   consultation. Reference: J Vasc Surg 3310;32:0-56. CT LUMBAR SPINE WO CONTRAST   Final Result   CERVICAL:      No acute fracture or traumatic malalignment.       Degenerative changes result in likely moderate spinal stenosis at C5-6 and   multilevel likely high-grade neural foraminal stenosis. LUMBAR:      No evidence of vertebral compression. Degenerative changes result in likely mild to moderate multilevel spinal   stenosis and neural foraminal stenosis as detailed above. 3.2 cm x 2.5 cm saccular aneurysm of the infrarenal abdominal aorta. See   recommendation below. Colonic diverticulosis. Cholelithiasis. RECOMMENDATIONS:   3.2 cm infrarenal saccular abdominal aortic aneurysm. Recommend vascular   consultation. Reference: J Vasc Surg 3361;62:7-26. EKG and Telemetry:  12-lead EKG personally reviewed and shows underlying atrial fibrillation with ventricular pacing    Telemetry personally reviewed and shows not on telemetry        ASSESSMENT / PLAN:    1. Chronic atrial fibrillation on anticoagulation, has sinus node dysfunction as well with a backup pacemaker. Rate is controlled    2 permanent pacemaker working appropriately luckily, and about 7 years of battery life. 3 oral anticoagulation with warfarin    4  chronic venous stasis    5 chronic diastolic heart failure and right-sided heart failure with moderately elevated RVSP. BNP only mildly elevated. Diuresed 2.7L. Maybe difficult to find a happy medium with her diuretics at home -she admits to skipping and missing doses. Doesn't elevated feet much at home, states has no help.    6 hyperkalemia-too many adjustments recently. Holding potassium obviously, spironolactone at least temporarily. Renal being consult as well. Thank you for consultation.     Aubrey Dias MD, MD, [address] [place] [address] at [facility]    Electronically signed by Aubrey Dias MD on 1/3/2022 at 7:35 AM

## 2022-01-03 NOTE — PROGRESS NOTES
to improve problem solving, judgement, memory, and attention for increased safety/participation in ADL/IADL tasks  * Therapeutic exercise to improve motor endurance, ROM, and functional strength for ADLs/functional transfers  * Therapeutic activities to facilitate/challenge dynamic balance, stand tolerance for increased safety and independence with ADLs  * Positioning to improve skin integrity, interaction with environment and functional independence        Recommended Adaptive Equipment: TBD     Home Living: Pt is questionable historian. Per pt, she lives alone in 1 story house with level entry. Bathroom setup: tub/shower; pt reports that she sponge bathes   Equipment owned: wheeled walker    Prior Level of Function: independent with ADLs , assist with IADLs; functional mobility: wheeled walker  Driving: yes    Pain Level: Pt did not report pain this date  Cognition: A&O: pt alert and oriented grossly; 2-3 step command follow demonstrated. Pt agreeable to therapy with some confusion noted. Pt reported that her walker went missing in ED and continued to asking for it throughout session. Memory:  Fair-   Sequencing:  fair   Problem solving:  Fair-   Judgement/safety:  Fair-     Functional Assessment:  AM-PAC Daily Activity Raw Score: 14/24   Initial Eval Status  Date: 1/3/22 Treatment Status  Date: STGs = LTGs  Time frame: 10-14 days   Feeding Set up     Grooming Min A  Sup/Mod I   UB Dressing Mod A/Min A  SBA/Sup   LB Dressing Max A to adjust socks   Min A-with use of AD as appropriate/needed   Bathing Max A/Mod A  Min A/SBA -with use of AD as appropriate/needed   Toileting Mod A  SBA    Bed Mobility  Pt in chair      Functional Transfers Min A with wheeled walker  Sit<>Stand from chair  Cues for hand placement and safe technique  Sup    Functional Mobility Min A with wheeled walker  Short distance in room and ridley; pt requested to return to chair after short distance.   Cues for safe wheeled walker management and navigation  Sup -with device as needed to maximize independence with ADLs and functional task completion   Balance Sitting:     Static:  Sup    Dynamic:SBA  Standing: Min A with wheeled walker  Sup for dynamic sitting balance to maximize independence with ADLs and functional task completion    Sup for standing balance to maximize independence with ADLs and functional task completion   Activity Tolerance Fair- with light activity  Fair+ with ADL activity. Visual/  Perceptual Glasses: yes                Additional long-term goal: Pt will increase functional independence to PLOF to allow pt to live in least restrictive environment. Hand Dominance R   AROM (PROM) Strength   RUE  WFL 4-/5   LUE WFL 4-/5     Hearing: WFL   Sensation:  No c/o numbness or tingling   Tone: WFL   Edema: BLE    Comments: Upon arrival patient sitting in chair. At end of session, patient returned to chair with call light and phone within reach, all lines and tubes intact. Overall patient demonstrated decreased independence and safety during completion of ADL/functional transfer/mobility tasks. Pt would benefit from continued skilled OT to increase safety and independence with completion of ADL/IADL tasks for functional independence and quality of life. Rehab Potential: Good for established goals     Patient / Family Goal: none stated    Patient and/or family were instructed on functional diagnosis, prognosis/goals and OT plan of care. Demonstrated fair- understanding.      Eval Complexity: Low    Time In: 1508  Time Out: 1521    Min Units   OT Eval Low 97165  x  1   OT Eval Medium 64957      OT Eval High 23147      OT Re-Eval [id number]       Therapeutic Ex [phone]       Therapeutic Activities 72538       ADL/Self Care 56818       Orthotic Management 64529       Manual [id number]     Neuro Re-Ed 77156       Non-Billable Time          Evaluation Time additionally includes thorough review of current medical information, gathering information on past medical history/social history and prior level of function, interpretation of standardized testing/informal observation of tasks, assessment of data and development of plan of care and goals.             Mary Stanley, OTR/L, [id number]

## 2022-01-03 NOTE — PROGRESS NOTES
Physical Therapy    Facility/Department: [facility] SURGERY  Initial Assessment    NAME: Evangelina Michaels  : 1935  MRN: [id number]    Date of Service: 1/3/2022       REQUIRES PT FOLLOW UP: Yes       Patient Diagnosis(es): The primary encounter diagnosis was Hyperkalemia. A diagnosis of Fall, initial encounter was also pertinent to this visit. has a past medical history of Arthritis, Atrial fibrillation ([address] Hyperlipidemia, Hypertension, Non-pressure chronic ulcer of other part of right lower leg with fat layer exposed ([address] and Thyroid disease. has a past surgical history that includes Pacemaker insertion (2019). Evaluating Therapist: Ga Farias PT     Referring Provider:  Aracely Flores MD    PT order : PT eval and treat     Room #:  704 B  DIAGNOSIS:  Hyperkalemia . Fall at home   PRECAUTIONS: falls     Social:  Pt lives alone  in a  1  floor plan with level entry   Prior to admission pt walked with  ww      Initial Evaluation  Date:  1/3/2022 Treatment      Short Term/ Long Term   Goals   Was pt agreeable to Eval/treatment? yes      Does pt have pain? None reported      Bed Mobility  Rolling:  NT  Supine to sit:  NT   Sit to supine:  NT   Scooting:  SBA in sit   Pt in chair upon arrival    SBA    Transfers Sit to stand:  Min assist   Stand to sit:  Min assist   Stand pivot:  NT    SBA    Ambulation     70  feet with  ww  with  CGA    150  feet with  ww  with  SBA        Stair negotiation: ascended and descended NT    2  steps with  1  rail with  CGA    LE ROM  Decreased throughout R hip, distally WFL, L LE WFL        LE strength  3- to 4-/ 5    4- to 4/ 5    AM- PAC RAW score   16/ 24            Pt is alert and Oriented x  3      Balance:  CGA , no LOB with gait, but fall risk due to weakness and h/o falls      ASSESSMENT  Pt displays functional ability as noted in the objective portion of this evaluation.         Conditions Requiring Skilled Therapeutic Intervention:    [x]Decreased strength     []Decreased ROM  [x]Decreased functional mobility  [x]Decreased balance   [x]Decreased endurance   []Decreased posture  []Decreased sensation  []Decreased coordination   []Decreased vision  [x]Decreased safety awareness   []Increased pain       Treatment/Education:    Pt seated in chair upon arrival. Pt perseverating on her walker which she states was lost in ER. Cues for hand placement with transfers and ww safety with gait. Flexed posture with gait. Pt educated on fall risk, safety with mobility         Patient response to education:   Pt verbalized understanding Pt demonstrated skill Pt requires further education in this area   x  needs cues   x       Comments:  Pt left  In chair after session, with call light in reach. Rehab potential is Good for reaching above PT goals. Pts/ family goals   1. None stated     Patient and or family understand(s) diagnosis, prognosis, and plan of care. -  Yes     PLAN  PT care will be provided in accordance with the objectives noted above. Whenever appropriate, clear delegation orders will be provided for nursing staff. Exercises and functional mobility practice will be used as well as appropriate assistive devices or modalities to obtain goals. Patient and family education will also be administered as needed.         PLAN OF CARE:    Current Treatment Recommendations     [] Strengthening to improve independence with functional mobility   [] ROM to improve independence with functional mobility   [] Balance Training to improve static/dynamic balance and to reduce fall risk  [] Endurance Training to improve activity tolerance during functional mobility   [] Transfer Training to improve safety and independence with all functional transfers   [] Gait Training to improve gait mechanics, endurance and assess need for appropriate assistive device  [] Stair Training in preparation for safe discharge home and/or into the community   [] Positioning to prevent skin breakdown and contractures  [] Safety and Education Training   [] Patient/Caregiver Education   [] HEP  [] Other     Frequency of treatments will be 2-5 x/ wk x 3- 7 days. Time in:  1509         Evaluation Time includes thorough review of current medical information, gathering information on past medical history/social history and prior level of function, completion of standardized testing/informal observation of tasks, assessment of data and education on plan of care and goals.     CPT codes:  [x] Low Complexity PT evaluation 21816  [] Moderate Complexity PT evaluation 28101  [] High Complexity PT evaluation 23383  [] PT Re-evaluation 19980  [] Gait training 40291  minutes  [] Therapeutic activities 43979  minutes  [] Therapeutic exercises 13562  minutes  [] Neuromuscular reeducation 65991  minutes       Amy [address] number:  PT 0021

## 2022-01-03 NOTE — PLAN OF CARE
Problem: Falls - Risk of:  Goal: Will remain free from falls  Description: Will remain free from falls  1/3/2022 1830 by Julee Mora RN  Outcome: Met This Shift    Goal: Absence of physical injury  Description: Absence of physical injury  1/3/2022 1830 by Julee Mora RN  Outcome: Met This Shift    Problem: Pain:  Goal: Pain level will decrease  Description: Pain level will decrease  Outcome: Met This Shift

## 2022-01-03 NOTE — CONSULTS
[facility] MD Mik Mathis, MD Yves Negron, MD Mateo Castaneda, CNP   Wilbur Camargo, NIDIA  Consultation  1/2/2022    Thank you for consult   seen this afternoon. Full note dictated, to follow  Briefly, 81 y.o. woman admitted with generalized weakness worse in her lower extremities, fatigue, status post a fall, discovered to be hyperkalemic on presentation. Has had recent adjustments to her diuretic regimen including changing Lasix to Bumex, added metolazone, continue spironolactone, increasing potassium supplementation. She stopped taking her Bumex and may be the metolazone as well. She continued to take potassium supplementation. She also eats a banana or 2 a day, and not infrequently drinks V8 next with tomato juice. A/R:  1. Hyperkalemia, secondary to combination of potassium supplementation, having on her own stopped the diuretic therapy for which the potassium supplementation was ordered in the first place. In addition, she eats at least a banana a day, and while it seems unlikely that V8/tomato juice combination will fit into a low-sodium diet, she also drinks this on a daily basis, as well. Status post \"K cocktail\" and Lokelma x2 doses potassium has normalized    2. Hyponatremia hypervolemic, though in fact she may be intravascularly contracted, volume status is a bit difficult to interpret though she is certainly total body water overloaded given the degree of her peripheral edema. Urinary studies sent last evening were sent not long after she received IV Lasix bolus, so are of little clinical utility. Sodium improved somewhat since her arrival      Urine studies were ordered, collected, and sent to the lab this early afternoon, though sodium, chloride and osmolality were not actually processed by the lab. All 3 would be useful bits of data.   I have spoken with bedside RN, presumably lab will be working on the studies shortly.       Continue low potassium diet  Repeat BMP this evening  Mostly by then urine studies will be back and that may guide our next therapeutic endeavor  Hold potassium supplementation  Hold spironolactone  Hold diuretic therapy  Follow labs, UO  Supportive care      Gianluca Mirza MD, MD

## 2022-01-04 NOTE — PROGRESS NOTES
Call to Dr. Ngo  regarding need for PT/INR to be drawn for Coumadin dosing tonight, awaiting return call.

## 2022-01-04 NOTE — PROGRESS NOTES
Occupational Therapy  OT BEDSIDE TREATMENT NOTE      Date:2022  Patient Name: Pancho Blood  MRN: [id number]  : 1935  Room: [address] [place] [address]         Evaluating OT: TRACY Husain/CHELSIE [id number]       Referring Provider: Jie Syed MD    Specific Provider Orders/Date: OT eval and treat 1/3/22       Diagnosis:  Hyperkalemia [E87.5]  Fall, initial encounter [W19. 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 Functional mobility            [x]?ADLs           [x]? Strength                  [x]? Cognition    [x]? Functional transfers          [x]? IADLs         [x]? Safety Awareness   [x]? Endurance    []? Fine Coordination                         [x]? Balance      []? Vision/perception   []? Sensation      []? Gross Motor Coordination             []? ROM           []?  Delirium                   []? Motor Control      OT PLAN OF CARE   OT POC based on physician orders, patient diagnosis and results of clinical assessment      Frequency/Duration 2-4 days/wk for 2 weeks PRN   Specific OT Treatment Interventions to include:   * Instruction/training on adapted ADL techniques and AE recommendations to increase functional independence within precautions       * Training on energy conservation strategies, correct breathing pattern and techniques to improve independence/tolerance for self-care routine  * Functional transfer/mobility training/DME recommendations for increased independence, safety, and fall prevention  * Patient/Family education to increase follow through with safety techniques and functional independence  * Recommendation of environmental modifications for increased safety with functional transfers/mobility and ADLs  * Cognitive retraining/development of therapeutic activities to improve problem solving, judgement, memory, and attention for increased safety/participation in ADL/IADL tasks  * Therapeutic exercise to improve motor endurance, ROM, and functional strength for ADLs/functional transfers  * Therapeutic activities to facilitate/challenge dynamic balance, stand tolerance for increased safety and independence with ADLs  * Positioning to improve skin integrity, interaction with environment and functional independence           Recommended Adaptive Equipment: continue to assess       Home Living: Pt is questionable historian. Per pt, she lives alone in 1 story house with level entry. Bathroom setup: tub/shower; pt reports that she sponge bathes   Equipment owned: wheeled walker     Prior Level of Function: independent with ADLs , assist with IADLs; functional mobility: wheeled walker  Driving: yes     Pain Level: Pt did not report pain this date. States she has some tingling in her R thigh area. Cognition: Awake and grossly oriented. Min cues for safety. Functional Assessment:  AM-PAC Daily Activity Raw Score: 14/24    Initial Eval Status  Date: 1/3/22 Treatment Status  Date:1/4/22  STGs = LTGs  Time frame: 10-14 days   Feeding Set up       Grooming Min A Setup seated. Limited stand tolerance for sink level activity.     Sup/Mod I   UB Dressing Mod A/Min A   SBA/Sup   LB Dressing Max A to adjust socks Max A to thread clothing. Pt reports she sits on a low bed surface and draws her leg up onto the bed to don clothing/shoes over feet. Unable to simulate this on the EOB due to curved mattress.      Min A-with use of AD as appropriate/needed   Bathing Max A/Mod A Assist for LB bathing. Min A/SBA -with use of AD as appropriate/needed   Toileting Mod A   SBA    Bed Mobility  Pt in chair        Functional Transfers Min A with wheeled walker  Sit<>Stand from chair  Cues for hand placement and safe technique Min A to stand from chair.   Cues for hand placemen to increase safety.   Sup    Functional Mobility Min A with wheeled walker  Short distance in room and khai; pt requested to return to chair after short distance. Cues for safe wheeled walker management and navigation [address] [facility] short distance in room using w/w. Pt reports legs feeling weak and wobbly. Sup -with device as needed to maximize independence with ADLs and functional task completion   Balance Sitting:     Static:  Sup    Dynamic:SBA  Standing: Min A with wheeled walker   Sup for dynamic sitting balance to maximize independence with ADLs and functional task completion     Sup for standing balance to maximize independence with ADLs and functional task completion   Activity Tolerance Fair- with light activity Poor + stand tolerance for ADL activity.   Fair+ with ADL activity. Comments:  Pt sitting in the chair. Pleasant and cooperative. Cues for safety. Remained in chair at end of the session. Pt verbalized concern with her ability to care for herself at home. Education/treatment:  ADL retraining with facilitation of movement to increase self care skills. Therapeutic activity to address balance and endurance for ADL and transfers. Pt education of walker safety and transfer safety. · Pt has made  progress towards set goals.    ·     Time In: 8:51   Time Out: 9:05      Min Units   Therapeutic Ex [id number]     Therapeutic Activities 15740     ADL/Self Care 91597 14 1   Orthotic Management 96321     Neuro Re-Ed 23988     Non-Billable Time     TOTAL TIMED TREATMENT 14 [address] [facility] AMAN/L [id number]

## 2022-01-04 NOTE — PROGRESS NOTES
[facility] MD Mik Mathis, MD Yves Negron, MD Mateo Castaneda, ESTELLE Soto, NIDIA  Progress Note    1/3/2022    SUBJECTIVE:   1/3: Seen this afternoon. \"I feel like sh%$.\"  Bilateral lower extremity cramping. Fatigue, malaise, anorexia. Not sleeping well. Abdominal discomfort. Denies dyspnea at rest supine, no chest pain or palpitations  Appetite ok    PROBLEM LIST:    Active Problems:    Venous stasis dermatitis of both lower extremities    Difficulty walking    Hyperkalemia    Hypertension    Atrial fibrillation (HCC)    Chronic diastolic heart failure (HCC)    Chronic right-sided heart failure ([address]  Resolved Problems:    * No resolved hospital problems. *         DIET:    ADULT DIET; Regular; 4 carb choices (60 gm/meal);  Low Potassium (Less than 3000 mg/day)     MEDS (scheduled):    warfarin  3 mg Oral Daily    dilTIAZem  120 mg Oral Daily    levothyroxine  125 mcg Oral Daily    metoprolol  100 mg Oral BID    atorvastatin  10 mg Oral Daily    sodium chloride flush  5-40 mL IntraVENous 2 times per day    insulin lispro  0-6 Units SubCUTAneous TID WC    insulin lispro  0-3 Units SubCUTAneous Nightly       MEDS (infusions):   sodium chloride 70 mL/hr at 01/02/22 2312    dextrose      sodium chloride      dextrose         MEDS (prn):  glucose, dextrose, glucagon (rDNA), dextrose, sodium chloride flush, sodium chloride, polyethylene glycol, acetaminophen **OR** acetaminophen, glucose, dextrose, glucagon (rDNA), dextrose    PHYSICAL EXAM:     Patient Vitals for the past 24 hrs:   BP Temp Temp src Pulse Resp SpO2   01/03/22 1957 126/64 98.3 °F (36.8 °C) Oral 107 16 96 %   01/03/22 1146 119/74 96.8 °F (36 °C) Temporal 91 16 98 %   01/03/22 0715 (!) 111/56 97.8 °F (36.6 °C) Oral 85 18 94 %   @      Intake/Output Summary (Last 24 hours) at 1/3/2022 9977  Last data filed at 1/3/2022 1744  Gross per 24 hour   Intake    Output 1650 ml   Net -1650 ml         Wt Readings from Last 3 Encounters:   01/01/22 222 lb (100.7 kg)   11/01/21 222 lb (100.7 kg)   08/02/21 222 lb (100.7 kg)       Constitutional:  in no acute distress  HEENT: NC/AT, EOMI, sclera and conjunctiva are clear and anicteric, mucus membranes moist  Neck: Trachea midline, no JVD  Cardiovascular: S1, S2 regular rhythm, no murmur,or rub  Respiratory: Few scattered crackles mostly inferiorly, no wheeze  Gastrointestinal:  Soft, nontender, nondistended, NABS  Ext: 1+ lower extremity and dependent edema, feet warm  Skin: dry, no rash  Neuro: awake, alert, interactive      DATA:    Recent Labs     01/01/22  1626 01/02/22  0420 01/03/22  1008   WBC 10.3 10.3 8.5   HGB 14.1 12.8 13.4   HCT 42.5 38.2 38.8   MCV 95.3 94.6 92.8    199 221     Recent Labs     01/01/22  1626 01/01/22  2007 01/02/22  0420 01/02/22  1222 01/02/22  2100 01/03/22  1008 01/03/22  1705   *   < > 127*   < > 128* 129* 126*   K 7.2*   < > 5.0   < > 4.7 4.1 4.6   CL 93*   < > 95*   < > 94* 96* 93*   CO2 22   < > 19*   < > 20* 19* 19*   MG  --   --  1.8  --   --  1.7  --    PHOS  --   --  3.5  --   --  3.3  --    BUN 37*   < > 37*   < > 32* 28* 31*   CREATININE 1.4*   < > 1.4*   < > 1.2* 1.2* 1.3*   ALT 16  --   --   --   --   --   --    AST 26  --   --   --   --   --   --    BILITOT 1.0  --   --   --   --   --   --    ALKPHOS 102  --   --   --   --   --   --     < > = values in this interval not displayed. Lab Results   Component Value Date    LABPROT 0.2 01/02/2022    LABPROT 0.2 01/02/2022    LABPROT 0.4 (H) 01/02/2022    LABPROT 0.4 01/02/2022       ASSESSMENT / RECOMMENDATIONS:    1. Hyperkalemia, secondary to combination of potassium supplementation, having on her own stopped the diuretic therapy for which the potassium supplementation was ordered in the first place.   In addition, she eats at least a banana a day, and while it seems unlikely that V8/tomato juice combination will fit into a low-sodium diet, she also drinks this on a daily basis, as well. Status post \"K cocktail\" and Lokelma x2 doses potassium has normalized     2. Hyponatremia hypervolemic, though in fact she may be intravascularly contracted, volume status is a bit difficult to interpret though she is certainly total body water overloaded given the degree of her peripheral edema. Urinary studies sent last evening were sent not long after she received IV Lasix bolus, so are of little clinical utility. Sodium improved somewhat since her arrival        Urine studies were ordered, collected, and sent to the lab this early afternoon, though sodium, chloride and osmolality were not actually processed by the lab. All 3 would be useful bits of data. I have spoken with bedside RN, presumably lab will be working on the studies shortly.        Continue low potassium diet  Repeat BMP this evening (done)  Bumex IV x1  Evaluate in early a.m., resume regular diuretic therapy starting tomorrow, probably IV x 1-2 days then transition to p.o.   Hold potassium supplementation  Hold spironolactone  Hold diuretic therapy  Follow labs, UO  Supportive care      Electronically signed by Deny Padilla MD on 1/3/2022 at 11:49 PM

## 2022-01-04 NOTE — CARE COORDINATION
Social Work:    Follow-up call was made with Jono Yossi at [facility] to see if they accepted Mrs. Kearns's referral. Await call back.     Electronically signed by RANDALL Jama on 1/4/2022 at 1:27 PM

## 2022-01-04 NOTE — PROGRESS NOTES
Patient refusing HS blood sugar check. States she has never had to check her sugar before.  Will try again in AM.

## 2022-01-04 NOTE — PROGRESS NOTES
Call from Dr. Tello Bagley with new orders to increase coumadin to 4mg tonight and to draw a daily PT/INR. Let pt know the following below. Pt verbalized his understanding and had no other questions at this time.

## 2022-01-04 NOTE — PROGRESS NOTES
Subjective: The patient is awake and alert. No problems overnight. Denies chest pain, angina, and dyspnea. Denies abdominal pain. Tolerating diet. No nausea or vomiting. Objective:  Very nice diuresis in progress. Na+ 126    /65   Pulse 80   Temp 97.5 °F (36.4 °C)   Resp 18   Ht 5' (1.524 m)   Wt 222 lb (100.7 kg)   SpO2 96%   BMI 43.36 kg/m²     Heart:  RRR, no murmurs, gallops, or rubs. Lungs:  CTA bilaterally, no wheeze, rales or rhonchi  Abd: bowel sounds present, nontender, nondistended, no masses  Extrem:  No clubbing, cyanosis, . Edema improved. CBC:   Lab Results   Component Value Date    WBC 8.5 01/03/2022    RBC 4.18 01/03/2022    HGB 13.4 01/03/2022    HCT 38.8 01/03/2022    MCV 92.8 01/03/2022    MCH 32.1 01/03/2022    MCHC 34.5 01/03/2022    RDW 15.3 01/03/2022     01/03/2022    MPV 9.9 01/03/2022     BMP:    Lab Results   Component Value Date     01/03/2022    K 4.6 01/03/2022    K 5.0 01/02/2022    CL 93 01/03/2022    CO2 19 01/03/2022    BUN 31 01/03/2022    LABALBU 3.7 01/01/2022    CREATININE 1.3 01/03/2022    CALCIUM 9.0 01/03/2022    GFRAA 47 01/03/2022    LABGLOM 39 01/03/2022    GLUCOSE 130 01/03/2022        Assessment:    Patient Active Problem List   Diagnosis    Venous stasis dermatitis of both lower extremities    Onychomycosis    Pain of toe of right foot    Pain of toe of left foot    PVD (peripheral vascular disease) ([address]    Difficulty walking    Hyperkalemia    Hypertension    Atrial fibrillation (HCC)    Chronic diastolic heart failure (HCC)    Chronic right-sided heart failure ([address]       Plan:  Present tx. Phys tx needs to work with pt.           Francisca Rosas MD  7:04 AM  1/4/2022

## 2022-01-04 NOTE — PROGRESS NOTES
[facility] [address] progress    Name: Sharmin Lemon    Age: 81 y.o. Date of Admission: 1/1/2022  2:53 PM    Date of Service: 1/4/2022    Reason for Consultation: HF    Referring Physician: Dr Collins Lazo  Primary Care Physician: Mary Lee MD    History of Present Illness: The patient is a 81y.o. year old female who follows with Dr. Kathleen Jackson in the office with hypertension, hyperlipidemia, non-insulin requiring diabetes mellitus, chronic atrial fibrillation and remains on lifelong anticoagulation therapy of daily dose adjusted Warfarin, sinus node dysfunction with placement of single-chamber [employer] permanent pacemaker in 2010 and rgenerator change June 4, 2019. Echocardiogram in 2012 revealed LVEF 45-50%, mild mitral regurgitation, moderate tricuspid regurgitation, RVSP 48 mmHg. Telephonic monitoring report of pacemaker completed 7/21 revealed normal functioning pacemaker and estimated remaining longevity 7 years. .    States she was doing well up until yesterday when she was trying to go to the bathroom but just collapsed due to weakness in her legs. She had no syncope, did not feel any chest pains or palpitations, no new shortness of breath. She states she recently had adjustments of her diuretics within the past week by PCP. States her diuretics were stopped except for Aldactone, she was still taking her potassium. She came with a potassium of 7. States she has been having problems with lower extremity edema and weeping of her legs recently. She was on furosemide, Bumex, metolazone and spironolactone but was having significant cramping last week, states she was taking spoonfuls of mustard to try to get the cramping to go away. adjustments were made, apparently stopped some on her own but continued on with her potassium, states she frequently has V8 juice and bananas as well during the day. 1/3/22:  Qiet night, no cramping- she blames the bumex.  Has Provider, MD   levothyroxine (SYNTHROID) 100 MCG tablet Take 125 mcg by mouth Daily     Historical Provider, MD   WARFARIN SODIUM PO Take 3 mg by mouth daily     Historical Provider, MD   potassium chloride (KLOR-CON) 20 MEQ packet Take 20 mEq by mouth 3 times daily     Historical Provider, MD   diltiazem (CARDIZEM) 120 MG tablet Take 120 mg by mouth daily    Historical Provider, MD   metolazone (ZAROXOLYN) 2.5 MG tablet Take 5 mg by mouth daily     Historical Provider, MD       Current Medications:  Current Facility-Administered Medications   Medication Dose Route Frequency Provider Last Rate Last Admin    warfarin (COUMADIN) tablet 3 mg  3 mg Oral Daily Maria Elena Yan MD   3 mg at 01/03/22 1802    0.9 % sodium chloride infusion   IntraVENous Continuous Mickey MD Sherman 70 mL/hr at 01/02/22 2312 New Bag at 01/02/22 2312    glucose (GLUTOSE) 40 % oral gel 15 g  15 g Oral PRN Junior Awe, DO        dextrose 50 % IV solution  12.5 g IntraVENous PRN Junior Thackere, DO        glucagon (rDNA) injection 1 mg  1 mg IntraMUSCular PRN Ayah Waller, DO        dextrose 5 % solution  100 mL/hr IntraVENous PRN Junior Thackere, DO        dilTIAZem (CARDIZEM CD) extended release capsule 120 mg  120 mg Oral Daily Maria Elena Yan MD   120 mg at 01/04/22 0843    levothyroxine (SYNTHROID) tablet 125 mcg  125 mcg Oral Daily Maria Elena Yan MD   125 mcg at 01/04/22 0558    metoprolol tartrate (LOPRESSOR) tablet 100 mg  100 mg Oral BID Maria Elena Yan MD   100 mg at 01/04/22 0843    atorvastatin (LIPITOR) tablet 10 mg  10 mg Oral Daily Maria Elena Yan MD   10 mg at 01/03/22 2344    sodium chloride flush 0.9 % injection 5-40 mL  5-40 mL IntraVENous 2 times per day Maria Elena Yan MD   10 mL at 01/03/22 1916    sodium chloride flush 0.9 % injection 5-40 mL  5-40 mL IntraVENous PRN Maria Elena Yan MD        0.9 % sodium chloride infusion  25 mL IntraVENous PRN Maria Elena Yan MD        polyethylene glycol Creasie Genta) packet 17 g  17 g Oral Daily PRN Marilee Jim MD        acetaminophen (TYLENOL) tablet 650 mg  650 mg Oral Q6H PRN Marilee Jim MD   650 mg at 01/02/22 2223    Or    acetaminophen (TYLENOL) suppository 650 mg  650 mg Rectal Q6H PRN Marilee Jim MD        insulin lispro (HUMALOG) injection vial 0-6 Units  0-6 Units SubCUTAneous TID WC Marilee Jim MD   1 Units at 01/04/22 0849    insulin lispro (HUMALOG) injection vial 0-3 Units  0-3 Units SubCUTAneous Nightly Marilee Jim MD        glucose (GLUTOSE) 40 % oral gel 15 g  15 g Oral PRN Marilee Jim MD        dextrose 50 % IV solution  12.5 g IntraVENous PRN Marilee Jim MD        glucagon (rDNA) injection 1 mg  1 mg IntraMUSCular PRN Marilee Jim MD        dextrose 5 % solution  100 mL/hr IntraVENous PRN Marilee Jim MD          sodium chloride 70 mL/hr at 01/02/22 2312    dextrose      sodium chloride      dextrose         Physical Exam:  /62   Pulse 110   Temp 97.7 °F (36.5 °C) (Oral)   Resp 18   Ht 5' (1.524 m)   Wt 222 lb (100.7 kg)   SpO2 97%   BMI 43.36 kg/m²   Weight change: Wt Readings from Last 3 Encounters:   01/01/22 222 lb (100.7 kg)   11/01/21 222 lb (100.7 kg)   08/02/21 222 lb (100.7 kg)     General: Awake, alert, oriented x3, obese elderly woman laying in bed no acute distress  HEENT: Normocephalic and atraumatic, extraocular movements intact, pupils equal and round, moist mucus membranes, sclera anicteric  Neck: No JVD, no carotid bruits, no thyromegaly, no adenopathy  Cardiac: Regular rate and rhythm, normal S1 and physiologically split S2, no S3, no S4. Apical impulse is nondisplaced. No murmurs, no pericardial rubs, no clicks. Carotid upstrokes brisk. Respiratory: Clear bilaterally; no wheezes, no rales, no rhonchi. Unlabored respirations  Abdomen: Soft, nontender, nondistended, bowel sounds+, no hepatomegaly, no masses, no abdominal bruits  Extremities edema chronic venous stasis changes, no cyanosis, no clubbing. Skin: Intact, warm and dry, no rashes, no breakdown  Musculoskeletal: normal tone and strength in the upper and lower extremities bilaterally  Neuro: No focal deficits. Moves all extremities appropriately to command. Normal sensation in the upper and lower extremities bilaterally  Psychiatric: Cooperative, and normal affect    Intake/Output:    Intake/Output Summary (Last 24 hours) at 1/4/2022 1105  Last data filed at 1/4/2022 0549  Gross per 24 hour   Intake    Output 2200 ml   Net -2200 ml     No intake/output data recorded. Laboratory Tests:  Last 3 CBC:  Recent Labs     01/01/22  1626 01/02/22  0420 01/03/22  1008   WBC 10.3 10.3 8.5   RBC 4.46 4.04 4.18   HGB 14.1 12.8 13.4   HCT 42.5 38.2 38.8   MCV 95.3 94.6 92.8   MCH 31.6 31.7 32.1   MCHC 33.2 33.5 34.5   RDW 15.2* 15.3* 15.3*    199 221   MPV 9.7 9.6 9.9       Last 3 CMP:    Recent Labs     01/01/22  1626 01/01/22  1838 01/03/22  1008 01/03/22  1705 01/04/22  0715   *   < > 129* 126* 128*   K 7.2*   < > 4.1 4.6 4.3   CL 93*   < > 96* 93* 97*   CO2 22   < > 19* 19* 20*   BUN 37*   < > 28* 31* 25*   CREATININE 1.4*   < > 1.2* 1.3* 1.0   GLUCOSE 140*   < > 178* 130* 108*   CALCIUM 9.9   < > 8.8 9.0 8.7   PROT 8.1  --   --   --   --    LABALBU 3.7  --   --   --   --    BILITOT 1.0  --   --   --   --    ALKPHOS 102  --   --   --   --    AST 26  --   --   --   --    ALT 16  --   --   --   --     < > = values in this interval not displayed. Last 3 Mag/Phos:  Recent Labs     01/02/22  0420 01/03/22  1008 01/04/22  0715   MG 1.8 1.7 1.7   PHOS 3.5 3.3 2.7       Last 3 CK, CKMB, Troponin  No results for input(s): CKTOTAL, CKMB, TROPONINI in the last 72 hours.     Last 3 Pro-BNP:  Recent Labs     01/01/22  1626   PROBNP 2,652*     Lab Results   Component Value Date    PROBNP 2,652 (H) 01/01/2022       Last 3 Glucose:     Recent Labs     01/03/22  1008 01/03/22  1705 01/04/22  0715   GLUCOSE 178* 130* 108*       Last 3 Coags:  Recent Labs 01/01/22  1626 01/02/22  0420   PROTIME 34.0* 27.8*   INR 3.1 2.5     Lab Results   Component Value Date    PROTIME 27.8 01/02/2022    INR 2.5 01/02/2022       Last 3 Lipid Panel:  No results for input(s): LDLCALC, HDL, TRIG in the last 72 hours. Invalid input(s): CHLPL  No results found for: LDLCALC  No results found for: HDL  No results found for: TRIG  No components found for: CHLPL    TSH:  No results for input(s): TSH in the last 72 hours. No results found for: TSH    ABGs:  No results for input(s): PH, PO2, PCO2, HCO3, BE, O2SAT in the last 72 hours. Lactic Acid:  No results for input(s): LACTA in the last 72 hours. Radiology:  RAD Results:  XR CHEST PORTABLE   Final Result   Pulmonary vascular congestion. XR SHOULDER RIGHT (MIN 2 VIEWS)   Final Result   No acute abnormality. AC joint arthrosis. CT HEAD WO CONTRAST   Final Result   1. No acute intracranial abnormality. 2.  Prominent underlying signs of cerebral and cerebellar atrophy. 3.  High density within the left maxillary antrum with mucosal thickening,   suggestive of fungal sinusitis      RECOMMENDATIONS:   Unavailable         CT CERVICAL SPINE WO CONTRAST   Final Result   CERVICAL:      No acute fracture or traumatic malalignment. Degenerative changes result in likely moderate spinal stenosis at C5-6 and   multilevel likely high-grade neural foraminal stenosis. LUMBAR:      No evidence of vertebral compression. Degenerative changes result in likely mild to moderate multilevel spinal   stenosis and neural foraminal stenosis as detailed above. 3.2 cm x 2.5 cm saccular aneurysm of the infrarenal abdominal aorta. See   recommendation below. Colonic diverticulosis. Cholelithiasis. RECOMMENDATIONS:   3.2 cm infrarenal saccular abdominal aortic aneurysm. Recommend vascular   consultation. Reference: J Vasc Surg 7933;96:2-58.          CT LUMBAR SPINE WO CONTRAST   Final Result CERVICAL:      No acute fracture or traumatic malalignment. Degenerative changes result in likely moderate spinal stenosis at C5-6 and   multilevel likely high-grade neural foraminal stenosis. LUMBAR:      No evidence of vertebral compression. Degenerative changes result in likely mild to moderate multilevel spinal   stenosis and neural foraminal stenosis as detailed above. 3.2 cm x 2.5 cm saccular aneurysm of the infrarenal abdominal aorta. See   recommendation below. Colonic diverticulosis. Cholelithiasis. RECOMMENDATIONS:   3.2 cm infrarenal saccular abdominal aortic aneurysm. Recommend vascular   consultation. Reference: J Vasc Surg 2969;68:0-35. EKG and Telemetry:  12-lead EKG personally reviewed and shows underlying atrial fibrillation with ventricular pacing    Telemetry personally reviewed and shows not on telemetry        ASSESSMENT / PLAN:    1. Chronic atrial fibrillation on anticoagulation, has sinus node dysfunction as well with a backup pacemaker. Rate is controlled    2 permanent pacemaker working appropriately luckily, and about 7 years of battery life. 3 oral anticoagulation with warfarin    4  chronic venous stasis    5 chronic diastolic heart failure and right-sided heart failure with moderately elevated RVSP. BNP only mildly elevated. Diuresed 2.7L. Maybe difficult to find a happy medium with her diuretics at home -she admits to skipping and missing doses. Doesn't elevated feet much at home, states has no help.    6 hyperkalemia-too many adjustments recently. Holding potassium obviously, spironolactone at least temporarily. Renal being consult as well. Thank you for consultation.     Wilbur Odonnell MD, MD, [address] [place] [address] at [facility]    Electronically signed by Wilbur Odonnell MD on 1/4/2022 at 11:05 AM

## 2022-01-05 NOTE — DISCHARGE INSTR - COC
Continuity of Care Form    Patient Name: Evangelina Michaels   :  1935  MRN:  [id number]    Admit date:  2022  Discharge date:  2022    Code Status Order: Full Code   Advance Directives:      Admitting Physician:  Roderick Quinn MD  PCP: Alec Aguilar MD    Discharging Nurse: Dominga Menendez RN  [address] Unit/Room#: 6179/9754-O  Discharging Unit Phone Number: [phone]    Emergency Contact:   Extended Emergency Contact Information  Primary Emergency Contact: None,Per Pt  Home Phone: [phone]  Relation: Other    Past Surgical History:  Past Surgical History:   Procedure Laterality Date    PACEMAKER INSERTION  2019    PPM GENT CHANGE  ([facility])   DR. [address]       Immunization History:   Immunization History   Administered Date(s) Administered    COVID-19, AIDAN Coombs, 30mcg/0.3mL 2021, 2021       Active Problems:  Patient Active Problem List   Diagnosis Code    Venous stasis dermatitis of both lower extremities I87.2    Onychomycosis B35.1    Pain of toe of right foot M79.674    Pain of toe of left foot M79.675    PVD (peripheral vascular disease) (HCC) I73.9    Difficulty walking R26.2    Hyperkalemia E87.5    Hypertension I10    Atrial fibrillation (HCC) I48.91    Chronic diastolic heart failure (HCC) I50.32    Chronic right-sided heart failure (HCC) I50.812       Isolation/Infection:   Isolation            No Isolation          Patient Infection Status       Infection Onset Added Last Indicated Last Indicated By Review Planned Expiration Resolved Resolved By    None active    Resolved    COVID-19 (Rule Out) 22 COVID-19, Rapid (Ordered)   22 Rule-Out Test Resulted            Nurse Assessment:  Last Vital Signs: /63   Pulse 78   Temp 97.9 °F (36.6 °C)   Resp 16   Ht 5' (1.524 m)   Wt 222 lb (100.7 kg)   SpO2 97%   BMI 43.36 kg/m²     Last documented pain score (0-10 scale): Pain Level: 3  Last Weight:   Wt Readings from Last 1 Encounters:   01/01/22 222 lb (100.7 kg)     Mental Status:  oriented, alert, and coherent    IV Access:  - None    Nursing Mobility/ADLs:  Walking   Assisted  Transfer  Assisted  Bathing  Assisted  Dressing  Assisted  Toileting  Assisted  Feeding  [facility] Delivery   whole    Wound Care Documentation and Therapy:        Elimination:  Continence: Bowel: No, with timing  Bladder: No, with timing   Urinary Catheter: Removal Date 1/7/2022    Colostomy/Ileostomy/Ileal Conduit: No       Date of Last BM: 01/07/22    Intake/Output Summary (Last 24 hours) at 1/5/2022 8715  Last data filed at 1/5/2022 0434  Gross per 24 hour   Intake --   Output 3700 ml   Net -3700 ml     I/O last 3 completed shifts:  In: -   Out: 2650 [Urine:2650]    Safety Concerns:     History of Falls (last 30 days) and At Risk for Falls    Impairments/Disabilities:      None    Nutrition Therapy:  Current Nutrition Therapy:   - Low sodium and potassium    Routes of Feeding: Oral self feeds  Liquids: No Restrictions  Daily Fluid Restriction: no patient was on strict I/Os   Last Modified Barium Swallow with Video (Video Swallowing Test): not done    Treatments at the Time of Hospital Discharge:   Respiratory Treatments: n/a  Oxygen Therapy:  is not on home oxygen therapy.   Ventilator:    - No ventilator support    Rehab Therapies: No  Weight Bearing Status/Restrictions: No weight bearing restirctions  Other Medical Equipment (for information only, NOT a DME order):  walker  Other Treatments: n/a    Patient's personal belongings (please select all that are sent with patient):  Items with patient    RN SIGNATURE:  Electronically signed by Missy Nina RN on 1/7/22 at 1:28 PM EST    CASE MANAGEMENT/SOCIAL WORK SECTION        Readmission Risk Assessment Score:  Readmission Risk              Risk of Unplanned Readmission:  17           Discharging to Facility/ Agency   Name: [place]   Address:  Phone: [phone]  Fax:[phone]    Dialysis Facility (if applicable)   Name:  Address:  Dialysis Schedule:  Phone:  Fax:    / signature: Electronically signed by RANDALL Pruett on 1/7/2022 at 10:39 AM    PHYSICIAN SECTION    Prognosis: Good    Condition at Discharge: Stable    Rehab Potential (if transferring to Rehab): Good    Recommended Labs or Other Treatments After Discharge: BMP Q 3rd day, PT/INR    Physician Certification: I certify the above information and transfer of Maria Elena Zaldivar  is necessary for the continuing treatment of the diagnosis listed and that she requires [facility] for less 30 days.      Update Admission H&P: No change in H&P    PHYSICIAN SIGNATURE:  Electronically signed by Gal Stover MD on 1/5/22 at 7:01 AM EST

## 2022-01-05 NOTE — PROGRESS NOTES
[facility] [address] progress    Name: Joyce Olsen    Age: 81 y.o. Date of Admission: 1/1/2022  2:53 PM    Date of Service: 1/5/2022    Reason for Consultation: HF    Referring Physician: Dr Sarath Syde  Primary Care Physician: Anitha Goncalves MD    History of Present Illness: The patient is a 81y.o. year old female who follows with Dr. Taty Cordero in the office with hypertension, hyperlipidemia, non-insulin requiring diabetes mellitus, chronic atrial fibrillation and remains on lifelong anticoagulation therapy of daily dose adjusted Warfarin, sinus node dysfunction with placement of single-chamber [employer] permanent pacemaker in 2010 and rgenerator change June 4, 2019. Echocardiogram in 2012 revealed LVEF 45-50%, mild mitral regurgitation, moderate tricuspid regurgitation, RVSP 48 mmHg. Telephonic monitoring report of pacemaker completed 7/21 revealed normal functioning pacemaker and estimated remaining longevity 7 years. .    States she was doing well up until yesterday when she was trying to go to the bathroom but just collapsed due to weakness in her legs. She had no syncope, did not feel any chest pains or palpitations, no new shortness of breath. She states she recently had adjustments of her diuretics within the past week by PCP. States her diuretics were stopped except for Aldactone, she was still taking her potassium. She came with a potassium of 7. States she has been having problems with lower extremity edema and weeping of her legs recently. She was on furosemide, Bumex, metolazone and spironolactone but was having significant cramping last week, states she was taking spoonfuls of mustard to try to get the cramping to go away. adjustments were made, apparently stopped some on her own but continued on with her potassium, states she frequently has V8 juice and bananas as well during the day. 1/3/22:  Qiet night, no cramping- she blames the bumex.  Has diuresed 2+ L , K+ improved, Na+ still low. C/o right leg weeping. 1/4/22:  Up inchair no distress. Diuresed 5 L  K+ stable    1/5/22:  Up in chair this AM. Diuresed 8.7 L . Still states leg weeps some. Needed her mustard for some leg cramps. Past Medical History:   Past Medical History:   Diagnosis Date    Arthritis     Atrial fibrillation ([facility] [address]     Hyperlipidemia     Hypertension     Non-pressure chronic ulcer of other part of right lower leg with fat layer exposed ([facility] [address] 8/21/2020    Thyroid disease        Review of Systems:   Constitutional: No fever, chills, sweats  Cardiac: As per HPI  Pulmonary: No cough, wheeze, hemoptysis  HEENT: No visual disturbances, difficult swallowing  GI: No nausea, vomiting, diarrhea, abdominal pain, rectal bleeding  : No dysuria or hematuria  Endocrine: No excessive thirst, heat or cold intolerance. Musculoskeletal: No joint pain or muscle aches. No claudication, but frequent cramps, gets around with a walker. Skin: No skin breakdown or rashes  Neuro: No headache, confusion, or seizures  Psych: No depression, anxiety    Allergies:  No Known Allergies    Home Medications:  Prior to Admission medications    Medication Sig Start Date End Date Taking? Authorizing Provider   dilTIAZem (DILTIAZEM CD) 120 MG extended release capsule Take 120 mg by mouth daily   Yes Historical Provider, MD   spironolactone (ALDACTONE) 25 MG tablet Take 100 mg by mouth 2 times daily     Historical Provider, MD   metFORMIN (GLUCOPHAGE) 500 MG tablet Take 500 mg by mouth daily (with breakfast)     Historical Provider, MD   Multiple Vitamins-Minerals (VITAMIN D3 COMPLETE PO) Take 1 tablet by mouth daily    Historical Provider, MD   magnesium (MAGNESIUM-OXIDE) 250 MG TABS tablet Take 250 mg by mouth daily    Historical Provider, MD   triamcinolone (KENALOG) 0.1 % cream Apply topically 2 times daily.  1/27/20   Brown Werner DPM   metoprolol (LOPRESSOR) 100 MG tablet Take 200 mg by mouth 2 times daily     Historical Provider, MD   simvastatin (ZOCOR) 20 MG tablet Take 20 mg by mouth nightly    Historical Provider, MD   levothyroxine (SYNTHROID) 100 MCG tablet Take 125 mcg by mouth Daily     Historical Provider, MD   WARFARIN SODIUM PO Take 3 mg by mouth daily     Historical Provider, MD   potassium chloride (KLOR-CON) 20 MEQ packet Take 20 mEq by mouth 3 times daily     Historical Provider, MD   diltiazem (CARDIZEM) 120 MG tablet Take 120 mg by mouth daily    Historical Provider, MD   metolazone (ZAROXOLYN) 2.5 MG tablet Take 5 mg by mouth daily     Historical Provider, MD       Current Medications:  Current Facility-Administered Medications   Medication Dose Route Frequency Provider Last Rate Last Admin    metFORMIN (GLUCOPHAGE) tablet 500 mg  500 mg Oral Daily with breakfast Aracely Flores MD        warfarin (COUMADIN) tablet 4 mg  4 mg Oral Daily Aracely Flores MD   4 mg at 01/04/22 1822    dextrose 50 % IV solution  12.5 g IntraVENous PRN Corinda Katelyn, DO        dextrose 5 % solution  100 mL/hr IntraVENous PRN Jalen Zepeda DO        dilTIAZem (CARDIZEM CD) extended release capsule 120 mg  120 mg Oral Daily Roderick Quinn MD   120 mg at 01/04/22 9395    levothyroxine (SYNTHROID) tablet 125 mcg  125 mcg Oral Daily Roderick Quinn MD   125 mcg at 01/05/22 1385    metoprolol tartrate (LOPRESSOR) tablet 100 mg  100 mg Oral BID Roderick Quinn MD   100 mg at 01/04/22 2034    atorvastatin (LIPITOR) tablet 10 mg  10 mg Oral Daily Roderick Quinn MD   10 mg at 01/04/22 2034    sodium chloride flush 0.9 % injection 5-40 mL  5-40 mL IntraVENous 2 times per day Roderick Quinn MD   10 mL at 01/03/22 9701    sodium chloride flush 0.9 % injection 5-40 mL  5-40 mL IntraVENous PRN Roderick Quinn MD        0.9 % sodium chloride infusion  25 mL IntraVENous PRN Roderick Quinn MD        polyethylene glycol [facility]) packet 17 g  17 g Oral Daily PRN Roderick Quinn MD        acetaminophen (TYLENOL) tablet 650 mg 650 mg Oral Q6H PRN Marquise Mauricio MD   650 mg at 01/04/22 2042    Or    acetaminophen (TYLENOL) suppository 650 mg  650 mg Rectal Q6H PRN Marquise Mauricio MD        dextrose 50 % IV solution  12.5 g IntraVENous PRN Marquise Mauricio MD        dextrose 5 % solution  100 mL/hr IntraVENous PRN Marquise Mauricio MD          dextrose      sodium chloride      dextrose         Physical Exam:  BP (!) 119/49   Pulse 85   Temp 97.9 °F (36.6 °C)   Resp 16   Ht 5' (1.524 m)   Wt 222 lb (100.7 kg)   SpO2 96%   BMI 43.36 kg/m²   Weight change: Wt Readings from Last 3 Encounters:   01/01/22 222 lb (100.7 kg)   11/01/21 222 lb (100.7 kg)   08/02/21 222 lb (100.7 kg)     General: Awake, alert, oriented x3, obese elderly woman up in chair no acute distress  HEENT: Normocephalic and atraumatic, extraocular movements intact, pupils equal and round, moist mucus membranes, sclera anicteric  Neck: No JVD, no carotid bruits, no thyromegaly, no adenopathy  Cardiac: Regular rate and rhythm, normal S1 and physiologically split S2, no S3, no S4. Apical impulse is nondisplaced. No murmurs, no pericardial rubs, no clicks. Carotid upstrokes brisk. Respiratory: Clear bilaterally; no wheezes, no rales, no rhonchi. Unlabored respirations  Abdomen: Soft, nontender, nondistended, bowel sounds+, no hepatomegaly, no masses, no abdominal bruits  Extremities edema chronic venous stasis changes, no cyanosis, no clubbing. Wrinkles forming   Musculoskeletal: normal tone and strength in the upper and lower extremities bilaterally  Neuro: No focal deficits. Moves all extremities appropriately to command. Normal sensation in the upper and lower extremities bilaterally  Psychiatric: Cooperative, and normal affect    Intake/Output:    Intake/Output Summary (Last 24 hours) at 1/5/2022 0827  Last data filed at 1/5/2022 0434  Gross per 24 hour   Intake    Output 3700 ml   Net -3700 ml     No intake/output data recorded.     Laboratory Tests:  Last 3 CBC:  Recent Labs     01/03/22  1008 01/05/22  0455   WBC 8.5 8.3   RBC 4.18 4.09   HGB 13.4 13.1   HCT 38.8 38.0   MCV 92.8 92.9   MCH 32.1 32.0   MCHC 34.5 34.5   RDW 15.3* 15.3*    211   MPV 9.9 9.6       Last 3 CMP:    Recent Labs     01/04/22  0715 01/04/22  1550 01/05/22  0455   * 128* 130*   K 4.3 4.7 3.6   CL 97* 95* 92*   CO2 20* 21* 22   BUN 25* 29* 33*   CREATININE 1.0 1.2* 1.2*   GLUCOSE 108* 123* 95   CALCIUM 8.7 8.9 9.1       Last 3 Mag/Phos:  Recent Labs     01/03/22  1008 01/04/22  0715   MG 1.7 1.7   PHOS 3.3 2.7       Last 3 CK, CKMB, Troponin  No results for input(s): CKTOTAL, CKMB, TROPONINI in the last 72 hours. Last 3 Pro-BNP:  No results for input(s): PROBNP in the last 72 hours. Lab Results   Component Value Date    PROBNP 2,652 (H) 01/01/2022       Last 3 Glucose:     Recent Labs     01/04/22  0715 01/04/22  1550 01/05/22  0455   GLUCOSE 108* 123* 95       Last 3 Coags:  Recent Labs     01/04/22  1550 01/05/22  0455   PROTIME 20.5* 21.8*   INR 1.9 2.0     Lab Results   Component Value Date    PROTIME 21.8 01/05/2022    INR 2.0 01/05/2022       Last 3 Lipid Panel:  No results for input(s): LDLCALC, HDL, TRIG in the last 72 hours. Invalid input(s): CHLPL  No results found for: LDLCALC  No results found for: HDL  No results found for: TRIG  No components found for: CHLPL    TSH:  No results for input(s): TSH in the last 72 hours. No results found for: TSH    ABGs:  No results for input(s): PH, PO2, PCO2, HCO3, BE, O2SAT in the last 72 hours. Lactic Acid:  No results for input(s): LACTA in the last 72 hours. Radiology:  RAD Results:  XR CHEST PORTABLE   Final Result   Pulmonary vascular congestion. XR SHOULDER RIGHT (MIN 2 VIEWS)   Final Result   No acute abnormality. AC joint arthrosis. CT HEAD WO CONTRAST   Final Result   1. No acute intracranial abnormality. 2.  Prominent underlying signs of cerebral and cerebellar atrophy.       3.  High density within the left maxillary antrum with mucosal thickening,   suggestive of fungal sinusitis      RECOMMENDATIONS:   Unavailable         CT CERVICAL SPINE WO CONTRAST   Final Result   CERVICAL:      No acute fracture or traumatic malalignment. Degenerative changes result in likely moderate spinal stenosis at C5-6 and   multilevel likely high-grade neural foraminal stenosis. LUMBAR:      No evidence of vertebral compression. Degenerative changes result in likely mild to moderate multilevel spinal   stenosis and neural foraminal stenosis as detailed above. 3.2 cm x 2.5 cm saccular aneurysm of the infrarenal abdominal aorta. See   recommendation below. Colonic diverticulosis. Cholelithiasis. RECOMMENDATIONS:   3.2 cm infrarenal saccular abdominal aortic aneurysm. Recommend vascular   consultation. Reference: J Vasc Surg 5050;09:1-66. CT LUMBAR SPINE WO CONTRAST   Final Result   CERVICAL:      No acute fracture or traumatic malalignment. Degenerative changes result in likely moderate spinal stenosis at C5-6 and   multilevel likely high-grade neural foraminal stenosis. LUMBAR:      No evidence of vertebral compression. Degenerative changes result in likely mild to moderate multilevel spinal   stenosis and neural foraminal stenosis as detailed above. 3.2 cm x 2.5 cm saccular aneurysm of the infrarenal abdominal aorta. See   recommendation below. Colonic diverticulosis. Cholelithiasis. RECOMMENDATIONS:   3.2 cm infrarenal saccular abdominal aortic aneurysm. Recommend vascular   consultation. Reference: J Vasc Surg 0482;56:6-73. EKG and Telemetry:  12-lead EKG personally reviewed and shows underlying atrial fibrillation with ventricular pacing    Telemetry personally reviewed and shows not on telemetry        ASSESSMENT / PLAN:    1.  Chronic atrial fibrillation on anticoagulation, has sinus node dysfunction as well with a backup pacemaker. Rate is controlled    2 permanent pacemaker working appropriately luckily, and about 7 years of battery life. 3 oral anticoagulation with warfarin    4  chronic venous stasis    5 chronic diastolic heart failure and right-sided heart failure with moderately elevated RVSP. BNP only mildly elevated. Diuresed 8.7L. Maybe difficult to find a happy medium with her diuretics at home -she admits to skipping and missing doses. Doesn't elevated feet much at home, states has no help. Renal adjusting diuretics. Overall improved    6 hyperkalemia-/hyponatremia- improved    Thank you for consultation.     Aria Wallace MD, MD, [address] [place] [address] at [facility]    Electronically signed by Aria Wallace MD on 1/5/2022 at 8:27 AM

## 2022-01-05 NOTE — PROGRESS NOTES
Physical Therapy    Facility/Department: [address] ORTHO SURGERY  Treatment Note    NAME: Live Baum  : 1935  MRN: [id number]    Date of Service: 2022               Patient Diagnosis(es): The primary encounter diagnosis was Hyperkalemia. A diagnosis of Fall, initial encounter was also pertinent to this visit. has a past medical history of Arthritis, Atrial fibrillation ([facility] [address] Hyperlipidemia, Hypertension, Non-pressure chronic ulcer of other part of right lower leg with fat layer exposed ([facility] [address] and Thyroid disease. has a past surgical history that includes Pacemaker insertion (2019). Evaluating Therapist: Jason Fisher PT     Referring Provider:  Chris Cardona MD    PT order : PT eval and treat     Room #:  704 B  DIAGNOSIS:  Hyperkalemia . Fall at home   PRECAUTIONS: falls     Social:  Pt lives alone  in a  1  floor plan with level entry   Prior to admission pt walked with  ww      Initial Evaluation  Date:  1/3/2022 Treatment Date: 2022      Short Term/ Long Term   Goals   Was pt agreeable to Eval/treatment?  yes  Yes     Does pt have pain? None reported  No c/o pain    Bed Mobility  Rolling:  NT  Supine to sit:  NT   Sit to supine:  NT   Scooting:  SBA in sit   Pt in chair upon arrival  NT, up in chair  SBA    Transfers Sit to stand:  Min assist   Stand to sit:  Min assist   Stand pivot:  NT  Sit to stand: Mod to Min A   Stand to sit: Min A   Stand pivot:  Min A  SBA    Ambulation     70  feet with  ww  with  CGA  50 feet with [employer] with Min A  150  feet with  ww  with  SBA        Stair negotiation: ascended and descended NT  NT  2  steps with  1  rail with  CGA    LE ROM  Decreased throughout R hip, distally WFL, L LE WFL        LE strength  3- to 4-/ 5    4- to 4/ 5    AM- PAC RAW score           Pt is alert and Oriented x  3      Balance:  Min to CGA with [employer]      ASSESSMENT  Pt displays functional ability as noted in the objective portion of this evaluation. Conditions Requiring Skilled Therapeutic Intervention:    [x]Decreased strength     []Decreased ROM  [x]Decreased functional mobility  [x]Decreased balance   [x]Decreased endurance   []Decreased posture  []Decreased sensation  []Decreased coordination   []Decreased vision  [x]Decreased safety awareness   []Increased pain       Treatment/Education:    Gait training: verbal cues for stride length, walker approximation for safety. Transfer training: verbal cues for hand placement and posture with standing. Exercise: verbal cues for proper technique with LAQs, APs 10x ea, STS x 8    Pt educated on fall risk, safety with mobility, walker safety/approximation, exercise       Patient response to education:   Pt verbalized understanding Pt demonstrated skill Pt requires further education in this area   x  needs cues   x       Comments:  Pt found seated in the bedside chair and voiced concerns with personal walker being lost in the ED. OT informed charge nurse of missing walker and informed pt security will try to find. Pt required increased assist with STS task initially, but with repetition less assist needed. Pt with forward flexed posture and cues given with fair carryover. Pt with slow gait speed, short stride length, and mildly unsteady on her feet. Pt returned to seated in the bedside chair for exercises. Call light and tray table in reach.       PLAN OF CARE:    Current Treatment Recommendations     [x] Strengthening to improve independence with functional mobility   [] ROM to improve independence with functional mobility   [x] Balance Training to improve static/dynamic balance and to reduce fall risk  [x] Endurance Training to improve activity tolerance during functional mobility   [x] Transfer Training to improve safety and independence with all functional transfers   [x] Gait Training to improve gait mechanics, endurance and assess need for appropriate assistive device  [] Stair Training in preparation for safe discharge home and/or into the community   [x] Positioning to prevent skin breakdown and contractures  [x] Safety and Education Training   [x] Patient/Caregiver Education   [] HEP  [] Other     Pt is making fair progress toward established physical therapy goals. Continue with current plan of care.     Time in:  4903  Time out: 0915      CPT codes:  [] Low Complexity PT evaluation 26607  [] Moderate Complexity PT evaluation 50700  [] High Complexity PT evaluation 17914  [] PT Re-evaluation 25278  [] Gait training 69350  minutes  [x] Therapeutic activities 24346  23 minutes  [] Therapeutic exercises 28708  minutes  [] Neuromuscular reeducation 73368  minutes       Josias Masters, PT, DPT  License [id number]

## 2022-01-05 NOTE — PROGRESS NOTES
[facility] MD Lenora Jarvis MD Tammy Harman, MD Trey Moh, MD Rakesh Whipple, CNP   Ann-Marie Soto, NIDIA  Progress Note    1/5/2022    SUBJECTIVE:   1/3: Seen this afternoon. \"I feel like sh%$.\"  Bilateral lower extremity cramping. Fatigue, malaise, anorexia. Not sleeping well. Abdominal discomfort. Denies dyspnea at rest supine, no chest pain or palpitations  Appetite ok  1/4: Seen this afternoon. No dyspnea at rest on room air. Ongoing lower extremity and abdominal pannus edema. Feet dependent. 1/5: Feeling little better. Swelling improving. Making a small effort to keep her feet elevated above the floor while she is sitting in the chair. PROBLEM LIST:    Active Problems:    Venous stasis dermatitis of both lower extremities    Difficulty walking    Hyperkalemia    Hypertension    Atrial fibrillation (HCC)    Chronic diastolic heart failure (HCC)    Chronic right-sided heart failure (HCC)  Resolved Problems:    * No resolved hospital problems. *         DIET:    ADULT DIET; Regular; 4 carb choices (60 gm/meal);  Low Potassium (Less than 3000 mg/day); 1500 ml     MEDS (scheduled):    metFORMIN  500 mg Oral Daily with breakfast    bumetanide  1 mg IntraVENous Q12H    warfarin  4 mg Oral Daily    dilTIAZem  120 mg Oral Daily    levothyroxine  125 mcg Oral Daily    metoprolol  100 mg Oral BID    atorvastatin  10 mg Oral Daily    sodium chloride flush  5-40 mL IntraVENous 2 times per day       MEDS (infusions):   dextrose      sodium chloride      dextrose         MEDS (prn):  dextrose, dextrose, sodium chloride flush, sodium chloride, polyethylene glycol, acetaminophen **OR** acetaminophen, dextrose, dextrose    PHYSICAL EXAM:     Patient Vitals for the past 24 hrs:   BP Temp Temp src Pulse Resp SpO2   01/05/22 1212 (!) 99/59 97.6 °F (36.4 °C) Oral 90 16 97 %   01/05/22 0749 (!) 119/49   85  96 %   01/05/22 0429 110/63 97.9 °F (36.6 °C)  78 16 97 %   01/04/22 2340 120/67   80  98 %   01/04/22 2023 115/69 97.5 °F (36.4 °C) Oral 85 16 98 %   @      Intake/Output Summary (Last 24 hours) at 1/5/2022 1629  Last data filed at 1/5/2022 0434  Gross per 24 hour   Intake    Output 3250 ml   Net -3250 ml         Wt Readings from Last 3 Encounters:   01/01/22 222 lb (100.7 kg)   11/01/21 222 lb (100.7 kg)   08/02/21 222 lb (100.7 kg)       Constitutional:  in no acute distress  HEENT: NC/AT, EOMI, sclera and conjunctiva are clear and anicteric, mucus membranes moist  Neck: Trachea midline, no JVD  Cardiovascular: S1, S2 regular rhythm, no murmur,or rub  Respiratory: Few scattered crackles mostly inferiorly, no wheeze  Gastrointestinal:  Soft, nontender, nondistended, NABS  Ext: 1+ lower extremity and dependent edema, feet warm  Skin: dry, no rash  Neuro: awake, alert, interactive      DATA:    Recent Labs     01/03/22  1008 01/05/22  0455   WBC 8.5 8.3   HGB 13.4 13.1   HCT 38.8 38.0   MCV 92.8 92.9    211     Recent Labs     01/03/22  1008 01/03/22  1705 01/04/22  0715 01/04/22  1550 01/05/22  0455   *   < > 128* 128* 130*   K 4.1   < > 4.3 4.7 3.6   CL 96*   < > 97* 95* 92*   CO2 19*   < > 20* 21* 22   MG 1.7  --  1.7  --   --    PHOS 3.3  --  2.7  --   --    BUN 28*   < > 25* 29* 33*   CREATININE 1.2*   < > 1.0 1.2* 1.2*    < > = values in this interval not displayed. Lab Results   Component Value Date    LABPROT 0.2 01/02/2022    LABPROT 0.2 01/02/2022    LABPROT 0.4 (H) 01/02/2022    LABPROT 0.4 01/02/2022       ASSESSMENT     1. Hyperkalemia, secondary to combination of potassium supplementation, having on her own stopped the diuretic therapy for which the potassium supplementation was ordered in the first place. In addition, she eats at least a banana a day, and while it seems unlikely that V8/tomato juice combination will fit into a low-sodium diet, she also drinks this on a daily basis, as well.   Status post \"K cocktail\" and Lokelma x2 doses potassium has normalized     2. Hyponatremia hypervolemic, though in fact she may be intravascularly contracted, volume status is a bit difficult to interpret though she is certainly total body water overloaded given the degree of her peripheral edema. Urinary studies sent last evening were sent not long after she received IV Lasix bolus, so are of little clinical utility.   Sodium improved somewhat since her arrival    RECOMMENDATIONS  Continue low potassium diet  Bumex IV q. every 12 hours  Evaluate in early a.m., resume regular diuretic therapy starting tomorrow, probably IV x 1-2 days then transition to p.o.  Klor-Con 40 mEq p.o. x1  Hold spironolactone --consider resuming in the next 1 to 2 days  Follow labs, UO  Supportive care      Electronically signed by Rubén Tripp MD on 1/5/2022

## 2022-01-05 NOTE — PROGRESS NOTES
[facility] MD Margarito Craven MD Jessie Sar, MD Adelene Blow, MD Dorris Pao, CNP   Ann-Marie Soto, NIDIA  Progress Note    1/4/2022    SUBJECTIVE:   1/3: Seen this afternoon. \"I feel like sh%$.\"  Bilateral lower extremity cramping. Fatigue, malaise, anorexia. Not sleeping well. Abdominal discomfort. Denies dyspnea at rest supine, no chest pain or palpitations  Appetite ok  1/4: Seen this afternoon. No dyspnea at rest on room air. Ongoing lower extremity and abdominal pannus edema. Feet dependent. PROBLEM LIST:    Active Problems:    Venous stasis dermatitis of both lower extremities    Difficulty walking    Hyperkalemia    Hypertension    Atrial fibrillation (HCC)    Chronic diastolic heart failure (HCC)    Chronic right-sided heart failure (HCC)  Resolved Problems:    * No resolved hospital problems. *         DIET:    ADULT DIET; Regular; 4 carb choices (60 gm/meal);  Low Potassium (Less than 3000 mg/day)     MEDS (scheduled):    bumetanide  1 mg IntraVENous Q8H    warfarin  4 mg Oral Daily    dilTIAZem  120 mg Oral Daily    levothyroxine  125 mcg Oral Daily    metoprolol  100 mg Oral BID    atorvastatin  10 mg Oral Daily    sodium chloride flush  5-40 mL IntraVENous 2 times per day    insulin lispro  0-6 Units SubCUTAneous TID WC    insulin lispro  0-3 Units SubCUTAneous Nightly       MEDS (infusions):   dextrose      sodium chloride      dextrose         MEDS (prn):  glucose, dextrose, glucagon (rDNA), dextrose, sodium chloride flush, sodium chloride, polyethylene glycol, acetaminophen **OR** acetaminophen, glucose, dextrose, glucagon (rDNA), dextrose    PHYSICAL EXAM:     Patient Vitals for the past 24 hrs:   BP Temp Temp src Pulse Resp SpO2   01/04/22 2023 115/69 97.5 °F (36.4 °C) Oral 85 16 98 %   01/04/22 1611 124/79 98 °F (36.7 °C) Oral 83 18 99 %   01/04/22 1216 112/74 98.4 °F (36.9 °C) Temporal 86 18 95 %   01/04/22 0830 106/62 97.7 °F (36.5 °C) Oral 110 18 97 %   01/04/22 0347 102/65   80 18 96 %   01/03/22 2354 114/74 97.5 °F (36.4 °C)  91 16 98 %   @      Intake/Output Summary (Last 24 hours) at 1/4/2022 2122  Last data filed at 1/4/2022 1504  Gross per 24 hour   Intake    Output 1850 ml   Net -1850 ml         Wt Readings from Last 3 Encounters:   01/01/22 222 lb (100.7 kg)   11/01/21 222 lb (100.7 kg)   08/02/21 222 lb (100.7 kg)       Constitutional:  in no acute distress  HEENT: NC/AT, EOMI, sclera and conjunctiva are clear and anicteric, mucus membranes moist  Neck: Trachea midline, no JVD  Cardiovascular: S1, S2 regular rhythm, no murmur,or rub  Respiratory: Few scattered crackles mostly inferiorly, no wheeze  Gastrointestinal:  Soft, nontender, nondistended, NABS  Ext: 1+ lower extremity and dependent edema, feet warm  Skin: dry, no rash  Neuro: awake, alert, interactive      DATA:    Recent Labs     01/02/22  0420 01/03/22  1008   WBC 10.3 8.5   HGB 12.8 13.4   HCT 38.2 38.8   MCV 94.6 92.8    221     Recent Labs     01/02/22  0420 01/02/22  1222 01/03/22  1008 01/03/22  1008 01/03/22  1705 01/04/22  0715 01/04/22  1550   *   < > 129*   < > 126* 128* 128*   K 5.0   < > 4.1   < > 4.6 4.3 4.7   CL 95*   < > 96*   < > 93* 97* 95*   CO2 19*   < > 19*   < > 19* 20* 21*   MG 1.8  --  1.7  --   --  1.7  --    PHOS 3.5  --  3.3  --   --  2.7  --    BUN 37*   < > 28*   < > 31* 25* 29*   CREATININE 1.4*   < > 1.2*   < > 1.3* 1.0 1.2*    < > = values in this interval not displayed. Lab Results   Component Value Date    LABPROT 0.2 01/02/2022    LABPROT 0.2 01/02/2022    LABPROT 0.4 (H) 01/02/2022    LABPROT 0.4 01/02/2022       ASSESSMENT / RECOMMENDATIONS:    1. Hyperkalemia, secondary to combination of potassium supplementation, having on her own stopped the diuretic therapy for which the potassium supplementation was ordered in the first place.   In addition, she eats at least a banana a day, and while it seems unlikely that V8/tomato juice combination will fit into a low-sodium diet, she also drinks this on a daily basis, as well. Status post \"K cocktail\" and Lokelma x2 doses potassium has normalized     2. Hyponatremia hypervolemic, though in fact she may be intravascularly contracted, volume status is a bit difficult to interpret though she is certainly total body water overloaded given the degree of her peripheral edema. Urinary studies sent last evening were sent not long after she received IV Lasix bolus, so are of little clinical utility. Sodium improved somewhat since her arrival        Urine studies were ordered, collected, and sent to the lab this early afternoon, though sodium, chloride and osmolality were not actually processed by the lab. All 3 would be useful bits of data. I have spoken with bedside RN, presumably lab will be working on the studies shortly.        Continue low potassium diet  Repeat BMP this evening (done)  Bumex IV q. 8 hours x 2  Evaluate in early a.m., resume regular diuretic therapy starting tomorrow, probably IV x 1-2 days then transition to p.o.   Hold potassium supplementation  Hold spironolactone  Hold diuretic therapy  Follow labs, UO  Supportive care      Electronically signed by Alec Abrams MD on 1/4/2022 at 9:22 PM

## 2022-01-05 NOTE — PROGRESS NOTES
Subjective:  C/o cramping with diuresis    The patient is awake and alert. No problems overnight. Denies chest pain, angina, and dyspnea. Denies abdominal pain. Tolerating diet. No nausea or vomiting. Objective:   Continues with excellent diuretic response, Na+ and renal stability. /63   Pulse 78   Temp 97.9 °F (36.6 °C)   Resp 16   Ht 5' (1.524 m)   Wt 222 lb (100.7 kg)   SpO2 97%   BMI 43.36 kg/m²     Heart:  IRRR, no murmurs, gallops, or rubs. Lungs:  CTA bilaterally, no wheeze, rales or rhonchi  Abd: bowel sounds present, nontender, nondistended, no masses  Extrem:  No clubbing, cyanosis. Less tense with chronic changes    CBC:   Lab Results   Component Value Date    WBC 8.3 01/05/2022    RBC 4.09 01/05/2022    HGB 13.1 01/05/2022    HCT 38.0 01/05/2022    MCV 92.9 01/05/2022    MCH 32.0 01/05/2022    MCHC 34.5 01/05/2022    RDW 15.3 01/05/2022     01/05/2022    MPV 9.6 01/05/2022     BMP:    Lab Results   Component Value Date     01/05/2022    K 3.6 01/05/2022    K 5.0 01/02/2022    CL 92 01/05/2022    CO2 22 01/05/2022    BUN 33 01/05/2022    LABALBU 3.7 01/01/2022    CREATININE 1.2 01/05/2022    CALCIUM 9.1 01/05/2022    GFRAA 52 01/05/2022    LABGLOM 43 01/05/2022    GLUCOSE 95 01/05/2022        Assessment:    Patient Active Problem List   Diagnosis    Venous stasis dermatitis of both lower extremities    Onychomycosis    Pain of toe of right foot    Pain of toe of left foot    PVD (peripheral vascular disease) ([address]    Difficulty walking    Hyperkalemia    Hypertension    Atrial fibrillation (HCC)    Chronic diastolic heart failure (HCC)    Chronic right-sided heart failure ([address]       Plan:  Present tx. DC planning in progress. To unsteady/weak for home. Needs rehab.           Zahra Walters MD  6:54 AM  1/5/2022

## 2022-01-05 NOTE — PLAN OF CARE
Problem: Falls - Risk of:  Goal: Will remain free from falls  Description: Will remain free from falls  1/5/2022 0013 by Benita Shepard RN  Outcome: Met This Shift  1/4/2022 1322 by Lang Gibson RN  Outcome: Met This Shift  Goal: Absence of physical injury  Description: Absence of physical injury  1/5/2022 0013 by Benita Shepard RN  Outcome: Met This Shift  1/4/2022 1322 by Lang Gibson RN  Outcome: Met This Shift     Problem: Pain:  Goal: Pain level will decrease  Description: Pain level will decrease  Outcome: Met This Shift  Goal: Control of acute pain  Description: Control of acute pain  Outcome: Met This Shift  Goal: Control of chronic pain  Description: Control of chronic pain  Outcome: Met This Shift     Problem: Skin Integrity:  Goal: Will show no infection signs and symptoms  Description: Will show no infection signs and symptoms  Outcome: Met This Shift  Goal: Absence of new skin breakdown  Description: Absence of new skin breakdown  1/5/2022 0013 by Benita Shepard RN  Outcome: Met This Shift  1/4/2022 1322 by Lang Gibson RN  Outcome: Met This Shift

## 2022-01-06 NOTE — PROGRESS NOTES
Subjective: The patient is awake and alert. No problems overnight. Denies chest pain, angina, and dyspnea. Denies abdominal pain. Tolerating diet. No nausea or vomiting. Objective: Therapeutic diuresis in progress with maintenance of renal fxn and electrolytes. Better BP and rate control continue. DM control adequate. /63   Pulse 92   Temp 97.9 °F (36.6 °C)   Resp 16   Ht 5' (1.524 m)   Wt 222 lb (100.7 kg)   SpO2 97%   BMI 43.36 kg/m²     Heart:  IRRR, no murmurs, gallops, or rubs. Lungs:  CTA bilaterally, no wheeze, rales or rhonchi  Abd: bowel sounds present, nontender, nondistended, no masses  Extrem:  No clubbing, cyanosis. Hard chronic venous insufficiency skin changes. Oral mucosa nl  Trach midline    CBC:   Lab Results   Component Value Date    WBC 8.3 01/05/2022    RBC 4.09 01/05/2022    HGB 13.1 01/05/2022    HCT 38.0 01/05/2022    MCV 92.9 01/05/2022    MCH 32.0 01/05/2022    MCHC 34.5 01/05/2022    RDW 15.3 01/05/2022     01/05/2022    MPV 9.6 01/05/2022     BMP:    Lab Results   Component Value Date     01/06/2022    K 4.4 01/06/2022    K 5.0 01/02/2022    CL 91 01/06/2022    CO2 23 01/06/2022    BUN 42 01/06/2022    LABALBU 3.7 01/01/2022    CREATININE 1.3 01/06/2022    CALCIUM 9.0 01/06/2022    GFRAA 47 01/06/2022    LABGLOM 39 01/06/2022    GLUCOSE 120 01/06/2022        Assessment:    Patient Active Problem List   Diagnosis    Venous stasis dermatitis of both lower extremities    Onychomycosis    Pain of toe of right foot    Pain of toe of left foot    PVD (peripheral vascular disease) ([address]    Difficulty walking    Hyperkalemia    Hypertension    Atrial fibrillation (HCC)    Chronic diastolic heart failure (HCC)    Chronic right-sided heart failure ([address]       Plan:  Present tx. Soon to transition to po diuretics.           Francisca Rosas MD  7:13 AM  1/6/2022

## 2022-01-06 NOTE — PROGRESS NOTES
Evaluation:   Behavioral-Environmental Outcomes:  None Identified   Food/Nutrient Intake Outcomes:  Food and Nutrient Intake  Physical Signs/Symptoms Outcomes:  Biochemical Data,Fluid Status or Edema,Nutrition Focused Physical Findings,Skin,Weight     Discharge Planning:    Continue current diet     Electronically signed by Annia Ruiz RD, LD on 1/6/22 at 2:39 PM EST    Contact: [phone]

## 2022-01-06 NOTE — PROGRESS NOTES
[facility] MD Rain Muñoz MD Thurlow Lederer, MD Ty Dunk, MD Libby Collier, NIDIA Srivastava  Progress Note    1/6/2022    SUBJECTIVE:   1/3: Seen this afternoon. \"I feel like sh%$.\"  Bilateral lower extremity cramping. Fatigue, malaise, anorexia. Not sleeping well. Abdominal discomfort. Denies dyspnea at rest supine, no chest pain or palpitations  Appetite ok  1/4: Seen this afternoon. No dyspnea at rest on room air. Ongoing lower extremity and abdominal pannus edema. Feet dependent. 1/5: Feeling little better. Swelling improving. Making a small effort to keep her feet elevated above the floor while she is sitting in the chair. 1/6:  No new complaint or issue. Sitting in chair resting comfortably. Feet are actually a little elevated. Swelling little better. PROBLEM LIST:    Active Problems:    Venous stasis dermatitis of both lower extremities    Difficulty walking    Hyperkalemia    Hypertension    Atrial fibrillation (HCC)    Chronic diastolic heart failure (HCC)    Chronic right-sided heart failure (HCC)  Resolved Problems:    * No resolved hospital problems. *         DIET:    ADULT DIET; Regular; 4 carb choices (60 gm/meal);  Low Potassium (Less than 3000 mg/day); 1500 ml     MEDS (scheduled):    metFORMIN  500 mg Oral Daily with breakfast    warfarin  4 mg Oral Daily    dilTIAZem  120 mg Oral Daily    levothyroxine  125 mcg Oral Daily    metoprolol  100 mg Oral BID    atorvastatin  10 mg Oral Daily    sodium chloride flush  5-40 mL IntraVENous 2 times per day       MEDS (infusions):   dextrose      sodium chloride      dextrose         MEDS (prn):  dextrose, dextrose, sodium chloride flush, sodium chloride, polyethylene glycol, acetaminophen **OR** acetaminophen, dextrose, dextrose    PHYSICAL EXAM:     Patient Vitals for the past 24 hrs:   BP Temp Temp src Pulse Resp SpO2 Height Weight   01/06/22 1608 (!) 117/56 97.7 °F (36.5 °C) Oral 86 18 98 %     01/06/22 1435       5' (1.524 m)    01/06/22 1228 129/86 97.5 °F (36.4 °C) Oral 93 16 99 %     01/06/22 0832        203 lb (92.1 kg)   01/06/22 0349 113/63 97.9 °F (36.6 °C)  92 16 97 %     01/05/22 2321 121/78 97.5 °F (36.4 °C) Oral 102 17 97 %     01/05/22 1900 119/81 97.5 °F (36.4 °C) Oral 110 17 98 %     @      Intake/Output Summary (Last 24 hours) at 1/6/2022 1756  Last data filed at 1/6/2022 6195  Gross per 24 hour   Intake    Output 1150 ml   Net -1150 ml         Wt Readings from Last 3 Encounters:   01/06/22 203 lb (92.1 kg)   11/01/21 222 lb (100.7 kg)   08/02/21 222 lb (100.7 kg)       Constitutional:  in no acute distress  HEENT: NC/AT, EOMI, sclera and conjunctiva are clear and anicteric, mucus membranes moist  Neck: Trachea midline, no JVD  Cardiovascular: S1, S2 regular rhythm, no murmur,or rub  Respiratory: Few scattered crackles mostly inferiorly, no wheeze  Gastrointestinal:  Soft, nontender, nondistended, NABS  Ext: 1+ lower extremity and dependent edema, feet warm  Skin: dry, no rash  Neuro: awake, alert, interactive      DATA:    Recent Labs     01/05/22  0455   WBC 8.3   HGB 13.1   HCT 38.0   MCV 92.9        Recent Labs     01/04/22  0715 01/04/22  0715 01/04/22  1550 01/05/22  0455 01/06/22  0355   *   < > 128* 130* 128*   K 4.3   < > 4.7 3.6 4.4   CL 97*   < > 95* 92* 91*   CO2 20*   < > 21* 22 23   MG 1.7  --   --   --   --    PHOS 2.7  --   --   --   --    BUN 25*   < > 29* 33* 42*   CREATININE 1.0   < > 1.2* 1.2* 1.3*    < > = values in this interval not displayed. Lab Results   Component Value Date    LABPROT 0.2 01/02/2022    LABPROT 0.2 01/02/2022    LABPROT 0.4 (H) 01/02/2022    LABPROT 0.4 01/02/2022       ASSESSMENT     1.   Hyperkalemia, secondary to combination of potassium supplementation, having on her own stopped the diuretic therapy for which the potassium supplementation was ordered in the first place.  In addition, she eats at least a banana a day, and while it seems unlikely that V8/tomato juice combination will fit into a low-sodium diet, she also drinks this on a daily basis, as well. Status post \"K cocktail\" and Lokelma x2 doses potassium has normalized     2. Hyponatremia hypervolemic, though in fact she may be intravascularly contracted, volume status is a bit difficult to interpret though she is certainly total body water overloaded given the degree of her peripheral edema. Urinary studies sent last evening were sent not long after she received IV Lasix bolus, so are of little clinical utility.   Sodium improved somewhat since her arrival      BUN and creatinine creeping up over the past 3 days    RECOMMENDATIONS  Continue low potassium diet   stop IV Bumex, start Bumex p.o. 1 mg b.i.d.   resume spironolactone   Follow labs, UO  Supportive care      Electronically signed by Rosita Rai MD on 1/6/2022

## 2022-01-06 NOTE — PROGRESS NOTES
[facility] [address] progress    Name: Angelina Elizondo    Age: 81 y.o. Date of Admission: 1/1/2022  2:53 PM    Date of Service: 1/6/2022    Reason for Consultation: HF    Referring Physician: Dr Quinn Huff  Primary Care Physician: Char Kelley MD    History of Present Illness: The patient is a 81y.o. year old female who follows with Dr. Man Baires in the office with hypertension, hyperlipidemia, non-insulin requiring diabetes mellitus, chronic atrial fibrillation and remains on lifelong anticoagulation therapy of daily dose adjusted Warfarin, sinus node dysfunction with placement of single-chamber [employer] permanent pacemaker in 2010 and rgenerator change June 4, 2019. Echocardiogram in 2012 revealed LVEF 45-50%, mild mitral regurgitation, moderate tricuspid regurgitation, RVSP 48 mmHg. Telephonic monitoring report of pacemaker completed 7/21 revealed normal functioning pacemaker and estimated remaining longevity 7 years. .    States she was doing well up until yesterday when she was trying to go to the bathroom but just collapsed due to weakness in her legs. She had no syncope, did not feel any chest pains or palpitations, no new shortness of breath. She states she recently had adjustments of her diuretics within the past week by PCP. States her diuretics were stopped except for Aldactone, she was still taking her potassium. She came with a potassium of 7. States she has been having problems with lower extremity edema and weeping of her legs recently. She was on furosemide, Bumex, metolazone and spironolactone but was having significant cramping last week, states she was taking spoonfuls of mustard to try to get the cramping to go away. adjustments were made, apparently stopped some on her own but continued on with her potassium, states she frequently has V8 juice and bananas as well during the day. 1/3/22:  Qiet night, no cramping- she blames the bumex.  Has diuresed 2+ L , K+ improved, Na+ still low. C/o right leg weeping. 1/4/22:  Up inchair no distress. Diuresed 5 L  K+ stable    1/5/22:  Up in chair this AM. Diuresed 8.7 L . Still states leg weeps some. Needed her mustard for some leg cramps. 1/6/22:  No new complaints- weight 222>203 after >10 L diuresis. Still c/o of some weeping. States seamus krause thinks she needs rehab. Past Medical History:   Past Medical History:   Diagnosis Date    Arthritis     Atrial fibrillation ([facility] [address]     Hyperlipidemia     Hypertension     Non-pressure chronic ulcer of other part of right lower leg with fat layer exposed ([facility] [address] 8/21/2020    Thyroid disease        Review of Systems:   Constitutional: No fever, chills, sweats  Cardiac: As per HPI  Pulmonary: No cough, wheeze, hemoptysis  HEENT: No visual disturbances, difficult swallowing  GI: No nausea, vomiting, diarrhea, abdominal pain, rectal bleeding  : No dysuria or hematuria  Endocrine: No excessive thirst, heat or cold intolerance. Musculoskeletal: No joint pain or muscle aches. No claudication, but frequent cramps, gets around with a walker. Skin: No skin breakdown or rashes  Neuro: No headache, confusion, or seizures  Psych: No depression, anxiety    Allergies:  No Known Allergies    Home Medications:  Prior to Admission medications    Medication Sig Start Date End Date Taking?  Authorizing Provider   dilTIAZem (DILTIAZEM CD) 120 MG extended release capsule Take 120 mg by mouth daily   Yes Historical Provider, MD   spironolactone (ALDACTONE) 25 MG tablet Take 100 mg by mouth 2 times daily     Historical Provider, MD   metFORMIN (GLUCOPHAGE) 500 MG tablet Take 500 mg by mouth daily (with breakfast)     Historical Provider, MD   Multiple Vitamins-Minerals (VITAMIN D3 COMPLETE PO) Take 1 tablet by mouth daily    Historical Provider, MD   magnesium (MAGNESIUM-OXIDE) 250 MG TABS tablet Take 250 mg by mouth daily    Historical Provider, MD   triamcinolone (KENALOG) 0.1 % cream Apply topically 2 times daily.  1/27/20   Mike Vuong, CRISTOFER   metoprolol (LOPRESSOR) 100 MG tablet Take 200 mg by mouth 2 times daily     Historical Provider, MD   simvastatin (ZOCOR) 20 MG tablet Take 20 mg by mouth nightly    Historical Provider, MD   levothyroxine (SYNTHROID) 100 MCG tablet Take 125 mcg by mouth Daily     Historical Provider, MD   WARFARIN SODIUM PO Take 3 mg by mouth daily     Historical Provider, MD   potassium chloride (KLOR-CON) 20 MEQ packet Take 20 mEq by mouth 3 times daily     Historical Provider, MD   diltiazem (CARDIZEM) 120 MG tablet Take 120 mg by mouth daily    Historical Provider, MD   metolazone (ZAROXOLYN) 2.5 MG tablet Take 5 mg by mouth daily     Historical Provider, MD       Current Medications:  Current Facility-Administered Medications   Medication Dose Route Frequency Provider Last Rate Last Admin    metFORMIN (GLUCOPHAGE) tablet 500 mg  500 mg Oral Daily with breakfast Shen Rashid MD   500 mg at 01/06/22 0944    warfarin (COUMADIN) tablet 4 mg  4 mg Oral Daily Shen Rashid MD   4 mg at 01/05/22 1849    dextrose 50 % IV solution  12.5 g IntraVENous PRN Blenda Wing, DO        dextrose 5 % solution  100 mL/hr IntraVENous PRN Blenda Wing, DO        dilTIAZem (CARDIZEM CD) extended release capsule 120 mg  120 mg Oral Daily Daniel Copeland MD   120 mg at 01/06/22 0944    levothyroxine (SYNTHROID) tablet 125 mcg  125 mcg Oral Daily Daniel Copeland MD   125 mcg at 01/06/22 6648    metoprolol tartrate (LOPRESSOR) tablet 100 mg  100 mg Oral BID Daniel Copeland MD   100 mg at 01/06/22 0944    atorvastatin (LIPITOR) tablet 10 mg  10 mg Oral Daily Daniel Copeland MD   10 mg at 01/05/22 2123    sodium chloride flush 0.9 % injection 5-40 mL  5-40 mL IntraVENous 2 times per day Daniel Copeland MD   10 mL at 01/06/22 0944    sodium chloride flush 0.9 % injection 5-40 mL  5-40 mL IntraVENous PRN Daniel Copeland MD        0.9 % sodium chloride infusion  25 mL IntraVENous PRN Chelsea Riavs MD        polyethylene glycol [facility]) packet 17 g  17 g Oral Daily PRN Chelsea Rivas MD   17 g at 01/05/22 2123    acetaminophen (TYLENOL) tablet 650 mg  650 mg Oral Q6H PRN Chelsea Rivas MD   650 mg at 01/06/22 0944    Or    acetaminophen (TYLENOL) suppository 650 mg  650 mg Rectal Q6H PRN Chelsea Rivas MD        dextrose 50 % IV solution  12.5 g IntraVENous PRN Chelsea Rivas MD        dextrose 5 % solution  100 mL/hr IntraVENous PRN Chelsea Rivas MD          dextrose      sodium chloride      dextrose         Physical Exam:  /63   Pulse 92   Temp 97.9 °F (36.6 °C)   Resp 16   Ht 5' (1.524 m)   Wt 203 lb (92.1 kg)   SpO2 97%   BMI 39.65 kg/m²   Weight change: Wt Readings from Last 3 Encounters:   01/06/22 203 lb (92.1 kg)   11/01/21 222 lb (100.7 kg)   08/02/21 222 lb (100.7 kg)     General: Awake, alert, oriented x3, obese elderly woman up in chair no acute distress  HEENT: Normocephalic and atraumatic, extraocular movements intact, pupils equal and round, moist mucus membranes, sclera anicteric  Neck: No JVD, no carotid bruits, no thyromegaly, no adenopathy  Cardiac: Regular rate and rhythm, normal S1 and physiologically split S2, no S3, no S4. Apical impulse is nondisplaced. No murmurs, no pericardial rubs, no clicks. Carotid upstrokes brisk. Respiratory: Clear bilaterally; no wheezes, no rales, no rhonchi. Unlabored respirations  Abdomen: Soft, nontender, nondistended, bowel sounds+, no hepatomegaly, no masses, no abdominal bruits  Extremities edema chronic venous stasis changes, no cyanosis, no clubbing. Wrinkles forming   Musculoskeletal: normal tone and strength in the upper and lower extremities bilaterally  Neuro: No focal deficits. Moves all extremities appropriately to command.   Normal sensation in the upper and lower extremities bilaterally  Psychiatric: Cooperative, and normal affect    Intake/Output:    Intake/Output Summary (Last 24 hours) at 1/6/2022 0954  Last data filed at 1/6/2022 0512  Gross per 24 hour   Intake    Output 2200 ml   Net -2200 ml     No intake/output data recorded. Laboratory Tests:  Last 3 CBC:  Recent Labs     01/03/22  1008 01/05/22  0455   WBC 8.5 8.3   RBC 4.18 4.09   HGB 13.4 13.1   HCT 38.8 38.0   MCV 92.8 92.9   MCH 32.1 32.0   MCHC 34.5 34.5   RDW 15.3* 15.3*    211   MPV 9.9 9.6       Last 3 CMP:    Recent Labs     01/04/22  1550 01/05/22  0455 01/06/22  0355   * 130* 128*   K 4.7 3.6 4.4   CL 95* 92* 91*   CO2 21* 22 23   BUN 29* 33* 42*   CREATININE 1.2* 1.2* 1.3*   GLUCOSE 123* 95 120*   CALCIUM 8.9 9.1 9.0       Last 3 Mag/Phos:  Recent Labs     01/03/22  1008 01/04/22  0715   MG 1.7 1.7   PHOS 3.3 2.7       Last 3 CK, CKMB, Troponin  No results for input(s): CKTOTAL, CKMB, TROPONINI in the last 72 hours. Last 3 Pro-BNP:  No results for input(s): PROBNP in the last 72 hours. Lab Results   Component Value Date    PROBNP 2,652 (H) 01/01/2022       Last 3 Glucose:     Recent Labs     01/04/22  1550 01/05/22  0455 01/06/22  0355   GLUCOSE 123* 95 120*       Last 3 Coags:  Recent Labs     01/04/22  1550 01/05/22  0455 01/06/22  0355   PROTIME 20.5* 21.8* 26.4*   INR 1.9 2.0 2.4     Lab Results   Component Value Date    PROTIME 26.4 01/06/2022    INR 2.4 01/06/2022       Last 3 Lipid Panel:  No results for input(s): LDLCALC, HDL, TRIG in the last 72 hours. Invalid input(s): CHLPL  No results found for: LDLCALC  No results found for: HDL  No results found for: TRIG  No components found for: CHLPL    TSH:  No results for input(s): TSH in the last 72 hours. No results found for: TSH    ABGs:  No results for input(s): PH, PO2, PCO2, HCO3, BE, O2SAT in the last 72 hours. Lactic Acid:  No results for input(s): LACTA in the last 72 hours. Radiology:  RAD Results:  XR CHEST PORTABLE   Final Result   Pulmonary vascular congestion.          XR SHOULDER RIGHT (MIN 2 VIEWS)   Final Result   No acute abnormality. AC joint arthrosis. CT HEAD WO CONTRAST   Final Result   1. No acute intracranial abnormality. 2.  Prominent underlying signs of cerebral and cerebellar atrophy. 3.  High density within the left maxillary antrum with mucosal thickening,   suggestive of fungal sinusitis      RECOMMENDATIONS:   Unavailable         CT CERVICAL SPINE WO CONTRAST   Final Result   CERVICAL:      No acute fracture or traumatic malalignment. Degenerative changes result in likely moderate spinal stenosis at C5-6 and   multilevel likely high-grade neural foraminal stenosis. LUMBAR:      No evidence of vertebral compression. Degenerative changes result in likely mild to moderate multilevel spinal   stenosis and neural foraminal stenosis as detailed above. 3.2 cm x 2.5 cm saccular aneurysm of the infrarenal abdominal aorta. See   recommendation below. Colonic diverticulosis. Cholelithiasis. RECOMMENDATIONS:   3.2 cm infrarenal saccular abdominal aortic aneurysm. Recommend vascular   consultation. Reference: J Vasc Surg 2815;35:8-18. CT LUMBAR SPINE WO CONTRAST   Final Result   CERVICAL:      No acute fracture or traumatic malalignment. Degenerative changes result in likely moderate spinal stenosis at C5-6 and   multilevel likely high-grade neural foraminal stenosis. LUMBAR:      No evidence of vertebral compression. Degenerative changes result in likely mild to moderate multilevel spinal   stenosis and neural foraminal stenosis as detailed above. 3.2 cm x 2.5 cm saccular aneurysm of the infrarenal abdominal aorta. See   recommendation below. Colonic diverticulosis. Cholelithiasis. RECOMMENDATIONS:   3.2 cm infrarenal saccular abdominal aortic aneurysm. Recommend vascular   consultation. Reference: J Vasc Surg 4181;60:5-25.                EKG and Telemetry:  12-lead EKG personally reviewed and shows underlying atrial fibrillation with ventricular pacing    Telemetry personally reviewed and shows not on telemetry        ASSESSMENT / PLAN:    1. Chronic atrial fibrillation on anticoagulation, has sinus node dysfunction as well with a backup pacemaker. Rate is controlled    2 permanent pacemaker working appropriately and about 7 years of battery life. 3 oral anticoagulation with warfarin    4  chronic venous stasis    5 chronic diastolic heart failure and right-sided heart failure with moderately elevated RVSP. BNP only mildly elevated. Diuresed about 11 L. -she admits to skipping and missing diuretic doses at home. Doesn't elevated feet much at home, states has no help. Renal adjusting diuretics. Starting to get azotemic so likely to po soon. Overall improved    6 hyperkalemia-/hyponatremia- improved    No other cardiac plans will follow peripherally, call if new concerns.     Mary Kate Geller MD, MD, [address] [place] [address] at [facility]    Electronically signed by Mary Kate Geller MD on 1/6/2022 at 9:54 AM

## 2022-01-07 NOTE — PROGRESS NOTES
Subjective: The patient is awake and alert. No problems overnight. Denies chest pain, angina, and dyspnea. Denies abdominal pain. Tolerating diet. No nausea or vomiting. Objective: Therapeutic diuresis. Maintaining renal fxn while diuresing. /61   Pulse 85   Temp 97.9 °F (36.6 °C)   Resp 16   Ht 5' (1.524 m)   Wt 203 lb (92.1 kg)   SpO2 97%   BMI 39.65 kg/m²     Heart:  RRR, no murmurs, gallops, or rubs. Lungs:  CTA bilaterally, no wheeze, rales or rhonchi  Abd: bowel sounds present, nontender, nondistended, no masses  Extrem:  No clubbing, cyanosis. Hardened chronic stasis changes. Edema decreased. CBC:   Lab Results   Component Value Date    WBC 8.3 01/05/2022    RBC 4.09 01/05/2022    HGB 13.1 01/05/2022    HCT 38.0 01/05/2022    MCV 92.9 01/05/2022    MCH 32.0 01/05/2022    MCHC 34.5 01/05/2022    RDW 15.3 01/05/2022     01/05/2022    MPV 9.6 01/05/2022     BMP:    Lab Results   Component Value Date     01/07/2022    K 3.6 01/07/2022    K 5.0 01/02/2022    CL 86 01/07/2022    CO2 25 01/07/2022    BUN 37 01/07/2022    LABALBU 3.7 01/01/2022    CREATININE 1.2 01/07/2022    CALCIUM 9.6 01/07/2022    GFRAA 52 01/07/2022    LABGLOM 43 01/07/2022    GLUCOSE 121 01/07/2022        Assessment:    Patient Active Problem List   Diagnosis    Venous stasis dermatitis of both lower extremities    Onychomycosis    Pain of toe of right foot    Pain of toe of left foot    PVD (peripheral vascular disease) ([address]    Difficulty walking    Hyperkalemia    Hypertension    Atrial fibrillation (HCC)    Chronic diastolic heart failure (HCC)    Chronic right-sided heart failure ([address]       Plan:  Present tx. Switched to po diuretics.           Alexis Ríos MD  7:11 AM  1/7/2022

## 2022-01-07 NOTE — PLAN OF CARE
Patient currently in recliner resting. Reminded patient to shift in chair from Left to right often to assist with skin intergrity of buttocks.  Patient agreed and states \"yes, I have and will continue\" Call bell in reach

## 2022-01-07 NOTE — PROGRESS NOTES
Occupational Therapy  OT BEDSIDE TREATMENT NOTE      Date:2022  Patient Name: Momo De Leon  MRN: [id number]  : 1935  Room: [address] [place] [address]         Evaluating OT: Saad Rebollar OTR/CHELSIE [id number]       Referring Provider: Humberto Boss MD    Specific Provider Orders/Date: OT eval and treat 1/3/22       Diagnosis:  Hyperkalemia [E87.5]  Fall, initial encounter [W19. 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 Functional mobility            [x]?ADLs           [x]? Strength                  [x]? Cognition    [x]? Functional transfers          [x]? IADLs         [x]? Safety Awareness   [x]? Endurance    []? Fine Coordination                         [x]? Balance      []? Vision/perception   []? Sensation      []? Gross Motor Coordination             []? ROM           []?  Delirium                   []? Motor Control      OT PLAN OF CARE   OT POC based on physician orders, patient diagnosis and results of clinical assessment      Frequency/Duration 2-4 days/wk for 2 weeks PRN   Specific OT Treatment Interventions to include:   * Instruction/training on adapted ADL techniques and AE recommendations to increase functional independence within precautions       * Training on energy conservation strategies, correct breathing pattern and techniques to improve independence/tolerance for self-care routine  * Functional transfer/mobility training/DME recommendations for increased independence, safety, and fall prevention  * Patient/Family education to increase follow through with safety techniques and functional independence  * Recommendation of environmental modifications for increased safety with functional transfers/mobility and ADLs  * Cognitive retraining/development of therapeutic activities to improve problem solving, judgement, memory, and attention for increased safety/participation in ADL/IADL tasks  * Therapeutic exercise to improve motor endurance, ROM, and functional strength for ADLs/functional transfers  * Therapeutic activities to facilitate/challenge dynamic balance, stand tolerance for increased safety and independence with ADLs  * Positioning to improve skin integrity, interaction with environment and functional independence           Recommended Adaptive Equipment: continue to assess       Home Living: Pt is questionable historian. Per pt, she lives alone in 1 story house with level entry. Bathroom setup: tub/shower; pt reports that she sponge bathes   Equipment owned: wheeled walker     Prior Level of Function: independent with ADLs , assist with IADLs; functional mobility: wheeled walker  Driving: yes     Pain Level: Pt reports L LE pain. Cognition: Awake and grossly oriented. Min cues for safety. Functional Assessment:  AM-PAC Daily Activity Raw Score: 14/24    Initial Eval Status  Date: 1/3/22 Treatment Status  Date:1/7/22  STGs = LTGs  Time frame: 10-14 days   Feeding Set up       Grooming Min A Setup seated. Limited stand tolerance for sink level activity.     Sup/Mod I   UB Dressing Mod A/Min A  min  A  SBA/Sup   LB Dressing Max A to adjust socks Max A for slipper socks.      Min A-with use of AD as appropriate/needed   Bathing Max A/Mod A  Min A/SBA -with use of AD as appropriate/needed   Toileting Mod A Pt has catheter. No bowel movement in several days per pt. Assist for lance hygiene.   SBA    Bed Mobility  Pt in chair        Functional Transfers Min A with wheeled walker  Sit<>Stand from chair  Cues for hand placement and safe technique Min A to stand from recliner chair. Cues for hand placement to increase safety.   Sup    Functional Mobility Min A with wheeled walker  Short distance in room and ridley; pt requested to return to chair after short distance. Cues for safe wheeled walker management and navigation [facility] short distance in room using w/w. Pt reports legs feeling weak and wobbly. Sup -with device as needed to maximize independence with ADLs and functional task completion   Balance Sitting:     Static:  Sup    Dynamic:SBA  Standing: Min A with wheeled walker   Sup for dynamic sitting balance to maximize independence with ADLs and functional task completion     Sup for standing balance to maximize independence with ADLs and functional task completion   Activity Tolerance Fair- with light activity Poor + stand tolerance for ADL activity.   Fair+ with ADL activity. Comments:  Pt sitting in the chair. Pleasant and cooperative. Cues for safety. Remained in chair at end of the session. Education/treatment:  ADL retraining with facilitation of movement to increase self care skills. Therapeutic activity to address balance and endurance for ADL and transfers. Pt education of walker safety and transfer safety. · Pt has made  progress towards set goals.    ·     Time In: 8:15   Time Out: 8:38      Min Units   Therapeutic Ex 30851     Therapeutic Activities 42598 20 1   ADL/Self Care 38171 10 1   Orthotic Management 43620     Neuro Re-Ed 43617     Non-Billable Time     TOTAL TIMED TREATMENT [facility] AMAN/L 13333

## 2022-01-07 NOTE — PROGRESS NOTES
Called Dr. Diego Triplett  Need Discharge Order per RN Camryn Romero  Will also give message to Dr. Emily Angulo

## 2022-01-07 NOTE — PROGRESS NOTES
Pt c/o constipation, reports passing gas. Nurse reached out to Dr. Fu Room answering service for laxative/suppository orders. Awaiting call back.

## 2022-01-07 NOTE — CARE COORDINATION
Social Work:    Updated in morning rounds about expected discharge today to [facility]. Social work confirmed [facility] accepted for today and arranged [facility] tentatively for 3:00 p.m. transfer. Ayaz is aware, as well as Tita at [facility].  ([place], [address], ambulance completed)    Electronically signed by RANDALL Rothman on 1/7/2022 at 10:41 AM

## 2022-01-07 NOTE — PROGRESS NOTES
Anticipating discharge to skilled nursing facility when medically stable per Communication note.  Waiting for response from Dr. Serena Hernandez

## 2022-01-26 NOTE — DISCHARGE SUMMARY
Physician Discharge Summary     Patient ID:  Grzegorz Miu  [id number]  71 y.o.  1935    Admit date: 1/1/2022    Discharge date and time: 1/7/2022  2:05 PM     Admission Diagnoses: Hyperkalemia [E87.5]  Fall, initial encounter [W19. XXXA]    Discharge Diagnoses:   Venous stasis dermatitis of both lower extremities     Onychomycosis    Pain of toe of right foot    Pain of toe of left foot    PVD (peripheral vascular disease) (HCC)    Difficulty walking    Hyperkalemia    Hypertension    Atrial fibrillation (HCC)    Chronic diastolic heart failure (HCC)    Chronic right-sided heart failure ([facility] [address]          Consults: cardiology and nephrology    Procedures: None    Hospital Course: The patient is a 81 y.o. female patient of Dr. Garrett Rizzo who presents with the above. This whole presentation was the proverbial \"accident waiting to happen. \"  For years, she has been plagued by refractory lower extremity edema and generalized fluid/weight gain to the point of chronic venous changes and stasis ulcers. I have tried partly successfully to treat the edema in concert with Cardiology. Multiple attempts at diuretic jockeying with lasix/metolazone/spironolactone/bumex all have partial success but cause debilitating muscle cramps. The patient is a 81y.o. year old female who follows with Dr. Gume Mobley in the office with hypertension, hyperlipidemia, non-insulin requiring diabetes mellitus, chronic atrial fibrillation and remains on lifelong anticoagulation therapy of daily dose adjusted Warfarin, sinus node dysfunction with placement of single-chamber [employer] permanent pacemaker in 2010 and rgenerator change June 4, 2019. Echocardiogram in 2012 revealed LVEF 45-50%, mild mitral regurgitation, moderate tricuspid regurgitation, RVSP 48 mmHg. Telephonic monitoring report of pacemaker completed 7/21 revealed normal functioning pacemaker and estimated remaining longevity 7 years. .     States she was doing well up until yesterday when she was trying to go to the bathroom but just collapsed due to weakness in her legs. She had no syncope, did not feel any chest pains or palpitations, no new shortness of breath. She states she recently had adjustments of her diuretics within the past week by PCP. States her diuretics were stopped except for Aldactone, she was still taking her potassium. She came with a potassium of 7. States she has been having problems with lower extremity edema and weeping of her legs recently. She was on furosemide, Bumex, metolazone and spironolactone but was having significant cramping last week, states she was taking spoonfuls of mustard to try to get the cramping to go away. adjustments were made, apparently stopped some on her own but continued on with her potassium, states she frequently has V8 juice and bananas as well during the day. Hyponatremia hypervolemic, though in fact she may be intravascularly contracted, volume status is a bit difficult to interpret though she is certainly total body water overloaded given the degree of her peripheral edema. Urinary studies sent last evening were sent not long after she received IV Lasix bolus, so are of little clinical utility. Sodium improved somewhat since her arrival        Urine studies were ordered, collected, and sent to the lab this early afternoon, though sodium, chloride and osmolality were not actually processed by the lab. All 3 would be useful bits of data. I have spoken with bedside RN, presumably lab will be working on the studies shortly. Very satisfactory diuresis with >3000cc accomplished with stability of her renal function. To rehab for further strengthening until safe to go home.     Discharge Exam:  See progress note from today    Disposition: SNF    Patient Instructions:   Discharge Medication List as of 1/7/2022  2:00 PM      START taking these medications    Details   bumetanide (BUMEX) 1 MG tablet Take 1 tablet by mouth 2 times daily, Disp-30 tablet, R-3DC to SNF      polyethylene glycol (GLYCOLAX) 17 g packet Take 17 g by mouth daily as needed for Constipation, Disp-527 g, R-1DC to SNF         CONTINUE these medications which have CHANGED    Details   warfarin (COUMADIN) 4 MG tablet Take 1 tablet by mouth daily, Disp-30 tablet, R-3DC to SNF      spironolactone (ALDACTONE) 25 MG tablet Take 1 tablet by mouth daily, Disp-30 tablet, R-3DC to SNF         CONTINUE these medications which have NOT CHANGED    Details   Multiple Vitamins-Minerals (VITAMIN D3 COMPLETE PO) Take 1 tablet by mouth dailyHistorical Med      metoprolol (LOPRESSOR) 100 MG tablet Take 200 mg by mouth 2 times daily Historical Med      simvastatin (ZOCOR) 20 MG tablet Take 20 mg by mouth nightly      levothyroxine (SYNTHROID) 100 MCG tablet Take 125 mcg by mouth Daily Historical Med      diltiazem (CARDIZEM) 120 MG tablet Take 120 mg by mouth daily         STOP taking these medications       dilTIAZem (DILTIAZEM CD) 120 MG extended release capsule Comments:   Reason for Stopping:         metFORMIN (GLUCOPHAGE) 500 MG tablet Comments:   Reason for Stopping:         magnesium (MAGNESIUM-OXIDE) 250 MG TABS tablet Comments:   Reason for Stopping:         triamcinolone (KENALOG) 0.1 % cream Comments:   Reason for Stopping:         furosemide (LASIX) 40 MG tablet Comments:   Reason for Stopping:         potassium chloride (KLOR-CON) 20 MEQ packet Comments:   Reason for Stopping:         metolazone (ZAROXOLYN) 2.5 MG tablet Comments:   Reason for Stopping:             Activity: activity as tolerated  Diet: cardiac diet, low fat, low cholesterol diet and renal diet    Follow-up with Dr. Huber Ervin in 4 days after discharge. .    Note that over 30 minutes was spent in preparing discharge papers, discussing discharge with patient, medication review, etc.    Signed:  Sherwin Aguilar MD  1/26/2022  4:59 PM

## 2022-02-04 PROBLEM — A41.9 SEPSIS (HCC): Status: ACTIVE | Noted: 2022-01-01

## 2022-02-04 NOTE — PROGRESS NOTES
Central Line Placement Procedure Note    Indication: central venous monitoring    Consent: Unable to be obtained due to the emergent nature of this procedure. Procedure: The patient was positioned appropriately and the skin over the right femoral vein was prepped with betadine and draped in a sterile fashion. Local anesthesia was not performed due to the emergent nature of this procedure. A large bore needle was used to identify the vein. A guide wire was then inserted into the vein through the needle. A triple lumen catheter was then inserted into the vessel over the guide wire using the Seldinger technique. All ports showed good, free flowing blood return and were flushed with saline solution. The catheter was then securely fastened to the skin with suture at 20 cm. Two sutures were placed into the a suture end from each of the securing sutures was extended around the catheter and tied to the proximal eyelets as an added measure to prevent dislodgement. An antibiotic disk was placed and the site was then covered with a sterile dressing. A post procedure X-ray was not indicated. The patient tolerated the procedure well. Complications: None    Arterial Line Placement Procedure Note                     Indication: severe hypotension    Consent: Unable to be obtained due to patient's condition. Rogelio's Test: Normal    Procedure: The skin over the left radial artery was prepped with betadine and draped in a sterile fashion. Local anesthesia was not indicated. A 20 gauge arterial line catheter was then inserted, using a modified Seldinger technique, into the vessel. The transducer set was then attached and securely fastened to the skin with sutures. Waveforms on the monitor were observed and found to be adequate. The patient had good distal perfusion after the procedure. The site was then dressed in a sterile fashion. The patient tolerated the procedure well.      Complications: None

## 2022-02-04 NOTE — ED NOTES
Bed: 16  Expected date:   Expected time:   Means of arrival:   Comments:  EMS     Shae Wallis RN  02/04/22 [id number]

## 2022-02-04 NOTE — ACP (ADVANCE CARE PLANNING)
Advance Care Planning     Advance Care Planning Activator (Inpatient)  Conversation Note      Date of ACP Conversation: 2/4/2022     Conversation Conducted with: Nona Landrum, Nephew    ACP Activator: RANDALL Alcala    Health Care Decision Maker:     Current Designated Health Care Decision Maker:     Primary Decision Maker: Malathi Jeronimo - Niece/Nephew - [phone]  Click here to complete Healthcare Decision Makers including section of the Healthcare Decision Maker Relationship (ie \"Primary\")  Today we documented Decision Maker(s) consistent with Legal Next of Kin hierarchy. Care Preferences    Ventilation: \"If you were in your present state of health and suddenly became very ill and were unable to breathe on your own, what would your preference be about the use of a ventilator (breathing machine) if it were available to you? \"      Would the patient desire the use of ventilator (breathing machine)?: yes    \"If your health worsens and it becomes clear that your chance of recovery is unlikely, what would your preference be about the use of a ventilator (breathing machine) if it were available to you? \"     Would the patient desire the use of ventilator (breathing machine)?: Yes      Resuscitation  \"CPR works best to restart the heart when there is a sudden event, like a heart attack, in someone who is otherwise healthy. Unfortunately, CPR does not typically restart the heart for people who have serious health conditions or who are very sick. \"    \"In the event your heart stopped as a result of an underlying serious health condition, would you want attempts to be made to restart your heart (answer \"yes\" for attempt to resuscitate) or would you prefer a natural death (answer \"no\" for do not attempt to resuscitate)? \" yes       [] Yes   [] No   Educated Patient / Vickii Putt regarding differences between Advance Directives and portable DNR orders.     Length of ACP Conversation in minutes:  10    Conversation Outcomes:  [x] ACP discussion completed  [] Existing advance directive reviewed with patient; no changes to patient's previously recorded wishes  [] New Advance Directive completed  [] Portable Do Not Rescitate prepared for Provider review and signature  [] POLST/POST/MOLST/MOST prepared for Provider review and signature      Follow-up plan:    [] Schedule follow-up conversation to continue planning  [x] Referred individual to Provider for additional questions/concerns   [] Advised patient/agent/surrogate to review completed ACP document and update if needed with changes in condition, patient preferences or care setting    [] This note routed to one or more involved healthcare providers

## 2022-02-04 NOTE — ED NOTES
Answered phone call from lab, lactic 4.7, notified attending and resident.       Madison Hood RN  02/04/22 [phone]

## 2022-02-04 NOTE — ED PROVIDER NOTES
Chief Complaint   Patient presents with    Altered Mental Status     pt positive for covid. was responsive only to pain at nursing home. [facility]    Positive For Covid-19       Patient is 60-year-old female who is Covid positive as of 10 days ago presents today via EMS from nursing home for altered mental status. She was found much more confused than usual this morning. Per EMS was noted to be hypoxic and on arrival.  Was placed on nonrebreather. She does not wear oxygen at baseline. Per EMS this is not her normal baseline. Per chart review patient does take Coumadin. History limited due to patient's current condition. They are unsure of code vaccination status. Patient does spontaneously open her eyes in response to painful stimuli. The history is provided by the EMS personnel. No  was used. Review of Systems   Unable to perform ROS: Acuity of condition        Physical Exam  Vitals and nursing note reviewed. Constitutional:       General: She is in acute distress. Appearance: She is well-developed. She is obese. She is ill-appearing. HENT:      Head: Normocephalic and atraumatic. Mouth/Throat:      Mouth: Mucous membranes are dry. Eyes:      Pupils: Pupils are equal, round, and reactive to light. Cardiovascular:      Rate and Rhythm: Tachycardia present. Rhythm irregular. Pulses: Normal pulses. Pulmonary:      Effort: Respiratory distress present. Breath sounds: No wheezing or rales. Abdominal:      General: Bowel sounds are normal.      Palpations: Abdomen is soft. Tenderness: There is no abdominal tenderness. There is no guarding or rebound. Genitourinary:     General: Normal vulva. Rectum: Guaiac result positive. Musculoskeletal:      Cervical back: Normal range of motion and neck supple. No rigidity or tenderness. Right lower leg: Edema present. Left lower leg: Edema present. Skin:     General: Skin is warm and dry. Capillary Refill: Capillary refill takes less than 2 seconds. Findings: Erythema present. Neurological:      Mental Status: She is alert. She is disoriented. Cranial Nerves: No cranial nerve deficit. Coordination: Coordination normal.      Comments: Nonverbal, responding to painful stimuli, not following commands          Procedures     Intubation Procedure Note    Indication: impending respiratory failure    Consent: Unable to be obtained due to the emergent nature of this procedure. Medications Used: etomidate intravenously and rocuronium intravenously    Procedure: The patient was placed in the appropriate position. Cricoid pressure was not required. Intubation was performed by direct laryngoscopy using a laryngoscope and a 7.5 cuffed endotracheal tube. The cuff was then inflated and the tube was secured appropriately at a distance of 22 cm to the dental ridge. Initial confirmation of placement included bilateral breath sounds, an end tidal CO2 detector and absence of sounds over the stomach. A chest x-ray to verify correct placement of the tube showed appropriate tube position. The patient tolerated the procedure well. Complications: None      Central Line Placement Procedure Note    Indication: long term access and central venous monitoring    Consent: Unable to be obtained due to the emergent nature of this procedure. Procedure: The patient was positioned appropriately and the skin over the right femoral vein was prepped with betadine and draped in a sterile fashion. Local anesthesia was not performed due to the emergent nature of this procedure. A large bore needle was used to identify the vein. A guide wire was then inserted into the vein through the needle. A triple lumen catheter was then inserted into the vessel over the guide wire using the Seldinger technique. All ports showed good, free flowing blood return and were flushed with saline solution.    The catheter was then securely fastened to the skin with suture at 18 cm. Two sutures were placed into the kit included tube clamp, proximal eyelets and a suture end from each of the securing sutures was extended around the catheter and tied to the proximal eyelets as an added measure to prevent dislodgement. An antibiotic disk was placed and the site was then covered with a sterile dressing. A post procedure X-ray was not indicated. The patient tolerated the procedure well.     Complications: None          Labs Reviewed   CBC WITH AUTO DIFFERENTIAL - Abnormal; Notable for the following components:       Result Value    WBC 21.7 (*)     RDW 15.3 (*)     Neutrophils % 95.7 (*)     Lymphocytes % 0.9 (*)     Monocytes % 1.7 (*)     Neutrophils Absolute 21.05 (*)     Lymphocytes Absolute 0.22 (*)     Eosinophils Absolute 0.00 (*)     Metamyelocytes Relative 1.7 (*)     All other components within normal limits    Narrative:     CALL  [facility] tel. [phone],  Rejected Test Name/Called to:CMPX,TRP5,BNP/KARISSA Bueno RN, 02/04/2022 11:12,  by ANGELA   URINALYSIS - Abnormal; Notable for the following components:    Blood, Urine SMALL (*)     All other components within normal limits   PROTIME-INR - Abnormal; Notable for the following components:    Protime 119.7 (*)     INR >10.0 (*)     All other components within normal limits    Narrative:     CALL  [facility] tel. [phone],  Rejected Test Name/Called to:CMPX,TRP5,BNP/KARISSA Bueno RN, 02/04/2022 11:12,  by Marvine Bence tel. [phone],  Coag results called to and read back by Ryan Rehman RN, 02/04/2022  11:24, by [address] [place] [address], SEPSIS - Abnormal; Notable for the following components:    Lactic Acid, Sepsis 4.7 (*)     All other components within normal limits    Narrative:     [facility] Alvares tel. [phone],  Critical Chemistry results called to and read back by Stanley Rodgers RN,  02/04/2022 10:54, by Zenon Florian, ARTERIAL - Abnormal; Notable for the following components:    pH, Blood Gas 7.535 (*)     PCO2 27.1 (*)     PO2 148.5 (*)     O2 Sat 99.1 (*)     O2Hb 98.1 (*)     All other components within normal limits   MICROSCOPIC URINALYSIS - Abnormal; Notable for the following components:    Bacteria, UA FEW (*)     All other components within normal limits   BRAIN NATRIURETIC PEPTIDE - Abnormal; Notable for the following components:    Pro-BNP 14,148 (*)     All other components within normal limits   TROPONIN - Abnormal; Notable for the following components:    Troponin, High Sensitivity 49 (*)     All other components within normal limits   COMPREHENSIVE METABOLIC PANEL - Abnormal; Notable for the following components:    Sodium 126 (*)     Potassium 3.3 (*)     Chloride 88 (*)     CO2 17 (*)     Anion Gap 21 (*)     Glucose 240 (*)     BUN 71 (*)     CREATININE 1.7 (*)     Calcium 8.3 (*)     Albumin 2.0 (*)     Alkaline Phosphatase 186 (*)     AST 38 (*)     All other components within normal limits   LACTATE, SEPSIS - Abnormal; Notable for the following components:    Lactic Acid, Sepsis 7.0 (*)     All other components within normal limits    Narrative:     CALL  [facility] tel. [phone],  Critical Chemistry results called to and read back by Brooklyn Dunn RN,  02/04/2022 13:29, by ANGELA   POCT GLUCOSE - Abnormal; Notable for the following components:    Meter Glucose 250 (*)     All other components within normal limits   POCT GLUCOSE - Normal   CULTURE, URINE   CULTURE, BLOOD 1   CULTURE, BLOOD 2   CULTURE, WOUND   SPECIMEN REJECTION    Narrative:     CALL  [facility] tel. [phone],  Rejected Test Name/Called to:CMPX,TRP5,BNP/KARISSA Bueno RN, 02/04/2022 11:12,  by ANGELA   ARTERIAL BLOOD GAS, RESPIRATORY ONLY   LACTATE, SEPSIS   LACTATE, SEPSIS   CORTISOL TOTAL   TROPONIN   BETA-HYDROXYBUTYRATE     CT Head WO Contrast   Final Result   1. There is no acute intracranial abnormality. Specifically, there is no   intracranial hemorrhage.    2. Atrophy and periventricular leukomalacia,         CT CHEST WO CONTRAST   Final Result   1. Prominent cardiomegaly and small to moderate pleural effusions, and lung   density appears mainly to represent atelectasis   2. Limited exam         CT ABDOMEN PELVIS WO CONTRAST Additional Contrast? None   Final Result   Partially imaged bilateral pleural effusions in the lung bases. Please   follow-up separate report CT of the chest for further detail. Uncomplicated cholelithiasis. No acute findings in the abdomen or pelvis. XR CHEST PORTABLE   Final Result   1. Hazy bilateral airspace disease which could represent CHF or viral   pneumonia. The chest with further evaluated on the pending CT of the chest.   2. Cardiomegaly         US GALLBLADDER RUQ    (Results Pending)     EKG #1:   I personally interpreted this EKG  Time:  0930    Rate: 176  Rhythm: Atrial fibrillation. Interpretation: Atrial fibrillation, RVR, QTC 47. MDM  Number of Diagnoses or Management Options  Altered mental status, unspecified altered mental status type  Atrial fibrillation with RVR ([address]  Septicemia ([address]  Diagnosis management comments: Patient is 52-year-old female presents today for altered mental status. She is Covid positive from 10 days ago. She was found confused and minimally responsive per nursing home this morning. She was hypoxic and tachycardic when EMS arrived. She comes in as she is opening her eyes spontaneously, does respond to painful stimuli, is altered and does not respond anything appropriately. She was noted to be tachycardic on arrival, EKG shows atrial fibrillation with RVR. Initially blood pressure was stable, she was given 10 mg Cardizem, no change in heart rate, therefore was given another 10 of Cardizem and was started on a drip. At this time patient noted to be hypotensive, Cardizem infusion was stopped.   Patient did receive 30 cc/kg IV fluid bolus for concern of sepsis as she was febrile with a temp of 102.4 on arrival.  She was given rectal Tylenol with improvement of her temperature. With patient having no improvement, decision was made to intubate for airway protection. Lab work and imaging obtained. Lab work pertinent for ISIDRO, creatinine 1.7, mild hypokalemia and hyponatremia. Initial lactic acid 4.7, repeat lactic acid after fluid bolus of 7.0.  UA shows no evidence of urinary infection. INR noted to be greater than 10, patient is Hemoccult positive, she was given 10 mg IV vitamin K as she is actively bleeding. She does have a white count of 21,000. CT of the head shows no abnormality, CT of the chest shows groundglass opacities, no definitive infectious etiology. CT of the abdomen does show cholelithiasis, however no evidence of free fluid or stranding around the gallbladder. Ultrasound pending. Patient was given broad-spectrum antibiotics in the department. Central line was placed. IV pressors were initially ordered as patient was hypotensive with a pressure in the 80s, however blood pressure has improved. Patient will be admitted to the ICU for further evaluation and treatment of sepsis and altered mental status. Per chart review patient does have history of multiple lower extremity wounds and did grow Pseudomonas, repeat culture will be obtained.        Amount and/or Complexity of Data Reviewed  Clinical lab tests: reviewed  Tests in the radiology section of CPT®: reviewed  Tests in the medicine section of CPT®: reviewed             ED Course as of 02/04/22 1435   Fri Feb 04, 2022   1222 Spoke with patient's emergency contact, is next of kin to make medical decisions, he states that she is full code and has expressed multiple times that she would want everything done to give her the best possible outcome []   1243 Patient noted be Hemoccult positive, dark tarry stools []      ED Course User Index  [] Tameka Beatty, DO       --------------------------------------------- PAST HISTORY ---------------------------------------------  Past Medical History:  has a past medical history of Arthritis, Atrial fibrillation ([facility] Hyperlipidemia, Hypertension, Non-pressure chronic ulcer of other part of right lower leg with fat layer exposed ([facility] [address] and Thyroid disease. Past Surgical History:  has a past surgical history that includes Pacemaker insertion (06/04/2019). Social History:  reports that she has never smoked. She has never used smokeless tobacco. She reports that she does not drink alcohol and does not use drugs. Family History: family history is not on file. The patients home medications have been reviewed. Allergies: Patient has no known allergies.     -------------------------------------------------- RESULTS -------------------------------------------------    LABS:  Results for orders placed or performed during the hospital encounter of 02/04/22   CBC Auto Differential   Result Value Ref Range    WBC 21.7 (H) 4.5 - 11.5 E9/L    RBC 4.10 3.50 - 5.50 E12/L    Hemoglobin 12.8 11.5 - 15.5 g/dL    Hematocrit 37.6 34.0 - 48.0 %    MCV 91.7 80.0 - 99.9 fL    MCH 31.2 26.0 - 35.0 pg    MCHC 34.0 32.0 - 34.5 %    RDW 15.3 (H) 11.5 - 15.0 fL    Platelets 979 602 - 300 E9/L    MPV 10.5 7.0 - 12.0 fL    Neutrophils % 95.7 (H) 43.0 - 80.0 %    Lymphocytes % 0.9 (L) 20.0 - 42.0 %    Monocytes % 1.7 (L) 2.0 - 12.0 %    Eosinophils % 0.0 0.0 - 6.0 %    Basophils % 0.0 0.0 - 2.0 %    Neutrophils Absolute 21.05 (H) 1.80 - 7.30 E9/L    Lymphocytes Absolute 0.22 (L) 1.50 - 4.00 E9/L    Monocytes Absolute 0.43 0.10 - 0.95 E9/L    Eosinophils Absolute 0.00 (L) 0.05 - 0.50 E9/L    Basophils Absolute 0.00 0.00 - 0.20 E9/L    Metamyelocytes Relative 1.7 (H) 0.0 - 1.0 %    nRBC 0.0 /100 WBC    Polychromasia 1+     Poikilocytes 1+     Ovalocytes 1+    Urinalysis, reflex to microscopic   Result Value Ref Range    Color, UA Yellow Straw/Yellow    Clarity, UA Clear Clear    Glucose, Ur Negative Negative mg/dL    Bilirubin Urine Negative Negative    Ketones, Urine Negative Negative mg/dL    Specific Gravity, UA 1.020 1.005 - 1.030    Blood, Urine SMALL (A) Negative    pH, UA 5.5 5.0 - 9.0    Protein, UA Negative Negative mg/dL    Urobilinogen, Urine 0.2 <2.0 E.U./dL    Nitrite, Urine Negative Negative    Leukocyte Esterase, Urine Negative Negative   Protime-INR   Result Value Ref Range    Protime 119.7 (H) 9.3 - 12.4 sec    INR >10.0 (HH)    Lactate, Sepsis   Result Value Ref Range    Lactic Acid, Sepsis 4.7 (HH) 0.5 - 1.9 mmol/L   Blood Gas, Arterial   Result Value Ref Range    Date Analyzed [id number]     Time Analyzed 0943     Source: Blood Arterial     pH, Blood Gas 7.535 (H) 7.350 - 7.450    PCO2 27.1 (L) 35.0 - 45.0 mmHg    PO2 148.5 (H) 75.0 - 100.0 mmHg    HCO3 22.4 22.0 - 26.0 mmol/L    B.E. 1.2 -3.0 - 3.0 mmol/L    O2 Sat 99.1 (H) 92.0 - 98.5 %    O2Hb 98.1 (H) 94.0 - 97.0 %    COHb 0.7 0.0 - 1.5 %    MetHb 0.3 0.0 - 1.5 %    O2 Content 20.4 mL/dL    HHb 0.9 0.0 - 5.0 %    tHb (est) 14.6 11.5 - 16.5 g/dL    Mode NRB 15L     Date Of Collection      Time Collected      Pt Temp 37.0 C     ID [id number]     Lab [id number]     Critical(s) Notified .  No Critical Values    Microscopic Urinalysis   Result Value Ref Range    Hyaline Casts, UA 0-2 0 - 2 /LPF    WBC, UA 0-1 0 - 5 /HPF    RBC, UA 0-1 0 - 2 /HPF    Bacteria, UA FEW (A) None Seen /HPF   SPECIMEN REJECTION   Result Value Ref Range    Rejected Test CMPX,TRP5,BNP     Reason for Rejection see below    Brain Natriuretic Peptide   Result Value Ref Range    Pro-BNP 14,148 (H) 0 - 450 pg/mL   Troponin   Result Value Ref Range    Troponin, High Sensitivity 49 (H) 0 - 9 ng/L   Comprehensive Metabolic Panel   Result Value Ref Range    Sodium 126 (L) 132 - 146 mmol/L    Potassium 3.3 (L) 3.5 - 5.0 mmol/L    Chloride 88 (L) 98 - 107 mmol/L    CO2 17 (L) 22 - 29 mmol/L    Anion Gap 21 (H) 7 - 16 mmol/L    Glucose 240 (H) 74 - 99 mg/dL    BUN 71 (H) 6 - 23 mg/dL CREATININE 1.7 (H) 0.5 - 1.0 mg/dL    GFR Non-African American 29 >=60 mL/min/1.73    GFR African American 34     Calcium 8.3 (L) 8.6 - 10.2 mg/dL    Total Protein 6.8 6.4 - 8.3 g/dL    Albumin 2.0 (L) 3.5 - 5.2 g/dL    Total Bilirubin 1.0 0.0 - 1.2 mg/dL    Alkaline Phosphatase 186 (H) 35 - 104 U/L    ALT 18 0 - 32 U/L    AST 38 (H) 0 - 31 U/L   Lactate, Sepsis   Result Value Ref Range    Lactic Acid, Sepsis 7.0 (HH) 0.5 - 1.9 mmol/L   POCT Glucose   Result Value Ref Range    Meter Glucose 250 (H) 74 - 99 mg/dL   POCT Glucose   Result Value Ref Range    Glucose 250 mg/dL    QC OK? yes    EKG 12 Lead   Result Value Ref Range    Ventricular Rate 176 BPM    Atrial Rate 130 BPM    QRS Duration 118 ms    Q-T Interval 250 ms    QTc Calculation (Bazett) 427 ms    R Axis -11 degrees    T Axis 160 degrees       RADIOLOGY:  CT Head WO Contrast   Final Result   1. There is no acute intracranial abnormality. Specifically, there is no   intracranial hemorrhage. 2. Atrophy and periventricular leukomalacia,         CT CHEST WO CONTRAST   Final Result   1. Prominent cardiomegaly and small to moderate pleural effusions, and lung   density appears mainly to represent atelectasis   2. Limited exam         CT ABDOMEN PELVIS WO CONTRAST Additional Contrast? None   Final Result   Partially imaged bilateral pleural effusions in the lung bases. Please   follow-up separate report CT of the chest for further detail. Uncomplicated cholelithiasis. No acute findings in the abdomen or pelvis. XR CHEST PORTABLE   Final Result   1. Hazy bilateral airspace disease which could represent CHF or viral   pneumonia.   The chest with further evaluated on the pending CT of the chest.   2. Cardiomegaly         US GALLBLADDER RUQ    (Results Pending)           ------------------------- NURSING NOTES AND VITALS REVIEWED ---------------------------  Date / Time Roomed:  2/4/2022  9:21 AM  ED Bed Assignment:  16/16    The nursing notes within the ED encounter and vital signs as below have been reviewed. Patient Vitals for the past 24 hrs:   BP Temp Temp src Pulse Resp SpO2 Weight   02/04/22 1412 128/68 99 °F (37.2 °C) Axillary 150 16 100 %    02/04/22 1342    168 28 99 %    02/04/22 1241    180  98 %    02/04/22 1219       203 lb (92.1 kg)   02/04/22 1208 90/62   156 28 96 %    02/04/22 1047  100.6 °F (38.1 °C) Axillary       02/04/22 1030 (!) 118/56   134 28 99 %    02/04/22 0941 (!) 134/58         02/04/22 0928  102.4 °F (39.1 °C) Axillary 169 24 98 %        Oxygen Saturation Interpretation: Abnormal    ------------------------------------------ PROGRESS NOTES ------------------------------------------    Counseling:  I have spoken with the patient and discussed todays results, in addition to providing specific details for the plan of care and counseling regarding the diagnosis and prognosis. Their questions are answered at this time and they are agreeable with the plan of admission.    --------------------------------- ADDITIONAL PROVIDER NOTES ---------------------------------  Consultations:  Spoke with [facility].  Discussed case. They will admit the patient. This patient's ED course included: a personal history and physicial examination    This patient has remained hemodynamically stable during their ED course.       Medications   dilTIAZem 100 mg in dextrose 5 % 100 mL infusion ([facility]) (0 mg/hr IntraVENous Paused 2/4/22 1217)   vancomycin 2000 mg in dextrose 5% 500 ml IVPB (has no administration in time range)   0.9 % sodium chloride bolus (500 mLs IntraVENous New Bag 2/4/22 1359)   rocuronium (ZEMURON) 50 MG/5ML injection (  Not Given 2/4/22 1415)   phytonadione (ADULT) (VITAMIN K) 10 mg in dextrose 5 % 100 mL IVPB (10 mg IntraVENous New Bag 2/4/22 1403)   phenylephrine (KIRK-SYNEPHRINE) 50 mg in dextrose 5 % 250 mL infusion (0 mcg/min IntraVENous Held 2/4/22 1414)   0.9 % sodium chloride bolus (0 mLs IntraVENous Stopped 2/4/22 1047)   dilTIAZem injection 10 mg (10 mg IntraVENous Given 2/4/22 0946)   acetaminophen (TYLENOL) suppository 650 mg (650 mg Rectal Given 2/4/22 0947)   dilTIAZem injection 10 mg (10 mg IntraVENous Given 2/4/22 1107)   0.9 % sodium chloride bolus (0 mLs IntraVENous Stopped 2/4/22 1259)   piperacillin-tazobactam (ZOSYN) 4,500 mg in dextrose 5 % 100 mL IVPB (mini-bag) (4,500 mg IntraVENous New Bag 2/4/22 1359)   etomidate (AMIDATE) 2 MG/ML injection (20 mg  Given 2/4/22 1236)   rocuronium (ZEMURON) 50 MG/5ML injection (100 mg  Given 2/4/22 1236)   potassium chloride 10 mEq/100 mL IVPB (Peripheral Line) (10 mEq IntraVENous New Bag 2/4/22 1402)         Diagnosis:  1. Atrial fibrillation with RVR ([facility] [address]    2. Septicemia ([address]    3. Altered mental status, unspecified altered mental status type      CRITICAL CARE:   34 MINUTES. Please note that the withdrawal or failure to initiate urgent interventions for this patient would likely result in a life threatening deterioration or permanent disability. Accordingly this patient received the above mentioned time, excluding separately billable procedures. Disposition:  Patient's disposition: Admit to CCU/ICU  Patient's condition is stable.              Tameka Beatty, DO  Resident  02/04/22 1821 [place], [place], DO  Resident  02/04/22 5483

## 2022-02-04 NOTE — ED NOTES
Resident made aware of lactic acid increasing. Another 500 mL of fluid to be given per physician for a total of 3000 mL. Yoly Correia.  KRYSTA Bueno  02/04/22 1923

## 2022-02-04 NOTE — ED NOTES
Decision to intubate made after speaking with family     Chary Brooks.  Chung Temple RN  02/04/22 8730

## 2022-02-04 NOTE — ED NOTES
Patient taken to CT with RN, student, and RRT     Pippa Holt.  Guilherme Leung, KRYSTA  02/04/22 1607

## 2022-02-04 NOTE — H&P
[facility] History and Physical    --------------------------------------------------------------------------------------  Assessment / Plan  Sepsis due to COVID with questionable bacterial component   Meets temp / HR / RR / WBC criteria   S/p 2 L IVF in ED and BP holding for now   Source?     UA few bacteria only    Imaging poss bl pna / no focal consolidation    BLE wounds to me not severe enough to cause this   Pan-cx sent    Imaging more c/w viral pna vs overload   Vanc and Zosyn given    Check CRP / procal for now    Consult to ID for any further recs   Pulm / critical care consult for ICU admission   Reported COVID+ ~10 days PTA    Repeat COVID swab    IV Decadron for now     Suspect acute on chronic CHF, type unknown   CXR / CTA c/w cardiomegaly, effusions and BNP is 14k   Does not appear to have an echo available in the EMR   [facility] - will consult   Watch fluid volume status very carefully    Acute hypoxic resp failure due to the above   Upper 90s on NRB on arrival   ABG showed significant alkalosis   Pt intubated in ED and going to ICU   Pulm / critical care to follow and wean / extubate as able     Atrial fibrillation with RVR   Afib is chronic   On Coumadin w INR >10 here likely d/t hepatic congestion   No evidence of active bleeding but w it over 10 would reverse   Vit K 10 mg given in ED   Follow INR   Currently on Cardizem gtt   Consult to [address] w whom pt has followed in the past    Metabolic acidosis w elevated anion gap   CO2 17 in ED w gap 21   BUN was 71 and lactic at 7.0   Very careful w fluids as imaging suggests overload    Renal dysfunction   Baseline Cr on EMR review is 1.2-1.3   Cr 1.7 here not quite ISIDRO   Monitor as pt receives IVF   Check urine studies to calculate FENa / FEUN   Monitor fluid balance closely   Low threshold for nephro involvement    Hyperglycemia   No hx DM / not on any antihyperglycemics    in ED   Was not on steroids at SNF that I could see   Check a1c   Follow BGs for now    Chronic hyponatremia   Likely multifactorial w low-solute intake as large contributor   Recently admitted early Jan, Na was 126-128 at that time   Na 126 on admission   Follow    Pt's PMH otherwise pertinent for HPL, HTN, hypothyroidism. Continue outpt med regimen; if any particular issues, will address and move to active problem list above. Please see orders for further plan of care.  Code status  Full code   DVT prophylaxis SCDs / chemical ppx held as INR >10   Disposition  To be determined  --------------------------------------------------------------------------------------    Admission Date  2/4/2022  9:21 AM  Chief Complaint AMS / COVID+    Subjective  History of Present Illness  Saturnino Becerril is an 81-year-old female with PMH A. fib, hypertension, hyperlipidemia, chronic lower extremity ulcer, hypothyroidism who presented from [facility] via EMS with the patient being largely unresponsive. Much more confused than usual this morning, she would only open her eyes in response to painful stimuli. Transported here, she was initially placed on rebreather, though she was subsequently intubated after ED staff spoke with family. She is reported to have been Covid positive at her facility 10 days ago but we do not have this result to confirm. Vitals in the ER did show she was febrile up to 102.4, tachycardic, tachypneic, now intubated. Labs showed a number of significant derangements including hyponatremia, lactic and metabolic acidosis, renal dysfunction, hyperglycemia, elevated BNP, elevated troponin, leukocytosis, significantly supratherapeutic INR, and a relatively benign looking urinalysis. EKG showed A. fib with RVR.   Chest x-ray showed hazy bilateral airspace disease which would be consistent with either CHF or Covid, follow-up CT of the chest actually noted prominent cardiomegaly with small effusions but noted that the lung density appeared to mainly represent atelectasis. CT of the abdomen and pelvis was performed which noted uncomplicated cholelithiasis but was otherwise unremarkable. This patient has been accepted to the intensive care unit. Patient, being intubated, was unable to provide any history or participate in the physical exam to a very significant degree. Review of Systems unable to be obtained as patient is intubated    Past Medical History:   Diagnosis Date    Arthritis     Atrial fibrillation ([facility]     Hyperlipidemia     Hypertension     Non-pressure chronic ulcer of other part of right lower leg with fat layer exposed ([facility] [address] 8/21/2020    Thyroid disease      Past Surgical History:   Procedure Laterality Date    PACEMAKER INSERTION  06/04/2019    PPM GENT CHANGE  ([facility])   DR. Shiloh Aguilera     Prior to Admission medications    Medication Sig Start Date End Date Taking?  Authorizing Provider   benzonatate (TESSALON) 200 MG capsule Take 200 mg by mouth three times daily 1/24/22 2/8/22 Yes Historical Provider, MD   dilTIAZem (CARDIZEM CD) 120 MG extended release capsule Take 120 mg by mouth daily   Yes Historical Provider, MD   docusate sodium (COLACE) 100 MG capsule Take 100 mg by mouth daily   Yes Historical Provider, MD   metOLazone (ZAROXOLYN) 2.5 MG tablet Take 2.5 mg by mouth every other day 2/2/22 2/12/22 Yes Historical Provider, MD   Multiple Vitamins-Minerals (THERAPEUTIC MULTIVITAMIN-MINERALS) tablet Take 1 tablet by mouth daily   Yes Historical Provider, MD   warfarin (COUMADIN) 4 MG tablet Take 1 tablet by mouth daily 1/7/22  Yes Raúl Munoz MD   bumetanide (BUMEX) 1 MG tablet Take 1 tablet by mouth 2 times daily 1/7/22  Yes Raúl Munoz MD   spironolactone (ALDACTONE) 25 MG tablet Take 1 tablet by mouth daily 1/8/22  Yes Raúl Munoz MD   polyethylene glycol (GLYCOLAX) 17 g packet Take 17 g by mouth daily as needed for Constipation 1/7/22 2/6/22 Yes Raúl Munoz MD   metoprolol (LOPRESSOR) 100 MG tablet Take 200 mg by mouth 2 times daily    Yes Historical Provider, MD   simvastatin (ZOCOR) 20 MG tablet Take 20 mg by mouth nightly   Yes Historical Provider, MD   levothyroxine (SYNTHROID) 125 MCG tablet Take 125 mcg by mouth Daily    Yes Historical Provider, MD     Allergies  Patient has no known allergies. Social History   reports that she has never smoked. She has never used smokeless tobacco. She reports that she does not drink alcohol and does not use drugs. Family History  family history is not on file.     Objective  Physical Exam   General: well-developed, well-nourished, intubated  Skin: warm, dry, intact, pale in color without cyanosis  HEENT: normocephalic, atraumatic, pt intubated  Respiratory: coarse bilaterally  Cardiovascular: tachy reg rate  Abdominal: obese, soft, nontender, nondistended, normoactive bowel sounds  Extremities: no mottling, pulses intact, no edema  Neurologic: intubated  Psychiatric: cannot assess    *Available labs, imaging studies, microbiologic studies, cardiac studies have been reviewed    Electronically signed by Lb Gamez DO on 2/4/2022 at 2:36 PM

## 2022-02-04 NOTE — CONSULTS
Critical Care Admit/Consult Note         Patient Ronni Granados   MRN -  [id number]   [place] # - [de-identified]   - 1935      Date of Admission -  2022  9:21 AM  Date of evaluation -  2022  [address] Day - 0            ADMIT/CONSULT DETAILS     Reason for Admit/Consult     Altered mental status  Acute respiratory failure status post intubation  A. fib RVR  Hypotension    Consulting Service/Physician   Consulting - Gume Mena DO  Primary Care Physician - Raz Gonzales MD         HPI     This history has been obtained from extensive review of the medical record chart. The patient is a 81 y.o. female with significant past medical history of atrial fibrillation on long with long anticoagulation with daily dose of warfarin, hyperlipidemia, hypertension, diabetes, sinus node dysfunction with placement of single-chamber [employer] permanent pacemaker in  and a regenerator change in 2019. Mild mitral regurgitation, moderate tricuspid regurgitation, moderate pulmonary hypertension with RVSP of 48 and EF of 40 to 45% who was presented to the emergency department today from [facility] via EMS with patient's being largely unresponsive. Apparently she was reported to be more confused than her usual.  Apparently she had tested positive for Covid 10 days ago. Patient is vaccinated due for her booster. In the emergency room department she was found to have a fever 102.4. She was found to be tachycardic A. fib RVR and also hypotensive. Patient was found to have a supratherapeutic INR more than 10. Elevated white count of 21.7. Elevated proBNP of 14 148. also elevated lactic acid initially 4.7 and increased up to seven. Patient has arrived to the intensive care unit intubated and on Levophed.         Past Medical History         Diagnosis Date    Arthritis     Atrial fibrillation ([address]     Hyperlipidemia     Hypertension     Non-pressure chronic ulcer of other part of right lower leg with fat layer exposed ([facility] [address] 8/21/2020    Thyroid disease         Past Surgical History           Procedure Laterality Date    PACEMAKER INSERTION  06/04/2019    [facility] TONG           Current Medications   Current Medications    vancomycin  2,000 mg IntraVENous Once    rocuronium           IV Drips/Infusions   dilTIAZem Stopped (02/04/22 1217)    phenylephrine (KIRK-SYNEPHRINE) 50mg/250mL infusion Stopped (02/04/22 1414)    norepinephrine 7.467 mcg/min (02/04/22 1651)     Home Medications  Medications Prior to Admission: benzonatate (TESSALON) 200 MG capsule, Take 200 mg by mouth three times daily  dilTIAZem (CARDIZEM CD) 120 MG extended release capsule, Take 120 mg by mouth daily  docusate sodium (COLACE) 100 MG capsule, Take 100 mg by mouth daily  metOLazone (ZAROXOLYN) 2.5 MG tablet, Take 2.5 mg by mouth every other day  Multiple Vitamins-Minerals (THERAPEUTIC MULTIVITAMIN-MINERALS) tablet, Take 1 tablet by mouth daily  warfarin (COUMADIN) 4 MG tablet, Take 1 tablet by mouth daily  bumetanide (BUMEX) 1 MG tablet, Take 1 tablet by mouth 2 times daily  spironolactone (ALDACTONE) 25 MG tablet, Take 1 tablet by mouth daily  polyethylene glycol (GLYCOLAX) 17 g packet, Take 17 g by mouth daily as needed for Constipation  metoprolol (LOPRESSOR) 100 MG tablet, Take 200 mg by mouth 2 times daily   simvastatin (ZOCOR) 20 MG tablet, Take 20 mg by mouth nightly  levothyroxine (SYNTHROID) 125 MCG tablet, Take 125 mcg by mouth Daily     Diet/Nutrition   No diet orders on file    Allergies   Patient has no known allergies. Social History   Tobacco   reports that she has never smoked. She has never used smokeless tobacco.    Alcohol     reports no history of alcohol use. Occupational history :    Family History   No family history on file.     Sleep History   n/a    ROS     REVIEW OF SYSTEMS:  Unable to obtain    Lines and Devices      She has a right femoral CVC placed today    Mechanical Ventilation Data   VENT SETTINGS (Comprehensive)  Vent Information  $Ventilation: $Initial Day  Vent Type: 980  Vent Mode: AC/VC  Vt Ordered: 450 mL  Rate Set: 16 bmp  Peak Flow: 50 L/min  Pressure Support: 0 cmH20  FiO2 : 100 %  SpO2: 100 %  SpO2/FiO2 ratio: 99  Sensitivity: 3  PEEP/CPAP: 5  I Time/ I Time %: 0 s  Additional Respiratory  Assessments  Pulse: 136  Resp: 18  SpO2: 100 %    ABG  Lab Results   Component Value Date    PH 7.535 2022    PCO2 27.1 2022    PO2 148.5 2022    HCO3 22.4 2022    O2SAT 99.1 2022     Lab Results   Component Value Date    MODE NRB 15L 2022           Vitals    weight is 203 lb (92.1 kg). Her axillary temperature is 99 °F (37.2 °C). Her blood pressure is 122/66 and her pulse is 136. Her respiration is 18 and oxygen saturation is 100%.        Temperature Range: Temp: 99 °F (37.2 °C) Temp  Av.7 °F (38.2 °C)  Min: 99 °F (37.2 °C)  Max: 102.4 °F (39.1 °C)  BP Range:  Systolic (16NHE), AII:954 , Min:90 , GEM:382     Diastolic (89HPV), IGS:58, Min:56, Max:68    Pulse Range: Pulse  Av.1  Min: 134  Max: 180  Respiration Range: Resp  Av.7  Min: 16  Max: 28  Current Pulse Ox[de-identified]  SpO2: 100 %  24HR Pulse Ox Range:  SpO2  Av.3 %  Min: 96 %  Max: 100 %  Oxygen Amount and Delivery: O2 Flow Rate (L/min): 6 L/min      I/O (24 Hours)    Patient Vitals for the past 8 hrs:   BP Temp Temp src Pulse Resp SpO2 Weight   22 1719    136 18     22 1607 122/66         22 1412 128/68 99 °F (37.2 °C) Axillary 150 16 100 %    22 1342    168 28 99 %    22 1241    180  98 %    22 1219       203 lb (92.1 kg)   22 1208 90/62   156 28 96 %    22 1047  100.6 °F (38.1 °C) Axillary       22 1030 (!) 118/56   134 28 99 %    22 0941 (!) 134/58         22 0928  102.4 °F (39.1 °C) Axillary 169 24 98 %      No intake or output data in the 24 hours ending 02/04/22 1725  No intake/output data recorded. Patient Vitals for the past 96 hrs (Last 3 readings):   Weight   02/04/22 1219 203 lb (92.1 kg)         Drains/Tubes Outputs    Exam         PHYSICAL EXAM:  CONSTITUTIONAL: Patient is in intubated   HEENT : Atraumatic, normocephalic, extraocular muscles are intact, pupils are equal reactive light accommodation,   LUNGS: Equal bilateral breathing sounds clear to auscultation no wheezing or rhonchi  CARDIOVASCULAR: Patient is in A. fib irregular rate and rhythm   ABDOMEN: Obese, large pannus, soft nontender   EXT: Patient has chronic lower extremity venous stasis and hyperpigmentation      Data   Old records and images have been reviewed    Lab Results   CBC     Lab Results   Component Value Date    WBC 21.7 02/04/2022    RBC 4.10 02/04/2022    HGB 12.8 02/04/2022    HCT 37.6 02/04/2022     02/04/2022    MCV 91.7 02/04/2022    MCH 31.2 02/04/2022    MCHC 34.0 02/04/2022    RDW 15.3 02/04/2022    NRBC 0.0 02/04/2022    METASPCT 1.7 02/04/2022    LYMPHOPCT 0.9 02/04/2022    MONOPCT 1.7 02/04/2022    BASOPCT 0.0 02/04/2022    MONOSABS 0.43 02/04/2022    LYMPHSABS 0.22 02/04/2022    EOSABS 0.00 02/04/2022    BASOSABS 0.00 02/04/2022       BMP   Lab Results   Component Value Date     02/04/2022    K 3.3 02/04/2022    K 5.0 01/02/2022    CL 88 02/04/2022    CO2 17 02/04/2022    BUN 71 02/04/2022    CREATININE 1.7 02/04/2022    GLUCOSE 240 02/04/2022    CALCIUM 8.3 02/04/2022       LFTS  Lab Results   Component Value Date    ALKPHOS 186 02/04/2022    ALT 18 02/04/2022    AST 38 02/04/2022    PROT 6.8 02/04/2022    BILITOT 1.0 02/04/2022    LABALBU 2.0 02/04/2022       INR  Recent Labs     02/04/22  0957   PROTIME 119.7*   INR >10.0*       APTT  No results for input(s): APTT in the last 72 hours. Lactic Acid  No results found for: LACTA     BNP   No results for input(s): BNP in the last 72 hours.      Cultures     No results for input(s): BC in the last 72 hours. No results for input(s): Lonn Slovenian in the last 72 hours. No results for input(s): LABURIN in the last 72 hours. Radiology   CT Scans    Narrative   EXAMINATION:   CT OF THE CHEST WITHOUT CONTRAST 2/4/2022 1:23 pm       TECHNIQUE:   CT of the chest was performed without the administration of intravenous   contrast. Multiplanar reformatted images are provided for review. Dose   modulation, iterative reconstruction, and/or weight based adjustment of the   mA/kV was utilized to reduce the radiation dose to as low as reasonably   achievable.       COMPARISON:   None.       HISTORY:   ORDERING SYSTEM PROVIDED HISTORY: AMS, sepsis   TECHNOLOGIST PROVIDED HISTORY:   Reason for exam:->AMS, sepsis       FINDINGS:   Mediastinum: Intubations present.  Prominent cardiomegaly and atherosclerotic   calcifications       Lungs/pleura: Posterior densities are suggestive of mild atelectasis adjacent   to small to moderate pleural effusions.  On axial images there is question   ground-glass densities lateral right-side in particular although on   reformatted images appears more linear favoring atelectasis       Upper Abdomen: Subtle nodular contour a questionable for hepatic cirrhosis. Adrenal thickening or nodules a, probably benign but limited in evaluation       Soft Tissues/Bones: Subcutaneous edema.  Pacemaker from the left. Mild-to-moderate spinal degenerative changes.  Apparent left shoulder   subcoracoid loose body with degenerative changes and additional loose body   lower posterior location       Exam is limited by artifacts from motion and beam hardening and lack of   contrast           Impression   1. Prominent cardiomegaly and small to moderate pleural effusions, and lung   density appears mainly to represent atelectasis   2. Limited exam             SYSTEMS ASSESSMENT    Neuro     Patient presented with altered mental status.   Apparently she was lethargic at the nursing home. At this point differential diagnosis includes metabolic encephalopathy. We will also check an ammonia level. We will also check a TSH and free T4    Respiratory     Patient was intubated for altered mental status and airway protection. COVID-19 infection     We will continue full mechanical ventilation support  Wean oxygen as tolerated. Keep O2 sat 90-92%  Patient tested +10 days ago not a candidate for remdesivir. I ordered inflammatory markers. If back positive will consider Tocilizumab. Cardiovascular     A. fib RVR which is chronic  History of CHF with an EF of 40 to 45%      We will switch pressors to Keegan-Synephrine. For map above 65. We will place a arterial line for better hemodynamic monitoring. Will order echocardiogram for evaluation of LV and pulmonary hypertension        Gastrointestinal     No active issue we will continue PPI for GI    Renal     ISIDRO, creatinine at baseline is 1.2   Anion gap metabolic acidosis  Chronic hyponatremia    We will continue to monitor urine output and creatinine. Check a beta hydroxy butyrate  We will repeat ABG  Patient known to Dr. Yonatan Do will consult him. Infectious Disease     Probable sepsis  Chronic lower extremity cellulitis  COVID-19 infection    Follow results of blood cultures. Patient received Zosyn and vancomycin in the emergency department. We will continue Zosyn for now  We will check inflammatory markers  Consult infectious disease. Hematology/Oncology     Supratherapeutic INR patient received 10 of vitamin K in the ER follow INR    Endocrine     Patient with history of diabetes and hypothyroidism  Continue to monitor blood glucose per protocol.   Patient was on Synthroid 125 mcg daily we will check a TSH and free T4    Social/Spiritual/DNR/Other     Patient is a full code  Will consult palliative care to discuss goals of care and CODE STATUS with the family    MECRED    \"Thank you for asking us to see this complex patient. \"    Total critical care time caring for this patient with life threatening, unstable organ failure, including direct patient contact, management of life support systems, review of data including imaging and labs, discussions with other team members and physicians at least 61'  Min so far today, excluding procedures. Please feel free to call with questions or concerns.

## 2022-02-05 NOTE — CONSULTS
[address] [place] [address] [facility]    Consultation Note     Admit Date: 2/4/2022  9:21 AM    Reason for Consult:   Pneumonia-Covid    Attending Physician:  Bessy Ewing DO     Chief Complaint: Change in mental status extended-care facility    HISTORY OF PRESENT ILLNESS:   The patient is a 81 y.o. woman not known to the Infectious Diseases service. The patient is admitted through the emergency room with confusion and shortness of breath. She had Covid positive testing 10 days prior to admission round and lateral 40 January 22. She was confused and only opened her eyes with painful stimulation in the emergency room. She was recently seen in the emergency room January 1, 2022 with volume overload and serous draining from lower extremities. At that time she was seen by renal and discharged around January 2. Since this admission she has had temperatures [id number] she is on the ventilator but weaning down to 30% today. She had been on Levophed and Keegan-Synephrine is only on Keegan-Synephrine at this point. She does have a pacemaker. Labs include the fact that her BUN and creatinine is 77 and 1.5.  proBNP on admission was 14,000. Her white count on admission was 21,001 up to 44 currently is 28,000. Her Covid testing was positive and blood cultures were obtained on admission are positive for strep. By looking at the PCR platform it appears at this strep is a group B beta-hemolytic strep. CT of the chest on admission is consistent with volume overload with bilateral effusions. CT of the abdomen pelvis only shows cholelithiasis. Patient is anticoagulated because of chronic atrial fibrillation. Currently she is on Zosyn. The leukocytosis and currently is in play is new since January 2022 when her white count was 8.5.         Microbiology:  10/2/2020 leg wound positive for Pseudomonas and MSSA    Past Medical History:        Diagnosis Date    Arthritis     Atrial fibrillation ([address]     Hyperlipidemia     Hypertension     Non-pressure chronic ulcer of other part of right lower leg with fat layer exposed ([facility] [address] 8/21/2020    Thyroid disease      Past Surgical History:        Procedure Laterality Date    PACEMAKER INSERTION  06/04/2019    PPM GENT CHANGE  ([facility])   DR. TONG     Current Medications:   Scheduled Meds:   digoxin  250 mcg IntraVENous Once    sodium chloride flush  5-40 mL IntraVENous 2 times per day    piperacillin-tazobactam  3,375 mg IntraVENous Q8H     Continuous Infusions:   dilTIAZem 10 mg/hr (02/05/22 0914)    phenylephrine (KIRK-SYNEPHRINE) 50mg/250mL infusion 50 mcg/min (02/04/22 2230)    sodium chloride      norepinephrine Stopped (02/04/22 1745)    sodium chloride 100 mL/hr at 02/05/22 0624     PRN Meds:sodium chloride flush, sodium chloride, ondansetron **OR** ondansetron, polyethylene glycol, acetaminophen **OR** acetaminophen, perflutren lipid microspheres    Allergies:  Patient has no known allergies. Social History:   Social History     Socioeconomic History    Marital status: Single     Spouse name: Not on file    Number of children: Not on file    Years of education: Not on file    Highest education level: Not on file   Occupational History    Not on file   Tobacco Use    Smoking status: Never Smoker    Smokeless tobacco: Never Used   Vaping Use    Vaping Use: Never used   Substance and Sexual Activity    Alcohol use: No    Drug use: No    Sexual activity: Not on file   Other Topics Concern    Not on file   Social History Narrative    Not on file     Social Determinants of Health     Financial Resource Strain:     Difficulty of Paying Living Expenses: Not on file   Food Insecurity:     Worried About Running Out of Food in the Last Year: Not on file    Kennedy of Food in the Last Year: Not on file   Transportation Needs:     Lack of Transportation (Medical): Not on file    Lack of Transportation (Non-Medical):  Not on file   Physical Activity:     Days of Exercise per Week: Not on file    Minutes of Exercise per Session: Not on file   Stress:     Feeling of Stress : Not on file   Social Connections:     Frequency of Communication with Friends and Family: Not on file    Frequency of Social Gatherings with Friends and Family: Not on file    Attends Gnosticist Services: Not on file    Active Member of [address] [place] [address] or Organizations: Not on file    Attends Club or Organization Meetings: Not on file    Marital Status: Not on file   Intimate Partner Violence:     Fear of Current or Ex-Partner: Not on file    Emotionally Abused: Not on file    Physically Abused: Not on file    Sexually Abused: Not on file   Housing Stability:     Unable to Pay for Housing in the Last Year: Not on file    Number of [place] in the Last Year: Not on file    Unstable Housing in the Last Year: Not on file     Family History:   No family history on file. . Otherwise non-pertinent to the chief complaint. REVIEW OF SYSTEMS: Taken from review of the chart  CONSTITUTIONAL:  No chills, positive fevers or night sweats. No loss of weight. EYES:  No double vision or drainage from eyes, ears or throat. HEENT:  No neck stiffness. No dysphagia. No drainage from eyes, ears or throat  RESPIRATORY:  No cough, productive sputum or hemoptysis. CARDIOVASCULAR:  No chest pain, palpitations, orthopnea or dyspnea on exertion. Chronic atrial fibrillation  GASTROINTESTINAL:  No nausea, vomiting, diarrhea or constipation or hematochezia   GENITOURINARY:  No frequency burning dysuria or hematuria. INTEGUMENT/BREAST:  No rash or breast masses. Chronic lymphedema of the lower extremities  HEMATOLOGIC/LYMPHATIC:  No lymphadenopathy or blood dyscrasics. ALLERGIC/IMMUNOLOGIC:  No anaphylaxis. ENDOCRINE:  No polyuria or polydipsia or temperature intolerance. MUSCULOSKELETAL:  No myalgia or arthralgia.   Sedentary body habitus  NEUROLOGICAL: Prior to admission no focal  BEHAVIOR/PSYCH:  No psychosis. PHYSICAL EXAM:    Vitals:    BP (!) 89/53   Pulse 152   Temp 102 °F (38.9 °C) (Rectal)   Resp 20   Ht 5' (1.524 m)   Wt 203 lb (92.1 kg)   SpO2 100%   BMI 39.65 kg/m²   Constitutional: The patient is intubated on 30% FiO2 with  Skin: Warm and dry. No rashes were noted. No jaundice. Breast folds are clean  HEENT: Eyes show round, and reactive pupils. Moist mucous membranes, no ulcerations, no thrush. Neck: Supple to movements. No lymphadenopathy. Chest: No use of accessory muscles to breathe. Symmetrical expansion. Auscultation reveals no wheezing, crackles, or rhonchi. Cardiovascular: S1 and S2 are irregular irregular no murmurs appreciated. Abdomen: Positive bowel sounds to auscultation. Benign to palpation. No masses felt. No hepatosplenomegaly. Large abdominal prominence that is fairly clean in the folds  Genitourinary: [place]  Extremities: No clubbing, no cyanosis, stasis dermatitis and a lymphedema. Minimal cellulitis at this time  Musculoskeletal: Equal and some symmetrical  Neurological: Currently unable to get any response from deep painful stimulation. Patient currently is not on any sedation  Lines: peripheral      CBC+dif:  Recent Labs     02/04/22  0957 02/04/22  0957 02/04/22  1845 02/04/22  1845 02/05/22  0540   WBC 21.7*  --  39.1*  --  28.5*   HGB 12.8   < > 13.4   < > 11.9   HCT 37.6   < > 39.8   < > 34.5   MCV 91.7   < > 91.9   < > 90.8      < > 181   < > 132   NEUTROABS 21.05*   < >  --   --  23.53*    < > = values in this interval not displayed.      Lab Results   Component Value Date    CRP 53.1 (H) 02/04/2022     No results found for: CRPHS  No results found for: SEDRATE  Lab Results   Component Value Date    ALT 15 02/05/2022    AST 40 (H) 02/05/2022    ALKPHOS 392 (H) 02/05/2022    BILITOT 2.4 (H) 02/05/2022     Lab Results   Component Value Date     02/05/2022    K 3.9 02/05/2022    K 5.0 01/02/2022    CL 94 02/05/2022 CO2 19 02/05/2022    BUN 77 02/05/2022    CREATININE 1.5 02/05/2022    GFRAA 40 02/05/2022    LABGLOM 33 02/05/2022    GLUCOSE 258 02/05/2022    PROT 5.7 02/05/2022    LABALBU 1.7 02/05/2022    CALCIUM 8.3 02/05/2022    BILITOT 2.4 02/05/2022    ALKPHOS 392 02/05/2022    AST 40 02/05/2022    ALT 15 02/05/2022       Lab Results   Component Value Date    PROTIME 16.2 02/05/2022    INR 1.5 02/05/2022       Lab Results   Component Value Date    TSH 1.790 02/04/2022       Lab Results   Component Value Date    COLORU Yellow 02/04/2022    PHUR 5.5 02/04/2022    WBCUA 0-1 02/04/2022    RBCUA 0-1 02/04/2022    BACTERIA FEW 02/04/2022    CLARITYU Clear 02/04/2022    SPECGRAV 1.020 02/04/2022    LEUKOCYTESUR Negative 02/04/2022    UROBILINOGEN 0.2 02/04/2022    BILIRUBINUR Negative 02/04/2022    BLOODU SMALL 02/04/2022    GLUCOSEU Negative 02/04/2022       No results found for: HYL1AMS, BEART, X4BXGCPF, PHART, THGBART, OIE6VRK, PO2ART, HHT9JMR  Radiology:  XR CHEST PORTABLE   Final Result   1. Cardiomegaly. 2. Slightly improved aeration of the right and left lungs when compared with   the prior study. 3. Minimal bibasilar atelectasis. XR ABDOMEN FOR NG/OG/NE TUBE PLACEMENT   Final Result   Satisfactory position of the enteric tube. US GALLBLADDER RUQ   Final Result   Cholelithiasis. No biliary dilatation, free fluid, or sonographic findings   of acute cholecystitis. RECOMMENDATIONS:   Unavailable         CT Head WO Contrast   Final Result   1. There is no acute intracranial abnormality. Specifically, there is no   intracranial hemorrhage. 2. Atrophy and periventricular leukomalacia,         CT CHEST WO CONTRAST   Final Result   1. Prominent cardiomegaly and small to moderate pleural effusions, and lung   density appears mainly to represent atelectasis   2.  Limited exam         CT ABDOMEN PELVIS WO CONTRAST Additional Contrast? None   Final Result   Partially imaged bilateral pleural effusions in blood culture  · Discussed with critical care  · Check cultures  · Baseline ESR, CRP  · Monitor labs  · Will follow with you    Thank you for having us see this patient in consultation. I will be discussing this case with the treating physicians.       Electronically signed by Nigel Chambers MD on 2/5/2022 at 10:16 AM

## 2022-02-05 NOTE — CONSULTS
[facility] MD Adeline Doss MD Arne Mannheim, MD Lynnette Los, MD Rory Labs, NIDIA Metcalf  Consultation  Patient's Name: Jessie Gan  1:50 PM  2/5/2022    Nephrologist: Adeline Joshua MD    Reason for Consult: Acute kidney injury, chronic kidney disease, hyponatremia  Requesting Physician:  Sadi Becerril MD    Chief Complaint: Acute mental status change    History Obtained From: Patient, chart    History of Present Ilness:    Ms. Leonora Braga is an 81-year-old woman known to me from prior admission. She is a resident of an [facility]. She has chronic hyponatremia, baseline serum sodium in the upper 120s, as well as chronic kidney disease with baseline creatinine 1 to 1.2 mg/dL. Other past medical history is notable for atrial fibrillation, hypertension, dyslipidemia, chronic lower extremity edema and venous stasis. She was diagnosed with COVID roughly 10 days prior to presentation. She sent to the emergency department yesterday with worsening in her baseline level of mental status. Presentation was febrile, hypoxemic, hypotensive severely hyperglycemic she was in atrial fibrillation with RVR. Chest x-ray and CT of lung demonstrated predominantly cardiomegaly and atelectasis, though no findings consistent with viral pneumonia. Given the severity of her obtundation, concern for her ability to control her airway led to intubation and her being placed on a ventilator. As of this morning she remains intubated, on ventilator, FiO2 30% PEEP 5. She remains in atrial fibrillation with RVR. She remains unresponsive to verbal and tactile stimuli. BP has improved markedly on pressors as well as status post IV normal saline bolus.   She was also started on normal saline at 100 cc/h    Past Medical History:   Diagnosis Date    Arthritis     Atrial fibrillation ([address]     Hyperlipidemia     Hypertension     Non-pressure chronic ulcer of other part of right lower leg with fat layer exposed ([facility] [address] 8/21/2020    Thyroid disease        Past Surgical History:   Procedure Laterality Date    PACEMAKER INSERTION  06/04/2019    PPM GENT CHANGE  ([facility])   DR. TONG       No family history on file. reports that she has never smoked. She has never used smokeless tobacco. She reports that she does not drink alcohol and does not use drugs. Allergies:  Patient has no known allergies. Current Medications:    pantoprazole (PROTONIX) injection 40 mg, Daily   And  sodium chloride (PF) 0.9 % injection 10 mL, Daily  levothyroxine (SYNTHROID) tablet 125 mcg, Daily  warfarin (COUMADIN) tablet 4 mg, Once  [START ON 2/6/2022] warfarin placeholder: dosing by pharmacy, Daily  tocilizumab (ACTEMRA) 800 mg in sodium chloride 0.9 % 100 mL IVPB, Once  dilTIAZem 100 mg in dextrose 5 % 100 mL infusion (ADD-Dubach), Continuous  phenylephrine (KIRK-SYNEPHRINE) 50 mg in dextrose 5 % 250 mL infusion, Continuous  sodium chloride flush 0.9 % injection 5-40 mL, 2 times per day  sodium chloride flush 0.9 % injection 5-40 mL, PRN  0.9 % sodium chloride infusion, PRN  ondansetron (ZOFRAN-ODT) disintegrating tablet 4 mg, Q8H PRN   Or  ondansetron (ZOFRAN) injection 4 mg, Q6H PRN  polyethylene glycol (GLYCOLAX) packet 17 g, Daily PRN  acetaminophen (TYLENOL) tablet 650 mg, Q6H PRN   Or  acetaminophen (TYLENOL) suppository 650 mg, Q6H PRN  norepinephrine (LEVOPHED) 16 mg in dextrose 5 % 250 mL infusion, Continuous  perflutren lipid microspheres (DEFINITY) injection 1.65 mg, ONCE PRN  piperacillin-tazobactam (ZOSYN) 3,375 mg in dextrose 5 % 50 mL IVPB extended infusion (mini-bag), Q8H  0.9 % sodium chloride infusion, Continuous        Review of Systems:   Review of systems not obtained due to patient factors.     Physical exam:   BP (!) 154/57   Pulse 116   Temp 101.7 °F (38.7 °C) (Rectal)   Resp 21   Ht 5' (1.524 m)   Wt 203 lb (92.1 kg)   SpO2 99%   BMI 39.65 kg/m²   Age-appropriate obese elderly woman who appears critically ill, intubated via ETT, unresponsive, though appears comfortable on ventilator   NC/AT sclera and conjunctive are clear and anicteric mucous members are dry.   ETT extensive oropharynx  Neck soft supple trachea midline   Decreased air entry bilaterally at the bases, no crackles, no wheeze  Abdomen soft nontender nondistended normal active bowel sounds  Chronic type nonpitting edema in distal lower extremities, though also notably considerable skin wrinkling distal lower extremities indicative that her swelling recently has been much worse  Pulses palpable pulmonary arteries, radial arteries, feet warm  Skin change consistent with chronic venous stasis distal lower extremities  No other rash or lesion on visible portions of integument  Moves all 4 extremities spontaneously  Unresponsive to verbal and tactile stimuli    Data:   Labs:  CBC with Differential:    Lab Results   Component Value Date    WBC 28.5 02/05/2022    RBC 3.80 02/05/2022    HGB 11.9 02/05/2022    HCT 34.5 02/05/2022     02/05/2022    MCV 90.8 02/05/2022    MCH 31.3 02/05/2022    MCHC 34.5 02/05/2022    RDW 15.6 02/05/2022    NRBC 0.0 02/04/2022    METASPCT 1.7 02/04/2022    LYMPHOPCT 4.1 02/05/2022    MONOPCT 9.4 02/05/2022    BASOPCT 0.4 02/05/2022    MONOSABS 2.67 02/05/2022    LYMPHSABS 1.18 02/05/2022    EOSABS 0.00 02/05/2022    BASOSABS 0.10 02/05/2022     CMP:    Lab Results   Component Value Date     02/05/2022    K 3.9 02/05/2022    K 5.0 01/02/2022    CL 94 02/05/2022    CO2 19 02/05/2022    BUN 77 02/05/2022    CREATININE 1.5 02/05/2022    GFRAA 40 02/05/2022    LABGLOM 33 02/05/2022    GLUCOSE 258 02/05/2022    PROT 5.7 02/05/2022    LABALBU 1.7 02/05/2022    CALCIUM 8.3 02/05/2022    BILITOT 2.4 02/05/2022    ALKPHOS 392 02/05/2022    AST 40 02/05/2022    ALT 15 02/05/2022     Ionized Calcium:  No results found for: IONCA  Magnesium:    Lab Results   Component Value Date    MG 1.9 02/05/2022     Phosphorus:    Lab Results   Component Value Date    PHOS 3.3 02/05/2022     U/A:    Lab Results   Component Value Date    COLORU Yellow 02/04/2022    PHUR 5.5 02/04/2022    WBCUA 0-1 02/04/2022    RBCUA 0-1 02/04/2022    BACTERIA FEW 02/04/2022    CLARITYU Clear 02/04/2022    SPECGRAV 1.020 02/04/2022    LEUKOCYTESUR Negative 02/04/2022    UROBILINOGEN 0.2 02/04/2022    BILIRUBINUR Negative 02/04/2022    BLOODU SMALL 02/04/2022    GLUCOSEU Negative 02/04/2022     Microalbumen/Creatinine ratio:  No components found for: RUCREAT  Iron Saturation:  No components found for: PERCENTFE  TIBC:  No results found for: TIBC  FERRITIN:    Lab Results   Component Value Date    FERRITIN 806 02/04/2022        Imaging:  XR CHEST PORTABLE   Final Result   1. Cardiomegaly. 2. Slightly improved aeration of the right and left lungs when compared with   the prior study. 3. Minimal bibasilar atelectasis. XR ABDOMEN FOR NG/OG/NE TUBE PLACEMENT   Final Result   Satisfactory position of the enteric tube. US GALLBLADDER RUQ   Final Result   Cholelithiasis. No biliary dilatation, free fluid, or sonographic findings   of acute cholecystitis. RECOMMENDATIONS:   Unavailable         CT Head WO Contrast   Final Result   1. There is no acute intracranial abnormality. Specifically, there is no   intracranial hemorrhage. 2. Atrophy and periventricular leukomalacia,         CT CHEST WO CONTRAST   Final Result   1. Prominent cardiomegaly and small to moderate pleural effusions, and lung   density appears mainly to represent atelectasis   2. Limited exam         CT ABDOMEN PELVIS WO CONTRAST Additional Contrast? None   Final Result   Partially imaged bilateral pleural effusions in the lung bases. Please   follow-up separate report CT of the chest for further detail. Uncomplicated cholelithiasis. No acute findings in the abdomen or pelvis. XR CHEST PORTABLE   Final Result   1.  Hazy bilateral airspace disease which could represent CHF or viral   pneumonia. The chest with further evaluated on the pending CT of the chest.   2. Cardiomegaly         XR CHEST PORTABLE    (Results Pending)       Assessment  1. Acute kidney injury, relatively mild at this point, hemodynamically mediated due to hypotension, atrial fibrillation with RVR, volume contraction. Urinary indices are consistent with prerenal azotemia. With IV fluid resuscitation and blood pressure improvement, azotemia is improving. Urine output has picked up. 2. Chronic kidney disease stage IIIa, baseline creatinine 1 to 1.2 mg/dL  3. Hyponatremia, chronic, in January admission serum sodium ranging 126 and 130 mmol/L. Etiology is multifactorial, due in part to chronically poorly compensated CHF exacerbated by atrial fibrillation, further exacerbated by hyperglycemia. With IV fluid resuscitation, serum sodium has improved somewhat. Urinary indices are consistent with hypovolemic hyponatremia  4. Anemia due to CKD, chronic illness, phlebotomy associated with recent hospitalization  5. Edema, marked chronically due to combination of decompensated CHF, atrial fibrillation, CKD. The degree of skin wrinkling increasing in her distal lower extremities suggest that she has recently been far more edematous    Recommendations  1. Continue IV normal saline at 100 cc/h for now  2. Continue intensive supportive care  3. Follow labs, UO  4. Avoid nephrotoxins      Thank you for the opportunity to participate in the care of your pleasant patient. We look forward to following along with you.         Electronically signed by Oni Tomlinson MD on 2/5/2022

## 2022-02-05 NOTE — CONSULTS
[facility] cardiology/[facility] [address]    Name: Alessio Sherwood    Age: 81 y.o. Date of Admission: 2/4/2022  9:21 AM    Date of Service: 2/5/2022    Reason for Consultation: Tachycardia    Referring Physician:     History of Present Illness: The patient is a 81y.o. year old female who was transported from extended care facility she was found to be relatively unresponsive and somnolent. In the emergency room she was actually intubated for airway protection as she was pretty out of it and not terribly interactive or responsive. Much of the current information was obtained from the old chart, my office chart,    Hospital chart labs medications and telemetry all reviewed. She has a known history of [place] permanent pacemaker initially implanted April 2010 I change the generator June 2019 it is a single lead ventricular device with known permanent atrial fibrillation. Prior echocardiogram 2 years ago LVEF 40 to 45% with mild pulmonary hypertension. Mild to moderate mitral valve and tricuspid valve regurgitation at that time. Currently she is on the ventilator on no sedation. She appears to be comfortable but again not terribly interactive and really hardly wakes up to anything. Has been found to be Covid positive and additionally on admission white count 39,000 down to 28.5 today and has grown gram-positive cocci in chains on blood cultures 4 out of 4. Has also been tachycardic with documented atrial fibrillation on EKG yesterday heart rate in the 150s and 160s. Has received about 5 L of fluid and systolic blood pressure now in the 120s mean blood pressure in the 80s this morning. No reports from the extended care facility of reported chest pain. Past Medical History:   As above also history of hyponatremia    Review of Systems: From the chart but otherwise cannot be obtained from the patient at this time.   She was obtunded and currently on the ventilator. Constitutional: No fever, chills, sweats  Cardiac: As per HPI  Pulmonary: No cough, wheeze, hemoptysis  HEENT: No visual disturbances or difficult swallowing  GI: No nausea, vomiting, diarrhea, abdominal pain, rectal bleeding  : No dysuria or hematuria  Endocrine: No excessive thirst, heat or cold intolerance. Musculoskeletal: No joint pain or muscle aches. No claudication  Skin: No skin breakdown or rashes  Neuro: No headache, confusion, or seizures  Psych: No depression, anxiety      Family History:  No family history on file. Social History:  Social History     Socioeconomic History    Marital status: Single     Spouse name: Not on file    Number of children: Not on file    Years of education: Not on file    Highest education level: Not on file   Occupational History    Not on file   Tobacco Use    Smoking status: Never Smoker    Smokeless tobacco: Never Used   Vaping Use    Vaping Use: Never used   Substance and Sexual Activity    Alcohol use: No    Drug use: No    Sexual activity: Not on file   Other Topics Concern    Not on file   Social History Narrative    Not on file     Social Determinants of Health     Financial Resource Strain:     Difficulty of Paying Living Expenses: Not on file   Food Insecurity:     Worried About Running Out of Food in the Last Year: Not on file    Kennedy of Food in the Last Year: Not on file   Transportation Needs:     Lack of Transportation (Medical): Not on file    Lack of Transportation (Non-Medical):  Not on file   Physical Activity:     Days of Exercise per Week: Not on file    Minutes of Exercise per Session: Not on file   Stress:     Feeling of Stress : Not on file   Social Connections:     Frequency of Communication with Friends and Family: Not on file    Frequency of Social Gatherings with Friends and Family: Not on file    Attends Orthodox Services: Not on file    Active Member of Clubs or Organizations: Not on file    Attends Club or Organization Meetings: Not on file    Marital Status: Not on file   Intimate Partner Violence:     Fear of Current or Ex-Partner: Not on file    Emotionally Abused: Not on file    Physically Abused: Not on file    Sexually Abused: Not on file   Housing Stability:     Unable to Pay for Housing in the Last Year: Not on file    Number of [place] in the Last Year: Not on file    Unstable Housing in the Last Year: Not on file       Allergies:  No Known Allergies    Current Medications:  Current Facility-Administered Medications   Medication Dose Route Frequency Provider Last Rate Last Admin    digoxin (LANOXIN) injection 250 mcg  250 mcg IntraVENous Once Magui Gould MD        dilTIAZem 100 mg in dextrose 5 % 100 mL infusion ([facility] [place])  5-15 mg/hr IntraVENous Continuous Lucille Harjinder Lockso, DO 10 mL/hr at 02/05/22 0914 10 mg/hr at 02/05/22 0914    phenylephrine (KIRK-SYNEPHRINE) 50 mg in dextrose 5 % 250 mL infusion   mcg/min IntraVENous Continuous Lloyd R Lockso, DO 15 mL/hr at 02/04/22 2230 50 mcg/min at 02/04/22 2230    sodium chloride flush 0.9 % injection 5-40 mL  5-40 mL IntraVENous 2 times per day Lucille Harjinder Lockso, DO   10 mL at 02/05/22 0915    sodium chloride flush 0.9 % injection 5-40 mL  5-40 mL IntraVENous PRN Lloyd R Lockso, DO        0.9 % sodium chloride infusion  25 mL IntraVENous PRN Lloyd R Lockso, DO        ondansetron (ZOFRAN-ODT) disintegrating tablet 4 mg  4 mg Oral Q8H PRN Lloyd R Lockso, DO        Or    ondansetron (ZOFRAN) injection 4 mg  4 mg IntraVENous Q6H PRN Lloyd R Lockso, DO        polyethylene glycol (GLYCOLAX) packet 17 g  17 g Oral Daily PRN Lucille Harjinder Lockso, DO        acetaminophen (TYLENOL) tablet 650 mg  650 mg Oral Q6H PRN Lucille Harjinder Lockso, DO   650 mg at 02/05/22 4877    Or    acetaminophen (TYLENOL) suppository 650 mg  650 mg Rectal Q6H PRN Lloyd R Lockso, DO        norepinephrine (LEVOPHED) 16 mg in dextrose 5 % 250 mL infusion  2-100 mcg/min IntraVENous Continuous Ival Saver, DO   Stopped at 02/04/22 1745    perflutren lipid microspheres (DEFINITY) injection 1.65 mg  1.5 mL IntraVENous ONCE PRN Giselle Broussard MD        piperacillin-tazobactam (ZOSYN) 3,375 mg in dextrose 5 % 50 mL IVPB extended infusion (mini-bag)  3,375 mg IntraVENous Q8H Giselle Broussard MD   Stopped at 02/05/22 0830    0.9 % sodium chloride infusion   IntraVENous Continuous Billie Wolff  mL/hr at 02/05/22 0624 New Bag at 02/05/22 0624      dilTIAZem 10 mg/hr (02/05/22 0914)    phenylephrine (KIRK-SYNEPHRINE) 50mg/250mL infusion 50 mcg/min (02/04/22 2230)    sodium chloride      norepinephrine Stopped (02/04/22 1745)    sodium chloride 100 mL/hr at 02/05/22 0624       Physical Exam:  /60   Pulse 133   Temp 101.8 °F (38.8 °C) (Rectal)   Resp 18   Ht 5' (1.524 m)   Wt 203 lb (92.1 kg)   SpO2 98%   BMI 39.65 kg/m²   Weight change: Wt Readings from Last 3 Encounters:   02/04/22 203 lb (92.1 kg)   01/06/22 203 lb (92.1 kg)   11/01/21 222 lb (100.7 kg)         General: Currently on the ventilator. Not sedated. , no acute distress, appears to be comfortable. HEENT: Unremarkable  Neck: No JVD or bruits. Cardiac: IRegular rate and rhythm, normal S1 and S2, no extra heart sounds, murmurs, heaves, thrills  Resp: Lungs clear without wheezing or crackles. No accessory muscle use or retraction  Abdomen: soft, nontender, nondistended, no gross organomegaly or mass  Skin: Warm and dry, no cyanosis. Chronic bilateral pedal and pretibial edema skin changes and some degree of blisters and ulceration. Musculoskeletal: Could not be assessed on the ventilator.   Neuro: Grossly unremarkable  Psych: Cooperative, and normal affect    Intake/Output:    Intake/Output Summary (Last 24 hours) at 2/5/2022 1035  Last data filed at 2/5/2022 0600  Gross per 24 hour   Intake 1256 ml   Output 1655 ml   Net -399 ml     No intake/output data recorded. Laboratory Tests:  Lab Results   Component Value Date    CREATININE 1.5 (H) 02/05/2022    BUN 77 (H) 02/05/2022     (L) 02/05/2022    K 3.9 02/05/2022    CL 94 (L) 02/05/2022    CO2 19 (L) 02/05/2022     No results for input(s): CKTOTAL, CKMB in the last 72 hours. Invalid input(s): TROPONONI  No results found for: BNP  Lab Results   Component Value Date    WBC 28.5 02/05/2022    RBC 3.80 02/05/2022    HGB 11.9 02/05/2022    HCT 34.5 02/05/2022    MCV 90.8 02/05/2022    MCH 31.3 02/05/2022    MCHC 34.5 02/05/2022    RDW 15.6 02/05/2022     02/05/2022    MPV 10.6 02/05/2022     Recent Labs     02/04/22  1117 02/04/22  1845 02/05/22  0540   ALKPHOS 186* 184* 392*   ALT 18 20 15   AST 38* 46* 40*   PROT 6.8 6.6 5.7*   BILITOT 1.0 2.1* 2.4*   LABALBU 2.0* 1.9* 1.7*     Lab Results   Component Value Date    MG 1.9 02/05/2022     Lab Results   Component Value Date    PROTIME 16.2 02/05/2022    INR 1.5 02/05/2022     Lab Results   Component Value Date    TSH 1.790 02/04/2022     No components found for: CHLPL  No results found for: TRIG  No results found for: HDL  No results found for: [address]  Lab Results   Component Value Date    INR 1.5 02/05/2022       Admission ECG February 4, 2022 reviewed by me revealed atrial fibrillation heart rate 176 with left bundle branch morphology which is known to be chronic and old. I personally reviewed her telemetry and this morning appears to be in atrial fibrillation heart rate in the 120s. Currently on Cardizem infusion 15 mg IV an hour and mean blood pressure 85. Personally reviewed her current chart and old chart. ASSESSMENT / PLAN:    1. Atrial fibrillation rapid ventricular rate likely related to current physiologic distress of infection with white count of 39,000 on admission, obtunded. Covid positive and also Gram positive cocci in chains on blood cultures.     Controlled ventricular rate with IV digoxin which will not lower the blood pressure but will control the ventricular rate especially at rest and also started IV Cardizem and can go up to a maximum of 20 mg an hour and titrate to mean blood pressure at least 65. In the ICU currently would not fully anticoagulate. #2 leukocytosis white count 39,000. Has received IV antibiotic. Gram-positive cocci in chains. #3 sepsis with hypotension is received 5 L of fluid and now blood pressure improved. Currently not on any pressor agents. This morning the creatinine is 1.5 with a BUN of 77 reflecting likely still intravascular volume depleted and dehydrated. #4 uremia BUN is 77 today and 74 yesterday. Continue IV fluid maintenance. Hemoglobin stable at 11.9 today down from 13.4 yesterday likely delusional with IV fluid given. #5 mild LV systolic dysfunction 56% mild to moderate tricuspid valve and mitral valve regurgitation follow-up urine output. #6 hyponatremia in today 128 up from 124 also hyponatremia 126 January 1, 2022. #7 Covid positive and continue supportive measures.             Electronically signed by Wolf Nash MD on 2/5/2022 at 10:35 AM

## 2022-02-05 NOTE — CONSULTS
Palliative Care Department  [phone]  Palliative Care Initial Consult  Provider Betty Chavez, APRN - CNP     Alisa Cho  [id number]  Hospital Day: 1  Date of Initial Consult: 2/4/2022  Referring Provider: Dino Cortes MD  Palliative Medicine was consulted for assistance with: Goals of Care, Code Status Discussion     HPI:   Alisa Cho is a 81 y.o. with a medical history of atrial fibrillation on anticoagulation, hyperlipidemia, hypertension, CHF, DM, sinus node dysfunction s/p pacemaker placement, MR, TR, pulmonary hypertension who was admitted on 2/4/2022 from facility with a CHIEF COMPLAINT of confusion. Patient tested positive for Covid 10 days prior to admission. Patient was febrile in the ED, found to be in atrial fibrillation with RVR, and also hypotensive. Patient currently intubated in ICU. Palliative medicine consulted for goals of care and code status discussion. ASSESSMENT/PLAN:     Pertinent Hospital Diagnoses      Acute hypoxic respiratory failure due to Covid 19   Sepsis   Atrial fibrillation with RVR   ISIDRO   Acute on chronic CHF      Palliative Care Encounter / Counseling Regarding Goals of Care  Please see detailed goals of care discussion as below   At this time, Alisa Cho, Does Not have capacity for medical decision-making.   Capacity is time limited and situation/question specific   During encounter, lauren Barros, was surrogate medical decision-maker   Outcome of goals of care meeting: Continue full code, continue current management    Code status Full Code   Advanced Directives: no POA or living will in epic   Surrogate/Legal NOK:  jeison Conroy nephew, [phone]    Spiritual assessment: no spiritual distress identified  Bereavement and grief: to be determined  Referrals to: none today  SUBJECTIVE:     Current medical issues leading to Palliative Medicine involvement include   Active Hospital Problems    Diagnosis Date Noted    Sepsis ([facility] [address] [A41.9] 02/04/2022       Details of Conversation:  Chart reviewed. Spoke to patient's nephew, Betty Martinez. Betty Martinez identifies himself as Adry Phelan. Role of palliative medicine explained. Discussion regarding hospital course, current condition, goals of care, and code status. Betty Martinez wishes to continue full code and current management at this time. Family hopeful that patient can be weaned and extubated from ventilator soon. Support offered and questions answered. Will follow. OBJECTIVE:   Prognosis: depends upon goals and unknown    Physical Exam:  BP (!) 89/53   Pulse 126   Temp 98.8 °F (37.1 °C) (Bladder)   Resp 17   Ht 5' (1.524 m)   Wt 203 lb (92.1 kg)   SpO2 100%   BMI 39.65 kg/m²   Due to the current efforts to prevent transmission of COVID-19 and also the need to preserve PPE for other caregivers, a face-to-face encounter with the patient was not performed. That being said, all relevant records and diagnostic tests were reviewed, including laboratory results and imaging. Please reference any relevant documentation elsewhere. Care will be coordinated with the primary service. Objective data reviewed: labs, images, records, medication use, vitals and chart    Discussed patient and the plan of care with the other IDT members: Palliative Medicine IDT Team    Time/Communication  Greater than 50% of time spent, total 30 minutes in counseling and coordination of care at the bedside regarding goals of care, diagnosis and prognosis and see above. Thank you for allowing Palliative Medicine to participate in the care of [facility].

## 2022-02-05 NOTE — FLOWSHEET NOTE
Patient admitted from ED to 211, with the following belongings - none, placed on monitor, patient oriented to room and unit visiting hours. Patient guide at bedside, unable to review patient rights and responsibilities 2/2 patient not responsive and intubated. MRSA nasal swab obtained. Bed alarm on.

## 2022-02-05 NOTE — PROGRESS NOTES
Critical Care Team - Daily Progress Note         Date and time: 2022 1:06 PM  Patient's name:  Momo De Leon  Medical Record Number: [id number]  Patient's account/billing number: [de-identified]  Patient's YOB: 1935  Age: 81 y.o. Date of Admission: 2022  9:21 AM  Length of stay during current admission: 1      Primary Care Physician: Maura Grove MD  ICU Attending Physician: Dr. Torey Mon Status: Full Code    Reason for ICU admission:     Altered mental status  Respiratory failure requiring intubation  Hypotension  A. fib RVR      SUBJECTIVE:     OVERNIGHT EVENTS:     Patient remains critically ill and intubated. Has ongoing fevers T-max earlier this morning was 102. 2. Remains in A. fib with RVR. Remains unresponsive off sedated medicine.       CURRENT VENTILATION STATUS:     [x] Ventilator  [] BIPAP  [] Nasal Cannula [] Room Air      IF INTUBATED, ET TUBE MARKING AT LOWER LIP:       cms    SECRETIONS Amount:  [] Small [] Moderate  [] Large  [x] None  Color:     [] White [] Colored  [] Bloody    SEDATION:  RAAS Score:  [] Propofol gtt  [] Versed gtt  [] Ativan gtt   [x] No Sedation    PARALYZED:  [x] No    [] Yes      VASOPRESSORS:  [x] No    [] Yes    If yes -   [] Levophed       [] Dopamine     [] Vasopressin       [] Dobutamine  [] Phenylephrine         [] Epinephrine    CENTRAL LINES:     [] No   [x] Yes   (Date of Insertion: 2022  )           If yes -     [] Right IJ     [] Left IJ [x] Right Femoral [] Left Femoral                   [] Right Subclavian [] Left Subclavian       SILVEIRA'S CATHETER:   [] No   [x] Yes  (Date of Insertion:   )     URINE OUTPUT:            [x] Good   [] Low              [] Anuric      OBJECTIVE:     VITAL SIGNS:  /60   Pulse 128   Temp 101.8 °F (38.8 °C) (Rectal)   Resp 21   Ht 5' (1.524 m)   Wt 203 lb (92.1 kg)   SpO2 98%   BMI 39.65 kg/m²   Tmax over 24 hours:  Temp (24hrs), Av.6 °F (38.1 °C), Min:98.8 °F (37.1 °C), Max:102.2 °F (39 °C)      Patient Vitals for the past 6 hrs:   BP Temp Temp src Pulse Resp SpO2 Height   02/05/22 1203    128 21 98 %    02/05/22 1000    133 18 98 %    02/05/22 0937       5' (1.524 m)   02/05/22 0904  101.8 °F (38.8 °C) Rectal       02/05/22 0900    148 18 99 %    02/05/22 0834    152 20 100 %    02/05/22 0800 137/60 101.8 °F (38.8 °C) Rectal 133 20 99 %          Intake/Output Summary (Last 24 hours) at 2/5/2022 1306  Last data filed at 2/5/2022 0600  Gross per 24 hour   Intake 1256 ml   Output 1655 ml   Net -399 ml     Wt Readings from Last 2 Encounters:   02/04/22 203 lb (92.1 kg)   01/06/22 203 lb (92.1 kg)     Body mass index is 39.65 kg/m².         PHYSICAL EXAMINATION:   CONSTITUTIONAL: Patient is in intubated   HEENT : Atraumatic, normocephalic, extraocular muscles are intact, pupils are equal reactive light accommodation,   LUNGS: Equal bilateral breathing sounds clear to auscultation no wheezing or rhonchi  CARDIOVASCULAR: Patient is in A. fib irregular rate and rhythm   ABDOMEN: Obese, large pannus, soft nontender   EXT: Patient has chronic lower extremity venous stasis and hyperpigmentation        Any additional physical findings:    MEDICATIONS:    Scheduled Meds:   pantoprazole  40 mg IntraVENous Daily    And    sodium chloride (PF)  10 mL IntraVENous Daily    levothyroxine  125 mcg Oral Daily    warfarin  4 mg Oral Once    [START ON 2/6/2022] warfarin placeholder: dosing by pharmacy   Other Daily    sodium chloride flush  5-40 mL IntraVENous 2 times per day    piperacillin-tazobactam  3,375 mg IntraVENous Q8H     Continuous Infusions:   dilTIAZem 10 mg/hr (02/05/22 0914)    phenylephrine (KRIK-SYNEPHRINE) 50mg/250mL infusion 50 mcg/min (02/04/22 2230)    sodium chloride      norepinephrine Stopped (02/04/22 1745)    sodium chloride 100 mL/hr at 02/05/22 0624     PRN Meds:   sodium chloride flush, 5-40 mL, PRN  sodium chloride, 25 mL, PRN  ondansetron, 4 mg, Q8H PRN   Or  ondansetron, 4 mg, Q6H PRN  polyethylene glycol, 17 g, Daily PRN  acetaminophen, 650 mg, Q6H PRN   Or  acetaminophen, 650 mg, Q6H PRN  perflutren lipid microspheres, 1.5 mL, ONCE PRN          VENT SETTINGS (Comprehensive) (if applicable):  Vent Information  $Ventilation: $Subsequent Day  Vent Type: 980  Vent Mode: AC/VC  Vt Ordered: 450 mL  Rate Set: 16 bmp  Peak Flow: 50 L/min  Pressure Support: 0 cmH20  FiO2 : 30 %  SpO2: 98 %  SpO2/FiO2 ratio: 326.67  Sensitivity: 3  PEEP/CPAP: 5  I Time/ I Time %: 0 s  Humidification Source: Heated wire  Humidification Temp: 37  Humidification Temp Measured: 37  Circuit Condensation: Drained  Additional Respiratory  Assessments  Pulse: 128  Resp: 21  SpO2: 98 %  Humidification Source: Heated wire  Humidification Temp: 37  Circuit Condensation: Drained  Oral Care: Mouth swabbed,Mouth moisturizer    ABGs:   Recent Labs     02/05/22  0546   PH 7.531*   PCO2 24.3*   PO2 132.9*   HCO3 19.9*   BE -1.2   O2SAT 99.1*       Laboratory findings:    Complete Blood Count:   Recent Labs     02/04/22  0957 02/04/22  1845 02/05/22  0540   WBC 21.7* 39.1* 28.5*   HGB 12.8 13.4 11.9   HCT 37.6 39.8 34.5    181 132        Last 3 Blood Glucose:   Recent Labs     02/04/22  1117 02/04/22  1845 02/05/22  0540   GLUCOSE 240* 357* 258*        PT/INR:    Lab Results   Component Value Date    PROTIME 16.2 02/05/2022    INR 1.5 02/05/2022     PTT:  No results found for: APTT, PTT    Comprehensive Metabolic Profile:   Recent Labs     02/04/22  1117 02/04/22  1117 02/04/22  1845 02/04/22  1845 02/05/22  0540   *  --  124*  --  128*   K 3.3*  --  3.8  --  3.9   CL 88*  --  87*  --  94*   CO2 17*  --  18*  --  19*   BUN 71*  --  74*  --  77*   CREATININE 1.7*  --  1.8*  --  1.5*   GLUCOSE 240*  --  357*  --  258*   CALCIUM 8.3*  --  8.4*  --  8.3*   PROT 6.8   < > 6.6   < > 5.7*   LABALBU 2.0*   < > 1.9*   < > 1.7*   BILITOT 1.0   < > 2.1*   < > 2.4*   ALKPHOS 186*   < > 184*   < > 392*   AST 38*   < > 46*   < > 40*   ALT 18   < > 20   < > 15    < > = values in this interval not displayed. Magnesium:   Lab Results   Component Value Date    MG 1.9 02/05/2022     Phosphorus:   Lab Results   Component Value Date    PHOS 3.3 02/05/2022     Ionized Calcium: No results found for: CAION     Urinalysis:     Troponin: No results for input(s): TROPONINI in the last 72 hours. Microbiology:    Cultures during this admission:     Blood cultures:                 [] None drawn      [] Negative             [x]  Positive (Details  Gram stain performed from blood culture bottle media   Gram positive cocci in chains:  )  Urine Culture:                   [] None drawn      [] Negative             []  Positive (Details:  )  Sputum Culture:               [] None drawn       [] Negative             []  Positive (Details:  )   Endotracheal aspirate:     [] None drawn       [] Negative             []  Positive (Details:  )     Other pertinent Labs:       Radiology/Imaging:     Chest Xray (2/5/2022):    Narrative   EXAMINATION:   ONE XRAY VIEW OF THE CHEST       2/5/2022 7:39 am       COMPARISON:   02/04/2022       HISTORY:   ORDERING SYSTEM PROVIDED HISTORY: intubation   TECHNOLOGIST PROVIDED HISTORY:   Reason for exam:->intubation       FINDINGS:   The heart is enlarged.  The endotracheal tube tip is located 1.7 cm proximal   to chika.  There is an NG tube in the stomach.  There is a left MediPort   catheter.       There is slight improved aeration of the lungs when compared with the prior   study.  The density within the right upper lobe medially likely represents   superimposed vascular structures.       There is minimal bibasilar atelectasis.         Impression   1. Cardiomegaly. 2. Slightly improved aeration of the right and left lungs when compared with   the prior study. 3. Minimal bibasilar atelectasis.             ASSESSMENT:     1. Altered mental status  2. Acute respiratory failure  3. Sepsis  4. A. fib RVR  5.  COVID-19 infection  6. Supratherapeutic INR  7. History of LV dysfunction with an EF of 40 -45%  8. History of diabetes type 2  9. Hypothyroidism          PLAN:     WEAN PER PROTOCOL:  [x] No   [] Yes  [] N/A    DISCONTINUE ANY LABS:   [x] No   [] Yes    ICU PROPHYLAXIS:  Stress ulcer:  [x] PPI Agent  [] A2Tcjlp [] Sucralfate  [] Other:  VTE:  Patient on Coumadin [] Enoxaparin  [] Unfract. Heparin Subcut  [] EPC Cuffs    NUTRITION:  [] NPO [] Tube Feeding (Specify: ) [] TPN  [] PO (Diet: Diet NPO)    HOME MEDICATIONS RECONCILED: [] No  [x] Yes    INSULIN DRIP:   [x] No   [] Yes    CONSULTATION NEEDED:  [] No   [x] Yes    FAMILY UPDATED:    [] No   [x] Yes    TRANSFER OUT OF ICU:   [x] No   [] Yes    ADDITIONAL PLAN:  1. Continue full vent support  2. Continue Zosyn appreciate infectious disease input. 3. Patient received digoxin last night and this morning. Started on a Cardizem drip, cardiology on board, echocardiogram has been ordered will follow results rule out vegetations given patient's bacteremia  4. We will have pharmacy resume Coumadin dosing  5. We will check a TSH and free T4 resume Synthroid  6. We will also consult pharmacy to evaluate patient for possible Tocilizumab. Maninder Carter MD, M.D. Jennifer Cornell. GIULIA. P                     2/5/2022, 1:06 PM   CCT excluding procedures:40'      NOTE: This report, in part or full, may have been transcribed using voice recognition software. Every effort was made to ensure accuracy; however, inadvertent computerized transcription errors may be present. Please excuse any transcriptional grammatical or spelling errors that may have escaped my editorial review.

## 2022-02-05 NOTE — CONSULTS
2/5/2022  9:48 AM      Comprehensive Nutrition Assessment    Type and Reason for Visit:  Initial,Positive Nutrition Screen,Consult (TF Recommendation - Provider to manage)    Nutrition Recommendations/Plan: When medically feasible/stabilized, recommend EN:    TF RECOMMENDATION: Standard TF Without Fiber (Osmolite 1.2) to goal 50 ml/hr and Wound Healing Modular BID with Water flushes 30 ml Q 4hr  This will provide: 1200 ml/d, 1620 william total, 67 g pro, 1524 total free water  This regimen will meet 100% calorie and protein needs    Nutrition Assessment:  Pt admit from NH 2/2 septicemia, ISIDRO, Covid+. Currently in ICU intubated/sedated and on pressor. Hx. DM and chronic LE wounds. Will recommend Standard TF without Fiber (Osmolite 1.2) and Wound Healing modular BID, which will provide added arginine to promote wound healing    Malnutrition Assessment:  Malnutrition Status: At risk for malnutrition    Context:  Acute Illness     Findings of the 6 clinical characteristics of malnutrition:  Energy Intake:  1 - 75% or less of estimated energy requirements for 7 or more days (currently NPO)  Weight Loss:  Unable to assess     Body Fat Loss:  Unable to assess (Covid Isolation)     Muscle Mass Loss:  Unable to assess (Covid Isolation)    Fluid Accumulation:  No significant fluid accumulation     Strength:  Not Performed    Estimated Daily Nutrient Needs:  Energy (kcal):  ; Weight Used for Energy Requirements:  Current     Protein (g):  60-70 (1.3-1.5 g/kg as tony w/ISIDRO);  Weight Used for Protein Requirements:  Ideal        Fluid (ml/day):  per Critical Care; Method Used for Fluid Requirements:  Other (Comment)      Nutrition Related Findings:  intubated/sedated, pressor (MAP 62), febrile, no edema, I/O WNL, round abd w/hypo BS, hyponatremia,      Wounds:  Multiple,Wound Consult Pending (Chronic LE wounds)       Current Nutrition Therapies:    Diet NPO    Anthropometric Measures:  · Height: 5' (152.4 cm)  · Current Body Weight: 203 lb (92.1 kg) (2/4 no method)   · Admission Body Weight: 203 lb (92.1 kg) (2/4)    · Usual Body Weight: 203 lb (92.1 kg) (1/1 standing scale)     · Ideal Body Weight: 100 lbs; % Ideal Body Weight 203 %   · BMI: 39.6  · BMI Categories: Obese Class 2 (BMI 35.0 -39.9)       Nutrition Diagnosis:   · Inadequate oral intake related to impaired respiratory function as evidenced by NPO or clear liquid status due to medical condition,intubation      Nutrition Interventions:   Food and/or Nutrient Delivery:  Continue NPO (When pt hemodynamically stable, recommend start TF with Wound Healing modular BID)  Nutrition Education/Counseling:  Education not indicated   Coordination of Nutrition Care:  Continue to monitor while inpatient    Goals:  Nutrition progression       Nutrition Monitoring and Evaluation:   Behavioral-Environmental Outcomes:  None Identified   Food/Nutrient Intake Outcomes:  Diet Advancement/Tolerance  Physical Signs/Symptoms Outcomes:  GI Status,Biochemical Data,Hemodynamic Status,Fluid Status or Edema,Nutrition Focused Physical Findings,Skin,Weight     Discharge Planning:     Too soon to determine     Electronically signed by Ben Goldberg RD, CNSC, LD on 2/5/22 at 9:48 AM EST    Contact: [phone]

## 2022-02-05 NOTE — PROGRESS NOTES
[facility] Progress Note    --------------------------------------------------------------------------------------  Assessment / Plan  Sepsis due to COVID with questionable bacterial component              Meets temp / HR / RR / WBC criteria - still febrile              Source?                           UA few bacteria only                          Imaging poss bl pna / no focal consolidation                          BLE wounds to me not severe enough to cause this              Pan-cx sent, Strep agalactiae in 1 bottle               Imaging more c/w viral pna vs overload              Vanc and Zosyn given initially              CRP 53 / procal 7.53               ID input appreciated    Zosyn    R/o infected pacer    LE wounds likely source of bacteremia              Pulm / critical care consult for ICU admission              Reported COVID+ ~10 days PTA                          Repeat COVID swab remains positive                          IV Decadron for now                Encephalopathy, type and exact etiology unclear   Pt essentially unresponsive not on sedation   CT head without acute pathology   Positive Babinski on exam bilaterally    Suspect acute on chronic CHF, type unknown              CXR / CTA c/w cardiomegaly, effusions and BNP is 14k              Does not appear to have an echo available in the EMR              Cardio input appreciated: IV dig + Cardizem gtt for rate, notes pt still likely hypovolemic despite s/p 5 L IVF              Watch fluid volume status very carefully     Acute hypoxic resp failure due to the above              Upper 90s on NRB on arrival              ABG showed significant alkalosis              Pt intubated in ED and going to ICU   FiO2 at 30 today though pt still unresponsive              Pulm / critical care to follow and wean / extubate as able                Atrial fibrillation with RVR              Afib is chronic              On Coumadin w INR >10 here likely d/t hepatic congestion              No evidence of active bleeding but w it over 10 would reverse              Vit K 10 mg given in ED              Follow INR - now much improved              Cardio input as above     Metabolic acidosis w elevated anion gap, improving              CO2 17 in ED w gap 21, now CO2 19 / gap 15              BUN was 71 and lactic at 7.0   BUN increasing now 77 w lactic improved at 3.8              Very careful w fluids as imaging suggests overload     Renal dysfunction              Baseline Cr on EMR review is 1.2-1.3              Cr 1.7 here not quite ISIDRO, now 1.5               Monitor as pt receives IVF   FENa / FEUN suggestive of pre-renal ISIDRO              Monitor fluid balance closely     Diabetes              No hx DM / not on any antihyperglycemics               in ED w a1c 7.0   BG checks and start low-dose ISS q6 for now and monitor   Hypoglycemic protocol     Chronic hyponatremia              Likely multifactorial w low-solute intake as large contributor              Recently admitted early Jan, Na was 126-128 at that time              Na 126 on admission              Follow, now 128    Pt's PMH otherwise pertinent for HPL, HTN, hypothyroidism. Continue outpt med regimen; if any particular issues, will address and move to active problem list above.   Please see orders for further plan of care.     · Code status                 Full code  · DVT prophylaxis         SCDs / chemical ppx held as INR >10  · Disposition                  To be determined    --------------------------------------------------------------------------------------    Admission Date  2/4/2022  9:21 AM  Chief Complaint AMS / COVID+    Subjective  History of Present Illness  Seen at bedside in ICU   Intubated, unresponsive but not sedated  Was moving toes spontaneously  Toes upgoing w Babinski, checked 4 times  No family present during my visit  No new issues identified today  Case was discussed with intensivist    Review of Systems unable to be obtained as patient is intubated    Objective  Physical Exam  Vitals: BP (!) 154/57   Pulse 116   Temp 101.7 °F (38.7 °C) (Rectal)   Resp 21   Ht 5' (1.524 m)   Wt 203 lb (92.1 kg)   SpO2 99%   BMI 39.65 kg/m²   General: well-developed, well-nourished, intubated  Skin: warm, dry, intact, pale in color without cyanosis  HEENT: normocephalic, atraumatic, pt intubated  Respiratory: coarse bilaterally  Cardiovascular: tachy reg rate  Abdominal: obese, soft, nontender, nondistended, normoactive bowel sounds  Extremities: no mottling, pulses intact, no edema  Neurologic: intubated. Not following commands.   Toes upgoing w Babinski bilaterally  Psychiatric: cannot assess    Electronically signed by Nathalie Lopez DO on 2/5/2022 at 1:50 PM

## 2022-02-05 NOTE — PROGRESS NOTES
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 Weight:   Wt Readings from Last 1 Encounters:   02/04/22 203 lb (92.1 kg)       TSH:    Lab Results   Component Value Date    TSH 1.790 02/04/2022       Hepatic Function Panel:                          Lab Results   Component Value Date    ALKPHOS 392 02/05/2022    ALT 15 02/05/2022    AST 40 02/05/2022    PROT 5.7 02/05/2022    BILITOT 2.4 02/05/2022    LABALBU 1.7 02/05/2022       Current warfarin drug-drug interactions include: No significant interactions    Recent Labs     02/04/22  0957 02/04/22  1845 02/05/22  0540   HGB 12.8 13.4 11.9    181 132     Date Warfarin Dose INR Heparin or LMWH Comment   2/5 4 mg 1.5 -------------                                  Assessment:  · Patient is a 81 y.o. female on warfarin for Atrial Fibrillation. Patient's home warfarin dosing regimen is warfarin 4 mg daily.    · Goal INR 2 - 3  · INR 1.5 today; patient received vitamin K 10 mg IV x 1 on 2/4 for INR >10    Plan:  · Warfarin 4 mg tonight  · Daily PT/INR until the INR is stable within the therapeutic range  · Pharmacist will follow and monitor/adjust dosing as necessary    Vinayak Castanon, Denisse, BCCCP  2/5/2022  10:54 AM

## 2022-02-05 NOTE — PATIENT CARE CONFERENCE
Intensive Care Daily Quality Rounding Checklist      ICU Team Members: Dr. Vianca Syed, Dr. Barbara Perez, bedside nurse, charge nurse    ICU Day #:  2    Intubation Date: 02/04/2022    Ventilator Day #: 2    Central Line Insertion Date: 02/04/2022        Day #: 2     Arterial Line Insertion Date: 02/04/2022      Day #: 2    Temporary Hemodialysis Catheter Insertion Date: N/A    Day # N/A    DVT Prophylaxis: Coumadin patient (admitted with high INR)    GI Prophylaxis: Protonix    Licona Catheter Insertion Date: 02/04/2022       Day #: 2      Continued need (if yes, reason documented and discussed with physician): Critical care patient    Skin Issues/ Wounds and ordered treatment discussed on rounds: BLE chronic wounds. Wound care consult    Goals/ Plans for the Day: Daily labs, CXR, ABG. Wean vent as able. Obtain Echo. Cardiology following. Antibiotics per ID. Continue critical care management.

## 2022-02-05 NOTE — PLAN OF CARE
Problem: Skin Integrity:  Goal: Will show no infection signs and symptoms  Description: Will show no infection signs and symptoms  Outcome: Met This Shift  Goal: Absence of new skin breakdown  Description: Absence of new skin breakdown  Outcome: Met This Shift     Problem: Falls - Risk of:  Goal: Will remain free from falls  Description: Will remain free from falls  Outcome: Met This Shift  Goal: Absence of physical injury  Description: Absence of physical injury  Outcome: Met This Shift     Problem: Airway Clearance - Ineffective  Goal: Achieve or maintain patent airway  Outcome: Ongoing     Problem: Gas Exchange - Impaired  Goal: Absence of hypoxia  Outcome: Ongoing  Goal: Promote optimal lung function  Outcome: Met This Shift     Problem: Breathing Pattern - Ineffective  Goal: Ability to achieve and maintain a regular respiratory rate  Outcome: Ongoing     Problem:  Body Temperature -  Risk of, Imbalanced  Goal: Ability to maintain a body temperature within defined limits  Outcome: Met This Shift  Goal: Will regain or maintain usual level of consciousness  Outcome: Not Met This Shift  Goal: Complications related to the disease process, condition or treatment will be avoided or minimized  Outcome: Ongoing     Problem: Isolation Precautions - Risk of Spread of Infection  Goal: Prevent transmission of infection  Outcome: Met This Shift     Problem: Nutrition Deficits  Goal: Optimize nutritional status  Outcome: Not Met This Shift     Problem: Risk for Fluid Volume Deficit  Goal: Maintain normal heart rhythm  Outcome: Not Met This Shift  Goal: Maintain normal serum potassium, sodium, calcium, phosphorus, and pH  Outcome: Ongoing

## 2022-02-06 PROBLEM — J96.00 ACUTE RESPIRATORY FAILURE (HCC): Status: ACTIVE | Noted: 2022-01-01

## 2022-02-06 PROBLEM — J96.00 ACUTE RESPIRATORY FAILURE DUE TO COVID-19 (HCC): Status: ACTIVE | Noted: 2022-01-01

## 2022-02-06 PROBLEM — U07.1 ACUTE RESPIRATORY FAILURE DUE TO COVID-19 (HCC): Status: ACTIVE | Noted: 2022-01-01

## 2022-02-06 NOTE — PROGRESS NOTES
[place] [facility] CARDIOLOGY PROGRESS NOTE  [facility]        Subjective: Seeing patient for AF RVR, mental status change, seizure activity overnight, obtunded subsequently intubated for airway protection. Leukocytosis on admission, permanent pacemaker ventricular demand pacing, Covid positive, prior echocardiogram LVEF 40%, GPC in chains bacteremia. Hypokalemia 2.8 today. On propofol sedation with seizure activity witnessed. Off Cardizem infusion with heart rate now atrial fibrillation in the 70s and 80s. Head CT last night shows diffuse areas of low attenuation/edema bilaterally was not present on the scan from yesterday afternoon. MRI cannot be done with pacemaker in place. Objective: Labs chart medications and telemetry all reviewed.     Patient Vitals for the past 24 hrs:   BP Temp Temp src Pulse Resp SpO2   02/06/22 1100  98.4 °F (36.9 °C) Rectal 71 16 99 %   02/06/22 1000  98.2 °F (36.8 °C) Rectal 70 16 99 %   02/06/22 0900  97.3 °F (36.3 °C) Rectal 81 16 99 %   02/06/22 0834    63 16 100 %   02/06/22 0800  96.8 °F (36 °C) Rectal 69 15 99 %   02/06/22 0700    72 15 99 %   02/06/22 0600  94.6 °F (34.8 °C) Rectal 74 15 99 %   02/06/22 0500  94.1 °F (34.5 °C) Rectal 71 16 99 %   02/06/22 0400  95 °F (35 °C) Rectal 72 15 98 %   02/06/22 0300  97 °F (36.1 °C) Rectal 80 16 99 %   02/06/22 0200  97.4 °F (36.3 °C) Rectal 71 16 100 %   02/06/22 0100    80 16 99 %   02/06/22 0012    88 17 98 %   02/06/22 0000  (P) 98.9 °F (37.2 °C) Rectal 93 19 97 %   02/05/22 2300  100.9 °F (38.3 °C) Rectal 106 23 98 %   02/05/22 2200    118 22 97 %   02/05/22 2100    99 25 98 %   02/05/22 2024    108  98 %   02/05/22 2000  102.2 °F (39 °C) Rectal 107 25 98 %   02/05/22 1900    105  98 %   02/05/22 1835  102.7 °F (39.3 °C) Rectal      02/05/22 1800    105  99 %   02/05/22 1700    119  99 %   02/05/22 1631    130  99 %   02/05/22 1600 (!) 141/56 100.2 °F (37.9 °C) Rectal 111 21 99 %   02/05/22 1500    105  99 %   02/05/22 1400    103  99 %   02/05/22 1300    116  99 %         Intake/Output Summary (Last 24 hours) at 2/6/2022 1228  Last data filed at 2/6/2022 1000  Gross per 24 hour   Intake 3202 ml   Output 2455 ml   Net 747 ml       Wt Readings from Last 3 Encounters:   02/04/22 203 lb (92.1 kg)   01/06/22 203 lb (92.1 kg)   11/01/21 222 lb (100.7 kg)       Telemetry: Personally reviewed and shows atrial fibrillation heart rate in the 80s. Current meds: Scheduled Meds:   potassium chloride  20 mEq IntraVENous Q1H    IV infusion builder   IntraVENous Q12H    pantoprazole  40 mg IntraVENous Daily    And    sodium chloride (PF)  10 mL IntraVENous Daily    levothyroxine  125 mcg Oral Daily    insulin lispro  0-12 Units SubCUTAneous Q6H    sodium chloride flush  5-40 mL IntraVENous 2 times per day    piperacillin-tazobactam  3,375 mg IntraVENous Q8H     Continuous Infusions:   dextrose      propofol 30 mcg/kg/min (02/06/22 1224)    dilTIAZem 5 mg/hr (02/06/22 0142)    phenylephrine (KIRK-SYNEPHRINE) 50mg/250mL infusion 50 mcg/min (02/06/22 1224)    sodium chloride      norepinephrine Stopped (02/04/22 1745)    sodium chloride 100 mL/hr at 02/05/22 0624     PRN Meds:.glucose, dextrose, glucagon (rDNA), dextrose, acetaminophen, sodium chloride flush, sodium chloride, ondansetron **OR** ondansetron, polyethylene glycol, acetaminophen **OR** acetaminophen, perflutren lipid microspheres    Allergies: Patient has no known allergies.       Labs:   Recent Labs     02/04/22  1845 02/05/22 0540 02/06/22  0545   WBC 39.1* 28.5* 22.2*   HGB 13.4 11.9 10.6*   HCT 39.8 34.5 30.9*   MCV 91.9 90.8 90.9    132 101*       Labs:   Recent Labs     02/04/22  1845 02/04/22  1845 02/05/22  0540 02/05/22 2045 02/06/22  0545   *   < > 128* 125* 130*   K 3.8   < > 3.9 3.6 2.8*   CL 87*   < > 94* 91* 96*   CO2 18*   < > 19* 17* 21*   PHOS 5.0*  --  3.3  --  2.7 BUN 74*   < > 77* 61* 60*   CREATININE 1.8*   < > 1.5* 1.5* 1.3*    < > = values in this interval not displayed. Labs: No results for input(s): CKTOTAL, CKMB, CKMBINDEX, TROPONINI in the last 72 hours. Labs: No results for input(s): BNP in the last 72 hours. Labs: No results for input(s): CHOL, HDL, TRIG in the last 72 hours. Invalid input(s): CHOLHDLR, LDLCALCU    Labs:   Recent Labs     02/04/22  1845 02/05/22  0540 02/05/22  0830 02/06/22  0545   PROT 6.6 5.7*  --  5.3*   INR 2.6  --  1.5 1.6       Review of systems: Cannot be determined on the ventilator. Exam      General: Patient comfortable on the ventilator. HEENT: Face symmetrical and no apparent cranial nerve deficit. Neck: No jugular venous distention, carotid bruit or thyromegaly. Lungs: Clear bilaterally without rales, wheezes or dullness. Ventilated. Cardiac: IRegular rate and rhythm, no S3, S4, no rub or gallop. Abdomen: No rebound or guarding, no hepatosplenomegaly. Extremities: Without significant clubbing , cyanosis, or edema. Neuro:  No focal motor or sensory deficit apparent. Skin: No petechiae, no significant bruising. Assessment: See plan below        Plan: #1 atrial fibrillation with now controlled ventricular rate I will follow-up telemetry and a little hypotensive now on IV propofol. Replace potassium to 4 or greater and magnesium 2 or greater. Hold on warfarin anticoagulation being in the ICU at this time and seizure activity and possibly neurologic process involving and head CT showing edema done last evening. #2 mental status change and obtunded on admission. Currently continues on ventilator. To be moved downtown per ICU attending for further neurologic evaluation with seizure activity last evening. #3 sepsis and leukocytosis white count from 39 down to 22 now. #4 permanent pacemaker sensing and pacing appropriately.     #5 Gram-positive cocci in chains bacteremia and on antibiotic therapy. No echo tech in the hospital this weekend and will get echocardiogram hopefully tomorrow. Would start with surface echocardiogram to evaluate and she would not be a candidate for valve surgery even if endocarditis was present. #6 Covid positive and continue supportive measures.         Electronically signed by Purnima Cartwright MD on 2/6/2022 at 12:28 PM

## 2022-02-06 NOTE — FLOWSHEET NOTE
UPDATE: There has been no further seizure activity. Remains on Propofol. Converted from [facility] to AFib to Vpaced. 12 lead EKG was obtained. Cardizem gtt was weaned to off as HR and MAP began to drop down. Temperature went from hyperthermic to hypothermic. Cooling blanket was turned off and removed. Warm blankets applied but temp still dropping. Carly Drilling hugger applied. Temp has stopped dropping and is slowly rising.

## 2022-02-06 NOTE — FLOWSHEET NOTE
Upon returning from CT, patient had a 3 minute seizure involving entire body this time. Dr Oscar Miller called to the bedside. Orders received. He did review the catscan results as well. Radiology called and discussed the results with him as well.

## 2022-02-06 NOTE — LETTER
[place] Department Medicaid  CERTIFICATION OF NECESSITY  FOR NON-EMERGENCY TRANSPORTATION   BY GROUND AMBULANCE      Individual Information   1. Name: Angelina Elizondo 2. [place] Medicaid Billing Number:    3. Address: [address] [place] [address],  [address]  [address] [place] [address] [address] [place] [address]     Transportation Provider Information   4. Provider Name:    5. [place] Provider Number:  National Provider Identifier (NPI):      Certification  7. Criteria:  During transport, this individual requires:  [x] Medical treatment or continuous     supervision by an EMT. [x] The administration or regulation of oxygen by another person. [] Supervised protective restraint. 8. Period Beginning Date: 2/18/2022     9. Length  [] Not more than {#:11685} day(s)  [] One Year     Additional Information Relevant to Certification   10. Comments or Explanations, If Necessary or Appropriate   Acute Respiratory Failure d/t covid - 19, acute respiratory failure, 2 liters oxygen unable to self regulate, CVA, Difficulty walking, sepsis     Certifying Practitioner Information   11. Name of Practitioner: Dr. Alison Jim MD   12. [place] Medicaid Provider Number, If Applicable:  [address] Provider Identifier (NPI):      Signature Information   14. Date of Signature: 2/18/2022   15. Name of Person Signing:   [facility]   16. Signature and Professional Designation:   Electronically signed by RANDALL Granados on 2/18/2022 at 3:22 PM       OD 32525  Rev. 7/2015            [address] Encounter Date/Time: 2/6/2022 53[address] [facility] Account: [de-identified]    MRN: [id number]    Patient: Dennis Service Serial #: [id number]      ENCOUNTER          Patient Class: I Private Enc?   No Unit RM BD: [address] [facility] Service: Med/Surg   Encounter DX: Acute respiratory failur*   ADM Provider: Alison Jim MD   Procedure:     ATT Provider: Alison Jim MD   REF Provider: Jenny Mcguire      Admission DX: Acute respiratory failure due to COVID-19 [facility]), Acute respiratory failure ([facility] [address] and DX codes: U07.1, J96.00, J96.00      PATIENT                 Name: Evangelina Michaels : 1935 (86 yrs)   Address: [address] [place] [address],, * Sex: Female   [facility]: [place] [address]         Marital Status: Single   Employer: RETIRED         Roman Catholic: Religious   Primary Care Provider: Aracely Flores MD         Primary Phone: [phone] [address]   Contact Name Legal Guardian? Relationship to Patient Home Phone Work Phone   1. Zen Kearns  2. *No Contact Specified* No    Niece/Nephew                      GUARANTOR            Guarantor: Evangelina Michaels     : 1935   Address: [address] [place] [address] Sex: Female   [place] [address]     Relation to Patient: Self       Home Phone: [phone]   Guarantor ID: [id number]       Work Phone:     Guarantor Employer: RETIRED         Status: RETIRED      COVERAGE        PRIMARY INSURANCE   Payor: MEDICARE Plan: MEDICARE PART A AND B   Payor Address: [facility] [address],  [facility] [address], [facility] [address]       Group Number:   Insurance Type: INDEMNITY   Subscriber Name: Anabel Dang : 1935   Subscriber ID: [id number] Pat. Rel. to Sub: Self   SECONDARY INSURANCE   Payor:   Plan:     Payor Address:  ,           Group Number:   Insurance Type:     Subscriber Name:   Subscriber :     Subscriber ID:   Pat.  Rel. to Sub:             CSN: [id number]

## 2022-02-06 NOTE — PROGRESS NOTES
Two small yellow colored rings removed from patient's fingers. Placed in specimen cup and taped to gown for transport to MICU.

## 2022-02-06 NOTE — PLAN OF CARE
Problem: Skin Integrity:  Goal: Will show no infection signs and symptoms  Description: Will show no infection signs and symptoms  Outcome: Ongoing  Goal: Absence of new skin breakdown  Description: Absence of new skin breakdown  Outcome: Met This Shift     Problem: Falls - Risk of:  Goal: Will remain free from falls  Description: Will remain free from falls  Outcome: Met This Shift  Goal: Absence of physical injury  Description: Absence of physical injury  Outcome: Met This Shift     Problem: Airway Clearance - Ineffective  Goal: Achieve or maintain patent airway  Outcome: Ongoing     Problem: Gas Exchange - Impaired  Goal: Absence of hypoxia  Outcome: Met This Shift  Goal: Promote optimal lung function  Outcome: Met This Shift     Problem: Breathing Pattern - Ineffective  Goal: Ability to achieve and maintain a regular respiratory rate  Outcome: Ongoing     Problem:  Body Temperature -  Risk of, Imbalanced  Goal: Ability to maintain a body temperature within defined limits  Outcome: Not Met This Shift  Goal: Will regain or maintain usual level of consciousness  Outcome: Not Met This Shift  Goal: Complications related to the disease process, condition or treatment will be avoided or minimized  Outcome: Not Met This Shift     Problem: Isolation Precautions - Risk of Spread of Infection  Goal: Prevent transmission of infection  Outcome: Met This Shift     Problem: Nutrition Deficits  Goal: Optimize nutritional status  Outcome: Not Met This Shift     Problem: Risk for Fluid Volume Deficit  Goal: Maintain normal heart rhythm  Outcome: Not Met This Shift

## 2022-02-06 NOTE — H&P
Salud Casillas M.D. History and Physical      CHIEF COMPLAINT:  transfer from [facility] - acute resp failure with     Reason for Admission: neuro eval     History Obtained From:  emr and nursing staff     HISTORY OF PRESENT ILLNESS:      Mally Montenegro is an 60-year-old female with PMH A. fib, hypertension, hyperlipidemia, chronic lower extremity ulcer, hypothyroidism who presented from [facility] via EMS with the patient being largely unresponsive. Much more confused than usual this morning, she would only open her eyes in response to painful stimuli. Transported here, she was initially placed on rebreather, though she was subsequently intubated after ED staff spoke with family. She is reported to have been Covid positive at her facility 10 days ago but we do not have this result to confirm.     Vitals in the ER did show she was febrile up to 102.4, tachycardic, tachypneic, now intubated. Labs showed a number of significant derangements including hyponatremia, lactic and metabolic acidosis, renal dysfunction, hyperglycemia, elevated BNP, elevated troponin, leukocytosis, significantly supratherapeutic INR, and a relatively benign looking urinalysis. EKG showed A. fib with RVR. Chest x-ray showed hazy bilateral airspace disease which would be consistent with either CHF or Covid, follow-up CT of the chest actually noted prominent cardiomegaly with small effusions but noted that the lung density appeared to mainly represent atelectasis. CT of the abdomen and pelvis was performed which noted uncomplicated cholelithiasis but was otherwise unremarkable.   This patient has been accepted to the intensive care unit.     Patient, being intubated, was unable to provide any history or participate in the physical exam to a very significant degree.     Above by admitting physician at [facility]  Pt transferred [facility] from [facility] for neuro evaluation as she currently remains intubated mainly for airway protection d/t her mental status. On my eval , intubated and sedated. No family at bedside. All labs personally reviewed   All imaging personally reviewed     Past Medical History:        Diagnosis Date    Arthritis     Atrial fibrillation ([facility] [address]     Hyperlipidemia     Hypertension     Non-pressure chronic ulcer of other part of right lower leg with fat layer exposed ([facility] [address] 8/21/2020    Thyroid disease      Past Surgical History:        Procedure Laterality Date    PACEMAKER INSERTION  06/04/2019    PPM GENT CHANGE  ([facility])   DR. TONG         Medications Prior to Admission:    Medications Prior to Admission: benzonatate (TESSALON) 200 MG capsule, Take 200 mg by mouth three times daily  dilTIAZem (CARDIZEM CD) 120 MG extended release capsule, Take 120 mg by mouth daily  docusate sodium (COLACE) 100 MG capsule, Take 100 mg by mouth daily  metOLazone (ZAROXOLYN) 2.5 MG tablet, Take 2.5 mg by mouth every other day  Multiple Vitamins-Minerals (THERAPEUTIC MULTIVITAMIN-MINERALS) tablet, Take 1 tablet by mouth daily  warfarin (COUMADIN) 4 MG tablet, Take 1 tablet by mouth daily  bumetanide (BUMEX) 1 MG tablet, Take 1 tablet by mouth 2 times daily  spironolactone (ALDACTONE) 25 MG tablet, Take 1 tablet by mouth daily  polyethylene glycol (GLYCOLAX) 17 g packet, Take 17 g by mouth daily as needed for Constipation  metoprolol (LOPRESSOR) 100 MG tablet, Take 200 mg by mouth 2 times daily   simvastatin (ZOCOR) 20 MG tablet, Take 20 mg by mouth nightly  levothyroxine (SYNTHROID) 125 MCG tablet, Take 125 mcg by mouth Daily     Allergies:  Patient has no known allergies. Social History:   TOBACCO:   reports that she has never smoked. She has never used smokeless tobacco.  ETOH:   reports no history of alcohol use. MARITAL STATUS:    OCCUPATION:      Family History:   No family history on file.     REVIEW OF SYSTEMS:    General ROS: unable to obtain d/t intubated   Hematological and Lymphatic ROS: negative  Endocrine ROS: negative  Respiratory ROS: no cough, shortness of breath, or wheezing  Cardiovascular ROS: no chest pain or dyspnea on exertion  Gastrointestinal ROS: no abdominal pain, change in bowel habits, or black or bloody stools  Genito-Urinary ROS: no dysuria, trouble voiding, or hematuria  Neurological ROS:   negative    Vitals:  BP (!) 141/47   Pulse 102   Temp 98.6 °F (37 °C) (Esophageal)   Resp 21   SpO2 100%     PHYSICAL EXAM:  General:  Intubated and sedated in icu morbid obesity  well nourished, well groomed. No apparent distress. HEENT:  Normocephalic, atraumatic. Pupils equal, round, reactive to light. No scleral icterus. No conjunctival injection. Normal lips, teeth, and gums. No nasal discharge. Neck:  Supple, FROM  Heart:  RRR, no murmurs, gallops, rubs, carotid upstroke normal, no carotid bruits- let pacer   Lungs:  CTA bilaterally, bilat symmetrical expansion, no wheeze, rales, or rhonchi- rt ct   Abdomen: Bowel sounds present, soft, nontender, no masses, no organomegaly, no peritoneal signs  Extremities:  No clubbing, cyanosis, or edema  Skin:  Warm and dry, no open lesions or rash  Neuro:  Unable to assess   Vascular: Radial and pedal Pulses 2+  Breast: deferred  Rectal: deferred  Genitalia:  deferred      DATA:     Recent Labs     02/05/22  0540 02/06/22  0545 02/06/22  1612   WBC 28.5* 22.2* 20.9*   HGB 11.9 10.6* 11.9    101* 125*     Recent Labs     02/05/22  2045 02/06/22  0545 02/06/22  1612   * 130* 132   K 3.6 2.8* 3.7   BUN 61* 60* 54*   CREATININE 1.5* 1.3* 1.3*     Recent Labs     02/04/22  1845 02/05/22  0540 02/05/22  0830 02/06/22  0545 02/06/22  1612   PROT  --  5.7*  --  5.3* 6.4   INR   < >  --  1.5 1.6 2.1    < > = values in this interval not displayed.      Recent Labs     02/05/22  0540 02/06/22  0545 02/06/22  1612   AST 40* 29 34*   ALT 15 11 13   ALKPHOS 392* 105* 112*   BILITOT 2.4* 1.8* 1.4*     No results for input(s): BNP in the last 72 hours. Recent Labs     02/06/22  1635   CKTOTAL 185*       ASSESSMENT:      Active Problems:    Acute respiratory failure due to COVID-19 [facility])    Acute respiratory failure (HCC)  Resolved Problems:    * No resolved hospital problems. *        PLAN:    Admit to MICU for neurology evaluation secondary to ams requiring intubating in pt with covid pna  Unable to get mri d/t pacer in place   Neuro eval pending  Wean to extubate as mentation improves  acute infectious issues seemingly resolved    On no tx for covid as currently that is not the cause of her intubated status   moitor rate and rhythm  Will need eeg and full neuro work up  empiric keppra bid   ?  LP   Check lipids , a1c , follow bp closely             DVT prophylaxis  PT OT  Discharge plan    Meghan Chakraborty MD  2/6/2022  6:19 PM

## 2022-02-06 NOTE — PROGRESS NOTES
Pharmacy Consultation Note  (Warfarin Dosing and Monitoring)  Initial consult date: 2/5/2022  Consulting Provider: Dr. Wero Ramirez    Note warfarin discontinued by cardiology. Clinical pharmacy will sign-off. Please re-consult if we can be of further assistance.     Deshawn Kearney, [facility], PharmD, BCCCP  2/6/2022  8:12 AM

## 2022-02-06 NOTE — PROGRESS NOTES
Dr. Alec Morse MD,    Your patient is on a medication that requires a renal and/or weight dose adjustment. Renal/Body Weight Function Assessment:    Date Body Weight IBW  Adjusted BW SCr  CrCl Dialysis status   2/6/2022 93 kg Ideal body weight: 45.5 kg (100 lb 4.9 oz)  Adjusted ideal body weight: 64.1 kg (141 lb 6.2 oz) Serum creatinine: 1.3 mg/dL (H) 02/06/22 0545  Estimated creatinine clearance: 32 mL/min (A) -       Pharmacy has dose-adjusted the following medication(s):    Date Previous Order Adjusted Order   2/6/2022 pepcid 20 mg bid pepcid 20 mg daily       These changes were made per protocol according to the [facility] [address] Renal Dosing Policy/ [facility] [address] Pharmacist Anticoagulant Review. *Please note this dose may need readjusted if your patient's condition changes. Please contact pharmacy with any questions regarding these changes.     YAMILE Manley [facility]  2/6/2022  4:28 PM

## 2022-02-06 NOTE — CONSULTS
[place]  Department of Internal Medicine  Division of Pulmonary, Critical Care and Sleep Medicine  Consult Note      Patient: [facility]  MRN: [id number]  : 1935    Encounter Time: 3:54 PM     Date of Admission: 2022  3:00 PM    Primary Care Physician: Mariah Bermeo MD    Reason for Consultation: Ventilator Management & Seizures & Encephalopathy. Altered mental status  Respiratory failure requiring intubation, Hypotension, A. fib RVR     HISTORY OF PRESENT ILLNESS : [facility] 81 y.o. female was seen in consultation regarding the above chief compliant. Pt was recently admitted 2022 & discharged 2022 with hyperkalemia, fall, venous stasis and Venous stasis dermatitis of both lower extremities along with Atrial fibrillation ([address] Chronic diastolic heart failure (HCC)Chronic right-sided heart failure ([address] Re-admitted 2022 with alterred mental status, +ve for COVID 19, responsive only to pain from [facility]. Intubated for airway protection (not for covid) and noted to have seizures x2. CT non specific but was abnormal and MRI could not be done. Covid test was +ve 22. No treatment recorded. \"\"\" Per EMS was noted to be hypoxic and on arrival.  Was placed on nonrebreather. She does not wear oxygen at baseline. Per EMS this is not her normal baseline. Per chart review patient does take Coumadin. History limited due to patient's current condition. They are unsure of code vaccination status. Patient does spontaneously open her eyes in response to painful stimuli\"\"\"    [facility] recorded and treated with Cardizem and this AM pt converted to NSR. BP in our ICU was normal without pressors.     PAST MEDICAL HISTORY:  has a past medical history of Arthritis, Atrial fibrillation ([address] Hyperlipidemia, Hypertension, Non-pressure chronic ulcer of other part of right lower leg with fat layer exposed ([address] and Thyroid disease. SURGICAL HISTORY:  has a past surgical history that includes Pacemaker insertion (06/04/2019). SOCIAL HISTORY:  reports that she has never smoked. She has never used smokeless tobacco. She reports that she does not drink alcohol and does not use drugs. FAMILY  HISTORY: family history is not on file. MEDICATIONS:    Prior to Admission medications    Medication Sig Start Date End Date Taking? Authorizing Provider   benzonatate (TESSALON) 200 MG capsule Take 200 mg by mouth three times daily 1/24/22 2/8/22  Historical Provider, MD   dilTIAZem (CARDIZEM CD) 120 MG extended release capsule Take 120 mg by mouth daily    Historical Provider, MD   docusate sodium (COLACE) 100 MG capsule Take 100 mg by mouth daily    Historical Provider, MD   metOLazone (ZAROXOLYN) 2.5 MG tablet Take 2.5 mg by mouth every other day 2/2/22 2/12/22  Historical Provider, MD   Multiple Vitamins-Minerals (THERAPEUTIC MULTIVITAMIN-MINERALS) tablet Take 1 tablet by mouth daily    Historical Provider, MD   warfarin (COUMADIN) 4 MG tablet Take 1 tablet by mouth daily 1/7/22   Zia Peña MD   bumetanide (BUMEX) 1 MG tablet Take 1 tablet by mouth 2 times daily 1/7/22   Zia Peña MD   spironolactone (ALDACTONE) 25 MG tablet Take 1 tablet by mouth daily 1/8/22   Zia Peña MD   polyethylene glycol (GLYCOLAX) 17 g packet Take 17 g by mouth daily as needed for Constipation 1/7/22 2/6/22  Zia Peña MD   metoprolol (LOPRESSOR) 100 MG tablet Take 200 mg by mouth 2 times daily     Historical Provider, MD   simvastatin (ZOCOR) 20 MG tablet Take 20 mg by mouth nightly    Historical Provider, MD   levothyroxine (SYNTHROID) 125 MCG tablet Take 125 mcg by mouth Daily     Historical Provider, MD     ALLERGIES: Patient has no known allergies.      REVIEW OF SYSTEMS:  Otherwise negative if not reported or listed below   on Vent  No sedation    OBJECTIVE:     PHYSICAL EXAM:   VITALS:   Vitals:    02/06/22 1508 02/06/22 1510   Pulse: 78 82 Resp: 22 14   Temp:  98.2 °F (36.8 °C)   TempSrc:  Esophageal   SpO2: (!) 79% 98%      No intake or output data in the 24 hours ending 02/06/22 [address] is in intubated   HEENT : Atraumatic, normocephalic, extraocular muscles are intact, pupils are equal reactive light accommodation,   LUNGS: Equal bilateral breathing sounds clear to auscultation no wheezing or rhonchi  CARDIOVASCULAR: Patient is in A. fib irregular rate and rhythm   ABDOMEN: Obese, large pannus, soft nontender   EXT: Patient has chronic lower extremity venous stasis and hyperpigmentation  Neuro: Unresponsive  But intact reflexes gag & corneals. Bilateral Up bibinski    DATA: IMAGING & TESTING:     LABORATORY TESTS:      PRO-BNP:   Lab Results   Component Value Date    PROBNP 14,148 (H) 02/04/2022    PROBNP 2,652 (H) 01/01/2022      ABGs:   Lab Results   Component Value Date    PH 7.565 02/06/2022    PO2 124.0 02/06/2022    PCO2 25.3 02/06/2022     Hemoglobin A1C: No components found for: HGBA1C    IMAGING:  Imaging tests were completed and reviewed and discussed radiology and care team involved and reveals   CT ABDOMEN PELVIS WO CONTRAST Additional Contrast? None  Result Date: 2/4/2022  Partially imaged bilateral pleural effusions in the lung bases. Please follow-up separate report CT of the chest for further detail. Uncomplicated cholelithiasis. No acute findings in the abdomen or pelvis. CT HEAD WO CONTRAST  Result Date: 2/5/2022  Diffuse areas of low attenuation/edema involving the convexities bilaterally not present on the scan from 1:16 p.m.. MRI recommended to further evaluate. Findings discussed on 02/05/2022 with Dr. Adalgisa Hitchcock at 11:30 p.m.     CT Head WO Contrast  Result Date: 2/4/2022  1. There is no acute intracranial abnormality. Specifically, there is no intracranial hemorrhage. 2. Atrophy and periventricular leukomalacia,     CT CHEST WO CONTRAST  Result Date: 2/4/2022  1.  Prominent cardiomegaly and small to moderate pleural effusions, and lung density appears mainly to represent atelectasis 2. Limited exam     US GALLBLADDER RUQ  Result Date: 2/4/2022  Cholelithiasis. No biliary dilatation, free fluid, or sonographic findings of acute cholecystitis. RECOMMENDATIONS: Unavailable     XR CHEST PORTABLE  Result Date: 2/6/2022  Stable chest as above with heart enlarged and small bilateral pleural effusions with increased markings seen in the right upper lung field. Stable prominence of the pulmonary vascularity bilaterally. No new findings. XR CHEST PORTABLE  Result Date: 2/5/2022  1. Cardiomegaly. 2. Slightly improved aeration of the right and left lungs when compared with the prior study. 3. Minimal bibasilar atelectasis. XR CHEST PORTABLE  Result Date: 2/4/2022  1. Hazy bilateral airspace disease which could represent CHF or viral pneumonia. The chest with further evaluated on the pending CT of the chest. 2. Cardiomegaly       Assessment:   1.   2. Seizure Disorder X 2 wittness on Kepra 500 BID  3. Intubated & Ventilated for Airway protection  4. Encephalopathy Hypoxic VS Metabolic  5. ISIDRO/CKD BUN 67 Cr 2.7  6. COVID19 Positive 1/25  No treatment  7. Covid Pneumonia? ?  Decadron 10 mg IV BID       Assessment / Plan  Sepsis due to COVID with questionable bacterial component              Meets temp / HR / RR / WBC criteria - still febrile              Now off Levophed and vasopressin in ICU              Source?              Pan-cx sent, Strep agalactiae in all bottles             Vanc and Zosyn started              CRP 53 / procal 7.53               needs LON              LE wounds likely source of bacteremia              Reported COVID+ ~10 days 1/25 --> IV Decadron for now                Encephalopathy, growing concern that this an anoxic injury              Pt essentially unresponsive not on sedation              CT head without acute pathology              Positive Babinski on exam bilaterally --> Encephalopathy     Suspect acute on chronic CHF, type unknown              CXR / CTA c/w cardiomegaly, effusions and BNP is 14k                          Watch fluid volume status very carefully     Acute hypoxic resp failure due to the above                            Atrial fibrillation with RVR              Afib is chronic              On Coumadin w INR >10 here likely d/t hepatic congestion              No evidence of active bleeding but w it over 10 would reverse              Vit K 10 mg given in ED              Follow INR - now much improved                  Metabolic acidosis w elevated anion gap, improving              CO2 17 in ED w gap 21, now CO2 19 / gap 15              BUN was 71 and lactic at 7.0              BUN increasing now 77 w lactic improved at 3.8              Very careful w fluids as imaging suggests overload     Renal dysfunction now at baseline              Baseline Cr on EMR review is 1.2-1. 3              Cr 1.7 here not quite ISIDRO, now 1.3              Monitor as pt receives IVF              FENa / FEUN suggestive of pre-renal ISIDRO              Monitor fluid balance closely     Diabetes              No hx DM / not on any antihyperglycemics               in ED w a1c 7.0              BG checks and low-dose ISS q6 for now and monitor              Hypoglycemic protocol     Chronic hyponatremia              Likely multifactorial w low-solute intake as large contributor              Recently admitted early Jan, Na was 126-128 at that time              Na 126 on admission              Follow, now 130     New-onset seizures             This is a concerning development              IV Keppra / Ativan given              Mag 1.9              CT head w edema of b/l convexities              Cannot get MRI               Accepted in transfer to [facility] ([facility]) for neuro eval  Plan:   1. As above  2. Careful fluid management  3. MRI  4. LON  5. Resume coumadin  6. Continue Kepra  7.  Low dose PO Cardizem and manage fluids for At Fib        Jayro Ruiz MD   Pulmonary, Critical Care and Sleep Medicine      [address] [facility]

## 2022-02-06 NOTE — PATIENT CARE CONFERENCE
Intensive Care Daily Quality Rounding Checklist        ICU Team Members: Dr. Long Santoro, bedside nurse, charge nurse     ICU Day #:  3     Intubation Date: 02/04/2022     Ventilator Day #: 3     Central Line Insertion Date: 02/04/2022                                                    Day #: 3      Arterial Line Insertion Date: 02/04/2022                             Day #: 3     Temporary Hemodialysis Catheter Insertion Date: N/A                          Day # N/A     DVT Prophylaxis: Coumadin patient (admitted with high INR)    GI Prophylaxis: Protonix     [place] Catheter Insertion Date: 02/04/2022                                        Day #: 3                             Continued need (if yes, reason documented and discussed with physician): Critical care patient     Skin Issues/ Wounds and ordered treatment discussed on rounds: BLE chronic wounds. Wound care consult     Goals/ Plans for the Day: Daily labs and replace as needed, CXR, ABG. Wean vent as able. Obtain Echo. Cardiology following. Antibiotics per ID. EEG. Possible transfer to [facility] ([facility] for neurology evaluation. Continue critical care management.

## 2022-02-06 NOTE — FLOWSHEET NOTE
Pt had approximately 1 minute seizure (head and right shoulder jerking motion). Dr Jane Rivera called to the bedside. Orders received. Labs sent. Received order for Keppra which will be given when it arrives from pharmacy.

## 2022-02-06 NOTE — PROGRESS NOTES
[facility] Progress Note    --------------------------------------------------------------------------------------  Assessment / Plan  Sepsis due to COVID with questionable bacterial component              Meets temp / HR / RR / WBC criteria - still febrile   Now on Levophed and vasopressin              Source?                           UA few bacteria only                          Imaging poss bl pna / no focal consolidation                          BLE wounds to me not severe enough to cause this              Pan-cx sent, Strep agalactiae in all bottles              Imaging more c/w viral pna vs overload              Vanc and Zosyn given initially              CRP 53 / procal 7.53               ID input appreciated    Zosyn    R/o infected pacer / needs LON    LE wounds likely source of bacteremia              Pulm / critical care consult for ICU admission              Reported COVID+ ~10 days PTA                          Repeat COVID swab remains positive                          IV Decadron for now                Encephalopathy, growing concern that this an anoxic injury   Pt essentially unresponsive not on sedation   CT head without acute pathology   Positive Babinski on exam bilaterally    Suspect acute on chronic CHF, type unknown              CXR / CTA c/w cardiomegaly, effusions and BNP is 14k              Does not appear to have an echo available in the EMR              Cardio input appreciated: IV dig + Cardizem gtt for rate as able              Watch fluid volume status very carefully     Acute hypoxic resp failure due to the above              Upper 90s on NRB on arrival              ABG showed significant alkalosis              Pt intubated in ED and going to ICU   FiO2 remains at 30 today              Pulm / critical care to follow and wean / extubate as able                Atrial fibrillation with RVR              Afib is chronic              On Coumadin w INR >10 here likely d/t hepatic congestion              No evidence of active bleeding but w it over 10 would reverse              Vit K 10 mg given in ED              Follow INR - now much improved              Cardio input as above   Not tolerating Cardizem gtt d/t hypOtension     Metabolic acidosis w elevated anion gap, improving              CO2 17 in ED w gap 21, now CO2 19 / gap 15              BUN was 71 and lactic at 7.0   BUN increasing now 77 w lactic improved at 3.8              Very careful w fluids as imaging suggests overload     Renal dysfunction now at baseline              Baseline Cr on EMR review is 1.2-1.3              Cr 1.7 here not quite ISIDRO, now 1.3              Monitor as pt receives IVF   FENa / FEUN suggestive of pre-renal ISIDRO              Monitor fluid balance closely     Diabetes              No hx DM / not on any antihyperglycemics               in ED w a1c 7.0   BG checks and low-dose ISS q6 for now and monitor   Hypoglycemic protocol     Chronic hyponatremia              Likely multifactorial w low-solute intake as large contributor              Recently admitted early Jan, Na was 126-128 at that time              Na 126 on admission              Follow, now 130    New-onset seizures   This is a concerning development   IV Keppra / Ativan given   Mag 1.9   CT head w edema of b/l convexities   Cannot get MRI    Accepted in transfer to [facility] ([facility]) for neuro eval     Pt's PMH otherwise pertinent for HPL, HTN, hypothyroidism. Continue outpt med regimen; if any particular issues, will address and move to active problem list above.   Please see orders for further plan of care.     · Code status                 DNRCCA  · DVT prophylaxis         SCDs / chemical ppx held  · Disposition                  To be determined    --------------------------------------------------------------------------------------    Admission Date  2/4/2022  9:21 AM  Chief Complaint AMS / COVID+    Subjective  History of Present Illness  Unfortunately, had an eventful night in that she started to develop seizures and started on Keppra. Back in Afib RVR but Cardizem infusion had to be weaned due to hypotension. Patient had alternating fevers and hypothermia that was refractory to warm blankets so Benita hugger applied. Due to the need for neurologic eval, transfer has been initiated to get pt downtown. Review of Systems unable to be obtained as patient is intubated    Objective  Physical Exam  Vitals: BP (!) 141/56   Pulse 77   Temp 99.1 °F (37.3 °C) (Rectal)   Resp 16   Ht 5' (1.524 m)   Wt 203 lb (92.1 kg)   SpO2 98%   BMI 39.65 kg/m²   General: well-developed, well-nourished, intubated  Skin: warm, dry, intact, pale in color without cyanosis  HEENT: normocephalic, atraumatic, pt intubated  Respiratory: coarse bilaterally  Cardiovascular: tachy reg rate  Abdominal: obese, soft, nontender, nondistended, normoactive bowel sounds  Extremities: no mottling, pulses intact, no edema  Neurologic: intubated. Not following commands.   Toes upgoing w Babinski bilaterally  Psychiatric: cannot assess    Electronically signed by Maricel Shea DO on 2/6/2022 at 2:06 PM

## 2022-02-06 NOTE — PROGRESS NOTES
Critical Care Team - Daily Progress Note         Date and time: 2/6/2022 12:29 PM  Patient's name:  Cheikh Howe  Medical Record Number: [id number]  Patient's account/billing number: [de-identified]  Patient's YOB: 1935  Age: 81 y.o. Date of Admission: 2/4/2022  9:21 AM  Length of stay during current admission: 2      Primary Care Physician: Kevin Natarajan MD  ICU Attending Physician: Dr. Lulu Dalton Status: DNR-CCA    Reason for ICU admission:     Altered mental status  Respiratory failure requiring intubation  Hypotension  A. fib RVR      SUBJECTIVE:     OVERNIGHT EVENTS:     Patient remains critically ill and intubated. Has ongoing fevers T-max earlier this morning was 102. 2. Remains in A. fib with RVR. Remains unresponsive off sedated medicine. 2/6/22: Patient had 2 episodes of seizure last night. Stat CT of the head did not show any acute abnormalities it was reported that there are diffuse areas of low attenuation/edema. An MRI was recommended unfortunately patient's pacemaker is not compatible with her MRI. Was given Keppra loading dose and started on Keppra. No further seizure activity.     CURRENT VENTILATION STATUS:     [x] Ventilator  [] BIPAP  [] Nasal Cannula [] Room Air      IF INTUBATED, ET TUBE MARKING AT LOWER LIP:       cms    SECRETIONS Amount:  [] Small [] Moderate  [] Large  [x] None  Color:     [] White [] Colored  [] Bloody    SEDATION:  RAAS Score:  [] Propofol gtt  [] Versed gtt  [] Ativan gtt   [x] No Sedation    PARALYZED:  [x] No    [] Yes      VASOPRESSORS:  [x] No    [] Yes    If yes -   [] Levophed       [] Dopamine     [] Vasopressin       [] Dobutamine  [] Phenylephrine         [] Epinephrine    CENTRAL LINES:     [] No   [x] Yes   (Date of Insertion: 2/4/2022  )           If yes -     [] Right IJ     [] Left IJ [x] Right Femoral [] Left Femoral                   [] Right Subclavian [] Left Subclavian       SILVEIRA'S CATHETER:   [] No   [x] Yes  (Date of Continuous Infusions:   dextrose      propofol 30 mcg/kg/min (02/06/22 1224)    dilTIAZem 5 mg/hr (02/06/22 0142)    phenylephrine (KIRK-SYNEPHRINE) 50mg/250mL infusion 50 mcg/min (02/06/22 1224)    sodium chloride      norepinephrine Stopped (02/04/22 1745)    sodium chloride 100 mL/hr at 02/05/22 0624     PRN Meds:   glucose, 15 g, PRN  dextrose, 12.5 g, PRN  glucagon (rDNA), 1 mg, PRN  dextrose, 100 mL/hr, PRN  acetaminophen, 650 mg, Q4H PRN  sodium chloride flush, 5-40 mL, PRN  sodium chloride, 25 mL, PRN  ondansetron, 4 mg, Q8H PRN   Or  ondansetron, 4 mg, Q6H PRN  polyethylene glycol, 17 g, Daily PRN  acetaminophen, 650 mg, Q6H PRN   Or  acetaminophen, 650 mg, Q6H PRN  perflutren lipid microspheres, 1.5 mL, ONCE PRN          VENT SETTINGS (Comprehensive) (if applicable):  Vent Information  $Ventilation: $Subsequent Day  Skin Assessment: Clean, dry, & intact  Vent Type: [phone]  Vent Mode: AC/VC  Vt Ordered: 450 mL  Rate Set: 16 bmp  Peak Flow: 50 L/min  Pressure Support: 0 cmH20  FiO2 : 30 %  SpO2: 99 %  SpO2/FiO2 ratio: 330  Sensitivity: 3  PEEP/CPAP: 5  I Time/ I Time %: 0 s  Humidification Source: Heated wire  Humidification Temp: 37  Humidification Temp Measured: 36.8  Circuit Condensation: Drained  Additional Respiratory  Assessments  Pulse: 71  Resp: 16  SpO2: 99 %  Humidification Source: Heated wire  Humidification Temp: 37  Circuit Condensation: Drained  Oral Care: Mouth swabbed,Mouth moisturizer,Mouth suctioned    ABGs:   Recent Labs     02/06/22  0604   PH 7.565*   PCO2 25.3*   PO2 124.0*   HCO3 22.4   BE 1.3   O2SAT 98.8*       Laboratory findings:    Complete Blood Count:   Recent Labs     02/04/22  1845 02/05/22  0540 02/06/22  0545   WBC 39.1* 28.5* 22.2*   HGB 13.4 11.9 10.6*   HCT 39.8 34.5 30.9*    132 101*        Last 3 Blood Glucose:   Recent Labs     02/05/22  0540 02/05/22  2045 02/06/22  0545   GLUCOSE 258* 185* 159*        PT/INR:    Lab Results   Component Value Date PROTIME 18.2 02/06/2022    INR 1.6 02/06/2022     PTT:  No results found for: APTT, PTT    Comprehensive Metabolic Profile:   Recent Labs     02/05/22  0540 02/05/22  0540 02/05/22 2045 02/06/22  0545   *  --  125* 130*   K 3.9  --  3.6 2.8*   CL 94*  --  91* 96*   CO2 19*  --  17* 21*   BUN 77*  --  61* 60*   CREATININE 1.5*  --  1.5* 1.3*   GLUCOSE 258*  --  185* 159*   CALCIUM 8.3*  --  7.9* 7.7*   PROT 5.7*   < >  --  5.3*   LABALBU 1.7*   < >  --  1.4*   BILITOT 2.4*   < >  --  1.8*   ALKPHOS 392*   < >  --  105*   AST 40*   < >  --  29   ALT 15   < >  --  11    < > = values in this interval not displayed. Magnesium:   Lab Results   Component Value Date    MG 1.9 02/06/2022     Phosphorus:   Lab Results   Component Value Date    PHOS 2.7 02/06/2022     Ionized Calcium: No results found for: CAION     Urinalysis:     Troponin: No results for input(s): TROPONINI in the last 72 hours.     Microbiology:    Cultures during this admission:     Blood cultures:                 [] None drawn      [] Negative             [x]  Positive (Details  Gram stain performed from blood culture bottle media   Gram positive cocci in chains:  )  Urine Culture:                   [] None drawn      [] Negative             []  Positive (Details:  )  Sputum Culture:               [] None drawn       [] Negative             []  Positive (Details:  )   Endotracheal aspirate:     [] None drawn       [] Negative             []  Positive (Details:  )     Other pertinent Labs:       Radiology/Imaging:     Chest Xray (2/6/2022):  Narrative   EXAMINATION:   ONE XRAY VIEW OF THE CHEST       2/6/2022 7:13 am       COMPARISON:   02/05/2022       HISTORY:   ORDERING SYSTEM PROVIDED HISTORY: intubation   TECHNOLOGIST PROVIDED HISTORY:   Reason for exam:->intubation       FINDINGS:   Portable chest reveals heart to be enlarged.  Pacer leads in satisfactory   position.  Endotracheal tube and nasogastric tube are unchanged. Gabriel high increased markings seen within the right upper lobe.  Small bilateral pleural   effusions.  Prominence seen of the pulmonary vascularity.           Impression   Stable chest as above with heart enlarged and small bilateral pleural   effusions with increased markings seen in the right upper lung field.  Stable   prominence of the pulmonary vascularity bilaterally.  No new findings.             Narrative   EXAMINATION:   CT OF THE HEAD WITHOUT CONTRAST  2/5/2022 10:26 pm       TECHNIQUE:   CT of the head was performed without the administration of intravenous   contrast. Dose modulation, iterative reconstruction, and/or weight based   adjustment of the mA/kV was utilized to reduce the radiation dose to as low   as reasonably achievable.       COMPARISON:   None.       HISTORY:   ORDERING SYSTEM PROVIDED HISTORY: Seizure   TECHNOLOGIST PROVIDED HISTORY:   Reason for exam:->Seizure   Has a \"code stroke\" or \"stroke alert\" been called? ->No       FINDINGS:   BRAIN/VENTRICLES: There is no acute intracranial hemorrhage, mass effect or   midline shift.  No abnormal extra-axial fluid collection.  New diffuse areas   of low attenuation involving the high a 7 8 the mastoid bowl convexities   bilaterally not present 11:16 p.m. Yazmin Dynes is no evidence of   hydrocephalus.       ORBITS: The visualized portion of the orbits demonstrate no acute abnormality.       SINUSES: The visualized paranasal sinuses and mastoid air cells demonstrate   no acute abnormality.       SOFT TISSUES/SKULL:  No acute abnormality of the visualized skull or soft   tissues.           Impression   Diffuse areas of low attenuation/edema involving the convexities bilaterally   not present on the scan from 1:16 p.m. .  MRI recommended to further evaluate.       Findings discussed on 02/05/2022 with Dr. Tamika Spaulding at 11:30 p.m.             ASSESSMENT:     1. Altered mental status  2. Acute respiratory failure  3. Sepsis  4.   A. fib RVR  5.  COVID-19 infection  6. Supratherapeutic INR  7. History of LV dysfunction with an EF of 40 -45%  8. History of diabetes type 2  9. Hypothyroidism  10. New onset seizure          PLAN:     WEAN PER PROTOCOL:  [x] No   [] Yes  [] N/A    DISCONTINUE ANY LABS:   [x] No   [] Yes    ICU PROPHYLAXIS:  Stress ulcer:  [x] PPI Agent  [] T3Ldqfg [] Sucralfate  [] Other:  VTE:  Patient on Coumadin [] Enoxaparin  [] Unfract. Heparin Subcut  [] EPC Cuffs    NUTRITION:  [] NPO [] Tube Feeding (Specify: ) [] TPN  [] PO (Diet: Diet NPO)    HOME MEDICATIONS RECONCILED: [] No  [x] Yes    INSULIN DRIP:   [x] No   [] Yes    CONSULTATION NEEDED:  [] No   [x] Yes    FAMILY UPDATED:    [] No   [x] Yes    TRANSFER OUT OF ICU:   [x] No   [] Yes    ADDITIONAL PLAN:  1. Continue full vent support  2. Continue Keppra. EEG was ordered. Patient cannot get an MRI her pacemaker is not compatible. 3. Continue Zosyn appreciate infectious disease input. 4. Follow echocardiogram to rule out vegetation  5. We will have pharmacy resume Coumadin dosing  6. We will check a TSH and free T4 resume Synthroid  7. Patient received 1 dose of Tocilizumab    Patient will need neurological evaluation. Since we do not have neurology coverage at this point initiated transfer to [facility]. Patient has been accepted. Awaiting bed. Diana Rivera MD, M.D. Eveline French. C. P                     2/6/2022, 12:29 PM   CCT excluding procedures:40'      NOTE: This report, in part or full, may have been transcribed using voice recognition software. Every effort was made to ensure accuracy; however, inadvertent computerized transcription errors may be present. Please excuse any transcriptional grammatical or spelling errors that may have escaped my editorial review.

## 2022-02-07 NOTE — PROGRESS NOTES
Subjective:    Remains intubated and sedated  Apparently had seizure episodes overnight secondary to not having received Keppra  Remains in ICU setting    Objective:    BP (!) 124/52   Pulse 70   Temp 98.6 °F (37 °C) (Esophageal)   Resp 16   Ht 5' (1.524 m)   Wt 233 lb 6.4 oz (105.9 kg)   SpO2 95%   BMI 45.58 kg/m²     In: 2101 [I.V.:1921; NG/GT:180]  Out: 934   In: 2101   Out: 934 [Urine:934]    General appearance: Intubated sedated  HEENT: AT/NC, MMM  Neck: FROM, supple  Lungs: Clear to auscultation  CV: RRR, no MRGs  Vasc: Radial pulses 2+  Abdomen: Soft, non-tender; no masses or HSM  Extremities: No peripheral edema or digital cyanosis  Skin: no rash, lesions or ulcers       Recent Labs     02/06/22  0545 02/06/22  1612 02/07/22  0549   WBC 22.2* 20.9* 13.1*   HGB 10.6* 11.9 9.2*   HCT 30.9* 34.8 27.2*   * 125* 91*       Recent Labs     02/06/22  0545 02/06/22  1612 02/07/22  0549   * 132 133   K 2.8* 3.7 4.4   CL 96* 98 99   CO2 21* 22 15*   BUN 60* 54* 50*   CREATININE 1.3* 1.3* 1.1*   CALCIUM 7.7* 8.2* 8.4*       Assessment:    Active Problems:    Acute respiratory failure due to COVID-19 [facility])    Acute respiratory failure (HCC)  Resolved Problems:    * No resolved hospital problems. *      Plan:    Admit to MICU for neurology evaluation secondary to ams requiring intubating in pt with covid pna  Unable to get mri d/t pacer in place   Neuro eval pending  Wean to extubate as mentation improves  acute infectious issues seemingly resolved    On no tx for covid as currently that is not the cause of her intubated status   moitor rate and rhythm  Will need eeg and full neuro work up  empiric keppra bidseizures overnight to 6/20/2022, Celena Meadows now resumed 500 IV twice daily  ?  LP   Check lipids , a1c , follow bp closely   Palliative care service following        DVT Prophylaxis   PT/OT  Discharge Gilson Marcus MD  12:55 PM  2/7/2022

## 2022-02-07 NOTE — PROGRESS NOTES
The patient was a DNR-CCA on arrival to [facility]. She was a transfer from [facility]. 's ICU. The patient's code status was changed earlier today in [address] [place] [address] ICU by Dr. Montserrat Patten per the order. Nurse to nurse report verified by nurse taking care of her in [facility] that her code status is DNR-CCA.

## 2022-02-07 NOTE — CONSULTS
[address] [place] [address] [facility]    Consultation Note     Admit Date: 2/6/2022  3:00 PM    Reason for Consult: covid pneumonia    Attending Physician:  Anatoliy Nassar MD     Chief Complaint: no complaint at this point    HISTORY OF PRESENT ILLNESS:   The patient is a 81 y.o.  female known to the Infectious Diseases service. The patient has the below past med hx. She was admitted with SOB and AMS. She has a pacemaker. WBC 21,000 on admission  She is anticoagulated because of chronic atrial fibrillation. She was in ER JAN 1 with volumd overload and serous draining from lower extremities. 10/2/20 leg wound  Pos for pseudmonas and MSSA   His dx was grp b strep septic shock and rule out endocarditis and pacer inffection. Chronic lower extremity lympedema ? Poss etiol of grp b strep  Continued zosyn   Unsure of vaccination status  She was transferred to [address] [place] [address] with fever, tachycardia, tachypneic. She is here for neuro evaluation. 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 TONG     Current Medications:   Scheduled Meds:   levetiracetam  500 mg IntraVENous Q12H    metoprolol tartrate  25 mg Oral BID    sodium chloride flush  5-40 mL IntraVENous 2 times per day    [Held by provider] famotidine  20 mg Oral Daily    [Held by provider] levETIRAcetam  500 mg Oral BID    [Held by provider] insulin lispro  0-3 Units SubCUTAneous Nightly    albumin human  50 g IntraVENous Q6H    piperacillin-tazobactam  3,375 mg IntraVENous Q8H    dexamethasone  10 mg IntraVENous BID    pantoprazole  40 mg IntraVENous Daily    And    sodium chloride (PF)  10 mL IntraVENous Daily    insulin lispro  0-6 Units SubCUTAneous Q4H     Continuous Infusions:   sodium chloride      sodium chloride 50 mL/hr at 02/06/22 1710    dextrose       PRN Meds:LORazepam, perflutren lipid microspheres, sodium chloride flush, sodium chloride, acetaminophen **OR** acetaminophen, glucose, dextrose, glucagon (rDNA), dextrose    Allergies:  Patient has no known allergies. Social History:   Social History     Socioeconomic History    Marital status: Single     Spouse name: Not on file    Number of children: Not on file    Years of education: Not on file    Highest education level: Not on file   Occupational History    Not on file   Tobacco Use    Smoking status: Never Smoker    Smokeless tobacco: Never Used   Vaping Use    Vaping Use: Never used   Substance and Sexual Activity    Alcohol use: No    Drug use: No    Sexual activity: Not on file   Other Topics Concern    Not on file   Social History Narrative    Not on file     Social Determinants of Health     Financial Resource Strain:     Difficulty of Paying Living Expenses: Not on file   Food Insecurity:     Worried About Running Out of Food in the Last Year: Not on file    Kennedy of Food in the Last Year: Not on file   Transportation Needs:     Lack of Transportation (Medical): Not on file    Lack of Transportation (Non-Medical):  Not on file   Physical Activity:     Days of Exercise per Week: Not on file    Minutes of Exercise per Session: Not on file   Stress:     Feeling of Stress : Not on file   Social Connections:     Frequency of Communication with Friends and Family: Not on file    Frequency of Social Gatherings with Friends and Family: Not on file    Attends Mandaeism Services: Not on file    Active Member of Clubs or Organizations: Not on file    Attends Club or Organization Meetings: Not on file    Marital Status: Not on file   Intimate Partner Violence:     Fear of Current or Ex-Partner: Not on file    Emotionally Abused: Not on file    Physically Abused: Not on file    Sexually Abused: Not on file   Housing Stability:     Unable to Pay for Housing in the Last Year: Not on file    Number of Places Lived in the Last Year: Not on file    Unstable Housing in the Last Year: Not on file     Tobacco: No  Alcohol: No  Pets: No  Travel: No    Family History:   No family history on file. . Otherwise non-pertinent to the chief complaint. REVIEW OF SYSTEMS:    CONSTITUTIONAL:  No chills, fevers or night sweats. No loss of weight. EYES:  No double vision or drainage from eyes, ears or throat. HEENT:  No neck stiffness. No dysphagia. No drainage from eyes, ears or throat  RESPIRATORY:  No cough, productive sputum or hemoptysis. CARDIOVASCULAR:  No chest pain, palpitations, orthopnea or dyspnea on exertion. GASTROINTESTINAL:  No nausea, vomiting, diarrhea or constipation or hematochezia   GENITOURINARY:  No frequency burning dysuria or hematuria. INTEGUMENT/BREAST:  No rash or breast masses. HEMATOLOGIC/LYMPHATIC:  No lymphadenopathy or blood dyscrasics. ALLERGIC/IMMUNOLOGIC:  No anaphylaxis. ENDOCRINE:  No polyuria or polydipsia or temperature intolerance. MUSCULOSKELETAL:  No myalgia or arthralgia. Full ROM. NEUROLOGICAL:  No focal motor sensory deficit. BEHAVIOR/PSYCH:  No psychosis. PHYSICAL EXAM:    Vitals:    BP (!) 124/52   Pulse 70   Temp 98.6 °F (37 °C) (Esophageal)   Resp 18   Ht 5' (1.524 m)   Wt 233 lb 6.4 oz (105.9 kg)   SpO2 94%   BMI 45.58 kg/m²   Constitutional: The patient is awake, alert, and oriented. Skin: Warm and dry. No rashes were noted. HEENT: Eyes show round, and reactive pupils. No jaundice. Moist mucous membranes, no ulcerations, no thrush. Neck: Supple to movements. No lymphadenopathy. Chest: No use of accessory muscles to breathe. Symmetrical expansion. Auscultation reveals no wheezing, crackles, or rhonchi.    Cardiovascular: S1 and S2 are rhythmic and regular. No murmurs appreciated. Abdomen: Positive bowel sounds to auscultation. Benign to palpation. No masses felt. No hepatosplenomegaly. Extremities: No clubbing, no cyanosis, no edema. Lines: peripheral      CBC+dif:  Recent Labs     02/06/22  0545 02/06/22  0545 02/06/22  1612 02/06/22  1612 02/07/22  0549   WBC 22.2*  --  20.9*  --  13.1*   HGB 10.6*   < > 11.9   < > 9.2*   HCT 30.9*   < > 34.8   < > 27.2*   MCV 90.9   < > 91.3   < > 93.2   *   < > 125*   < > 91*   NEUTROABS 20.87*   < > 17.56*   < > 10.81*    < > = values in this interval not displayed.      Lab Results   Component Value Date    CRP 33.5 (H) 02/06/2022    CRP 53.1 (H) 02/04/2022     No results found for: CRPHS  No results found for: SEDRATE  Lab Results   Component Value Date    ALT 13 02/06/2022    AST 34 (H) 02/06/2022    ALKPHOS 112 (H) 02/06/2022    BILITOT 1.4 (H) 02/06/2022     Lab Results   Component Value Date     02/07/2022    K 4.4 02/07/2022    K 3.7 02/06/2022    CL 99 02/07/2022    CO2 15 02/07/2022    BUN 50 02/07/2022    CREATININE 1.1 02/07/2022    GFRAA 57 02/07/2022    LABGLOM 47 02/07/2022    GLUCOSE 224 02/07/2022    PROT 6.4 02/06/2022    LABALBU 1.6 02/06/2022    CALCIUM 8.4 02/07/2022    BILITOT 1.4 02/06/2022    ALKPHOS 112 02/06/2022    AST 34 02/06/2022    ALT 13 02/06/2022       Lab Results   Component Value Date    PROTIME 29.9 02/07/2022    INR 2.7 02/07/2022       Lab Results   Component Value Date    TSH 1.790 02/04/2022       Lab Results   Component Value Date    COLORU Yellow 02/04/2022    PHUR 5.5 02/04/2022    WBCUA 0-1 02/04/2022    RBCUA 0-1 02/04/2022    BACTERIA FEW 02/04/2022    CLARITYU Clear 02/04/2022    SPECGRAV 1.020 02/04/2022    LEUKOCYTESUR Negative 02/04/2022    UROBILINOGEN 0.2 02/04/2022    BILIRUBINUR Negative 02/04/2022    BLOODU SMALL 02/04/2022    GLUCOSEU Negative 02/04/2022       No results found for: OQZ4BSM, BEART, Z2YYGEMS, PHART, THGBART, diffuse areas of low attenuation/edema iinvolving the convexities bilaterally not present on scan from 1;16pm    Plan:    · Cont zosyn  · Bc repeated   · Await neuro evaluation'  · Needs LON   · Check cultures  · Baseline ESR, CRP  · Monitor labs  · Will follow with you  · Talked to Dr. Shefali Jung    Thank you for having us see this patient in consultation. I will be discussing this case with the treating physicians.       Electronically signed by Deysi Minor MD on 2/7/2022 at 11:28 AM

## 2022-02-07 NOTE — PROGRESS NOTES
02/07/22  0549   *   < > 130* 132 133   K 3.9   < > 2.8* 3.7 4.4   CL 94*   < > 96* 98 99   CO2 19*   < > 21* 22 15*   PHOS 3.3  --  2.7  --  3.0   BUN 77*   < > 60* 54* 50*   CREATININE 1.5*   < > 1.3* 1.3* 1.1*    < > = values in this interval not displayed. Labs:   Recent Labs     02/06/22  1635   CKTOTAL 185*       Labs: No results for input(s): BNP in the last 72 hours. Labs: No results for input(s): CHOL, HDL, TRIG in the last 72 hours. Invalid input(s): CHOLHDLR, LDLCALCU    Labs:   Recent Labs     02/05/22  0540 02/05/22  0830 02/06/22  0545 02/06/22  1612 02/07/22  0549   PROT 5.7*  --  5.3* 6.4  --    INR  --    < > 1.6 2.1 2.7    < > = values in this interval not displayed. Review of systems: No reported significant weight gain or weight loss. no dysuria or frequency, no dizziness, falls or trauma, no change in bowel or bladder habits, no hematochezia, hemoptysis or hematuria. No fevers, chills, nausea or vomiting reported. No significant wheezing or sputum production. No headache or visual changes. The remainder of the 10 review of systems otherwise negative. Exam    Pacemaker left subclavicular region. General: Patient comfortable in no distress and currently denies any chest pain. HEENT: Face symmetrical and no apparent cranial nerve deficit. Neck: No jugular venous distention, carotid bruit or thyromegaly. Lungs: Clear bilaterally without rales, wheezes or dullness. Cardiac: IRegular rate and rhythm, no S3, S4, no rub or gallop. Abdomen: No rebound or guarding, no hepatosplenomegaly. Extremities: Without significant clubbing , cyanosis, or edema. Neuro:  No focal motor or sensory deficit apparent. Skin: No petechiae, no significant bruising. Assessment: See plan below        Plan: #1 atrial fibrillation and now controlled ventricular rate. Would favor metoprolol tartrate over Cardizem p.o./NG tube.   Better rate control

## 2022-02-07 NOTE — PLAN OF CARE
Problem: Airway Clearance - Ineffective  Goal: Achieve or maintain patent airway  Outcome: Met This Shift     Problem: Gas Exchange - Impaired  Goal: Absence of hypoxia  Outcome: Met This Shift  Goal: Promote optimal lung function  Outcome: Met This Shift     Problem: Breathing Pattern - Ineffective  Goal: Ability to achieve and maintain a regular respiratory rate  Outcome: Met This Shift     Problem: Body Temperature -  Risk of, Imbalanced  Goal: Ability to maintain a body temperature within defined limits  Outcome: Met This Shift     Problem: Isolation Precautions - Risk of Spread of Infection  Goal: Prevent transmission of infection  Outcome: Met This Shift     Problem: Risk for Fluid Volume Deficit  Goal: Maintain absence of muscle cramping  Outcome: Met This Shift  Goal: Maintain normal serum potassium, sodium, calcium, phosphorus, and pH  Outcome: Met This Shift     Problem: Falls - Risk of:  Goal: Will remain free from falls  Outcome: Met This Shift  Goal: Absence of physical injury  Outcome: Met This Shift     Problem: Skin Integrity:  Goal: Absence of new skin breakdown  Outcome: Met This Shift     Problem:  Body Temperature -  Risk of, Imbalanced  Goal: Will regain or maintain usual level of consciousness  Outcome: Not Met This Shift  Goal: Complications related to the disease process, condition or treatment will be avoided or minimized  Outcome: Not Met This Shift     Problem: Nutrition Deficits  Goal: Optimize nutritional status  Outcome: Not Met This Shift     Problem: Risk for Fluid Volume Deficit  Goal: Maintain normal heart rhythm  Outcome: Not Met This Shift     Problem: Loneliness or Risk for Loneliness  Goal: Demonstrate positive use of time alone when socialization is not possible  Outcome: Not Met This Shift     Problem: Fatigue  Goal: Verbalize increase energy and improved vitality  Outcome: Not Met This Shift     Problem: Patient Education: Go to Patient Education Activity  Goal: Patient/Family Education  Outcome: Not Met This Shift     Problem: Skin Integrity:  Goal: Will show no infection signs and symptoms  Outcome: Not Met This Shift

## 2022-02-07 NOTE — PROGRESS NOTES
[address] [facility]  Department of Critical Care Medicine   MICU Night Call Note        Call initiated by: Nursing staff:  Fatou Heaton, RN  Call addressed around: 2/7/2022 2:00 AM      Reason for call: Seizure episode    Patient was seen and examined. By the time I arrived seizure is done according to bedside nurse. No seizure witness by me on examination. Called pharmacist and found out that patient's last dose of Keppra was around 11:48 AM (02/05/22) at [facility]. Patient did not receive her 2nd dose for 02/05.      Orders placed: prn Ativan for seizure, Keppra was change to IV from oral, and pharmacy change Keppra schedule to due now        AMAURI Leon   Critical Care  2/7/2022 2:10 AM    HIMANSHU Leon CNP

## 2022-02-07 NOTE — PROGRESS NOTES
[facility] MD Olive Rogel, MD Ivette Lee, MD Rosemary Jin, ESTELLE Soto, NIDIA  Progress Note    2/7/2022    SUBJECTIVE:   2/6: Seen this early afternoon. Remains critically ill, intubated via ETT. Vent setting stable. BP stable. No meaningful neurologic recovery, she is due for transfer to [address] [place] [address] for further neurologic evaluation. 2/7: Patient was seen, evaluated this afternoon she remains in critical condition with mechanical ventilation via ETT, vital signs remained stable with adequate urine output for now. Case was discussed with the staff and the MICU nurse, patient white count today is 30.1 with hemoglobin of 9.2 no BMP today yesterday his BUN was 54 and creatinine is 1.3. PROBLEM LIST:    Active Problems:    Acute respiratory failure due to COVID-19 [facility])    Acute respiratory failure (HCC)  Resolved Problems:    * No resolved hospital problems. *         DIET:    Diet NPO     MEDS (scheduled):       MEDS (infusions):      MEDS (prn):       PHYSICAL EXAM:     Patient Vitals for the past 24 hrs:   BP Temp Temp src Pulse Resp SpO2 Height Weight   02/07/22 1400    73 (!) 32 95 %     02/07/22 1350    83 (!) 34 94 %     02/07/22 1300    80 17 95 %     02/07/22 1200    70 16 95 %     02/07/22 1154    71 18 95 %     02/07/22 1100    78 19 95 %     02/07/22 1000    70 18 94 %     02/07/22 0940 (!) 124/52   72       02/07/22 0900    74 19 97 %     02/07/22 0831    74 19 97 %     02/07/22 0800    79 25 97 %     02/07/22 0700    73 20 97 %     02/07/22 0600    80 20 98 %     02/07/22 0500    73 17 98 %     02/07/22 0400 (!) 134/56 98.6 °F (37 °C) Esophageal 82 17 100 %     02/07/22 0340        233 lb 6.4 oz (105.9 kg)   02/07/22 0300    79 17 100 %     02/07/22 0200    116 26 97 %     02/07/22 0100    88 15 99 %   02/07/22 0000 (!) 120/50 99.1 °F (37.3 °C) Esophageal 81 16 99 %     02/06/22 2300    88 21 98 %     02/06/22 2200    80 16 99 %     02/06/22 2100    81 16 98 %     02/06/22 2000 (!) 130/49 99.5 °F (37.5 °C) Esophageal 81 18 97 %     02/06/22 1954    93 20 100 %     02/06/22 1900    92 19 100 %     02/06/22 1800    102 21 100 %     02/06/22 1749 (!) 141/47          02/06/22 1700 (!) 141/47 98.6 °F (37 °C) Esophageal 96 25 98 % 5' (1.524 m)    02/06/22 1600  98.6 °F (37 °C) Esophageal 85 22 100 %     02/06/22 1510  98.2 °F (36.8 °C) Esophageal 82 14 98 %     02/06/22 1508    78 22 (!) 79 %     @      Intake/Output Summary (Last 24 hours) at 2/7/2022 1454  Last data filed at 2/7/2022 1400  Gross per 24 hour   Intake 2101 ml   Output 1199 ml   Net 902 ml         Wt Readings from Last 3 Encounters:   02/07/22 233 lb 6.4 oz (105.9 kg)   02/04/22 203 lb (92.1 kg)   01/06/22 203 lb (92.1 kg)       Constitutional: Appears critically ill on ventilator via ETT, though in no acute distress on vent  HEENT: NC/AT,  mucus membranes moist, ETT extensive oropharynx  Neck: Trachea midline, no JVD, no bruit  Cardiovascular: S1, S2 regular rhythm, no murmur,or rub  Respiratory:  No crackles, no wheeze  Gastrointestinal:  Soft, nontender, nondistended, NABS  Ext: no edema, feet warm  Skin: dry, no rash  Neuro: Unresponsive to verbal and tactile stimuli.   Moves all 4 extremities      DATA:    Recent Labs     02/06/22  0545 02/06/22  1612 02/07/22  0549   WBC 22.2* 20.9* 13.1*   HGB 10.6* 11.9 9.2*   HCT 30.9* 34.8 27.2*   MCV 90.9 91.3 93.2   * 125* 91*     Recent Labs     02/05/22  0540 02/05/22  0540 02/05/22  2045 02/05/22  2045 02/06/22  0545 02/06/22  1612 02/07/22  0549   *   < > 125*   < > 130* 132 133   K 3.9   < > 3.6   < > 2.8* 3.7 4.4   CL 94*   < > 91*   < > 96* 98 99   CO2 19*   < > 17*   < > 21* 22 15*   MG 1.9   < > 1.9  --  1.9  --  2.2   PHOS 3.3  -- --   --  2.7  --  3.0   BUN 77*   < > 61*   < > 60* 54* 50*   CREATININE 1.5*   < > 1.5*   < > 1.3* 1.3* 1.1*   ALT 15  --   --   --  11 13  --    AST 40*  --   --   --  29 34*  --    BILITOT 2.4*  --   --   --  1.8* 1.4*  --    ALKPHOS 392*  --   --   --  105* 112*  --     < > = values in this interval not displayed. Lab Results   Component Value Date    LABPROT 0.3 (H) 02/04/2022    LABPROT 0.3 02/04/2022    LABPROT 0.2 01/02/2022    LABPROT 0.2 01/02/2022       Assessment  1. Acute kidney injury, relatively mild at this point, hemodynamically mediated due to hypotension, atrial fibrillation with RVR, volume contraction. Urinary indices are consistent with prerenal azotemia. With IV fluid resuscitation and blood pressure improvement, azotemia is improving. Urine output has picked up. Gradual resolving with subsiding hyponatremia. 2. Chronic kidney disease stage IIIa, baseline creatinine 1 to 1.2 mg/dL    3. Hyponatremia, chronic, in January admission serum sodium ranging 126 and 130 mmol/L. Etiology is multifactorial, due in part to chronically poorly compensated CHF exacerbated by atrial fibrillation, further exacerbated by hyperglycemia. With IV fluid resuscitation, serum sodium has improved somewhat. Urinary indices are consistent with hypovolemic hyponatremia    4. Anemia due to CKD, chronic illness, phlebotomy associated with recent hospitalization    5. Edema, marked chronically due to combination of decompensated CHF, atrial fibrillation, CKD. The degree of skin wrinkling increasing in her distal lower extremities suggest that she has recently been far more edematous      Azotemia improving briskly on IV fluid resuscitation. [Na+] improving on IV normal saline drip  K+ improved status post supplementation  Hepatic enzymes improving    Recommendations  1. Continue IV normal saline at 100 cc/h for now  2. Continue intensive supportive care  3. Follow labs, UO  4.  Avoid nephrotoxins  Continue follow-up clinical status and daily labs BMP and CBC and overall clinical status.     Electronically signed by Darcie Krishnan MD on 2/7/2022

## 2022-02-07 NOTE — PROGRESS NOTES
Pt with seizure like activity, generalized twitching with rightward gaze, episode lasting approximately 1 minute. NP notified. MAR reviewed.

## 2022-02-07 NOTE — PROGRESS NOTES
Cheikh Howe is a 81 y.o. female with past medical history of arthritis, Afib on Coumadin, chronic diastolic HF, hyperlipidemia, hypertension, bilateral venous stasis, PVD, difficulty walking and thyroid disease was transferred from [facility] for further Neuro evaluation. She was initially seen and evaluated at [facility] ED for unresponsiveness, increased confusion, and respiratory failure secondary to COVID-19. She has been positive for COVID at her facility 10 days ago, and confirmed last 02/04/22. Patient arrived here on mechanical ventilator, on gentle MIVF, no sedation, and with stable vital signs. Supervising physician, Dr. Shay Cain aware of patient's arrival. Dr. Shay Cain was also notified regarding patient's DNR-CCA code status according to [facility] nurse and per Dr. Tg Sousa notes. Per Cardiology, Dr. Emily Hunter notes \"warfarin anticoagulation being in the ICU at this time and seizure activity and possibly neurologic process involving and head CT showing edema done last evening. \" Patient was seen by hospitalist, Dr. Hamida Omalley, Nephrology, Dr. Mukul Lynn, Cardiology, Dr. Radha Lazo, Dr. Mich Alva, Palliative care, [facility], [facility], and dietician, Britney Cummings at [facility]. Patient was seen and examined. She is comfortably resting with minimal response to physical stimuli. She tolerating at this time the mechanical vent setting with SpO2 at 99%, no sedation.     HIMANSHU Pratt-BC   Critical Care  2/6/2022 10:48 PM

## 2022-02-07 NOTE — CONSULTS
[facility]  Neurology Consult    Date:  2/7/2022  Patient Name:  Antwon Shaw  YOB: 1935  MRN: [id number]     PCP:  Ciarra Joseph MD   Referring:  Laura Garner MD    Chief Complaint: Reported seizures    History obtained from: EMR    Nikole Winter is a 81 y.o. female with multiple strokes and large amount of edema Strokes likely cardioembolic given history of atrial fibrillation. Plan  · Echo with bubble  · No clear role for steroids in edema post stroke  · Continue keppra    History of Present Illness:  Antwon Shaw is a 81 y.o. Female with PMH of AF on coumadin, HLD, HTN, hypothyroid, recent covid 23, Obesity, DM, pacemaker presenting for evaluation of seizures. Patient from SNF [facility] to [facility] on 2/4/22 with workup showing AF with RVR,pna, hypoxia, lactic acidosis. Patient was intubated in the ED, noted febrile, tachycardic. Patient was admitted to the ICU. Patient with noted seizure-like activity x 2 on 2/5 with right shoulder shaking. Patient with additional shaking episodes on 2/7 with noted right sided gaze. Repeat CT on 2/5 showed edema noted noted on CT 2/4. Patient is on Keppra and decadron. Review of Systems:    Unable to obtain review of symptoms due to intuabted)    Medical History:   Past Medical History:   Diagnosis Date    Arthritis     Atrial fibrillation ([address]     Hyperlipidemia     Hypertension     Non-pressure chronic ulcer of other part of right lower leg with fat layer exposed ([address] 8/21/2020    Thyroid disease         Surgical History:   Past Surgical History:   Procedure Laterality Date    PACEMAKER INSERTION  06/04/2019    PPM GENT CHANGE  ([facility])   [address]        Family History:   No family history on file. Social History:  Social History     Tobacco Use    Smoking status: Never Smoker    Smokeless tobacco: Never Used   Vaping Use    Vaping Use: Never used   Substance Use Topics    Alcohol use:  No  Drug use: No        Current Medications:      Current Facility-Administered Medications   Medication Dose Route Frequency Provider Last Rate Last Admin    LORazepam (ATIVAN) injection 1 mg  1 mg IntraVENous Q5 Min PRN HIMANSHU Barcenas CNP   1 mg at 02/07/22 0331    levETIRAcetam (KEPPRA) 500 mg/100 mL IVPB  500 mg IntraVENous Q12H HIMANSHU Barcenas - CNP   Stopped at 02/07/22 0228    perflutren lipid microspheres (DEFINITY) injection 1.65 mg  1.5 mL IntraVENous ONCE PRN Hiram Doherty MD        metoprolol tartrate (LOPRESSOR) tablet 25 mg  25 mg Oral BID Hiram Doherty MD   25 mg at 02/07/22 0940    sodium chloride flush 0.9 % injection 5-40 mL  5-40 mL IntraVENous 2 times per day Judd Bhatia MD   30 mL at 02/07/22 0941    sodium chloride flush 0.9 % injection 5-40 mL  5-40 mL IntraVENous PRN Judd Bhatia MD        0.9 % sodium chloride infusion  25 mL IntraVENous PRN Judd Bhatia MD        acetaminophen (TYLENOL) tablet 650 mg  650 mg Oral Q6H PRN Judd Bhatia MD        Or    acetaminophen (TYLENOL) suppository 650 mg  650 mg Rectal Q6H PRN Judd Bhatia MD        [Held by provider] famotidine (PEPCID) tablet 20 mg  20 mg Oral Daily Judd Bhatia MD   20 mg at 02/06/22 1718    [Held by provider] levETIRAcetam (KEPPRA) tablet 500 mg  500 mg Oral BID Judd Bhatia MD   500 mg at 02/06/22 2021    [Held by provider] insulin lispro (HUMALOG) injection vial 0-3 Units  0-3 Units SubCUTAneous Nightly Judd Bhatia MD        albumin human 25 % IV solution 50 g  50 g IntraVENous Q6H Judd Bhatia  mL/hr at 02/07/22 0955 50 g at 02/07/22 0955    0.9 % sodium chloride infusion   IntraVENous Continuous Judd Bhatia MD 50 mL/hr at 02/06/22 1710 New Bag at 02/06/22 1710    piperacillin-tazobactam (ZOSYN) 3,375 mg in dextrose 5 % 100 mL IVPB extended infusion (mini-bag)  3,375 mg IntraVENous Louise Paul MD 25 mL/hr at 02/07/22 0943 3,375 mg at 02/07/22 0943    dexamethasone (DECADRON) injection 10 mg  10 mg IntraVENous BID Tammy Shah MD   10 mg at 02/07/22 0555    pantoprazole (PROTONIX) injection 40 mg  40 mg IntraVENous Daily Jaiden Fagan APRN - CNP   40 mg at 02/07/22 9926    And    sodium chloride (PF) 0.9 % injection 10 mL  10 mL IntraVENous Daily Destiny Aaron APRLEILANI - CNP   10 mL at 02/07/22 0941    glucose (GLUTOSE) 40 % oral gel 15 g  15 g Oral PRN Destiny GlezHIMANSHU ritter - CNP        dextrose 50 % IV solution  12.5 g IntraVENous PRN Destiny AaronHIMANSHU - CNP        glucagon (rDNA) injection 1 mg  1 mg IntraMUSCular PRN Jaiden FaganHIMANSHU CNP        dextrose 5 % solution  100 mL/hr IntraVENous PRN HIMANSHU Scott CNP        insulin lispro (HUMALOG) injection vial 0-6 Units  0-6 Units SubCUTAneous Q4H Destiny Aaron APRLEILANI - CNP   2 Units at 02/07/22 1000        Allergies:      No Known Allergies     Physical Examination  Vitals   Vitals:    02/07/22 0500 02/07/22 0600 02/07/22 0831 02/07/22 0940   BP:    (!) 124/52   Pulse: 73 80 74 72   Resp: 17 20 19    Temp:       TempSrc:       SpO2: 98% 98% 97%    Weight:       Height:          General: Intubated. Not following commands  HEENT: Normocephalic, atraumatic. ETT in place  Chest: Clear to auscultation bilaterally  Heart: No murmurs appreciated  Extremities/Peripheral vascular: No edema/swelling noted. No cold limbs noted. Neurologic Examination    Mental Status  Patient is intubated, not currently on sedation. Patient with no purposeful movement, not following simple commands. Cranial Nerves  Unable to perform due to lack of patient condition. Pupils with left 5-3 mm and sluggish. Right pupil 3-2 mm sluggish. Gag and cough reflex intact. Motor    Unable to assess. Tone: Decreased in all four limbs    Bulk: Normal in all four limbs with no evidence of atrophy    Sensation    Unable to perform due to patient condition. Patient is not responsive to painful stimuli.     Reflexes    Babinski is nonreactive. Coordination    Unable to perform due to patient condition    Gait    Deferred, patient intubated.     Labs  Recent Labs     02/04/22  1845 02/04/22  1859 02/06/22  0545 02/06/22  0545 02/06/22  0604 02/06/22  1220 02/06/22  1612 02/06/22  1612 02/06/22 2028 02/07/22  0508 02/07/22  0549   *   < > 130*   < >  --   --  132   < >  --   --  133   K 3.8   < > 2.8*   < >  --   --  3.7  --   --   --  4.4   CL 87*   < > 96*   < >  --   --  98   < >  --   --  99   CO2 18*   < > 21*   < >  --   --  22   < >  --   --  15*   BUN 74*   < > 60*   < >  --   --  54*   < >  --   --  50*   CREATININE 1.8*   < > 1.3*   < >  --   --  1.3*   < >  --   --  1.1*   GLUCOSE 357*   < > 159*   < >  --   --  107*   < >  --   --  224*   CALCIUM 8.4*   < > 7.7*   < >  --   --  8.2*   < >  --   --  8.4*   PROT 6.6   < > 5.3*   < >  --   --  6.4  --   --   --   --    LABALBU 1.9*   < > 1.4*   < >  --   --  1.6*  --   --   --   --    BILITOT 2.1*   < > 1.8*   < >  --   --  1.4*  --   --   --   --    ALKPHOS 184*   < > 105*   < >  --   --  112*  --   --   --   --    AST 46*   < > 29   < >  --   --  34*  --   --   --   --    ALT 20   < > 11   < >  --   --  13  --   --   --   --    WBC 39.1*   < > 22.2*   < >  --   --  20.9*   < >  --   --  13.1*   RBC 4.33   < > 3.40*   < >  --   --  3.81   < >  --   --  2.92*   HGB 13.4   < > 10.6*   < >  --   --  11.9   < >  --   --  9.2*   HCT 39.8   < > 30.9*   < >  --   --  34.8   < >  --   --  27.2*   MCV 91.9   < > 90.9   < >  --   --  91.3   < >  --   --  93.2   MCH 30.9   < > 31.2   < >  --   --  31.2   < >  --   --  31.5   MCHC 33.7   < > 34.3   < >  --   --  34.2   < >  --   --  33.8   RDW 15.6*   < > 15.4*   < >  --   --  15.5*   < >  --   --  15.5*      < > 101*   < >  --   --  125*   < >  --   --  91*   MPV 10.5   < > 10.7   < >  --   --  10.5   < >  --   --  11.1   PH  --    < >  --   --    < >  --   --   --    < > 7.481*  --    PO2  --    < >  --   --    < >  --   --   -- < > 102.6*  --    PCO2  --    < >  --   --    < >  --   --   --    < > 22.3*  --    HCO3  --    < >  --   --    < >  --   --   --    < > 16.3*  --    BE  --    < >  --   --    < >  --   --   --    < > -5.8*  --    O2SAT  --    < >  --   --    < >  --   --   --    < > 97.7  --    LACTA  --    < > 2.0  --    < > 2.1  --   --   --   --   --    LABA1C 7.0*  --   --   --   --   --   --   --   --   --   --    CRP 53.1*   < > 33.5*  --   --   --   --   --   --   --   --     < > = values in this interval not displayed. Imaging  XR CHEST PORTABLE   Final Result   1. Medical support devices as above. 2.  Retrocardiac and bibasilar opacities appears stable. Medial right upper   lung opacity appears stable. Atherosclerotic disease and cardiomegaly no new   cardiopulmonary pathology. I have personally reviewed the images.     Other Testing Results    Electronically signed by Liam Guevara MD on 2/7/2022 at 10:09 AM     Attending: Dr. Veronica Lo MD

## 2022-02-07 NOTE — CONSULTS
Palliative Care Department  [phone]  Palliative Care Initial Consult  Provider Julio Orourke, APRN - CNP     Leni Cavanaugh  [id number]  Hospital Day: 2  Date of Initial Consult: 2/7/2022  Referring Provider: Hugo Morse MD  Palliative Medicine was consulted for assistance with:  Code Status Discussion, Goals of Care    HPI:   Leni Cavanaugh is a 81 y.o. with a medical history of atrial fibrillation on anticoagulation, hyperlipidemia, hypertension, CHF, DM, sinus node dysfunction s/p pacemaker placement, MR, TR, pulmonary hypertension who was admitted on 2/6/2022 from facility with a CHIEF COMPLAINT of confusion. Patient tested positive for Covid 10 days prior to admission. Patient was febrile in the ED, found to be in atrial fibrillation with RVR, and also hypotensive. Patient currently intubated in ICU. Palliative medicine consulted for goals of care and code status discussion. Patient transferred from [facility] to [facility] for neuro eval.   ASSESSMENT/PLAN:     Pertinent Hospital Diagnoses      Acute hypoxic respiratory failure due to Covid 19   Sepsis   Atrial fibrillation with RVR   ISIDRO   Acute on chronic CHF      Palliative Care Encounter / Counseling Regarding Goals of Care  Please see detailed goals of care discussion as below   At this time, Leni Cavanaugh, Does Not have capacity for medical decision-making.   Capacity is time limited and situation/question specific   During encounter, lauren Holden, was surrogate medical decision-maker   Outcome of goals of care meeting: Continue DNR-CCA and current    Code status DNR-CCA   Advanced Directives: no POA or living will in [facility]   Surrogate/Legal NOK:  o lauren Rico, [phone]    Spiritual assessment: no spiritual distress identified  Bereavement and grief: to be determined  Referrals to: none today  SUBJECTIVE:     Current medical issues leading to Palliative Medicine involvement include   C/Hina Garrett [address] Problems    Diagnosis Date Noted    Acute respiratory failure due to COVID-19 ([facility] [U07.1, J96.00] 02/06/2022    Acute respiratory failure ([facility] [J96.00] 02/06/2022       Details of Conversation:  Chart reviewed. Spoke to patient's nephew, Xochitl Velasquez. Xochitl Velasquez familiar with role of palliative medicine. Xochitl Velasquez states that plan is currently to watch and wait, continue DNR-CCA. Denies needs from palliative medicine standpoint at this time. Will follow. OBJECTIVE:   Prognosis: depends upon goals and unknown    Physical Exam:  BP (!) 124/52   Pulse 70   Temp 98.6 °F (37 °C) (Esophageal)   Resp 18   Ht 5' (1.524 m)   Wt 233 lb 6.4 oz (105.9 kg)   SpO2 94%   BMI 45.58 kg/m²   Due to the current efforts to prevent transmission of COVID-19 and also the need to preserve PPE for other caregivers, a face-to-face encounter with the patient was not performed. That being said, all relevant records and diagnostic tests were reviewed, including laboratory results and imaging. Please reference any relevant documentation elsewhere. Care will be coordinated with the primary service. Objective data reviewed: labs, images, records, medication use, vitals and chart    Discussed patient and the plan of care with the other IDT members: Palliative Medicine IDT Team    Time/Communication  Greater than 50% of time spent, total 30 minutes in counseling and coordination of care at the bedside regarding goals of care, diagnosis and prognosis and see above. Thank you for allowing Palliative Medicine to participate in the care of [facility].

## 2022-02-07 NOTE — PROGRESS NOTES
Pt weaning on PSV 12.     02/07/22 1350   Vent Information   Vent Type 980   Vent Mode PS   Vt Ordered 0 mL   Rate Set 0 bmp   Peak Flow 0 L/min   Pressure Support 12 cmH20   FiO2  30 %   SpO2 94 %   SpO2/FiO2 ratio 313.33   Sensitivity 3   PEEP/CPAP 5   I Time/ I Time % 0 s   Vent Patient Data   High Peep/I Pressure 0   Peak Inspiratory Pressure 18 cmH2O   Mean Airway Pressure 8 cmH20   Rate Measured 27 br/min   Vt Exhaled 219 mL   Minute Volume 8.17 Liters   I:E Ratio 1:2.90   Spontaneous Breathing Trial (SBT) RT Doc   Pulse 83   Additional Respiratory  Assessments   Resp (!) 34   Alarm Settings   High Pressure Alarm 50 cmH2O   Low Minute Volume Alarm 5 L/min   Apnea (secs) 20 secs   High Respiratory Rate 35 br/min   Low Exhaled Vt  200 mL

## 2022-02-07 NOTE — PROGRESS NOTES
Seizure like activity of generalized twitching with rightward gaze observed, episode lasting approximately 90 seconds. PRN ativan given. NP notified.

## 2022-02-07 NOTE — DISCHARGE SUMMARY
after admission, patient was noted to have a positive Babinski reflex bilaterally throughout the day. The night of 2/4-2/5, patient began to have seizures. These were able to be chemically aborted, though patient had CT head that showed diffuse areas of low attenuation/edema involving the bilateral convexities that was not present on the previous study. The intensivist here had subsequently contacted [place] care unit [place] the patient was accepted in transfer for further neurologic evaluation. Discharge Exam  BP (!) 141/56   Pulse 77   Temp 99.4 °F (37.4 °C) (Rectal)   Resp 16   Ht 5' (1.524 m)   Wt 203 lb (92.1 kg)   SpO2 100%   BMI 39.65 kg/m²   General: well-developed, well-nourished, intubated  Skin: warm, dry, intact, pale in color without cyanosis  HEENT: normocephalic, atraumatic, pt intubated  Respiratory: coarse bilaterally  Cardiovascular: tachy reg rate  Abdominal: obese, soft, nontender, nondistended, normoactive bowel sounds  Extremities: no mottling, pulses intact, no edema  Neurologic: intubated. Not following commands. Toes upgoing w Babinski bilaterally  Psychiatric: cannot assess    I/O last 3 completed shifts: In: 1031 [I.V.:3002; IV Piggyback:200]  Out: 1300 [Urine:1300]  I/O this shift: In: 1171 [I.V.:3965; IV Piggyback:200]  Out: 1039 [facility]     02/06/22  0545 02/06/22  1612 02/07/22  0549   * 132 133   K 2.8* 3.7 4.4   CL 96* 98 99   CO2 21* 22 15*   BUN 60* 54* 50*   CREATININE 1.3* 1.3* 1.1*   GLUCOSE 159* 107* 224*   CALCIUM 7.7* 8.2* 8.4*   WBC 22.2* 20.9* 13.1*   RBC 3.40* 3.81 2.92*   HGB 10.6* 11.9 9.2*   HCT 30.9* 34.8 27.2*   MCV 90.9 91.3 93.2   MCH 31.2 31.2 31.5   MCHC 34.3 34.2 33.8   RDW 15.4* 15.5* 15.5*   * 125* 91*   MPV 10.7 10.5 11.1       Imaging  CT ABDOMEN PELVIS WO CONTRAST Additional Contrast? None    Result Date: 2/4/2022  Partially imaged bilateral pleural effusions in the lung bases.   Please follow-up separate report CT of the chest for further detail. Uncomplicated cholelithiasis. No acute findings in the abdomen or pelvis. CT HEAD WO CONTRAST    Result Date: 2/5/2022  Diffuse areas of low attenuation/edema involving the convexities bilaterally not present on the scan from 1:16 p.m.. MRI recommended to further evaluate. Findings discussed on 02/05/2022 with Dr. Jonathan Sousa at 11:30 p.m.     CT Head WO Contrast    Result Date: 2/4/2022  1. There is no acute intracranial abnormality. Specifically, there is no intracranial hemorrhage. 2. Atrophy and periventricular leukomalacia,     CT CHEST WO CONTRAST    Result Date: 2/4/2022  1. Prominent cardiomegaly and small to moderate pleural effusions, and lung density appears mainly to represent atelectasis 2. Limited exam     US GALLBLADDER RUQ    Result Date: 2/4/2022  Cholelithiasis. No biliary dilatation, free fluid, or sonographic findings of acute cholecystitis. RECOMMENDATIONS: Unavailable     XR CHEST PORTABLE    Result Date: 2/6/2022  1. Medical support devices as above. 2.  Retrocardiac and bibasilar opacities appears stable. Medial right upper lung opacity appears stable. Atherosclerotic disease and cardiomegaly no new cardiopulmonary pathology. XR CHEST PORTABLE    Result Date: 2/6/2022  Stable chest as above with heart enlarged and small bilateral pleural effusions with increased markings seen in the right upper lung field. Stable prominence of the pulmonary vascularity bilaterally. No new findings. XR CHEST PORTABLE    Result Date: 2/5/2022  1. Cardiomegaly. 2. Slightly improved aeration of the right and left lungs when compared with the prior study. 3. Minimal bibasilar atelectasis. XR CHEST PORTABLE    Result Date: 2/4/2022  1. Hazy bilateral airspace disease which could represent CHF or viral pneumonia.   The chest with further evaluated on the pending CT of the chest. 2. Cardiomegaly     XR ABDOMEN FOR NG/OG/NE TUBE PLACEMENT    Result Date: 2/4/2022  Satisfactory position of the enteric tube.      Patient Instructions     Medication List      ASK your doctor about these medications    benzonatate 200 MG capsule  Commonly known as: TESSALON     bumetanide 1 MG tablet  Commonly known as: BUMEX  Take 1 tablet by mouth 2 times daily     dilTIAZem 120 MG extended release capsule  Commonly known as: CARDIZEM CD     docusate sodium 100 MG capsule  Commonly known as: COLACE     levothyroxine 125 MCG tablet  Commonly known as: SYNTHROID     metOLazone 2.5 MG tablet  Commonly known as: ZAROXOLYN     metoprolol 100 MG tablet  Commonly known as: LOPRESSOR     polyethylene glycol 17 g packet  Commonly known as: GLYCOLAX  Take 17 g by mouth daily as needed for Constipation  Ask about: Should I take this medication?     simvastatin 20 MG tablet  Commonly known as: ZOCOR     spironolactone 25 MG tablet  Commonly known as: ALDACTONE  Take 1 tablet by mouth daily     therapeutic multivitamin-minerals tablet  Ask about: Which instructions should I use?     warfarin 4 MG tablet  Commonly known as: COUMADIN  Take 1 tablet by mouth daily          Note that more than 30 minutes was spent in preparing discharge papers, discussing discharge with patient, medication review, etc.    Electronically signed by Landy Graham DO on 2/7/2022 at 12:49 PM

## 2022-02-07 NOTE — PROGRESS NOTES
[facility] MD Rhina Trevino MD Marry Nettles, MD Earlyne Atta, MD Carlena Riding, ESTELLE Soto, NIDIA  Progress Note    2/6/2022    SUBJECTIVE:   2/6: Seen this early afternoon. Remains critically ill, intubated via ETT. Vent setting stable. BP stable. No meaningful neurologic recovery, she is due for transfer to [address] [place] [address] for further neurologic evaluation. PROBLEM LIST:    Active Problems:    Sepsis ([address]  Resolved Problems:    * No resolved hospital problems. *         DIET:    No diet orders on file     MEDS (scheduled):       MEDS (infusions):      MEDS (prn):       PHYSICAL EXAM:     Patient Vitals for the past 24 hrs:   Temp Temp src Pulse Resp SpO2   02/06/22 1400 99.4 °F (37.4 °C) Rectal  16 100 %   02/06/22 1337   77 16 98 %   02/06/22 1300 99.1 °F (37.3 °C) Rectal 65 15 99 %   02/06/22 1200 99.1 °F (37.3 °C) Rectal 73 16 99 %   02/06/22 1100 98.4 °F (36.9 °C) Rectal 71 16 99 %   02/06/22 1000 98.2 °F (36.8 °C) Rectal 70 16 99 %   02/06/22 0900 97.3 °F (36.3 °C) Rectal 81 16 99 %   02/06/22 0834   63 16 100 %   02/06/22 0800 96.8 °F (36 °C) Rectal 69 15 99 %   02/06/22 0700   72 15 99 %   02/06/22 0600 94.6 °F (34.8 °C) Rectal 74 15 99 %   02/06/22 0500 94.1 °F (34.5 °C) Rectal 71 16 99 %   02/06/22 0400 95 °F (35 °C) Rectal 72 15 98 %   02/06/22 0300 97 °F (36.1 °C) Rectal 80 16 99 %   02/06/22 0200 97.4 °F (36.3 °C) Rectal 71 16 100 %   02/06/22 0100   80 16 99 %   02/06/22 0012   88 17 98 %   02/06/22 0000 98.9 °F (37.2 °C) Rectal 93 19 97 %   02/05/22 2300 100.9 °F (38.3 °C) Rectal 106 23 98 %   02/05/22 2200   118 22 97 %   02/05/22 2100   99 25 98 %   02/05/22 2024   108  98 %   @      Intake/Output Summary (Last 24 hours) at 2/6/2022 2011  Last data filed at 2/6/2022 1200  Gross per 24 hour   Intake 4165 ml   Output 1890 ml   Net 2275 ml         Wt Readings from Last 3 Encounters:   02/04/22 203 lb (92.1 kg)   01/06/22 203 lb (92.1 kg)   11/01/21 222 lb (100.7 kg)       Constitutional: Appears critically ill on ventilator via ETT, though in no acute distress on vent  HEENT: NC/AT,  mucus membranes moist, ETT extensive oropharynx  Neck: Trachea midline, no JVD, no bruit  Cardiovascular: S1, S2 regular rhythm, no murmur,or rub  Respiratory:  No crackles, no wheeze  Gastrointestinal:  Soft, nontender, nondistended, NABS  Ext: no edema, feet warm  Skin: dry, no rash  Neuro: Unresponsive to verbal and tactile stimuli. Moves all 4 extremities      DATA:    Recent Labs     02/05/22  0540 02/06/22  0545 02/06/22  1612   WBC 28.5* 22.2* 20.9*   HGB 11.9 10.6* 11.9   HCT 34.5 30.9* 34.8   MCV 90.8 90.9 91.3    101* 125*     Recent Labs     02/04/22  1845 02/04/22  1845 02/05/22 0540 02/05/22  0540 02/05/22  2045 02/06/22  0545 02/06/22  1612   *   < > 128*   < > 125* 130* 132   K 3.8   < > 3.9   < > 3.6 2.8* 3.7   CL 87*   < > 94*   < > 91* 96* 98   CO2 18*   < > 19*   < > 17* 21* 22   MG 2.0   < > 1.9  --  1.9 1.9  --    PHOS 5.0*  --  3.3  --   --  2.7  --    BUN 74*   < > 77*   < > 61* 60* 54*   CREATININE 1.8*   < > 1.5*   < > 1.5* 1.3* 1.3*   ALT 20   < > 15  --   --  11 13   AST 46*   < > 40*  --   --  29 34*   BILITOT 2.1*   < > 2.4*  --   --  1.8* 1.4*   ALKPHOS 184*   < > 392*  --   --  105* 112*    < > = values in this interval not displayed. Lab Results   Component Value Date    LABPROT 0.3 (H) 02/04/2022    LABPROT 0.3 02/04/2022    LABPROT 0.2 01/02/2022    LABPROT 0.2 01/02/2022       Assessment  1. Acute kidney injury, relatively mild at this point, hemodynamically mediated due to hypotension, atrial fibrillation with RVR, volume contraction. Urinary indices are consistent with prerenal azotemia. With IV fluid resuscitation and blood pressure improvement, azotemia is improving. Urine output has picked up.     2. Chronic kidney disease stage IIIa, baseline creatinine 1 to 1.2 mg/dL    3. Hyponatremia, chronic, in January admission serum sodium ranging 126 and 130 mmol/L. Etiology is multifactorial, due in part to chronically poorly compensated CHF exacerbated by atrial fibrillation, further exacerbated by hyperglycemia. With IV fluid resuscitation, serum sodium has improved somewhat. Urinary indices are consistent with hypovolemic hyponatremia    4. Anemia due to CKD, chronic illness, phlebotomy associated with recent hospitalization    5. Edema, marked chronically due to combination of decompensated CHF, atrial fibrillation, CKD. The degree of skin wrinkling increasing in her distal lower extremities suggest that she has recently been far more edematous      Azotemia improving briskly on IV fluid resuscitation. [Na+] improving on IV normal saline drip  K+ improved status post supplementation  Hepatic enzymes improving    Recommendations  1. Continue IV normal saline at 100 cc/h for now  2. Continue intensive supportive care  3. Follow labs, UO  4.  Avoid nephrotoxins        Electronically signed by Hunter Singletary MD on 2/6/2022

## 2022-02-08 NOTE — ACP (ADVANCE CARE PLANNING)
Advance Care Planning   Healthcare Decision Maker:    Primary Decision Maker: Aung Bailey/Nephew - [phone]    Click here to complete Healthcare Decision Makers including selection of the Healthcare Decision Maker Relationship (ie \"Primary\").

## 2022-02-08 NOTE — CARE COORDINATION
2/8: Transition of care: Pt came in from [facility] ER with being unresponsive/Covid +2/4. Pt was intubated & transferred to MICU for neuro evaluation. Pt remains intubated, SPo2 95% & Fio2 305. CM spoke with nephheather Jean-Baptiste [phone]. He would prefer his Aunt to go to another facility for rehab. Cm explained that it will depend on her 02 needs & mobility to where she can go. At this point the pt is Covid + & will need to go to a Covid SNF. He preferred [address] [place] [address]. Cm called left [facility] referral. Elena Rivas following as well. Pt is a DNRCCA, Iv Keppra, Iv Decadron, Iv Zosyn & Iv NS@ 50cc/hr. 2 D Echo & EEG awake & sleep needed. Needs unclear until pt more medically stable. Sw/CM will continue to follow for dc planning. Electronically signed by Harrison Muller RN on 2/8/2022 at 10:51 AM      The Plan for Transition of Care is related to the following treatment goals: SNF/LTAC    The Patient and/or patient representative  was provided with a choice of provider and agrees   with the discharge plan. [x] Yes [] No    Freedom of choice list was provided with basic dialogue that supports the patient's individualized plan of care/goals, treatment preferences and shares the quality data associated with the providers.  [x] Yes [] No Maylin order placed for CT procedure in [place] on 6/9/17.

## 2022-02-08 NOTE — PROGRESS NOTES
Patient had an episode of seizure, given 2 mg Ativan IV, neurology already on board, on keppra 500mg BID.

## 2022-02-08 NOTE — PROGRESS NOTES
[facility] CARDIOLOGY PROGRESS NOTE  [facility]        Subjective: AF RVR on admission and now heart rate greatly improved heart rate in the 70s and ventricular demand pacing. Admitted with obtundation and intubated on first day to hospital to protect airway. Has had significant leukocytosis, found to be Covid positive. Bacteremia, known dual-chamber permanent pacemaker in place    Seizure activity. Currently on the ventilator. Has been since admission. Appears to be comfortable. Objective: Labs chart medications and telemetry all reviewed.     Patient Vitals for the past 24 hrs:   BP Temp Pulse Resp SpO2 Weight   02/08/22 1200   70 26 95 %    02/08/22 1159   70 29 96 %    02/08/22 1157   70 29 95 %    02/08/22 1152   75 29 95 %    02/08/22 1100   70 (!) 31 95 %    02/08/22 1000   67 23 95 %    02/08/22 0900   70 25 96 %    02/08/22 0848 (!) 168/74  75      02/08/22 0800   71 17 95 %    02/08/22 0700   70 21 95 %    02/08/22 0600   70 22 95 %    02/08/22 0500   70 16 94 %    02/08/22 0400   72 17 94 %    02/08/22 0300   70 15 92 %    02/08/22 0200   71 18 95 % 234 lb 9.1 oz (106.4 kg)   02/08/22 0100   71 24 97 %    02/08/22 0000   71 21 97 %    02/07/22 2301  97 °F (36.1 °C)       02/07/22 2300   70 20 96 %    02/07/22 2200   71 15 96 %    02/07/22 2128 (!) 138/52  72      02/07/22 2105   77 24 96 %    02/07/22 2100   80 26 94 %    02/07/22 2000  97.2 °F (36.2 °C) 75 29 95 %    02/07/22 1917   73 28 92 %    02/07/22 1800   71 30 94 %    02/07/22 1700   73 28 95 %    02/07/22 1612   72 28 94 %    02/07/22 1600   75 27 94 %    02/07/22 1500   74 29 95 %    02/07/22 1400   73 (!) 32 95 %    02/07/22 1350   83 (!) 34 94 %          Intake/Output Summary (Last 24 hours) at 2/8/2022 1313  Last data filed at 2/8/2022 0834  Gross per 24 hour   Intake 1218 ml   Output 940 ml   Net 278 ml       Wt Readings from Last 3 Encounters:   02/08/22 234 lb 9.1 oz (106.4 kg)   02/04/22 203 lb (92.1 kg)   01/06/22 203 lb (92.1 kg)       Telemetry: Personally reviewed and shows atrial fibrillation and heart rate well controlled now in the 70s and ventricular demand pacing. As needed. Current meds: Scheduled Meds:   metoprolol tartrate  50 mg Oral BID    levetiracetam  750 mg IntraVENous Q12H    chlorhexidine  15 mL Mouth/Throat BID    mupirocin   Nasal Daily    insulin lispro  0-12 Units SubCUTAneous Q4H    sodium chloride flush  5-40 mL IntraVENous 2 times per day    piperacillin-tazobactam  3,375 mg IntraVENous Q8H    dexamethasone  10 mg IntraVENous BID    pantoprazole  40 mg IntraVENous Daily    And    sodium chloride (PF)  10 mL IntraVENous Daily     Continuous Infusions:   sodium chloride      sodium chloride 50 mL/hr at 02/07/22 1204    dextrose       PRN Meds:. LORazepam, perflutren lipid microspheres, sodium chloride flush, sodium chloride, acetaminophen **OR** acetaminophen, glucose, dextrose, glucagon (rDNA), dextrose    Allergies: Patient has no known allergies. Labs:   Recent Labs     02/06/22  1612 02/07/22  0549 02/08/22  0330   WBC 20.9* 13.1* 9.5   HGB 11.9 9.2* 9.2*   HCT 34.8 27.2* 27.6*   MCV 91.3 93.2 93.2   * 91* 82*       Labs:   Recent Labs     02/06/22  0545 02/06/22  0545 02/06/22  1612 02/07/22  0549 02/08/22  0330   *   < > 132 133 133   K 2.8*  --  3.7 4.4 3.6   CL 96*   < > 98 99 99   CO2 21*   < > 22 15* 17*   PHOS 2.7  --   --  3.0 3.0   BUN 60*   < > 54* 50* 49*   CREATININE 1.3*   < > 1.3* 1.1* 1.1*    < > = values in this interval not displayed. Labs:   Recent Labs     02/06/22  1635   CKTOTAL 185*       Labs: No results for input(s): BNP in the last 72 hours. Labs: No results for input(s): CHOL, HDL, TRIG in the last 72 hours.     Invalid input(s): Marilyn Forbes    Labs:   Recent Labs     02/06/22  0545 02/06/22  0545 02/06/22  1612 02/07/22  0549 02/08/22  0330   PROT 5.3*  --  6.4  --   --    INR 1.6   < > 2.1 2.7 2.8    < > = values in this interval not displayed. Review of systems: No reported significant weight gain or weight loss. no dysuria or frequency, no dizziness, falls or trauma, no change in bowel or bladder habits, no hematochezia, hemoptysis or hematuria. No fevers, chills, nausea or vomiting reported. No significant wheezing or sputum production. No headache or visual changes. The remainder of the 10 review of systems otherwise negative. Exam      General: Patient comfortable in no distress and currently denies any chest pain. HEENT: Face symmetrical and no apparent cranial nerve deficit. Neck: No jugular venous distention, carotid bruit or thyromegaly. Lungs: Clear bilaterally without rales, wheezes or dullness. Cardiac: Regular rate and rhythm, no S3, S4, no rub or gallop. Abdomen: No rebound or guarding, no hepatosplenomegaly. Extremities: Without significant clubbing , cyanosis, or edema. Neuro:  No focal motor or sensory deficit apparent. Skin: No petechiae, no significant bruising. Assessment: See plan below        Plan: #1 AF RVR now rate controlled. Continue to follow-up telemetry. Cautious with anticoagulation and INR today 2.8 up from 2.7 yesterday and received warfarin dose about 3 days ago. Continue to check INR daily. #2 sepsis and leukocytosis much improved. #3 Gram-positive cocci beta strep in the blood/bacteremia. Continues on IV antibiotic. Awaiting transthoracic echocardiogram.  Would not put her through a transesophageal echocardiogram at this time especially with unclear neurologic status and unclear prognosis. Again she would not be a candidate for any type of valve surgery even if vegetation was present. #4 dual-chamber permanent pacemaker placed 2010 and generator change about 3 years ago. In the setting of bacteremia receiving IV antibiotics. 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 #5 anemia hemoglobin stable at 9.2. No overt bleeding reported. #6 thrombocytopenia and platelet count little lower today at 82,000 down from 91 yesterday. #7 Covid diagnosed on admission or recently at extended-care facility. Continue supportive measures. #8 seizure activity and defer follow-up to neurology currently receiving Keppra 750 mg twice a day.       Electronically signed by Giovany Mancini MD on 2/8/2022 at 1:13 PM

## 2022-02-08 NOTE — PROGRESS NOTES
Critical Care Progress Note         Patient Jose Hunt   MRN -  [id number]   Acct # - [de-identified]   - 1935      Date of Admission -  2022  3:00 PM  Date of evaluation -  2022  4409/4409-A   Hospital Day - 2            ADMIT/CONSULT DETAILS     Reason for Admit/Consult   AMS   Unresponsiveness  COVID 19  Acute respiratory failure. Ventilator management. Indu Fernandez MD  Primary Care Physician - Alexis Ríos MD         Subjective    Overnight events seizure x 1 requiring ativan. Remains intubated. No pressor requirements. VSS Afebrile. Increased Lopressor to 50 mg BID SBP > 150. Keppra increased to 750 mg BID  EEG today. Afib with controlled VR. Past Medical History         Diagnosis Date    Arthritis     Atrial fibrillation ([address]     Hyperlipidemia     Hypertension     Non-pressure chronic ulcer of other part of right lower leg with fat layer exposed ([facility] [address] 2020    Thyroid disease         Past Surgical History           Procedure Laterality Date    PACEMAKER INSERTION  2019    PPM GENT CHANGE  ([facility])   DR. TONG     Current Medications   Current Medications    metoprolol tartrate  50 mg Oral BID    levetiracetam  750 mg IntraVENous Q12H    chlorhexidine  15 mL Mouth/Throat BID    mupirocin   Nasal Daily    insulin lispro  0-12 Units SubCUTAneous Q4H    sodium chloride flush  5-40 mL IntraVENous 2 times per day    piperacillin-tazobactam  3,375 mg IntraVENous Q8H    dexamethasone  10 mg IntraVENous BID    pantoprazole  40 mg IntraVENous Daily    And    sodium chloride (PF)  10 mL IntraVENous Daily     LORazepam, perflutren lipid microspheres, sodium chloride flush, sodium chloride, acetaminophen **OR** acetaminophen, glucose, dextrose, glucagon (rDNA), dextrose  IV Drips/Infusions   sodium chloride      sodium chloride 50 mL/hr at 22 1204    dextrose       Home Medications  Medications Prior to Admission: benzonatate (TESSALON) 200 MG capsule, Take 200 mg by mouth three times daily  dilTIAZem (CARDIZEM CD) 120 MG extended release capsule, Take 120 mg by mouth daily  docusate sodium (COLACE) 100 MG capsule, Take 100 mg by mouth daily  metOLazone (ZAROXOLYN) 2.5 MG tablet, Take 2.5 mg by mouth every other day  Multiple Vitamins-Minerals (THERAPEUTIC MULTIVITAMIN-MINERALS) tablet, Take 1 tablet by mouth daily  warfarin (COUMADIN) 4 MG tablet, Take 1 tablet by mouth daily  bumetanide (BUMEX) 1 MG tablet, Take 1 tablet by mouth 2 times daily  spironolactone (ALDACTONE) 25 MG tablet, Take 1 tablet by mouth daily  [] polyethylene glycol (GLYCOLAX) 17 g packet, Take 17 g by mouth daily as needed for Constipation  metoprolol (LOPRESSOR) 100 MG tablet, Take 200 mg by mouth 2 times daily   simvastatin (ZOCOR) 20 MG tablet, Take 20 mg by mouth nightly  levothyroxine (SYNTHROID) 125 MCG tablet, Take 125 mcg by mouth Daily     Diet/Nutrition   Diet NPO    Allergies   Patient has no known allergies. Social History   Tobacco   reports that she has never smoked. She has never used smokeless tobacco.    Alcohol     reports no history of alcohol use. Occupational history :    Family History   No family history on file. ROS     REVIEW OF SYSTEMS:  Review of systems not obtained due to patient factors - intubation    Lines and Devices    22  Right Femoral TLC    Right radial arterial line   22 Urethral catheter.    22  OGT  22  ETT # 7.5      Mechanical Ventilation Data   VENT SETTINGS (Comprehensive)  Vent Information  $Ventilation: $Subsequent Day  Skin Assessment: Clean, dry, & intact  Equipment ID: [id number]  Vent Type: [id number]  Vent Mode: PS  Vt Ordered: 0 mL  Rate Set: 0 bmp  Peak Flow: 0 L/min  Pressure Support: 16 cmH20  FiO2 : 35 %  SpO2: 95 %  SpO2/FiO2 ratio: 271.43  Sensitivity: 3  PEEP/CPAP: 5  I Time/ I Time %: 0 s  Humidification Source: Heated wire  Humidification Temp: 37  Humidification Temp Measured: 37  Additional Respiratory  Assessments  Pulse: 70  Resp: 26  SpO2: 95 %  Position: Semi-Pineda's  Humidification Source: Heated wire  Humidification Temp: 37  Oral Care: Lip moisturizer applied,Mouth swabbed,Mouth suctioned  Subglottic Suction Done?: Yes  Cuff Pressure (cm H2O): 29 cm H2O    ABG  Lab Results   Component Value Date    PH 7.463 2022    PCO2 23.6 2022    PO2 74.7 2022    HCO3 16.5 2022    O2SAT 94.2 2022     Lab Results   Component Value Date    MODE AC 2022           Vitals    height is 5' (1.524 m) and weight is 234 lb 9.1 oz (106.4 kg). Her temperature is 97 °F (36.1 °C). Her blood pressure is 168/74 (abnormal) and her pulse is 70. Her respiration is 26 and oxygen saturation is 95%. Temperature Range: Temp: 97 °F (36.1 °C) Temp  Av.1 °F (36.2 °C)  Min: 97 °F (36.1 °C)  Max: 97.2 °F (36.2 °C)  BP Range:  Systolic (76BKM), RCB:102 , Min:138 , SNP:064     Diastolic (93QNE), RG, Min:52, Max:74    Pulse Range: Pulse  Av.6  Min: 67  Max: 83  Respiration Range: Resp  Av.3  Min: 15  Max: 34  Current Pulse Ox[de-identified]  SpO2: 95 %  24HR Pulse Ox Range:  SpO2  Av.9 %  Min: 92 %  Max: 97 %  Oxygen Amount and Delivery:        I/O (24 Hours)    Patient Vitals for the past 8 hrs:   BP Pulse Resp SpO2   22 1200  70 26 95 %   22 1159  70 29 96 %   22 1157  70 29 95 %   22 1152  75 29 95 %   22 1100  70 (!) 31 95 %   22 1000  67 23 95 %   22 0900  70 25 96 %   22 0848 (!) 168/74 75     22 0800  71 17 95 %   22 0700  70 21 95 %   22 0600  70 22 95 %   22 0500  70 16 94 %       Intake/Output Summary (Last 24 hours) at 2022 1203  Last data filed at 2022 0834  Gross per 24 hour   Intake 1218 ml   Output 940 ml   Net 278 ml     I/O last 3 completed shifts:   In: 2453 [I.V.:2649; NG/GT:180; IV Piggyback:480]  Out: 1699 [JRSTX:8434] Date 02/08/22 0000 - 02/08/22 2359   Shift 0920-8329 6343-7349 5774-4389 24 Hour Total   INTAKE   I.V.(mL/kg/hr) 728(0.9) 10  738   IV Piggyback 480   480   Shift Total(mL/kg) 9640(49.9) 10(0.1)  1218(11.4)   OUTPUT   Urine(mL/kg/hr) 300(0.4)   300   Shift Total(mL/kg) 300(2.8)   300(2.8)   Weight (kg) 106.4 106.4 106.4 106.4     Patient Vitals for the past 96 hrs (Last 3 readings):   Weight   02/08/22 0200 234 lb 9.1 oz (106.4 kg)   02/07/22 0340 233 lb 6.4 oz (105.9 kg)     Exam     PHYSICAL EXAM:  CONSTITUTIONAL:  Lying in bed monitored. Orally intubated NAD. EYES:  lids and lashes normal and left pupils 4 to 5mm right pupil 3 mm reactive bilaterally sluggish. Upward gaze present   ENT:  normocepalic, without obvious abnormality, atraumatic  NECK:  supple, symmetrical, trachea midline, skin normal and no stridor  HEMATOLOGIC/LYMPHATICS:  no cervical lymphadenopathy and no supraclavicular lymphadenopathy  LUNGS:  Mechanical ventilator. Equal expansion. BBS aerating all lobes. Diminished bilaterally in bases. Few upper lobe scattered rhonchi present. CARDIOVASCULAR:  irregularly irregular rhythm and normal S1 and S2  ABDOMEN:  No scars, normal bowel sounds, soft, non-distended, non-tender, no masses palpated, no hepatosplenomegally  MUSCULOSKELETAL:  there is no redness, warmth, or swelling of the joints  tone is normal  NEUROLOGIC:  No response to noxious stimuli does not withdraw to painful stimuli. Gag and cocugh present. Limited neurological exam.   SKIN:  Right lower leg wound present.       Data   Old records and images have been reviewed    Lab Results   CBC     Lab Results   Component Value Date    WBC 9.5 02/08/2022    RBC 2.96 02/08/2022    HGB 9.2 02/08/2022    HCT 27.6 02/08/2022    PLT 82 02/08/2022    MCV 93.2 02/08/2022    MCH 31.1 02/08/2022    MCHC 33.3 02/08/2022    RDW 15.8 02/08/2022    NRBC 0.9 02/06/2022    METASPCT 1.8 02/08/2022    LYMPHOPCT 3.5 02/08/2022    PROMYELOPCT 0.9 02/06/2022    MONOPCT 2.6 02/08/2022    MYELOPCT 1.8 02/08/2022    BASOPCT 0.2 02/08/2022    MONOSABS 0.28 02/08/2022    LYMPHSABS 0.38 02/08/2022    EOSABS 0.00 02/08/2022    BASOSABS 0.00 02/08/2022       BMP   Lab Results   Component Value Date     02/08/2022    K 3.6 02/08/2022    K 3.7 02/06/2022    CL 99 02/08/2022    CO2 17 02/08/2022    BUN 49 02/08/2022    CREATININE 1.1 02/08/2022    GLUCOSE 246 02/08/2022    CALCIUM 9.1 02/08/2022       LFTS  Lab Results   Component Value Date    ALKPHOS 112 02/06/2022    ALT 13 02/06/2022    AST 34 02/06/2022    PROT 6.4 02/06/2022    BILITOT 1.4 02/06/2022    LABALBU 1.6 02/06/2022       INR  Recent Labs     02/06/22  1612 02/07/22  0549 02/08/22  0330   PROTIME 22.6* 29.9* 31.8*   INR 2.1 2.7 2.8       APTT  Recent Labs     02/06/22 1612   APTT 40.4*       Lactic Acid  Lab Results   Component Value Date    LACTA 2.1 02/06/2022    LACTA 2.0 02/06/2022    LACTA 2.1 02/06/2022        BNP   No results for input(s): BNP in the last 72 hours. Cultures     Recent Labs     02/06/22  2211   BC 24 Hours no growth     Recent Labs     02/06/22  2324   BLOODCULT2 24 Hours no growth       No results for input(s): LABURIN in the last 72 hours. Radiology   CXR        CT Scans  Head CT 02/05/22    No acute intracranial hemorrhage mass effect or midline shift. No evidence of hydrocephalus. SYSTEMS ASSESSMENT    Neuro   Acute encephalopathy unknown etiology metabolic or anoxic. Seizure disorder  Questionable cardio embolic  Stroke  Neurology consulted appreciate recommendations. Continue Keppra IV for seizure prophylaxis. Increased to 750 mg BID. EEG cancelled per neurology Dr. Sharifa Paulson prn for seizures  Seizure precautions. Unable to obtain MRI due to pacemaker   CT head no intracranial hemorrhage. Respiratory   Acute hypoxic respiratory failure secondary to COVID 19 pneumonia.    COVID + 1/25 received no treatment  Decadron IV 10 mg for 5 days.   Duo Nebs as needed   Enhanced droplet precautions  Chest x ray in AM   Daily ABG while intubated. Plan for SBT today. Will not extubate due to neurological status and inability to protect airway. Wean oxygen as tolerated. Keep O2 sat 90-92%    Cardiovascular   Acute on chronic CHF   Cardiomegaly   Echo with bubble study. 20 mg IVP Lasix x 1 today. Metoprolol 50 g BID   Maintain MAP > 65   Continuous telemetry. ProBNP  14,148 02/02  repeat in AM   Cardiology consulted for Afib with RVR  Holding Coumadin as per Cardiology note INR 2.8 today. Goal INR 2 to 3   LV dysfunction EF 40 to 45%/       Gastrointestinal   NPO  Protonix daily  Tube feedings at goal  Dietary consulted appreciate input. Renal   Acute on chronic kidney injury   Chronic hyponatremia sodium range 126 to 130. Multifactorial   Avoid nephrotoxic medications. Strict I and O. Renal dose medications for GFR. Baseline creatine 1.2 to 1.3  Continue IVF gentle hydration. Nephrology consulted Dr. Hai fulton       Infectious Disease   Sepsis   Infectious disease consulted appreciated recommendations. Gram + cocci beta strep in blood   Improving WBC   Wound Culture proteus species. MRSA nasal colonization   Bactroban nasal started. Continue antibiotics as per Id  Continue Zosyn. Blood cultures repeated. Procal 4.55 2/06/22 Repeat in AM       Hematology/Oncology   Supratherapetic INR received reversal agent  Coumadin on hold as per cardiology. Endocrine   No record of diabetes. Hgb A1C 7.0   Low dose insulin coverage. Hypoglycemic protocol. Hyponatremia chronic  133    Social/Spiritual/DNR/Other   DNR CCA  Palliative care consulted. Plan of care discussed in multidisciplinary rounds. Dr. Tomasz Watson is the collaborating physician. Colby Gomez CNP   Medical Intensive Care Unit.

## 2022-02-08 NOTE — PROGRESS NOTES
Infectious Disease  Progress Note  [facility]    Chief Complaint: no complaint    Subjective: grp b strep sepsis    Scheduled Meds:   metoprolol tartrate  50 mg Oral BID    levetiracetam  750 mg IntraVENous Q12H    chlorhexidine  15 mL Mouth/Throat BID    mupirocin   Nasal Daily    insulin lispro  0-12 Units SubCUTAneous Q4H    sodium chloride flush  5-40 mL IntraVENous 2 times per day    piperacillin-tazobactam  3,375 mg IntraVENous Q8H    dexamethasone  10 mg IntraVENous BID    pantoprazole  40 mg IntraVENous Daily    And    sodium chloride (PF)  10 mL IntraVENous Daily     Continuous Infusions:   sodium chloride      sodium chloride 50 mL/hr at 02/08/22 1604    dextrose       PRN Meds:LORazepam, perflutren lipid microspheres, sodium chloride flush, sodium chloride, acetaminophen **OR** acetaminophen, glucose, dextrose, glucagon (rDNA), dextrose    Patient Vitals for the past 24 hrs:   BP Temp Temp src Pulse Resp SpO2 Weight   02/08/22 1700  96.8 °F (36 °C) Bladder 71 28 96 %    02/08/22 1600    76 (!) 38 94 %    02/08/22 1500    76 27 95 %    02/08/22 1405    70 (!) 31 95 %    02/08/22 1400    69 26 95 %    02/08/22 1300    70 26 96 %    02/08/22 1200    70 26 95 %    02/08/22 1159    70 29 96 %    02/08/22 1157    70 29 95 %    02/08/22 1152    75 29 95 %    02/08/22 1100    70 (!) 31 95 %    02/08/22 1000    67 23 95 %    02/08/22 0900    70 25 96 %    02/08/22 0848 (!) 168/74   75      02/08/22 0800    71 17 95 %    02/08/22 0700    70 21 95 %    02/08/22 0600    70 22 95 %    02/08/22 0500    70 16 94 %    02/08/22 0400    72 17 94 %    02/08/22 0300    70 15 92 %    02/08/22 0200    71 18 95 % 234 lb 9.1 oz (106.4 kg)   02/08/22 0100    71 24 97 %    02/08/22 0000    71 21 97 %    02/07/22 2301  97 °F (36.1 °C)        02/07/22 2300    70 20 96 %    02/07/22 2200    71 15 96 %    02/07/22 2128 (!) 138/52   72      02/07/22 2105    77 24 96 %    02/07/22 2100    80 26 94 %    02/07/22 2000  97.2 °F (36.2 °C)  75 29 95 %    02/07/22 1917    73 28 92 %    02/07/22 1800    71 30 94 %        CBC with Differential:    Lab Results   Component Value Date    WBC 9.5 02/08/2022    RBC 2.96 02/08/2022    HGB 9.2 02/08/2022    HCT 27.6 02/08/2022    PLT 82 02/08/2022    MCV 93.2 02/08/2022    MCH 31.1 02/08/2022    MCHC 33.3 02/08/2022    RDW 15.8 02/08/2022    NRBC 0.9 02/06/2022    METASPCT 1.8 02/08/2022    LYMPHOPCT 3.5 02/08/2022    PROMYELOPCT 0.9 02/06/2022    MONOPCT 2.6 02/08/2022    MYELOPCT 1.8 02/08/2022    BASOPCT 0.2 02/08/2022    MONOSABS 0.28 02/08/2022    LYMPHSABS 0.38 02/08/2022    EOSABS 0.00 02/08/2022    BASOSABS 0.00 02/08/2022     CMP:    Lab Results   Component Value Date     02/08/2022    K 3.6 02/08/2022    K 3.7 02/06/2022    CL 99 02/08/2022    CO2 17 02/08/2022    BUN 49 02/08/2022    CREATININE 1.1 02/08/2022    GFRAA 57 02/08/2022    LABGLOM 47 02/08/2022    GLUCOSE 246 02/08/2022    PROT 6.4 02/06/2022    LABALBU 1.6 02/06/2022    CALCIUM 9.1 02/08/2022    BILITOT 1.4 02/06/2022    ALKPHOS 112 02/06/2022    AST 34 02/06/2022    ALT 13 02/06/2022       BP (!) 168/74   Pulse 71   Temp 96.8 °F (36 °C) (Bladder)   Resp 28   Ht 5' (1.524 m)   Wt 234 lb 9.1 oz (106.4 kg)   SpO2 96%   BMI 45.81 kg/m²     Physical Exam  Const/Neuro- unchanged, no signs of acute distress, Alert  ENMT- Within Normal Limits, Normocephalic, mucous membranes pink/moist, No thrush  Neck: Neck supple  Heart- Regular, Rate, Rhythm- no murmur appreciated. Lungs- clear to ascultation. Respirations even and nonlabored. Abdomen- Soft, bowel sounds positive, non tender  Musculo/Extremities-  Equal and symmetrical, no edema. No tenderness. Skin:  Warm and dry, free from rashes. Cultures reviewed    Radiology reviewed  XR CHEST PORTABLE   Final Result   1.   Medical support devices as above.      2.  Retrocardiac and bibasilar opacities appears stable. Medial right upper   lung opacity appears stable. Atherosclerotic disease and cardiomegaly no new   cardiopulmonary pathology.          XR CHEST PORTABLE    (Results Pending)       Assessment  grp b strep septic shock and bacteremia   Pacer Rule out pacer infection and endocarditis   cRP 53 and procal 7.53   Started on decadron   Atrial fibrillation   WBC 13,100 better   grp b strep endocardits not as common as other organisms   Will have to wait to see if pacer seeded   CXR retrocardiac and bibasilar opacities appears stable Medial right upper lung opacity appears stable   CT head diffuse areas of low attenuation/edema iinvolving the convexities bilaterally not present on scan from 1;16pm  Neurology note  Bilateral areas of edema suspicious for CVA  resp culture candida   Check fungitell  Candida albicans not a cause of pneumonia  Wbc down to 9500  Afebrile     Plan  Cont zosyn   Bc repeated   Await neuro evaluation'   Needs LON   Check cultures   Baseline ESR, CRP   Monitor labs   Will follow with you   Talked to Dr. Radha Florentino        Electronically signed by Shay Delgado MD on 2/8/2022 at 5:15 PM

## 2022-02-08 NOTE — PROGRESS NOTES
Alisa Cho is a 81 y.o.  female     PMH: JENY coleman on Coumadin, hyperlipidemia, hypertension, hypothyroidism, recent Covid infection, diabetes, obesity, pacemaker    [facility] with JENY coleman with RVR, pneumonia, hypoxia and lactic acidosis found in ED. Patient was intubated in ED found to be febrile and tachycardic. Patient was admitted to the ICU. Patient was noted to have seizure activity x2 described as right shoulder shaking. Patient had additional shaking episodes on 2/7 with noted right gaze. Repeat CT on 2/5 showed edema bilaterally not shown on CT from 2/4. EEG is pending however Keppra increased to 750 mg BID this morning. Patient had an episode of suspected witnessed seizure activity overnight around 2:20 AM and was given Ativan 2 mg    There is no family present at bedside currently. Patient remains on isolation for COVID-19     Vitals at present time are stable on ventilator and off sedation at present time    ROS is limited due to patient's condition    Objective:     BP (!) 168/74   Pulse 75   Temp 97 °F (36.1 °C)   Resp 21   Ht 5' (1.524 m)   Wt 234 lb 9.1 oz (106.4 kg)   SpO2 95%   BMI 45.81 kg/m²     General appearance: Intubated, off sedation, no obvious distress  Head: normocephalic, without obvious abnormality, atraumatic  Eyes: conjunctivae/corneas clear.  .  Neck:  supple, symmetrical, trachea midline and thyroid not enlarged  Lungs: Unlabored breaths on vent  Heart: regular rate and rhythm  Extremities: normal, atraumatic, no cyanosis or edema  Skin: color, texture, turgor normal---no rashes or lesions      Mental Status: Intubated, off sedation, does not follow commands for me    Poor attention/concentration  Intact fundus of knowledge--- unable to assess due to intubation  Intact memories--- unable to assess due to intubation    Speech/Language: Intubated    Cranial Nerves: CN 2-12 limited d/t pt's condition  I: smell NA   II: visual acuity  NA   II: visual fields  no clear-cut blink to threat   II: pupils NATE-- 2mm nonreactive   III,VII: ptosis None   III,IV,VI: extraocular muscles   pupils midline   V: mastication    V: facial light touch sensation   appears normal   V,VII: corneal reflex  Present   VII: facial muscle function - upper  Normal   VII: facial muscle function - lower  appears grossly normal   VIII: hearing  no response to loud claps or voice   IX: soft palate elevation     IX,X: gag reflex Present   XI: trapezius strength     XI: sternocleidomastoid strength    XI: neck extension strength     XII: tongue strength       Motor:  Moves all extremities to pain  Hypotonic  Normal bulk  No abnormal movements    Sensory:  Moves all to noxious stimuli    Coordination:   Unable to test due to patient condition    Gait:  Unable to test due to patient's condition    DTR:   Right Brachioradialis reflex 1+  Left Brachioradialis reflex 1+  Right Biceps reflex 1+  Left Biceps reflex 1+  Right Triceps reflex 1+  Left Triceps reflex 1+  Right Quadriceps reflex 1+  Left Quadriceps reflex 1+  Right Achilles reflex 0  Left Achilles reflex 0    No Babinskis  No Patel's    No pathological reflexes    Laboratory/Radiology:     CBC:   Lab Results   Component Value Date    WBC 9.5 02/08/2022    RBC 2.96 02/08/2022    HGB 9.2 02/08/2022    HCT 27.6 02/08/2022    MCV 93.2 02/08/2022    MCH 31.1 02/08/2022    MCHC 33.3 02/08/2022    RDW 15.8 02/08/2022    PLT 82 02/08/2022    MPV 11.0 02/08/2022     CMP:    Lab Results   Component Value Date     02/08/2022    K 3.6 02/08/2022    K 3.7 02/06/2022    CL 99 02/08/2022    CO2 17 02/08/2022    BUN 49 02/08/2022    CREATININE 1.1 02/08/2022    GFRAA 57 02/08/2022    LABGLOM 47 02/08/2022    GLUCOSE 246 02/08/2022    PROT 6.4 02/06/2022    LABALBU 1.6 02/06/2022    CALCIUM 9.1 02/08/2022    BILITOT 1.4 02/06/2022    ALKPHOS 112 02/06/2022    AST 34 02/06/2022    ALT 13 02/06/2022     CT head without contrast 2/5/2022: Diffuse areas of low attenuation/edema involving the convexities bilaterally not present on the scan from 1:16 PM.  MRI recommended to further evaluate. CT head without contrast 2/4/2022:   1. There is no acute intracranial abnormality. Specifically, there is no intracranial hemorrhage. 2.  Atrophy and periventricular leukomalacia    All labs and images personally reviewed today    Assessment:     Multiple strokes with large amount of edema suspicious for cardioembolic source as patient has history of A. Fib   Unfortunately unable to get MRI due to PPM   Repeat CT head completed on 2/5 shows bilateral areas of edema suspicious for stroke   Coumadin currently on hold due to supratherapeutic INR; status post reversal agent, INR today 2.8    Seizure activity   Described as right shoulder shaking   Initially 2 times on 2/5 but additionally on 2/7   Overnight patient had seizure activity and was given Ativan 2 mg   Keppra increased to 750 mg this morning; await EEG    Covid infection with sepsis   Leukocytosis and hypotension present    A.  Fib   Now controlled     Plan:     Continue supportive care  Increase Keppra to 750mg BID   Await EEG await  Echo with bubble  A1c 7.0, LDL ordered  SCDs  Seizure precautions  Continue telemetry    Bhavin 207, APRN - NP-C   9:20 AM  2/8/2022

## 2022-02-08 NOTE — PROGRESS NOTES
Subjective:    Remains intubated and sedated  Seizure episodes again through the night while on Keppra  Remains in ICU setting    Objective:    BP (!) 168/74   Pulse 71   Temp 96.8 °F (36 °C) (Bladder)   Resp 28   Ht 5' (1.524 m)   Wt 234 lb 9.1 oz (106.4 kg)   SpO2 96%   BMI 45.81 kg/m²     In: 1228 [I.V.:748]  Out: 1445   In: 1228   Out: 1445 [Urine:1445]    General appearance: Intubated sedated  HEENT: AT/NC, MMM  Neck: FROM, supple  Lungs: Clear to auscultation  CV: RRR, no MRGs  Vasc: Radial pulses 2+  Abdomen: Soft, non-tender; no masses or HSM  Extremities: No peripheral edema or digital cyanosis  Skin: no rash, lesions or ulcers       Recent Labs     02/06/22  1612 02/07/22  0549 02/08/22  0330   WBC 20.9* 13.1* 9.5   HGB 11.9 9.2* 9.2*   HCT 34.8 27.2* 27.6*   * 91* 82*       Recent Labs     02/06/22  1612 02/07/22  0549 02/08/22  0330    133 133   K 3.7 4.4 3.6   CL 98 99 99   CO2 22 15* 17*   BUN 54* 50* 49*   CREATININE 1.3* 1.1* 1.1*   CALCIUM 8.2* 8.4* 9.1       Assessment:    Active Problems:    Acute respiratory failure due to COVID-19 [facility])    Acute respiratory failure (HCC)    Cerebrovascular accident (CVA) due to embolism of cerebral artery ([facility] [address]  Resolved Problems:    * No resolved hospital problems. *      Plan:    Admit to MICU for neurology evaluation secondary to ams requiring intubating in pt with covid pna  Unable to get mri d/t pacer in place   Wean to extubate as mentation improves  acute infectious issues seemingly resolved    On no tx for covid as currently that is not the cause of her intubated status   moitor rate and rhythm  Will need eeg and full neuro work up  empiric keppra bidseizures overnight to 6/20/2022, Keppra increased to 750 IV twice daily by neurology  ?  LP   Check lipids , a1c , follow bp closely   Palliative care service following        DVT Prophylaxis   PT/OT  Discharge planning           Alison Jim MD  5:57 PM  2/8/2022

## 2022-02-09 NOTE — PROGRESS NOTES
   70 (!) 31 95 %    02/08/22 1000    67 23 95 %    02/08/22 0900    70 25 96 %    02/08/22 0848 (!) 168/74   75      02/08/22 0800    71 17 95 %    02/08/22 0700    70 21 95 %    02/08/22 0600    70 22 95 %    02/08/22 0500    70 16 94 %    02/08/22 0400    72 17 94 %    02/08/22 0300    70 15 92 %    02/08/22 0200    71 18 95 % 234 lb 9.1 oz (106.4 kg)   02/08/22 0100    71 24 97 %    02/08/22 0000    71 21 97 %    02/07/22 2301  97 °F (36.1 °C)        02/07/22 2300    70 20 96 %    @      Intake/Output Summary (Last 24 hours) at 2/8/2022 2205  Last data filed at 2/8/2022 1800  Gross per 24 hour   Intake 1218 ml   Output 1130 ml   Net 88 ml         Wt Readings from Last 3 Encounters:   02/08/22 234 lb 9.1 oz (106.4 kg)   02/04/22 203 lb (92.1 kg)   01/06/22 203 lb (92.1 kg)       Constitutional: Appears critically ill on ventilator via ETT, though in no acute distress on vent  HEENT: NC/AT,  mucus membranes moist, ETT extends from oropharynx  Neck: Trachea midline, no JVD, no bruit  Cardiovascular: S1, S2 regular rhythm, no murmur,or rub  Respiratory:  No crackles, no wheeze  Gastrointestinal:  Soft, nontender, nondistended, NABS  Ext: no edema, feet warm  Skin: dry, no rash  Neuro: Unresponsive to verbal and tactile stimuli.   Moves all 4 extremities      DATA:    Recent Labs     02/06/22  1612 02/07/22  0549 02/08/22  0330   WBC 20.9* 13.1* 9.5   HGB 11.9 9.2* 9.2*   HCT 34.8 27.2* 27.6*   MCV 91.3 93.2 93.2   * 91* 82*     Recent Labs     02/06/22  0545 02/06/22  0545 02/06/22  1612 02/07/22  0549 02/08/22  0330   *   < > 132 133 133   K 2.8*  --  3.7 4.4 3.6   CL 96*   < > 98 99 99   CO2 21*   < > 22 15* 17*   MG 1.9  --   --  2.2 2.4   PHOS 2.7  --   --  3.0 3.0   BUN 60*   < > 54* 50* 49*   CREATININE 1.3*   < > 1.3* 1.1* 1.1*   ALT 11  --  13  --   --    AST 29  --  34*  --   --    BILITOT 1.8*  --  1.4*  --   --    ALKPHOS 105*  -- 112*  --   --     < > = values in this interval not displayed. Lab Results   Component Value Date    LABPROT 0.3 (H) 02/04/2022    LABPROT 0.3 02/04/2022    LABPROT 0.2 01/02/2022    LABPROT 0.2 01/02/2022       Assessment  1. Acute kidney injury, relatively mild at this point, hemodynamically mediated due to hypotension, atrial fibrillation with RVR, volume contraction. Urinary indices are consistent with prerenal azotemia. With IV fluid resuscitation and blood pressure improvement, azotemia is improving. Urine output has picked up. 2. Chronic kidney disease stage IIIa, baseline creatinine 1 to 1.2 mg/dL    3. Hyponatremia, chronic, in January admission serum sodium ranging 126 and 130 mmol/L. Etiology is multifactorial, due in part to chronically poorly compensated CHF exacerbated by atrial fibrillation, further exacerbated by hyperglycemia. With IV fluid resuscitation, serum sodium has improved somewhat. Urinary indices are consistent with hypovolemic hyponatremia    4. Anemia due to CKD, chronic illness, phlebotomy associated with recent hospitalization    5. Edema, marked chronically due to combination of decompensated CHF, atrial fibrillation, CKD. The degree of skin wrinkling increasing in her distal lower extremities suggest that she has recently been far more edematous      Azotemia improved, stable at baseline  [Na+] improved, stable at low normal  K+ improved status post supplementation    Recommendations  1. Continue intensive supportive care  2. Follow labs, UO  3.  Avoid nephrotoxins      Electronically signed by Alvarez Golden MD on 2/8/2022

## 2022-02-09 NOTE — FLOWSHEET NOTE
Inpatient Wound Care(initial evaluation)  [id number]    Admit Date: 2/6/2022  3:00 PM    Reason for consult:  wounds    Significant history:  Covid 19+  From [facility]    Wound history:  Admitted with wounds    Findings:     02/09/22 1105   Skin Integrity   Skin Integrity Bruising; Redness   Location BUE   Skin Fold Management Yes   Dressing Site Abdominal pannus  (breast)   Treatment Silver impregnated textile   Assessed this shift Dry   Skin Integrity Site 2   Skin Integrity Location 2   (dry flaky skin, vascular discoloration)   Location 2 BLE   Skin Integrity Site 3   Skin Integrity Location 3 Excoriation; Redness    Location 3 under breast and abdominal fold   Wound 02/04/22 Heel Left   Date First Assessed/Time First Assessed: 02/04/22 1942   Present on Hospital Admission: Yes  Location: Heel  Wound Location Orientation: Left   Wound Image    Wound Etiology Pressure Unstageable   Dressing/Treatment Open to air   Wound Length (cm) 3.4 cm   Wound Width (cm) 3 cm   Wound Depth (cm)   (unable to determine)   Wound Surface Area (cm^2) 10.2 cm^2   Change in Wound Size % (l*w) 15   Wound Assessment Eschar dry   Drainage Amount None   Letty-wound Assessment   (purple)   Wound 02/04/22 Leg Right; Lower; Lateral   Date First Assessed/Time First Assessed: 02/04/22 1942   Present on Hospital Admission: Yes  Location: Leg  Wound Location Orientation: Right; Lower; Lateral   Wound Image    Wound Etiology Venous   Dressing Status New dressing applied   Wound Cleansed Cleansed with saline   Dressing/Treatment Xeroform;Roll gauze;Dry dressing   Wound Length (cm) 16 cm   Wound Width (cm) 5 cm   Wound Depth (cm) 0.1 cm   Wound Surface Area (cm^2) 80 cm^2   Change in Wound Size % (l*w) -48.15   Wound Volume (cm^3) 8 cm^3   Wound Healing % -48   Wound Assessment Pink/red   Drainage Amount Scant   Drainage Description Serosanguinous   Odor None   Letty-wound Assessment Dry/flaky   Wound Thickness Description not for Pressure Injury Partial thickness Wound 02/04/22 Foot Right;Medial   Date First Assessed/Time First Assessed: 02/04/22 1944   Present on Hospital Admission: Yes  Location: Foot  Wound Location Orientation: Right;Medial   Wound Image    Wound Etiology Deep tissue/Injury   Dressing/Treatment Open to air   Wound Length (cm) 0.8 cm   Wound Width (cm) 0.8 cm   Wound Depth (cm)   (unable to determine)   Wound Surface Area (cm^2) 0.64 cm^2   Change in Wound Size % (l*w) -156   Wound Assessment Purple/maroon   Drainage Amount None   Letty-wound Assessment Dry/flaky     **Informed Consent**    photos taken of wounds and inserted into their chart as part of their permanent medical record for purposes of documentation, treatment management and/or medical review. All Images taken on 2/9/22 of patient name: Leni Cavanaugh were transmitted and stored on secured [employer] located within [employer] by a registered [employer] Mobile Application Device.      Impression: DTI left heel, right medial foot  Venous right lower leg    Plan:  xeroform right lower leg  heelmedix boots  Comfort glide  Will need continued preventative care  Dietary consult  sheron Caal RN 2/9/2022 12:21 PM

## 2022-02-09 NOTE — PROGRESS NOTES
[facility] Roberto A. Nicolette Music, MD Fleurette Look, MD Malinda Moritz, MD Cranston List, MD Forestine Hem, CNP   Ann-Marie Soto, NIDIA  Progress Note    2/9/2022    SUBJECTIVE:   2/6: Seen this early afternoon. Remains critically ill, intubated via ETT. Vent setting stable. BP stable. No meaningful neurologic recovery, she is due for transfer to [address] [place] [address] for further neurologic evaluation. 2/7: Patient was seen, evaluated this afternoon she remains in critical condition with mechanical ventilation via ETT, vital signs remained stable with adequate urine output for now. Case was discussed with the staff and the MICU nurse, patient white count today is 30.1 with hemoglobin of 9.2 no BMP today yesterday his BUN was 54 and creatinine is 1.3.  2/8: Remains critically ill. Intubated via ETT. FiO2 30% PEEP 5 unresponsive to verbal and tactile stimuli. Hemodynamically stable. 2/9: Patient remains in critical medical condition, she still on mechanical ventilation with no dramatic improvements in clinical status, her BUN today is 55 and creatinine is 1.1 with resolving ISIDRO. PROBLEM LIST:    Active Problems:    Acute respiratory failure due to COVID-19 [facility])    Acute respiratory failure (HCC)    Cerebrovascular accident (CVA) due to embolism of cerebral artery ([facility] [address]  Resolved Problems:    * No resolved hospital problems. *         DIET:    Diet NPO     MEDS (scheduled):       MEDS (infusions):      MEDS (prn):       PHYSICAL EXAM:     Patient Vitals for the past 24 hrs:   BP Temp Temp src Pulse Resp SpO2   02/09/22 0900    71 23 95 %   02/09/22 0800    74 (!) 34 96 %   02/09/22 0700    71 22 96 %   02/09/22 0600    75 (!) 35 96 %   02/09/22 0500    70 20 95 %   02/09/22 0434    71 19 95 %   02/09/22 0400    70 16 95 %   02/09/22 0300    71 18 96 %   02/09/22 0200    79 18 96 %   02/09/22 0100    71 21 95 %   02/09/22 0000    70 20 95 % 02/08/22 2300    72 21 96 %   02/08/22 2200    70 19 95 %   02/08/22 2100    71 14 95 %   02/08/22 2000 (!) 136/55 96.8 °F (36 °C) Bladder 70 22 95 %   02/08/22 1900 (!) 128/54   70 24 94 %   02/08/22 1800  96.8 °F (36 °C) Bladder 70 24 95 %   02/08/22 1700  96.8 °F (36 °C) Bladder 71 28 96 %   02/08/22 1600    76 (!) 38 94 %   02/08/22 1500    76 27 95 %   02/08/22 1405    70 (!) 31 95 %   02/08/22 1400    69 26 95 %   02/08/22 1300    70 26 96 %   02/08/22 1200    70 26 95 %   02/08/22 1159    70 29 96 %   02/08/22 1157    70 29 95 %   02/08/22 1152    75 29 95 %   @      Intake/Output Summary (Last 24 hours) at 2/9/2022 1136  Last data filed at 2/9/2022 0900  Gross per 24 hour   Intake 1093 ml   Output 1325 ml   Net -232 ml         Wt Readings from Last 3 Encounters:   02/08/22 234 lb 9.1 oz (106.4 kg)   02/04/22 203 lb (92.1 kg)   01/06/22 203 lb (92.1 kg)       Constitutional: Appears critically ill on ventilator via ETT, though in no acute distress on vent  HEENT: NC/AT,  mucus membranes moist, ETT extends from oropharynx  Neck: Trachea midline, no JVD, no bruit  Cardiovascular: S1, S2 regular rhythm, no murmur,or rub  Respiratory:  No crackles, no wheeze  Gastrointestinal:  Soft, nontender, nondistended, NABS  Ext: no edema, feet warm  Skin: dry, no rash  Neuro: Unresponsive to verbal and tactile stimuli.   Moves all 4 extremities      DATA:    Recent Labs     02/07/22  0549 02/08/22  0330 02/09/22  0357   WBC 13.1* 9.5 12.1*   HGB 9.2* 9.2* 9.8*   HCT 27.2* 27.6* 29.4*   MCV 93.2 93.2 94.5   PLT 91* 82* 123*     Recent Labs     02/06/22  1612 02/06/22  1612 02/07/22  0549 02/08/22  0330 02/09/22  0357      < > 133 133 136   K 3.7  --  4.4 3.6 4.1   CL 98   < > 99 99 102   CO2 22   < > 15* 17* 19*   MG  --   --  2.2 2.4 2.5   PHOS  --   --  3.0 3.0 3.4   BUN 54*   < > 50* 49* 55*   CREATININE 1.3*   < > 1.1* 1.1* 1.1*   ALT 13  --   --   --   --    AST 34*  -- --   --   --    BILITOT 1.4*  --   --   --   --    ALKPHOS 112*  --   --   --   --     < > = values in this interval not displayed. Lab Results   Component Value Date    LABPROT 0.3 (H) 02/04/2022    LABPROT 0.3 02/04/2022    LABPROT 0.2 01/02/2022    LABPROT 0.2 01/02/2022       Assessment  1. Acute kidney injury, relatively mild at this point, hemodynamically mediated due to hypotension, atrial fibrillation with RVR, volume contraction. Urinary indices are consistent with prerenal azotemia. With IV fluid resuscitation and blood pressure improvement, azotemia is improving. Urine output has picked up. Resolving  2. Chronic kidney disease stage IIIa, baseline creatinine 1 to 1.2 mg/dL    3. Hyponatremia, chronic, in January admission serum sodium ranging 126 and 130 mmol/L. Etiology is multifactorial, due in part to chronically poorly compensated CHF exacerbated by atrial fibrillation, further exacerbated by hyperglycemia. With IV fluid resuscitation, serum sodium has improved somewhat. Urinary indices are consistent with hypovolemic hyponatremia     Resolved  4. Anemia due to CKD, chronic illness, phlebotomy associated with recent hospitalization    5. Edema, marked chronically due to combination of decompensated CHF, atrial fibrillation, CKD. The degree of skin wrinkling increasing in her distal lower extremities suggest that she has recently been far more edematous      Azotemia improved, stable at baseline  [Na+] improved, stable at low normal  K+ improved status post supplementation    Recommendations  1. Continue intensive supportive care  2. Follow labs, UO  3.  Avoid nephrotoxins      Electronically signed by Nakul Cm MD on 2/9/2022

## 2022-02-09 NOTE — PROGRESS NOTES
Comprehensive Nutrition Assessment    Type and Reason for Visit:  Reassess    Nutrition Recommendations/Plan: Continue NPO. TF Rec if needed: Diabetic @ 45 ml/lhr  Will provide: 1080 ml tv, 1620 kcals, 89 gm pro, 820 ml free water    Nutrition Assessment:  Pt transferred from [facility] d/t seizure activity/ multiple strokes. Initially admitted +COVID-19 PNA/Sepsis. Noted hx CHF, DM, PVD/ chronic wounds. Pt remains intubated, will monitor nutrition progression/provide recs as needed. Malnutrition Assessment:  Malnutrition Status:   At risk for malnutrition  Context:  Acute Illness     Findings of the 6 clinical characteristics of malnutrition:  Energy Intake: 50% or less of estimated energy requirements for 5 or more days  Weight Loss:  Unable to assess (limited wt hx on file)     Body Fat Loss:  Unable to assess (d/t covid iso)     Muscle Mass Loss:  Unable to assess (d/t covid iso)    Fluid Accumulation:  No significant fluid accumulation     Strength:  Not Performed    Estimated Daily Nutrient Needs:  Energy (kcal):  ; 4883-0535; Weight Used for Energy Requirements:  Usual     Protein (g):  ; Weight Used for Protein Requirements:  Ideal (1.8-2.2 g/kg)        Fluid (ml/day):  per critical care    Nutrition Related Findings:  Pt unresponsive on vent, MAP WNL, fluid bal WNL, +1/+2 edema, active BS, OGT clamped      Wounds:  Multiple,Pressure Injury,Unstageable,Partial Thickness,Deep Tissue Injury       Current Nutrition Therapies:    Diet NPO    Anthropometric Measures:  · Height: 5' (152.4 cm)  · Current Body Weight: 203 lb (92.1 kg) (1/1 EMR UBW as both adm/cbw elevated)   · Admission Body Weight: 233 lb (105.7 kg) (2/7 actual - likely elevated)    · Usual Body Weight: 203 lb (92.1 kg) (1/1 standing scale)     · Ideal Body Weight: 100 lbs; % Ideal Body Weight 203 %   · BMI: 39.6 BMI Categories: Obese Class 2 (BMI 35.0 -39.9)       Nutrition Diagnosis:   · Inadequate oral intake related to impaired respiratory function as evidenced by NPO or clear liquid status due to medical condition,intubation    Nutrition Interventions:   Nutrition Education/Counseling:  Education not appropriate   Coordination of Nutrition Care:  Continue to monitor while inpatient    Goals:  Nutrition Progression       Nutrition Monitoring and Evaluation:   Food/Nutrient Intake Outcomes:  Diet Advancement/Tolerance  Physical Signs/Symptoms Outcomes:  Biochemical Data,Nutrition Focused Physical Findings,Skin,Weight,GI Status,Fluid Status or Edema,Hemodynamic Status     Discharge Planning:     Too soon to determine     Electronically signed by Margarita Valderrmaa RD, LD on 2/9/22 at 2:44 PM EST    Contact: Ext [phone]

## 2022-02-09 NOTE — PROGRESS NOTES
29 96 %   02/08/22 1157    70 29 95 %       CBC with Differential:    Lab Results   Component Value Date    WBC 12.1 02/09/2022    RBC 3.11 02/09/2022    HGB 9.8 02/09/2022    HCT 29.4 02/09/2022     02/09/2022    MCV 94.5 02/09/2022    MCH 31.5 02/09/2022    MCHC 33.3 02/09/2022    RDW 16.0 02/09/2022    NRBC 1.7 02/09/2022    METASPCT 1.7 02/09/2022    LYMPHOPCT 3.5 02/09/2022    PROMYELOPCT 0.9 02/06/2022    MONOPCT 5.2 02/09/2022    MYELOPCT 1.8 02/08/2022    BASOPCT 0.3 02/09/2022    MONOSABS 0.60 02/09/2022    LYMPHSABS 0.48 02/09/2022    EOSABS 0.00 02/09/2022    BASOSABS 0.00 02/09/2022     CMP:    Lab Results   Component Value Date     02/09/2022    K 4.1 02/09/2022    K 3.7 02/06/2022     02/09/2022    CO2 19 02/09/2022    BUN 55 02/09/2022    CREATININE 1.1 02/09/2022    GFRAA 57 02/09/2022    LABGLOM 47 02/09/2022    GLUCOSE 245 02/09/2022    PROT 6.4 02/06/2022    LABALBU 1.6 02/06/2022    CALCIUM 9.2 02/09/2022    BILITOT 1.4 02/06/2022    ALKPHOS 112 02/06/2022    AST 34 02/06/2022    ALT 13 02/06/2022       BP (!) 136/55   Pulse 71   Temp 96.8 °F (36 °C) (Bladder)   Resp 23   Ht 5' (1.524 m)   Wt 234 lb 9.1 oz (106.4 kg)   SpO2 95%   BMI 45.81 kg/m²     Physical Exam  Const/Neuro- unchanged, no signs of acute distress, Alert  ENMT- Within Normal Limits, Normocephalic, mucous membranes pink/moist, No thrush  Neck: Neck supple  Heart- Regular, Rate, Rhythm- no murmur appreciated. Lungs- clear to ascultation. Respirations even and nonlabored. Abdomen- Soft, bowel sounds positive, non tender  Musculo/Extremities-  Equal and symmetrical, no edema. No tenderness. Skin:  Warm and dry, free from rashes. Cultures reviewed    Radiology reviewed  XR CHEST PORTABLE   Final Result   1. There is no interval change in the multifocal bilateral airspace disease. 2. Bilateral pleural effusions. 3. Cardiomegaly. XR CHEST PORTABLE   Final Result   1.   Medical support devices as above. 2.  Retrocardiac and bibasilar opacities appears stable. Medial right upper   lung opacity appears stable. Atherosclerotic disease and cardiomegaly no new   cardiopulmonary pathology.          XR CHEST PORTABLE    (Results Pending)       Assessment  grp b strep septic shock and bacteremia   Pacer Rule out pacer infection and endocarditis   cRP 53 and procal 7.53   Started on decadron   Atrial fibrillation   WBC 13,100 better   grp b strep endocardits not as common as other organisms   Will have to wait to see if pacer seeded   CXR retrocardiac and bibasilar opacities appears stable Medial right upper lung opacity appears stable   CT head diffuse areas of low attenuation/edema iinvolving the convexities bilaterally not present on scan from 1;16pm  Neurology note  Bilateral areas of edema suspicious for CVA  resp culture candida   Check fungitell  Candida albicans not a cause of pneumonia  Wbc down to 12,100  Afebrile     Plan  Cont zosyn   Bc repeated   Long talk with Dr Naomi Combs this morning from cardiology  He did 2D  Hold off on LON until more stable   agree        Electronically signed by Rosalind Álvarez MD on 2/9/2022 at 11:53 AM

## 2022-02-09 NOTE — PROCEDURES
[address] Report    MRN: [id number]   PATIENT NAME: Prasad Peters   DATE OF REPORT: 2022   DATE OF SERVICE: 2022    PHYSICIAN NAME: Mindy Delgadillo DO  Referring Physician: HIMANSHU Walker      Patient's : 1935   Patient's Age: 81 y.o. Gender: female     PROCEDURE: Routine EEG with video      Clinical Interpretation: This abnormal study showed evidence of:    1. Moderate nonspecific cerebral dysfunction of the left frontotemporal region  2. A moderate to severe nonspecific encephalopathy    Structural abnormalities should be considered for the findings above and appropriate imaging obtained if clinically indicated. No seizures or epileptiform discharges were noted during this study. 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 Scalp electrodes were applied in the international 10/20 system. Multiple digital montage arrangements were utilized for evaluation. EKG and video were recorded. Background:      Occipital rhythm (posterior dominant rhythm or PDR): Absent    Voltage: Medium   Organization: poor  Reactivity to eye opening/closure: not tested    Drowsiness: Absent  Sleep: Absent    Comment: The background is composed primarily of generalized irregular theta and delta activity. Technical and Activation Procedures:  Hyperventilation: Not done        Photic stimulation: Not done        Reactivity to stimulation: Minimal    Abnormalities:    I. Seizures? No    II. Rhythmic or Periodic Patterns? No    III. Other Abnormalities?         Occasional irregular delta activity noted at Fp1, F3, F&, T3

## 2022-02-09 NOTE — PROGRESS NOTES
Critical Care Progress Note         Patient Dodson Najjar   MRN -  [id number]   Acct # - [de-identified]   - 1935      Date of Admission -  2022  3:00 PM  Date of evaluation -  2022  4409/4409-A   Hospital Day - 3            ADMIT/CONSULT DETAILS     Reason for Admit/Consult   AMS   Unresponsiveness  COVID 19  Acute respiratory failure. Ventilator management. Van Peres MD  Primary Care Physician - Mary Lee MD         Subjective    No acute events overnight. Remains intubated. No pressor requirements. VSS Afebrile  Afib with controlled VR. 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 TONG     Current Medications   Current Medications    albumin human  25 g IntraVENous Once    Followed by   Vallie Roller furosemide  40 mg IntraVENous Once    warfarin  2 mg Oral Once    warfarin placeholder: dosing by pharmacy   Other RX Placeholder    metoprolol tartrate  50 mg Oral BID    levetiracetam  750 mg IntraVENous Q12H    chlorhexidine  15 mL Mouth/Throat BID    mupirocin   Nasal Daily    insulin lispro  0-12 Units SubCUTAneous Q4H    sodium chloride flush  5-40 mL IntraVENous 2 times per day    piperacillin-tazobactam  3,375 mg IntraVENous Q8H    pantoprazole  40 mg IntraVENous Daily    And    sodium chloride (PF)  10 mL IntraVENous Daily     midazolam, LORazepam, perflutren lipid microspheres, sodium chloride flush, sodium chloride, acetaminophen **OR** acetaminophen, glucose, dextrose, glucagon (rDNA), dextrose  IV Drips/Infusions   sodium chloride      sodium chloride 50 mL/hr at 22 7436    dextrose       Home Medications  Medications Prior to Admission: [] benzonatate (TESSALON) 200 MG capsule, Take 200 mg by mouth three times daily  dilTIAZem (CARDIZEM CD) 120 MG extended release capsule, Take 120 mg by mouth daily  docusate sodium (COLACE) 100 MG capsule, Take 100 mg by mouth daily  metOLazone (ZAROXOLYN) 2.5 MG tablet, Take 2.5 mg by mouth every other day  Multiple Vitamins-Minerals (THERAPEUTIC MULTIVITAMIN-MINERALS) tablet, Take 1 tablet by mouth daily  warfarin (COUMADIN) 4 MG tablet, Take 1 tablet by mouth daily  bumetanide (BUMEX) 1 MG tablet, Take 1 tablet by mouth 2 times daily  spironolactone (ALDACTONE) 25 MG tablet, Take 1 tablet by mouth daily  [] polyethylene glycol (GLYCOLAX) 17 g packet, Take 17 g by mouth daily as needed for Constipation  metoprolol (LOPRESSOR) 100 MG tablet, Take 200 mg by mouth 2 times daily   simvastatin (ZOCOR) 20 MG tablet, Take 20 mg by mouth nightly  levothyroxine (SYNTHROID) 125 MCG tablet, Take 125 mcg by mouth Daily     Diet/Nutrition   Diet NPO    Allergies   Patient has no known allergies. Social History   Tobacco   reports that she has never smoked. She has never used smokeless tobacco.    Alcohol     reports no history of alcohol use. Occupational history :    Family History   No family history on file. ROS     REVIEW OF SYSTEMS:  Review of systems not obtained due to patient factors - intubation    Lines and Devices    22  Right Femoral TLC    Right radial arterial line   22 Urethral catheter.    22  OGT  22  ETT # 7.5      Mechanical Ventilation Data   VENT SETTINGS (Comprehensive)  Vent Information  $Ventilation: $Subsequent Day  Skin Assessment: Clean, dry, & intact  Equipment ID: [id number]  Vent Type: [id number]  Vent Mode: AC/VC  Vt Ordered: 450 mL  Rate Set: 10 bmp  Peak Flow: 65 L/min  Pressure Support: 0 cmH20  FiO2 : 30 %  SpO2: 95 %  SpO2/FiO2 ratio: 320  Sensitivity: 3  PEEP/CPAP: 5  I Time/ I Time %: 0 s  Humidification Source: Heated wire  Humidification Temp: 37  Humidification Temp Measured: 36.9  Circuit Condensation: Drained  Additional Respiratory  Assessments  Pulse: 71  Resp: 23  SpO2: 95 %  Position: Semi-Pineda's  Humidification Source: Heated wire  Humidification Temp: 37  Circuit Condensation: Drained  Oral Care: Lip moisturizer applied,Mouth swabbed,Mouth suctioned  Subglottic Suction Done?: Yes  Cuff Pressure (cm H2O): 29 cm H2O    ABG  Lab Results   Component Value Date    PH 7.463 2022    PCO2 27.3 2022    PO2 85.3 2022    HCO3 19.1 2022    O2SAT 95.6 2022     Lab Results   Component Value Date    MODE AC 2022           Vitals    height is 5' (1.524 m) and weight is 234 lb 9.1 oz (106.4 kg). Her bladder temperature is 96.8 °F (36 °C). Her blood pressure is 136/55 (abnormal) and her pulse is 71. Her respiration is 23 and oxygen saturation is 95%. Temperature Range: Temp: 96.8 °F (36 °C) Temp  Av.8 °F (36 °C)  Min: 96.8 °F (36 °C)  Max: 96.8 °F (36 °C)  BP Range:  Systolic (53FAI), BFV:725 , Min:128 , NRO:144     Diastolic (51RAR), JYH:59, Min:54, Max:55    Pulse Range: Pulse  Av.7  Min: 69  Max: 79  Respiration Range: Resp  Av.7  Min: 14  Max: 38  Current Pulse Ox[de-identified]  SpO2: 95 %  24HR Pulse Ox Range:  SpO2  Av.3 %  Min: 94 %  Max: 96 %  Oxygen Amount and Delivery:        I/O (24 Hours)    Patient Vitals for the past 8 hrs:   Pulse Resp SpO2   22 0900 71 23 95 %   22 0800 74 (!) 34 96 %   22 0700 71 22 96 %   22 0600 75 (!) 35 96 %   22 0500 70 20 95 %   22 0434 71 19 95 %       Intake/Output Summary (Last 24 hours) at 2022 1228  Last data filed at 2022 0900  Gross per 24 hour   Intake 1093 ml   Output 1325 ml   Net -232 ml     I/O last 3 completed shifts:   In: 2756 [I.V.:1731; IV Piggyback:580]  Out: 7333 [XWTIM:6281]   Date 22 0000 - 22 9400 [facility] [id number] 4065-5693 24 Hour Total   INTAKE   I.V.(mL/kg/hr) 378(0.4)   378   Shift Total(mL/kg) 378(3.6)   378(3.6)   OUTPUT   Urine(mL/kg/hr) 350(0.4) 145  495   Shift Total(mL/kg) 350(3.3) 145(1.4)  495(4.7)   Weight (kg) 106.4 106.4 106.4 106.4     Patient Vitals for the past 96 hrs (Last 3 readings):   Weight   02/08/22 0200 234 lb 9.1 oz (106.4 kg)   02/07/22 0340 233 lb 6.4 oz (105.9 kg)     Exam     PHYSICAL EXAM:  CONSTITUTIONAL:  Lying in bed monitored. Orally intubated NAD. EYES:  lids and lashes normal and left pupils 4 to 5mm right pupil 3 mm reactive bilaterally sluggish. Upward gaze present   ENT:  normocepalic, without obvious abnormality, atraumatic  NECK:  supple, symmetrical, trachea midline, skin normal and no stridor  HEMATOLOGIC/LYMPHATICS:  no cervical lymphadenopathy and no supraclavicular lymphadenopathy  LUNGS:  Mechanical ventilator. Equal expansion. BBS aerating all lobes. Diminished bilaterally in bases. Few upper lobe scattered rhonchi present. CARDIOVASCULAR:  irregularly irregular rhythm and normal S1 and S2  ABDOMEN:  No scars, normal bowel sounds, soft, non-distended, non-tender, no masses palpated, no hepatosplenomegally  MUSCULOSKELETAL:  there is no redness, warmth, or swelling of the joints  tone is normal  NEUROLOGIC:  No response to noxious stimuli does not withdraw to painful stimuli. Gag and cocugh present. Limited neurological exam.   SKIN:  Right lower leg wound present.       Data   Old records and images have been reviewed    Lab Results   CBC     Lab Results   Component Value Date    WBC 12.1 02/09/2022    RBC 3.11 02/09/2022    HGB 9.8 02/09/2022    HCT 29.4 02/09/2022     02/09/2022    MCV 94.5 02/09/2022    MCH 31.5 02/09/2022    MCHC 33.3 02/09/2022    RDW 16.0 02/09/2022    NRBC 1.7 02/09/2022    METASPCT 1.7 02/09/2022    LYMPHOPCT 3.5 02/09/2022    PROMYELOPCT 0.9 02/06/2022    MONOPCT 5.2 02/09/2022    MYELOPCT 1.8 02/08/2022    BASOPCT 0.3 02/09/2022 MONOSABS 0.60 02/09/2022    LYMPHSABS 0.48 02/09/2022    EOSABS 0.00 02/09/2022    BASOSABS 0.00 02/09/2022       BMP   Lab Results   Component Value Date     02/09/2022    K 4.1 02/09/2022    K 3.7 02/06/2022     02/09/2022    CO2 19 02/09/2022    BUN 55 02/09/2022    CREATININE 1.1 02/09/2022    GLUCOSE 245 02/09/2022    CALCIUM 9.2 02/09/2022       LFTS  Lab Results   Component Value Date    ALKPHOS 112 02/06/2022    ALT 13 02/06/2022    AST 34 02/06/2022    PROT 6.4 02/06/2022    BILITOT 1.4 02/06/2022    LABALBU 1.6 02/06/2022       INR  Recent Labs     02/07/22  0549 02/08/22  0330 02/09/22  0357   PROTIME 29.9* 31.8* 28.8*   INR 2.7 2.8 2.6       APTT  Recent Labs     02/06/22  1612   APTT 40.4*       Lactic Acid  Lab Results   Component Value Date    LACTA 2.1 02/06/2022    LACTA 2.0 02/06/2022    LACTA 2.1 02/06/2022        BNP   No results for input(s): BNP in the last 72 hours. Cultures     Recent Labs     02/06/22  2211   BC 24 Hours no growth     Recent Labs     02/06/22  2324   BLOODCULT2 24 Hours no growth       No results for input(s): LABURIN in the last 72 hours. Radiology   CXR        CT Scans  Head CT 02/05/22    No acute intracranial hemorrhage mass effect or midline shift. No evidence of hydrocephalus. SYSTEMS ASSESSMENT    Neuro   Acute encephalopathy unknown etiology metabolic or anoxic. Seizure disorder  Questionable cardio embolic  Stroke  Neurology consulted appreciate recommendations. Continue Keppra IV for seizure prophylaxis. Increased to 750 mg BID. Ativan prn for seizures  Seizure precautions. Unable to obtain MRI due to pacemaker   CT head no intracranial hemorrhage. Respiratory   Acute hypoxic respiratory failure secondary to COVID 19 pneumonia. COVID + 1/25 received no treatment  Decadron IV 10 mg for 5 days. Duo Nebs as needed   Enhanced droplet precautions  Chest x ray in AM   Daily ABG while intubated. Plan for SBT today.   Will not extubate due to neurological status and inability to protect airway. Wean oxygen as tolerated. Keep O2 sat 90-92%    Cardiovascular   Acute on chronic CHF   Cardiomegaly   Echo with bubble study. Albumin x 1 follow with 40 mg IVP lasix. Metoprolol 50 g BID   Maintain MAP > 65   Continuous telemetry. ProBNP  14,148 02/02  repeat in AM   Cardiology consulted for Afib with RVR  Holding Coumadin as per Cardiology note INR 2.8 today. Goal INR 2 to 3   LV dysfunction EF 40 to 45%/       Gastrointestinal   NPO  Protonix daily  Tube feedings at goal  Dietary consulted appreciate input. Renal   Acute on chronic kidney injury   Chronic hyponatremia sodium range 126 to 130. Multifactorial   Avoid nephrotoxic medications. Strict I and O. Renal dose medications for GFR. Baseline creatine 1.2 to 1.3  IVF discontinued   Nephrology consulted Dr. Ramon Blake group       Infectious Disease   Sepsis   Infectious disease consulted appreciated recommendations. Gram + cocci beta strep in blood   Resp culture candida   Will check fungitell   Improving WBC  12.1  Wound Culture proteus species. MRSA nasal colonization   Bactroban nasal.   Continue antibiotics as per Id  Continue Zosyn. Blood cultures repeated. Procal 4.55 2/06/22 Repeat in AM       Hematology/Oncology   Supratherapetic INR received reversal agent  Coumadin restarted   INR 2.6   Goal INR 2.0 to 3.0  Pharmacy consulte.d       Endocrine   No record of diabetes. Hgb A1C 7.0   Low dose insulin coverage. Hypoglycemic protocol. Hyponatremia chronic  133    Social/Spiritual/DNR/Other   DNR [facility]  Palliative care consulted. Plan of care discussed in multidisciplinary rounds. Dr. Genny Day is the collaborating physician. Hyacinth Halsted, CNP   Medical Intensive Care Unit.

## 2022-02-09 NOTE — PROGRESS NOTES
Subjective:    Remains intubated and sedated  Seizure episodes again through the night while on Keppra  Remains in ICU setting    Objective:    BP (!) 145/52   Pulse 73   Temp 96.8 °F (36 °C) (Bladder)   Resp 26   Ht 5' (1.524 m)   Wt 234 lb 9.1 oz (106.4 kg)   SpO2 95%   BMI 45.81 kg/m²     In: 1103 [I.V.:1003]  Out: 1500   In: 1103   Out: 1500 [Urine:1500]    General appearance: Intubated sedated  HEENT: AT/NC, MMM  Neck: FROM, supple  Lungs: Clear to auscultation  CV: RRR, no MRGs  Vasc: Radial pulses 2+  Abdomen: Soft, non-tender; no masses or HSM  Extremities: No peripheral edema or digital cyanosis  Skin: no rash, lesions or ulcers       Recent Labs     02/07/22  0549 02/08/22  0330 02/09/22  0357   WBC 13.1* 9.5 12.1*   HGB 9.2* 9.2* 9.8*   HCT 27.2* 27.6* 29.4*   PLT 91* 82* 123*       Recent Labs     02/07/22  0549 02/08/22  0330 02/09/22  0357    133 136   K 4.4 3.6 4.1   CL 99 99 102   CO2 15* 17* 19*   BUN 50* 49* 55*   CREATININE 1.1* 1.1* 1.1*   CALCIUM 8.4* 9.1 9.2       Assessment:    Active Problems:    Acute respiratory failure due to COVID-19 [facility])    Acute respiratory failure (HCC)    Cerebrovascular accident (CVA) due to embolism of cerebral artery ([facility] [address]  Resolved Problems:    * No resolved hospital problems. *      Plan:    Admit to MICU for neurology evaluation secondary to ams requiring intubating in pt with covid pna  Unable to get mri d/t pacer in place   Wean to extubate as mentation improves  acute infectious issues seemingly resolved    On no tx for covid as currently that is not the cause of her intubated status   moitor rate and rhythm, echocardiogram with ejection fraction 50%  Will need eeg and full neuro work upunderway   empiric keppra bidseizures overnight to 6/20/2022, Keppra increased to 750 IV twice daily by neurology  ?  LP   Check lipids , a1c , follow bp closely   Palliative care service following  Multiple consultants on board        DVT Prophylaxis PT/OT  Discharge planning           Georgie Peabody, MD  4:03 PM  2/9/2022

## 2022-02-09 NOTE — PROGRESS NOTES
[facility] CARDIOLOGY PROGRESS NOTE  [facility]        Subjective: AF RVR now rate controlled, Covid positive, intubated on presentation due to obtundation and mental status change, significant leukocytosis significantly improved and initially hypotensive and likely septic but with IV fluid blood pressure stabilized in less than 48 hours and received IV antibiotics. Dual-chamber permanent pacemaker sensing and pacing appropriately. Has subsequently developed seizure type activity since in the hospital and has been evaluated and followed by neurology. Currently on the ventilator and not terribly awake or interactive. Echocardiogram completed yesterday afternoon shows low normal LVEF 50%, 2+ TR, mild pulmonary hypertension. No indication of any significant valvular echodensity or vegetation. Pacing lead is noted. Objective: Labs medications chart all reviewed.     Patient Vitals for the past 24 hrs:   BP Temp Temp src Pulse Resp SpO2   02/09/22 0955 (!) 145/52   78     02/09/22 0900    71 23 95 %   02/09/22 0800    74 (!) 34 96 %   02/09/22 0700    71 22 96 %   02/09/22 0600    75 (!) 35 96 %   02/09/22 0500    70 20 95 %   02/09/22 0434    71 19 95 %   02/09/22 0400    70 16 95 %   02/09/22 0300    71 18 96 %   02/09/22 0200    79 18 96 %   02/09/22 0100    71 21 95 %   02/09/22 0000    70 20 95 %   02/08/22 2300    72 21 96 %   02/08/22 2200    70 19 95 %   02/08/22 2100    71 14 95 %   02/08/22 2000 (!) 136/55 96.8 °F (36 °C) Bladder 70 22 95 %   02/08/22 1900 (!) 128/54   70 24 94 %   02/08/22 1800  96.8 °F (36 °C) Bladder 70 24 95 %   02/08/22 1700  96.8 °F (36 °C) Bladder 71 28 96 %   02/08/22 1600    76 (!) 38 94 %   02/08/22 1500    76 27 95 %         Intake/Output Summary (Last 24 hours) at 2/9/2022 1434  Last data filed at 2/9/2022 0900  Gross per 24 hour   Intake 1093 ml   Output 1325 ml   Net -232 ml       Wt Readings from Last 3 Encounters:   02/08/22 234 lb 9.1 oz (106.4 kg)   02/04/22 203 lb (92.1 kg)   01/06/22 203 lb (92.1 kg)       Telemetry: Personally reviewed and shows atrial fibrillation and ventricular demand pacing. Heart rate predominantly in the 70s and 80s now. Current meds: Scheduled Meds:   warfarin  2 mg Oral Once    warfarin placeholder: dosing by pharmacy   Other RX Placeholder    dexamethasone  10 mg IntraVENous Q6H    metoprolol tartrate  50 mg Oral BID    levetiracetam  750 mg IntraVENous Q12H    chlorhexidine  15 mL Mouth/Throat BID    mupirocin   Nasal Daily    insulin lispro  0-12 Units SubCUTAneous Q4H    sodium chloride flush  5-40 mL IntraVENous 2 times per day    piperacillin-tazobactam  3,375 mg IntraVENous Q8H    pantoprazole  40 mg IntraVENous Daily    And    sodium chloride (PF)  10 mL IntraVENous Daily     Continuous Infusions:   sodium chloride      dextrose       PRN Meds:.midazolam, LORazepam, perflutren lipid microspheres, sodium chloride flush, sodium chloride, acetaminophen **OR** acetaminophen, glucose, dextrose, glucagon (rDNA), dextrose    Allergies: Patient has no known allergies. Labs:   Recent Labs     02/07/22  0549 02/08/22  0330 02/09/22  0357   WBC 13.1* 9.5 12.1*   HGB 9.2* 9.2* 9.8*   HCT 27.2* 27.6* 29.4*   MCV 93.2 93.2 94.5   PLT 91* 82* 123*       Labs:   Recent Labs     02/07/22  0549 02/08/22  0330 02/09/22  0357    133 136   K 4.4 3.6 4.1   CL 99 99 102   CO2 15* 17* 19*   PHOS 3.0 3.0 3.4   BUN 50* 49* 55*   CREATININE 1.1* 1.1* 1.1*       Labs:   Recent Labs     02/06/22  1635   CKTOTAL 185*       Labs: No results for input(s): BNP in the last 72 hours. Labs: No results for input(s): CHOL, HDL, TRIG in the last 72 hours.     Invalid input(s): CHOLHDLR, LDLCALCU    Labs:   Recent Labs     02/06/22  1612 02/06/22  1612 02/07/22  0549 02/08/22  0330 02/09/22  0357   PROT 6.4  --   --   --   --    INR 2.1   < > 2.7 2.8 2.6    < > = values in this interval not displayed. Review of systems: Could not be completed as patient is on the ventilator. Exam      General: Patient comfortable in no distress and currently on the ventilator. Nader Stoddard HEENT: Face symmetrical and no apparent cranial nerve deficit. Neck: No jugular venous distention, carotid bruit or thyromegaly. Lungs: Clear bilaterally without rales, wheezes or dullness. Cardiac: IRegular rate and rhythm, no S3, S4, no rub or gallop. Abdomen: No rebound or guarding, no hepatosplenomegaly. Extremities: Without significant clubbing , cyanosis, or edema. Neuro:  No focal motor or sensory deficit apparent. Skin: No petechiae, no significant bruising. Assessment: See plan below        Plan: #1 AF RVR now rate controlled on metoprolol tartrate 50 mg twice a day and blood pressure stable. Received 1 dose of p.o. warfarin 4 days ago and INR has been appropriately in the 2-3 range to receive an additional 2 mg today. INR greater than 10 on admission. Check INR daily. #2 Gram-positive cocci beta strep in the blood and white count and hypotension significantly improved and essentially resolved and now waiting for neurologic status to be clarified. Transthoracic echocardiogram completed yesterday afternoon shows low normal LVEF 50%, no indication of valvular vegetation or echodensity. Does have pacing wires in place and at this time waiting to clarify neurologic status. #3 permanent pacemaker left subclavicular region sensing and pacing appropriately. Waiting for neurologic status to be clarified as to what to do next. #4 obtunded and mental status change and on the ventilator currently. Has been maintained on warfarin on an outpatient basis and again INR greater than 10 on admission. Repeat head CT done 3 days ago shows bilateral areas of edema suspicious for stroke. #5  Chronic anticoagulation and today INR 2.6 yesterday 2.8.   Last warfarin dose was approximately 5 days ago and certainly some degree of auto anticoagulated also with sepsis and antibiotic therapy. Again INR was over 10 on admission on February 4, 2022 on presentation to [facility]. #6 anemia with hemoglobin 9.8 today up from 9.2 the last 2 days. Platelet count also trending upward at 123 today from 82 yesterday. Avoid supratherapeutic INR with unclear neurologic status. I would like to avoid and limit bleeding risk.         Electronically signed by Carmela Cruz MD on 2/9/2022 at 2:34 PM

## 2022-02-09 NOTE — PROGRESS NOTES
Pharmacy Consultation Note  (Warfarin Dosing and Monitoring)    Initial consult date: 2/9/22  Consulting Provider: Williams BAUGH    Krystle Kim is a 81 y.o. female for whom pharmacy has been asked to manage warfarin therapy. Weight:   Wt Readings from Last 1 Encounters:   02/08/22 234 lb 9.1 oz (106.4 kg)       TSH:    Lab Results   Component Value Date    TSH 1.790 02/04/2022       Hepatic Function Panel:                            Lab Results   Component Value Date    ALKPHOS 112 02/06/2022    ALT 13 02/06/2022    AST 34 02/06/2022    PROT 6.4 02/06/2022    BILITOT 1.4 02/06/2022    LABALBU 1.6 02/06/2022       Current warfarin drug-drug interactions include: No significant interactions    Recent Labs     02/07/22  0549 02/08/22  0330 02/09/22  0357   HGB 9.2* 9.2* 9.8*   PLT 91* 82* 123*     Date Warfarin Dose INR Heparin or LMWH Comment   2/9 2mg 2.6 -----                                  Assessment:  · Patient is a 81 y.o. female on warfarin for Atrial Fibrillation. Patient's home warfarin dosing regimen is 4mg daily.    · Goal INR 2 - 3  · INR 2.6 today, last dose of warfarin was 2/5/22    Plan:  · Warfarin 2mg tonight  · Daily PT/INR until the INR is stable within the therapeutic range  · Pharmacist will follow and monitor/adjust dosing as necessary    Thank you for this consult,    Anita Williamson, [facility] 2/9/2022 11:14 AM

## 2022-02-09 NOTE — PLAN OF CARE
Notes reviewed. Exam today deferred due to COVID-19 infection to reduce exposure and preserve PPE for primary caregivers. In addition to this testing also still pending. No events overnight documented or reported. Vital signs at present time are stable    Unfortunately patient unable to have MRI due to PPM.  Reported seizure activity on 2/8 around 2:20 AM however EEG for 2/8 was canceled. Keppra increased to 750 mg twice daily on 2/8. Assessment:  Multiple strokes with large amount of edema    Suspicious for cardioembolic source as patient has history of A. Fib              Unfortunately unable to get MRI due to PPM              Repeat CT head completed on 2/5 shows bilateral areas of edema suspicious for stroke              Coumadin currently on hold due to supratherapeutic INR; status post reversal agent, INR today 2.6     Seizure activity              Described as right shoulder shaking              Initially 2 times on 2/5 but additionally on 2/7              Overnight on 2/8 patient had seizure activity and was given Ativan 2 mg              Keppra increased to 750 mg Tuesday morning; await EEG   No seizures reported overnight     Covid infection with sepsis              Leukocytosis and hypotension present     A. Fib              Now controlled     Plan:  EEG reordered this morning--- will await this study.   Complete echo completed report is pending  Continue supportive care  A1c 7.0, fasting lipid panel pending from 2/8  SCDs  Seizure precautions  Continue tele

## 2022-02-10 NOTE — PROGRESS NOTES
Subjective:    Remains intubated and sedated  Seizure episodes again through the night while on Keppra  Remains in ICU setting    Objective:    BP (!) 145/52   Pulse 70   Temp 97.9 °F (36.6 °C) (Bladder)   Resp 19   Ht 5' (1.524 m)   Wt 234 lb 9.1 oz (106.4 kg)   SpO2 96%   BMI 45.81 kg/m²     In: -   Out: 1545   In: -   Out: 8932 [Urine:1545]    General appearance: Intubated sedated  HEENT: AT/NC, MMM  Neck: FROM, supple  Lungs: Clear to auscultation  CV: RRR, no MRGs  Vasc: Radial pulses 2+  Abdomen: Soft, non-tender; no masses or HSM  Extremities: No peripheral edema or digital cyanosis  Skin: no rash, lesions or ulcers       Recent Labs     02/08/22  0330 02/09/22  0357 02/10/22  0403   WBC 9.5 12.1* 12.5*   HGB 9.2* 9.8* 9.8*   HCT 27.6* 29.4* 29.9*   PLT 82* 123* 165       Recent Labs     02/08/22  0330 02/09/22  0357 02/10/22  0403    136 140  135   K 3.6 4.1 4.1  4.0   CL 99 102 106  102   CO2 17* 19* 19*  19*   BUN 49* 55* 59*  59*   CREATININE 1.1* 1.1* 1.2*  1.2*   CALCIUM 9.1 9.2 9.0  9.1       Assessment:    Active Problems:    Acute respiratory failure due to COVID-19 [facility])    Acute respiratory failure (HCC)    Cerebrovascular accident (CVA) due to embolism of cerebral artery (HCC)  Resolved Problems:    * No resolved hospital problems. *      Plan:    Admit to MICU for neurology evaluation secondary to ams requiring intubating in pt with covid pna  Unable to get mri d/t pacer in place   Wean to extubate as mentation improves- remais intubated   acute infectious issues seemingly resolved  - covid + - recheck  On no tx for covid as currently that is not the cause of her intubated status   monitor rate and rhythm, echocardiogram with ejection fraction 50%  Will need eeg and full neuro work upcompleted - cerebral dysfunxn and encephalopathy   empiric keppra bidseizures overnight to 6/20/2022, Keppra increased to 750 IV twice daily by neurology  ?  LP at discretion of others   Check lipids , a1c , follow bp closely   Palliative care service following  Multiple consultants on board    No significant improvement in clinical status         DVT Prophylaxis   PT/OT  Discharge planning           Jessica Colunga MD  10:52 AM  2/10/2022

## 2022-02-10 NOTE — PROGRESS NOTES
Diagnosis Date Noted    Cerebrovascular accident (CVA) due to embolism of cerebral artery ([facility] [address] [I63.40]     Acute respiratory failure due to COVID-19 ([facility] [address] [U07.1, J96.00] 02/06/2022    Acute respiratory failure ([facility] [address] [J96.00] 02/06/2022       Details of Conversation:    Chart reviewed, update received from the bedside nurse, patient observed in the ICU unit. I did speak with the patient's nephew, Lexie Marques. Providing him an update and again discussing goals of care. He states at this time they wish to continue with her current level of care, he does understand that her neurologic prognosis is likely poor, however he would like to provide a bit more time in full supportive care until he makes decision regarding long-term needs, especially prior to making any decision regarding potential withdrawal of care. OBJECTIVE:   Prognosis: Poor    Physical Exam:  BP (!) 145/52   Pulse 67   Temp 97.3 °F (36.3 °C) (Bladder)   Resp 25   Ht 5' (1.524 m)   Wt 234 lb 9.1 oz (106.4 kg)   SpO2 96%   BMI 45.81 kg/m²   Due to the current efforts to prevent transmission of COVID-19 and also the need to preserve PPE for other caregivers, a face-to-face encounter with the patient was not performed. That being said, all relevant records and diagnostic tests were reviewed, including laboratory results and imaging. Please reference any relevant documentation elsewhere. Care will be coordinated with the primary service. Objective data reviewed: labs, images, records, medication use, vitals and chart    Discussed patient and the plan of care with the other IDT members: Palliative Medicine IDT Team    Time/Communication  Greater than 50% of time spent, total 35 minutes in counseling and coordination of care at the bedside regarding goals of care, diagnosis and prognosis and see above. Thank you for allowing Palliative Medicine to participate in the care of [facility].

## 2022-02-10 NOTE — PROGRESS NOTES
Pharmacy Consultation Note  (Antibiotic Dosing and Monitoring)    Initial consult date: 2/10/22  Consulting physician/provider: Dr. Hassan Beeler  Drug: Vancomycin  Indication: Skin and soft tissue infection    Age/  Gender Height Weight IBW  Allergy Information   86 y.o./female 5' (152.4 cm) 233 lb 6.4 oz (105.9 kg)     Ideal body weight: 45.5 kg (100 lb 4.9 oz)  Adjusted ideal body weight: 69.9 kg (154 lb 0.2 oz)   Patient has no known allergies. Renal Function:  Recent Labs     02/08/22  0330 02/09/22  0357 02/10/22  0403   BUN 49* 55* 59*  59*   CREATININE 1.1* 1.1* 1.2*  1.2*       Intake/Output Summary (Last 24 hours) at 2/10/2022 1128  Last data filed at 2/10/2022 1000  Gross per 24 hour   Intake    Output 1475 ml   Net -1475 ml       Vancomycin Monitoring:  Trough:  No results for input(s): VANCOTROUGH in the last 72 hours. Random:  No results for input(s): VANCORANDOM in the last 72 hours. Vancomycin Administration Times:  Recent vancomycin administrations      No vancomycin IV orders with administrations found. Orders not given:          vancomycin (VANCOCIN) 1,250 mg in dextrose 5 % 250 mL IVPB                Assessment:  · Patient is a 81 y.o. female who has been initiated on vancomycin  · Estimated Creatinine Clearance: 38 mL/min (A) (based on SCr of 1.2 mg/dL (H)).     Plan:  · Will continue vancomycin 1250 IV every 24 hours  · Will check vancomycin levels when appropriate  · Will continue to monitor renal function   · Clinical pharmacy to follow      Anita Williamson [facility] 2/10/2022 11:28 AM

## 2022-02-10 NOTE — PROGRESS NOTES
Pharmacy Consultation Note  (Warfarin Dosing and Monitoring)    Initial consult date: 2/9/22  Consulting Provider: Milton BAUGH    Deena Gale is a 81 y.o. female for whom pharmacy has been asked to manage warfarin therapy. Weight:   Wt Readings from Last 1 Encounters:   02/08/22 234 lb 9.1 oz (106.4 kg)       TSH:    Lab Results   Component Value Date    TSH 1.790 02/04/2022       Hepatic Function Panel:                            Lab Results   Component Value Date    ALKPHOS 112 02/06/2022    ALT 13 02/06/2022    AST 34 02/06/2022    PROT 6.4 02/06/2022    BILITOT 1.4 02/06/2022    LABALBU 3.7 02/10/2022       Current warfarin drug-drug interactions include: steroids (variable/dose dependent)    Recent Labs     02/08/22  0330 02/09/22  0357 02/10/22  0403   HGB 9.2* 9.8* 9.8*   PLT 82* 123* 165     Date Warfarin Dose INR Heparin or LMWH Comment   2/9 2 mg 2.6 -----    2/10 2 mg 2.5 -----                           Assessment:  · Patient is a 81 y.o. female on warfarin for Atrial Fibrillation. Patient's home warfarin dosing regimen is 4mg daily.    · Goal INR 2 - 3  · INR 2.5 today    Plan:  · Warfarin 2mg tonight  · Daily PT/INR until the INR is stable within the therapeutic range  · Pharmacist will follow and monitor/adjust dosing as necessary    Thank you for this consult,    Saadia Bustamante [facility] 2/10/2022 11:32 AM

## 2022-02-10 NOTE — PROGRESS NOTES
Infectious Disease  Progress Note  [facility]    Chief Complaint: no complaint    Subjective: grp b strep sepsis    Scheduled Meds:   acetylcysteine  600 mg Oral BID    insulin glargine  10 Units SubCUTAneous Nightly    furosemide  40 mg IntraVENous Once    [START ON 2/11/2022] dexamethasone  10 mg IntraVENous Q24H    warfarin placeholder: dosing by pharmacy   Other RX Placeholder    metoprolol tartrate  50 mg Oral BID    levetiracetam  750 mg IntraVENous Q12H    chlorhexidine  15 mL Mouth/Throat BID    mupirocin   Nasal Daily    insulin lispro  0-12 Units SubCUTAneous Q4H    sodium chloride flush  5-40 mL IntraVENous 2 times per day    piperacillin-tazobactam  3,375 mg IntraVENous Q8H    pantoprazole  40 mg IntraVENous Daily    And    sodium chloride (PF)  10 mL IntraVENous Daily     Continuous Infusions:   sodium chloride      dextrose       PRN Meds:ipratropium-albuterol, midazolam, LORazepam, perflutren lipid microspheres, sodium chloride flush, sodium chloride, acetaminophen **OR** acetaminophen, glucose, dextrose, glucagon (rDNA), dextrose    Patient Vitals for the past 24 hrs:   Temp Temp src Pulse Resp SpO2 Height   02/10/22 1000   70 19 96 %    02/10/22 0917   72 18 97 %    02/10/22 0900   71 19 97 %    02/10/22 0800 97.9 °F (36.6 °C) Bladder 70 18 97 %    02/10/22 0700   70 19 96 %    02/10/22 0600   70 26 96 %    02/10/22 0500   72 22 96 %    02/10/22 0400 97.7 °F (36.5 °C) Bladder 70 19 96 %    02/10/22 0358   72 21 96 %    02/10/22 0300   75 20 96 %    02/10/22 0200   73 22 95 %    02/10/22 0100   73 21 95 %    02/10/22 0035   70 19 95 %    02/10/22 0000 97 °F (36.1 °C) Bladder 71 15 96 %    02/09/22 2300   71 28 96 %    02/09/22 2200   70 16 96 %    02/09/22 2104   59 19 96 %    02/09/22 2100   70 17 95 %    02/09/22 2000  Bladder 68 17 96 %    02/09/22 1900   62 18 96 %    02/09/22 1800   72 21 96 %    02/09/22 1600   90 21 94 %  02/09/22 1500   73 26 95 %    02/09/22 1434      5' (1.524 m)   02/09/22 1400   76 27 94 %    02/09/22 1300   73 19 97 %    02/09/22 1200   72          CBC with Differential:    Lab Results   Component Value Date    WBC 12.5 02/10/2022    RBC 3.13 02/10/2022    HGB 9.8 02/10/2022    HCT 29.9 02/10/2022     02/10/2022    MCV 95.5 02/10/2022    MCH 31.3 02/10/2022    MCHC 32.8 02/10/2022    RDW 16.7 02/10/2022    NRBC 1.7 02/09/2022    METASPCT 1.7 02/09/2022    LYMPHOPCT 2.6 02/10/2022    PROMYELOPCT 0.9 02/06/2022    MONOPCT 6.1 02/10/2022    MYELOPCT 0.9 02/10/2022    BASOPCT 0.3 02/10/2022    MONOSABS 0.75 02/10/2022    LYMPHSABS 0.38 02/10/2022    EOSABS 0.00 02/10/2022    BASOSABS 0.00 02/10/2022     CMP:    Lab Results   Component Value Date     02/10/2022     02/10/2022    K 4.1 02/10/2022    K 4.0 02/10/2022    K 3.7 02/06/2022     02/10/2022     02/10/2022    CO2 19 02/10/2022    CO2 19 02/10/2022    BUN 59 02/10/2022    BUN 59 02/10/2022    CREATININE 1.2 02/10/2022    CREATININE 1.2 02/10/2022    GFRAA 52 02/10/2022    GFRAA 52 02/10/2022    LABGLOM 43 02/10/2022    LABGLOM 43 02/10/2022    GLUCOSE 250 02/10/2022    GLUCOSE 247 02/10/2022    PROT 6.4 02/06/2022    LABALBU 3.7 02/10/2022    CALCIUM 9.0 02/10/2022    CALCIUM 9.1 02/10/2022    BILITOT 1.4 02/06/2022    ALKPHOS 112 02/06/2022    AST 34 02/06/2022    ALT 13 02/06/2022       BP (!) 145/52   Pulse 70   Temp 97.9 °F (36.6 °C) (Bladder)   Resp 19   Ht 5' (1.524 m)   Wt 234 lb 9.1 oz (106.4 kg)   SpO2 96%   BMI 45.81 kg/m²     Physical Exam  Const/Neuro- unchanged, no signs of acute distress, Alert  ENMT- Within Normal Limits, Normocephalic, mucous membranes pink/moist, No thrush  Neck: Neck supple  Heart- Regular, Rate, Rhythm- no murmur appreciated. Lungs- clear to ascultation. Respirations even and nonlabored.   Abdomen- Soft, bowel sounds positive, non tender  Musculo/Extremities-  Equal and symmetrical, no edema. No tenderness. Skin:  Warm and dry, free from rashes. Cultures reviewed    Radiology reviewed  XR CHEST PORTABLE   Final Result   1. Endotracheal tube close to the chika. To be at the level of the upper   contour of the arch of the aorta can be pulled back 2 cm. 2.  Persistent cardiomegaly with signs of volume overload. See above   comments. XR CHEST PORTABLE   Final Result   1. There is no interval change in the multifocal bilateral airspace disease. 2. Bilateral pleural effusions. 3. Cardiomegaly. XR CHEST PORTABLE   Final Result   1. Medical support devices as above. 2.  Retrocardiac and bibasilar opacities appears stable. Medial right upper   lung opacity appears stable. Atherosclerotic disease and cardiomegaly no new   cardiopulmonary pathology.          XR CHEST PORTABLE    (Results Pending)       Assessment  grp b strep septic shock and bacteremia   Pacer Rule out pacer infection and endocarditis   cRP 53 and procal 7.53   Started on decadron   Atrial fibrillation   WBC 13,100 better   grp b strep endocardits not as common as other organisms   Will have to wait to see if pacer seeded   CXR retrocardiac and bibasilar opacities appears stable Medial right upper lung opacity appears stable   CT head diffuse areas of low attenuation/edema iinvolving the convexities bilaterally not present on scan from 1;16pm  Neurology note  Bilateral areas of edema suspicious for CVA  resp culture candida   Check fungitell  Candida albicans not a cause of pneumonia  Wbc down to 12,500  Afebrile   Wound culture polymicrobial   Staph aureus and mixed gm neg rods   MRSA in sputum    Plan  Cont zosyn need to determine stop date   With pacer may be looking at four weeks  Bc repeated   Long talk with Dr Emanuel Lin this morning from cardiology  He did 2D  Hold off on LON until more stable   Agree  Add iv vancomycin        Electronically signed by Ben Elizondo MD on 2/10/2022 at 10:57 AM

## 2022-02-10 NOTE — PROGRESS NOTES
Zen Adams NP wrote an order to retract the ETT 2 cm. Patients ETT is currently at 21cm. Spoke with Adam Kim and she stated to leave the ETT were it is at at this point.

## 2022-02-10 NOTE — PLAN OF CARE
Notes reviewed. Exam today deferred due to COVID-19 infection to reduce exposure and preserve PPE for primary caregivers. No events overnight documented or reported. Vital signs at present time are stable on vent     There is no family present at bedside     Unfortunately patient unable to have MRI due to PPM.  Reported seizure activity on 2/8 around 2:20 AM and Keppra was increased to 750 mg twice daily on 2/8. There has been no additional reports of seizure activity     Testing:    EEG 2/9:  This abnormal study showed evidence of:  1. Moderate nonspecific cerebral dysfunction of the left frontotemporal region  2. A moderate to severe nonspecific encephalopathy  Structural abnormalities should be considered for the findings above and appropriate imaging obtained if clinically indicated. No seizures or epileptiform discharges were noted during this study.      Complete echo 2/8:  Pulmonary hypertension is mild . Moderate tricuspid valve regurgitation. Physiologic and/or trace mitral regurgitation is present. No evidence of hemodynamically significant mitral stenosis. No valvular echodensity or vegatation seen. LV Ejection fraction is visually estimated at 50%. Normal aortic root and ascending aorta. Mild PHTN. Assessment:  Multiple strokes with large amount of edema    Suspicious for cardioembolic source as patient has history of A.  Fib              Unfortunately unable to get MRI due to PPM              Repeat CT head completed on 2/5 shows bilateral areas of edema suspicious for stroke              Coumadin currently on hold due to supratherapeutic INR; status post reversal agent, INR today 2.5   Poor neuro recovery suspected d/t large strokes seen on CT.       Seizure activity              Described as right shoulder shaking              Initially 2 times on 2/5 but additionally on 2/7              Overnight on 2/8 patient had seizure activity and was given Ativan 2 mg Keppra increased to 750 mg Tuesday morning; EEG on 2/9 showed moderate nonspecific cerebral dysfunction and moderate to severe encephalopathy    No additional seizures reported overnight or today      Covid infection with sepsis              Leukocytosis present     A. Fib              Now controlled     Plan:  Continue supportive care  Continue treatment of other medical issues  Continue Keppra 750 mg BID   A1c 7.0, fasting lipid panel pending from 2/8  [facility]  Seizure precautions  Continue tele     Neuro will sign off. Please call with additional questions or concerns.

## 2022-02-10 NOTE — PROGRESS NOTES
[facility] CARDIOLOGY PROGRESS NOTE  [facility]        Subjective: AF RVR now rate controlled on AV blocking agents, bacteremia, Covid positive, intubated day of admission due to obtundation, hypotension and leukocytosis resolved. Appears to be comfortable on the ventilator. Has developed seizure activity over the last 5 days. Neurology following patient. Transthoracic echocardiogram 2 days ago with normal LVEF 50% and no distinct intracardiac mass thrombus or mobile echodensity. Objective: Medications lab chart and telemetry all reviewed.     Patient Vitals for the past 24 hrs:   Temp Temp src Pulse Resp SpO2 Height   02/10/22 1200   67 17 97 %    02/10/22 1134   75 21 97 %    02/10/22 1100   72 17 97 %    02/10/22 1000   70 19 96 %    02/10/22 0917   72 18 97 %    02/10/22 0900   71 19 97 %    02/10/22 0800 97.9 °F (36.6 °C) Bladder 70 18 97 %    02/10/22 0700   70 19 96 %    02/10/22 0600   70 26 96 %    02/10/22 0500   72 22 96 %    02/10/22 0400 97.7 °F (36.5 °C) Bladder 70 19 96 %    02/10/22 0358   72 21 96 %    02/10/22 0300   75 20 96 %    02/10/22 0200   73 22 95 %    02/10/22 0100   73 21 95 %    02/10/22 0035   70 19 95 %    02/10/22 0000 97 °F (36.1 °C) Bladder 71 15 96 %    02/09/22 2300   71 28 96 %    02/09/22 2200   70 16 96 %    02/09/22 2104   59 19 96 %    02/09/22 2100   70 17 95 %    02/09/22 2000  Bladder 68 17 96 %    02/09/22 1900   62 18 96 %    02/09/22 1800   72 21 96 %    02/09/22 1600   90 21 94 %    02/09/22 1500   73 26 95 %    02/09/22 1434      5' (1.524 m)   02/09/22 1400   76 27 94 %          Intake/Output Summary (Last 24 hours) at 2/10/2022 1341  Last data filed at 2/10/2022 1000  Gross per 24 hour   Intake    Output 1475 ml   Net -1475 ml       Wt Readings from Last 3 Encounters:   02/08/22 234 lb 9.1 oz (106.4 kg)   02/04/22 203 lb (92.1 kg)   01/06/22 203 lb (92.1 kg) Telemetry: Personally reviewed and shows atrial fibrillation and ventricular demand pacing. Current meds: Scheduled Meds:   acetylcysteine  600 mg Oral BID    insulin glargine  10 Units SubCUTAneous Nightly    [START ON 2/11/2022] dexamethasone  10 mg IntraVENous Q24H    vancomycin  1,250 mg IntraVENous Q24H    warfarin  2 mg Oral Once    warfarin placeholder: dosing by pharmacy   Other RX Placeholder    metoprolol tartrate  50 mg Oral BID    levetiracetam  750 mg IntraVENous Q12H    chlorhexidine  15 mL Mouth/Throat BID    mupirocin   Nasal Daily    insulin lispro  0-12 Units SubCUTAneous Q4H    sodium chloride flush  5-40 mL IntraVENous 2 times per day    piperacillin-tazobactam  3,375 mg IntraVENous Q8H    pantoprazole  40 mg IntraVENous Daily    And    sodium chloride (PF)  10 mL IntraVENous Daily     Continuous Infusions:   sodium chloride      dextrose       PRN Meds:.ipratropium-albuterol, midazolam, LORazepam, perflutren lipid microspheres, sodium chloride flush, sodium chloride, acetaminophen **OR** acetaminophen, glucose, dextrose, glucagon (rDNA), dextrose    Allergies: Patient has no known allergies. Labs:   Recent Labs     02/08/22  0330 02/09/22  0357 02/10/22  0403   WBC 9.5 12.1* 12.5*   HGB 9.2* 9.8* 9.8*   HCT 27.6* 29.4* 29.9*   MCV 93.2 94.5 95.5   PLT 82* 123* 165       Labs:   Recent Labs     02/08/22  0330 02/09/22  0357 02/10/22  0403    136 140  135   K 3.6 4.1 4.1  4.0   CL 99 102 106  102   CO2 17* 19* 19*  19*   PHOS 3.0 3.4  --    BUN 49* 55* 59*  59*   CREATININE 1.1* 1.1* 1.2*  1.2*       Labs: No results for input(s): CKTOTAL, CKMB, CKMBINDEX, TROPONINI in the last 72 hours. Labs: No results for input(s): BNP in the last 72 hours. Labs: No results for input(s): CHOL, HDL, TRIG in the last 72 hours.     Invalid input(s): CHOLHDLR, LDLCALCU    Labs:   Recent Labs     02/08/22 0330 02/09/22  0357 02/10/22  0403   INR 2.8 2.6 2.5 Review of systems: No reported significant weight gain or weight loss. no dysuria or frequency, no dizziness, falls or trauma, no change in bowel or bladder habits, no hematochezia, hemoptysis or hematuria. No fevers, chills, nausea or vomiting reported. No significant wheezing or sputum production. No headache or visual changes. The remainder of the 10 review of systems otherwise negative. Exam      General: Patient comfortable in no distress and currently not interactive. See plan below    HEENT: Face symmetrical and no apparent cranial nerve deficit. Neck: No jugular venous distention, carotid bruit or thyromegaly. Lungs: Clear bilaterally without rales, wheezes or dullness. Cardiac: Regular rate and rhythm, no S3, S4, no rub or gallop. Abdomen: No rebound or guarding, no hepatosplenomegaly. Extremities: Without significant clubbing , cyanosis, or edema. Neuro:  No focal motor or sensory deficit apparent. Skin: No petechiae, no significant bruising. Assessment: See plan below      Plan: #1 AF RVR now heart rate controlled on current medications and blood pressure reasonable. Warfarin for an INR in the 2-3 range. #2 Gram-positive cocci beta strep in the blood would be an unlikely pathogen to cause endocarditis. Will await to consider whether transesophageal echocardiogram appropriate depending on evolution of neurologic status. Currently on appropriate IV antibiotic therapy. White count and markers of sepsis significantly improved/resolved. #3 permanent pacemaker sensing and pacing appropriately. #4 seizure activity, obtunded and ongoing neurologic questions. #5 chronic anticoagulation and INR in the 2-3 range long-term is received 2 mg the last couple of days. #6  Anemia without overt bleeding. Continue supportive measures.       Electronically signed by Randell Gong MD on 2/10/2022 at 1:41 PM

## 2022-02-10 NOTE — PLAN OF CARE
Problem: Airway Clearance - Ineffective  Goal: Achieve or maintain patent airway  Outcome: Met This Shift     Problem: Gas Exchange - Impaired  Goal: Absence of hypoxia  Outcome: Met This Shift  Goal: Promote optimal lung function  Outcome: Met This Shift     Problem: Breathing Pattern - Ineffective  Goal: Ability to achieve and maintain a regular respiratory rate  Outcome: Met This Shift     Problem:  Body Temperature -  Risk of, Imbalanced  Goal: Ability to maintain a body temperature within defined limits  Outcome: Met This Shift  Goal: Will regain or maintain usual level of consciousness  Outcome: Met This Shift  Goal: Complications related to the disease process, condition or treatment will be avoided or minimized  Outcome: Met This Shift     Problem: Isolation Precautions - Risk of Spread of Infection  Goal: Prevent transmission of infection  Outcome: Met This Shift     Problem: Nutrition Deficits  Goal: Optimize nutritional status  Outcome: Met This Shift     Problem: Risk for Fluid Volume Deficit  Goal: Maintain normal heart rhythm  Outcome: Met This Shift  Goal: Maintain absence of muscle cramping  Outcome: Met This Shift  Goal: Maintain normal serum potassium, sodium, calcium, phosphorus, and pH  Outcome: Met This Shift     Problem: Loneliness or Risk for Loneliness  Goal: Demonstrate positive use of time alone when socialization is not possible  Outcome: Met This Shift     Problem: Fatigue  Goal: Verbalize increase energy and improved vitality  Outcome: Met This Shift     Problem: Patient Education: Go to Patient Education Activity  Goal: Patient/Family Education  Outcome: Met This Shift     Problem: Falls - Risk of:  Goal: Will remain free from falls  Description: Will remain free from falls  Outcome: Met This Shift  Goal: Absence of physical injury  Description: Absence of physical injury  Outcome: Met This Shift     Problem: Skin Integrity:  Goal: Will show no infection signs and symptoms  Description: Will show no infection signs and symptoms  Outcome: Met This Shift  Goal: Absence of new skin breakdown  Description: Absence of new skin breakdown  Outcome: Met This Shift     Problem: Gas Exchange - Impaired:  Goal: Levels of oxygenation will improve  Description: Levels of oxygenation will improve  Outcome: Met This Shift     Problem: Infection, Septic Shock:  Goal: Will show no infection signs and symptoms  Description: Will show no infection signs and symptoms  Outcome: Met This Shift     Problem: Infection - Ventilator-Associated Pneumonia:  Goal: Absence of pulmonary infection  Description: Absence of pulmonary infection  Outcome: Met This Shift     Problem: Inadequate oral food/beverage intake (NI-2.1)  Goal: Food and/or Nutrient Delivery  Description: Individualized approach for food/nutrient provision.   2/9/2022 1443 by Corina Jeffers RD, GEO  Outcome: Met This Shift

## 2022-02-10 NOTE — PROGRESS NOTES
Coordination of care discussion and chart review with PM team. No immediate PM psychosocial needs identified for [facility].  will remain available for on-going psychosocial support, as needed.

## 2022-02-10 NOTE — PROGRESS NOTES
Critical Care Progress Note         Patient [facility]   MRN -  [id number]   Acct # - [de-identified]   - 1935      Date of Admission -  2022  3:00 PM  Date of evaluation -  2/10/2022  [id number]/[id number]   Hospital Day - 4            ADMIT/CONSULT DETAILS     Reason for Admit/Consult   AMS   Unresponsiveness  COVID 19  Acute respiratory failure. Ventilator management. Mickey Pittman MD  Primary Care Physician - Zahra Walters MD         Subjective    No acute events overnight. VSS Afebrile   Neurological has a slight facial grimace to sternal rub    Past Medical History         Diagnosis Date    Arthritis     Atrial fibrillation ([address]     Hyperlipidemia     Hypertension     Non-pressure chronic ulcer of other part of right lower leg with fat layer exposed ([facility] [address] 2020    Thyroid disease         Past Surgical History           Procedure Laterality Date    PACEMAKER INSERTION  2019    PPM GENT CHANGE  ([facility])   DR. TONG     Current Medications   Current Medications    acetylcysteine  600 mg Oral BID    insulin glargine  10 Units SubCUTAneous Nightly    [START ON 2022] dexamethasone  10 mg IntraVENous Q24H    vancomycin  1,250 mg IntraVENous Q24H    warfarin  2 mg Oral Once    warfarin placeholder: dosing by pharmacy   Other RX Placeholder    metoprolol tartrate  50 mg Oral BID    levetiracetam  750 mg IntraVENous Q12H    chlorhexidine  15 mL Mouth/Throat BID    mupirocin   Nasal Daily    insulin lispro  0-12 Units SubCUTAneous Q4H    sodium chloride flush  5-40 mL IntraVENous 2 times per day    piperacillin-tazobactam  3,375 mg IntraVENous Q8H    pantoprazole  40 mg IntraVENous Daily    And    sodium chloride (PF)  10 mL IntraVENous Daily     ipratropium-albuterol, midazolam, LORazepam, perflutren lipid microspheres, sodium chloride flush, sodium chloride, acetaminophen **OR** acetaminophen, glucose, dextrose, glucagon (rDNA), dextrose  IV Drips/Infusions   sodium chloride      dextrose       Home Medications  Medications Prior to Admission: [] benzonatate (TESSALON) 200 MG capsule, Take 200 mg by mouth three times daily  dilTIAZem (CARDIZEM CD) 120 MG extended release capsule, Take 120 mg by mouth daily  docusate sodium (COLACE) 100 MG capsule, Take 100 mg by mouth daily  metOLazone (ZAROXOLYN) 2.5 MG tablet, Take 2.5 mg by mouth every other day  Multiple Vitamins-Minerals (THERAPEUTIC MULTIVITAMIN-MINERALS) tablet, Take 1 tablet by mouth daily  warfarin (COUMADIN) 4 MG tablet, Take 1 tablet by mouth daily  bumetanide (BUMEX) 1 MG tablet, Take 1 tablet by mouth 2 times daily  spironolactone (ALDACTONE) 25 MG tablet, Take 1 tablet by mouth daily  [] polyethylene glycol (GLYCOLAX) 17 g packet, Take 17 g by mouth daily as needed for Constipation  metoprolol (LOPRESSOR) 100 MG tablet, Take 200 mg by mouth 2 times daily   simvastatin (ZOCOR) 20 MG tablet, Take 20 mg by mouth nightly  levothyroxine (SYNTHROID) 125 MCG tablet, Take 125 mcg by mouth Daily     Diet/Nutrition   Diet NPO    Allergies   Patient has no known allergies. Social History   Tobacco   reports that she has never smoked. She has never used smokeless tobacco.    Alcohol     reports no history of alcohol use. Occupational history :    Family History   No family history on file. ROS     REVIEW OF SYSTEMS:  Review of systems not obtained due to patient factors - intubation    Lines and Devices    22  Right Femoral TLC    Right radial arterial line   22 Urethral catheter.    22  OGT  22  ETT # 7.5      Mechanical Ventilation Data   VENT SETTINGS (Comprehensive)  Vent Information  $Ventilation: $Subsequent Day  Skin Assessment: Clean, dry, & intact  Equipment ID: [id number]  Vent Type: [id number]  Vent Mode: AC/VC  Vt Ordered: 450 mL  Rate Set: 10 bmp  Peak Flow: 65 L/min  Pressure Support: 0 cmH20  FiO2 : 30 %  SpO2: 97 %  SpO2/FiO2 ratio: 323.33  Sensitivity: 3  PEEP/CPAP: 5  I Time/ I Time %: 0 s  Humidification Source: Heated wire  Humidification Temp: 37  Humidification Temp Measured: 37.1  Circuit Condensation: Drained  Additional Respiratory  Assessments  Pulse: 70  Resp: 17  SpO2: 97 %  Position: Left Side  Humidification Source: Heated wire  Humidification Temp: 37  Circuit Condensation: Drained  Oral Care: Mouth suctioned  Subglottic Suction Done?: Yes  Cuff Pressure (cm H2O): 29 cm H2O    ABG  Lab Results   Component Value Date    PH 7.469 02/10/2022    PCO2 27.8 02/10/2022    PO2 86.5 02/10/2022    HCO3 19.7 02/10/2022    O2SAT 95.3 02/10/2022     Lab Results   Component Value Date    MODE AC 02/10/2022           Vitals    height is 5' (1.524 m) and weight is 234 lb 9.1 oz (106.4 kg). Her bladder temperature is 97.5 °F (36.4 °C). Her blood pressure is 145/52 (abnormal) and her pulse is 70. Her respiration is 17 and oxygen saturation is 97%. Temperature Range: Temp: 97.5 °F (36.4 °C) Temp  Av.5 °F (36.4 °C)  Min: 97 °F (36.1 °C)  Max: 97.9 °F (36.6 °C)  BP Range:  No data recorded. No data recorded.     Pulse Range: Pulse  Av  Min: 59  Max: 90  Respiration Range: Resp  Av.7  Min: 15  Max: 28  Current Pulse Ox[de-identified]  SpO2: 97 %  24HR Pulse Ox Range:  SpO2  Av %  Min: 94 %  Max: 97 %  Oxygen Amount and Delivery:        I/O (24 Hours)    Patient Vitals for the past 8 hrs:   Temp Temp src Pulse Resp SpO2   02/10/22 1300   70 17 97 %   02/10/22 1200 97.5 °F (36.4 °C) Bladder 67 17 97 %   02/10/22 1134   75 21 97 %   02/10/22 1100   72 17 97 %   02/10/22 1000   70 19 96 %   02/10/22 0917   72 18 97 %   02/10/22 0900   71 19 97 %   02/10/22 0800 97.9 °F (36.6 °C) Bladder 70 18 97 %   02/10/22 0700   70 19 96 %       Intake/Output Summary (Last 24 hours) at 2/10/2022 1421  Last data filed at 2/10/2022 1300  Gross per 24 hour   Intake    Output 5 ml   Net -5 ml     I/O last 3 completed shifts: In: 1093 [I.V.:993; IV Piggyback:100]  Out: 2045 [Urine:2045]   Date 02/10/22 0000 - 02/10/22 2359   Shift 5942-2194 9686-2033 6930-8446 24 Hour Total   INTAKE   Shift Total(mL/kg)       OUTPUT   Urine(mL/kg/hr) 585(0.7) 725  1310   Shift Total(mL/kg) 585(5.5) 725(6.8)  1310(12.3)   Weight (kg) 106.4 106.4 106.4 106.4     Patient Vitals for the past 96 hrs (Last 3 readings):   Weight   02/08/22 0200 234 lb 9.1 oz (106.4 kg)   02/07/22 0340 233 lb 6.4 oz (105.9 kg)     Exam     PHYSICAL EXAM:  CONSTITUTIONAL:  Lying in bed monitored. Orally intubated NAD. EYES:  lids and lashes normal and left pupils 4 to 5mm right pupil 3 mm reactive bilaterally sluggish. Upward gaze present   ENT:  normocepalic, without obvious abnormality, atraumatic  NECK:  supple, symmetrical, trachea midline, skin normal and no stridor  HEMATOLOGIC/LYMPHATICS:  no cervical lymphadenopathy and no supraclavicular lymphadenopathy  LUNGS:  Mechanical ventilator. Equal expansion. BBS aerating all lobes. Diminished bilaterally in bases. Few upper lobe scattered rhonchi present. CARDIOVASCULAR:  irregularly irregular rhythm and normal S1 and S2  ABDOMEN:  No scars, normal bowel sounds, soft, non-distended, non-tender, no masses palpated, no hepatosplenomegally  MUSCULOSKELETAL:  there is no redness, warmth, or swelling of the joints  tone is normal  NEUROLOGIC:  No response to noxious stimuli does not withdraw to painful stimuli. Gag and cocugh present. Limited neurological exam.   SKIN:  Right lower leg wound present.       Data   Old records and images have been reviewed    Lab Results   CBC     Lab Results   Component Value Date    WBC 12.5 02/10/2022    RBC 3.13 02/10/2022    HGB 9.8 02/10/2022    HCT 29.9 02/10/2022     02/10/2022    MCV 95.5 02/10/2022    MCH 31.3 02/10/2022    MCHC 32.8 02/10/2022    RDW 16.7 02/10/2022    NRBC 1.7 02/09/2022    METASPCT 1.7 02/09/2022    LYMPHOPCT 2.6 02/10/2022    PROMYELOPCT 0.9 02/06/2022    MONOPCT 6.1 02/10/2022    MYELOPCT 0.9 02/10/2022    BASOPCT 0.3 02/10/2022    MONOSABS 0.75 02/10/2022    LYMPHSABS 0.38 02/10/2022    EOSABS 0.00 02/10/2022    BASOSABS 0.00 02/10/2022       BMP   Lab Results   Component Value Date     02/10/2022     02/10/2022    K 4.1 02/10/2022    K 4.0 02/10/2022    K 3.7 02/06/2022     02/10/2022     02/10/2022    CO2 19 02/10/2022    CO2 19 02/10/2022    BUN 59 02/10/2022    BUN 59 02/10/2022    CREATININE 1.2 02/10/2022    CREATININE 1.2 02/10/2022    GLUCOSE 250 02/10/2022    GLUCOSE 247 02/10/2022    CALCIUM 9.0 02/10/2022    CALCIUM 9.1 02/10/2022       LFTS  Lab Results   Component Value Date    ALKPHOS 112 02/06/2022    ALT 13 02/06/2022    AST 34 02/06/2022    PROT 6.4 02/06/2022    BILITOT 1.4 02/06/2022    LABALBU 3.7 02/10/2022       INR  Recent Labs     02/08/22  0330 02/09/22  0357 02/10/22  0403   PROTIME 31.8* 28.8* 27.7*   INR 2.8 2.6 2.5       APTT  No results for input(s): APTT in the last 72 hours. Lactic Acid  Lab Results   Component Value Date    LACTA 2.1 02/06/2022    LACTA 2.0 02/06/2022    LACTA 2.1 02/06/2022        BNP   No results for input(s): BNP in the last 72 hours. Cultures     Recent Labs     02/09/22  1035   BC 24 Hours no growth     Recent Labs     02/09/22  1038   BLOODCULT2 24 Hours no growth       No results for input(s): LABURIN in the last 72 hours. Radiology   CXR        CT Scans  Head CT 02/05/22    No acute intracranial hemorrhage mass effect or midline shift. No evidence of hydrocephalus. SYSTEMS ASSESSMENT    Neuro   Acute encephalopathy unknown etiology metabolic or anoxic. Seizure disorder  Questionable cardio embolic  Stroke  Neurology consulted appreciate recommendations. Continue Keppra IV for seizure prophylaxis. 750 mg BID. Ativan prn for seizures  Seizure precautions. Unable to obtain MRI due to pacemaker   CT head no intracranial hemorrhage.      Respiratory Acute hypoxic respiratory failure secondary to COVID 19 pneumonia. COVID + 1/25 received no treatment  Decadron IV 10 mg for 5 days. Enhanced droplet precautions  Chest x ray in AM   Daily ABG while intubated. Plan for SBT on hold today due to worsening chest x ray. Will not extubate due to neurological status and inability to protect airway. Worsening chest x ray mucomyst and duo nebs added. Position with right side down left lung up   Chest pt   Wean oxygen as tolerated. Keep O2 sat 90-92%    Cardiovascular   Acute on chronic CHF   Cardiomegaly   Echo with bubble study. Lasix 40 mg IVP. Metoprolol 50 g BID   Maintain MAP > 65   Continuous telemetry. ProBNP  14,350  02/10  Cardiology consulted for Afib with RVR  Pharmacy to dose Coumadin INR 2.5   Goal INR 2 to 3   LV dysfunction EF 50%       Gastrointestinal   NPO  Protonix daily  Tube feedings at goal  Dietary consulted appreciate input. Renal   Acute on chronic kidney injury   Chronic hyponatremia sodium range 126 to 130. Multifactorial   Avoid nephrotoxic medications. Strict I and O. Renal dose medications for GFR. Baseline creatine 1.2 to 1.3  IVF discontinued   Nephrology consulted Dr. Ramon Blake group       Infectious Disease   Sepsis   Infectious disease consulted appreciated recommendations. Gram + cocci beta strep in blood   Gram negative rods , Proteus species, Staph aureus species wound cultures   Resp culture candida and staph. Will check fungitell   Improving WBC   12.5 > 12.1  Wound Culture proteus species. MRSA nasal colonization   Bactroban nasal.   Continue antibiotics as per Id  Continue Zosyn. Vancomycin added for staph. Blood cultures repeated. Procal 4.55 2/06/22 Repeat in AM       Hematology/Oncology   Supratherapetic INR received reversal agent  Coumadin restarted   INR 2.5  Goal INR 2.0 to 3.0  Pharmacy consulted for warfarin     Endocrine   No record of diabetes.    Hgb A1C 7.0   Low dose insulin coverage. Hypoglycemic protocol. Hyponatremia chronic  133    Social/Spiritual/DNR/Other   DNR CCA  Palliative care consulted. Plan of care discussed in multidisciplinary rounds. Dr. Sam Macias is the collaborating physician. Bryn Altamirano CNP   Medical Intensive Care Unit.

## 2022-02-11 NOTE — PROGRESS NOTES
[facility] CARDIOLOGY PROGRESS NOTE  [facility]        Subjective: Saw patient initially for AF RVR now controlled ventricular rate. Has permanent pacemaker in place. Was intubated the first day due to being obtunded and mental status change. Has not been awake or very interactive. Has had seizure activity and being followed by neurology. Was Covid positive and gram-positive cocci in the blood. Transthoracic echocardiogram without evidence of mobile echodensity, or significant valvular stenosis or regurgitation. Currently on the ventilator. Objective: Labs chart medications and telemetry all reviewed.     Patient Vitals for the past 24 hrs:   BP Temp Temp src Pulse Resp SpO2   02/11/22 1500 138/64   72 18 98 %   02/11/22 1400 136/69   72 21 98 %   02/11/22 1300 (!) 154/73   71 20 97 %   02/11/22 1230    71 16 97 %   02/11/22 1200 (!) 160/79 96.8 °F (36 °C) Bladder 70 20 97 %   02/11/22 1100 130/64   70 15 97 %   02/11/22 1000    71 18 98 %   02/11/22 0900    72 20 98 %   02/11/22 0853    70 19 98 %   02/11/22 0800  96.4 °F (35.8 °C) Bladder 72 18 99 %   02/11/22 0700    66 17 98 %   02/11/22 0600    70 22 99 %   02/11/22 0500    71 19 99 %   02/11/22 0408    70 18 99 %   02/11/22 0400  96.6 °F (35.9 °C) Bladder 72 17 99 %   02/11/22 0300    70 19 98 %   02/11/22 0200    74 17 98 %   02/11/22 0100    70 14 98 %   02/11/22 0007    70 17 98 %   02/11/22 0000  97 °F (36.1 °C) Bladder 70 20 98 %   02/10/22 2300    70 19 99 %   02/10/22 2200    71 21 99 %   02/10/22 2100    71 13 98 %   02/10/22 2019    71 18 98 %   02/10/22 2000  96.8 °F (36 °C) Bladder 76 23 97 %   02/10/22 1900    70 17 96 %   02/10/22 1800    70 18 97 %   02/10/22 1700    70 19 97 %   02/10/22 1600  97.3 °F (36.3 °C) Bladder 67 25 96 %         Intake/Output Summary (Last 24 hours) at 2/11/2022 1551  Last data filed at 2/11/2022 1400  Gross per 24 hour   Intake 515 ml   Output 1940 ml   Net -1425 ml       Wt Readings from Last 3 Encounters:   02/08/22 234 lb 9.1 oz (106.4 kg)   02/04/22 203 lb (92.1 kg)   01/06/22 203 lb (92.1 kg)       Telemetry: Personally reviewed and shows atrial fibrillation and occasional ventricular demand pacing. Current meds: Scheduled Meds:   lidocaine PF  5 mL IntraDERmal Once    sodium chloride flush  5-40 mL IntraVENous 2 times per day    heparin flush  3 mL IntraVENous 2 times per day    warfarin  2 mg Oral Once    acetylcysteine  600 mg Oral BID    insulin glargine  10 Units SubCUTAneous Nightly    dexamethasone  10 mg IntraVENous Q24H    vancomycin  1,250 mg IntraVENous Q24H    warfarin placeholder: dosing by pharmacy   Other RX Placeholder    metoprolol tartrate  50 mg Oral BID    levetiracetam  750 mg IntraVENous Q12H    chlorhexidine  15 mL Mouth/Throat BID    mupirocin   Nasal Daily    insulin lispro  0-12 Units SubCUTAneous Q4H    sodium chloride flush  5-40 mL IntraVENous 2 times per day    piperacillin-tazobactam  3,375 mg IntraVENous Q8H    pantoprazole  40 mg IntraVENous Daily    And    sodium chloride (PF)  10 mL IntraVENous Daily     Continuous Infusions:   sodium chloride      sodium chloride      dextrose       PRN Meds:.sodium chloride flush, sodium chloride, heparin flush, ipratropium-albuterol, midazolam, LORazepam, perflutren lipid microspheres, sodium chloride flush, sodium chloride, acetaminophen **OR** acetaminophen, glucose, dextrose, glucagon (rDNA), dextrose    Allergies: Patient has no known allergies.       Labs:   Recent Labs     02/09/22  0357 02/10/22  0403 02/11/22  0406   WBC 12.1* 12.5* 12.2*   HGB 9.8* 9.8* 11.5   HCT 29.4* 29.9* 35.4   MCV 94.5 95.5 97.0   * 165 198       Labs:   Recent Labs     02/09/22  0357 02/10/22  0403 02/11/22  0406    140  135 141   K 4.1 4.1  4.0 3.6    106  102 106   CO2 19* 19*  19* 21*   PHOS 3.4  --   --    BUN 55* 59*  59* 58* CREATININE 1.1* 1.2*  1.2* 1.1*       Labs: No results for input(s): CKTOTAL, CKMB, CKMBINDEX, TROPONINI in the last 72 hours. Labs: No results for input(s): BNP in the last 72 hours. Labs: No results for input(s): CHOL, HDL, TRIG in the last 72 hours. Invalid input(s): Shaan Elizondo    Labs:   Recent Labs     02/09/22  0357 02/10/22  0403 02/11/22  0406   INR 2.6 2.5 2.2       Review of systems: No reported significant weight gain or weight loss. no dysuria or frequency, no dizziness, falls or trauma, no change in bowel or bladder habits, no hematochezia, hemoptysis or hematuria. No fevers, chills, nausea or vomiting reported. No significant wheezing or sputum production. No headache or visual changes. The remainder of the 10 review of systems otherwise negative. Exam      General: Patient comfortable in no distress and currentlY not responsive or interactive. Vineet Pesa HEENT: Face symmetrical and no apparent cranial nerve deficit. Neck: No jugular venous distention, carotid bruit or thyromegaly. Lungs: Clear bilaterally without rales, wheezes or dullness. Cardiac: IRegular rate and rhythm, no S3, S4, no rub or gallop. Abdomen: No rebound or guarding, no hepatosplenomegaly. Extremities: Without significant clubbing , cyanosis, or edema. Neuro: Not interactive. Skin: No petechiae, no significant bruising. Assessment: See plan below        Plan: #1 AF RVR heart rate now controlled. On telemetry heart rate in the 80s and well controlled on current medical regimen and blood pressure reasonable. #2 permanent pacemaker sensing and pacing appropriately. #3 chronic anticoagulation and on warfarin today INR 2.2 would like in the 2-3 range. #4 obtunded and mental status change with intermittent seizure activity.   Neuro status would further define what would further be done with consideration of pacemaker leads in the setting of bacteremia and possibly may consider transesophageal echocardiogram if meaningful recovery is fully expected. #5 Covid positive and really has never required much oxygen from ventilator. Please call if any further questions or concerns.         Electronically signed by Gemma Porter MD on 2/11/2022 at 3:51 PM

## 2022-02-11 NOTE — PROGRESS NOTES
Pharmacy Consultation Note  (Warfarin Dosing and Monitoring)    Initial consult date: 2/9/22  Consulting Provider: Bailee BAUGH    Jocye Olsen is a 81 y.o. female for whom pharmacy has been asked to manage warfarin therapy. Weight:   Wt Readings from Last 1 Encounters:   02/08/22 234 lb 9.1 oz (106.4 kg)       TSH:    Lab Results   Component Value Date    TSH 1.790 02/04/2022       Hepatic Function Panel:                            Lab Results   Component Value Date    ALKPHOS 112 02/06/2022    ALT 13 02/06/2022    AST 34 02/06/2022    PROT 6.4 02/06/2022    BILITOT 1.4 02/06/2022    LABALBU 3.7 02/10/2022       Current warfarin drug-drug interactions include: steroids (variable/dose dependent)    Recent Labs     02/09/22  0357 02/10/22  0403 02/11/22  0406   HGB 9.8* 9.8* 11.5   * 165 198     Date Warfarin Dose INR Heparin or LMWH Comment   2/9 2 mg 2.6 -----    2/10 2 mg 2.5 -----    2/11 2 mg 2.2                     Assessment:  · Patient is a 81 y.o. female on warfarin for Atrial Fibrillation. Patient's home warfarin dosing regimen is 4mg daily.    · Goal INR 2 - 3  · INR 2.2 today    Plan:  · Warfarin 2mg tonight  · Daily PT/INR until the INR is stable within the therapeutic range  · Pharmacist will follow and monitor/adjust dosing as necessary    Thank you for this consult,    Julian Cortez, [facility] 2/11/2022 12:08 PM

## 2022-02-11 NOTE — PROGRESS NOTES
[facility] MD Petra Kovacs MD Samuel Felling, MD Pennelope Liner, MD Jessica Fuentes, ESTELLE Soto, NIDIA  Progress Note    2/11/2022    SUBJECTIVE:   2/6: Seen this early afternoon. Remains critically ill, intubated via ETT. Vent setting stable. BP stable. No meaningful neurologic recovery, she is due for transfer to [address] [place] [address] for further neurologic evaluation. 2/7: Patient was seen, evaluated this afternoon she remains in critical condition with mechanical ventilation via ETT, vital signs remained stable with adequate urine output for now. Case was discussed with the staff and the MICU nurse, patient white count today is 30.1 with hemoglobin of 9.2 no BMP today yesterday his BUN was 54 and creatinine is 1.3.  2/8: Remains critically ill. Intubated via ETT. FiO2 30% PEEP 5 unresponsive to verbal and tactile stimuli. Hemodynamically stable. 2/9: Patient remains in critical medical condition, she still on mechanical ventilation with no dramatic improvements in clinical status, her BUN today is 55 and creatinine is 1.1 with resolving ISIDRO. 2/10 : mechanically ventilated via ET tube . In isolation for COVID 19 PNA . Remain critically ill . Anand Bream is resolving  2/11: Patient was seen in ICU still on mechanical ventilation in isolation for COVID-19 pneumonia condition remains still critically ill, her renal function has been stabilizing and improving today BUN is 58 and creatinine is 1.1  We will sign off at this point please call us back if needed. PROBLEM LIST:    Active Problems:    Acute respiratory failure due to COVID-19 [facility])    Acute respiratory failure (HCC)    Cerebrovascular accident (CVA) due to embolism of cerebral artery ([facility] [address]  Resolved Problems:    * No resolved hospital problems.  *         DIET:    Diet NPO  ADULT TUBE FEEDING; Nasogastric; Diabetic; Continuous; 20; Yes; 10; Q 4 hours; 45; 30; Q 4 hours     MEDS (scheduled): MEDS (infusions):      MEDS (prn):       PHYSICAL EXAM:     Patient Vitals for the past 24 hrs:   BP Temp Temp src Pulse Resp SpO2   02/11/22 1500 138/64   72 18 98 %   02/11/22 1400 136/69   72 21 98 %   02/11/22 1300 (!) 154/73   71 20 97 %   02/11/22 1230    71 16 97 %   02/11/22 1200 (!) 160/79 96.8 °F (36 °C) Bladder 70 20 97 %   02/11/22 1100 130/64   70 15 97 %   02/11/22 1000    71 18 98 %   02/11/22 0900    72 20 98 %   02/11/22 0853    70 19 98 %   02/11/22 0800  96.4 °F (35.8 °C) Bladder 72 18 99 %   02/11/22 0700    66 17 98 %   02/11/22 0600    70 22 99 %   02/11/22 0500    71 19 99 %   02/11/22 0408    70 18 99 %   02/11/22 0400  96.6 °F (35.9 °C) Bladder 72 17 99 %   02/11/22 0300    70 19 98 %   02/11/22 0200    74 17 98 %   02/11/22 0100    70 14 98 %   02/11/22 0007    70 17 98 %   02/11/22 0000  97 °F (36.1 °C) Bladder 70 20 98 %   02/10/22 2300    70 19 99 %   02/10/22 2200    71 21 99 %   02/10/22 2100    71 13 98 %   02/10/22 2019    71 18 98 %   02/10/22 2000  96.8 °F (36 °C) Bladder 76 23 97 %   02/10/22 1900    70 17 96 %   02/10/22 1800    70 18 97 %   02/10/22 1700    70 19 97 %   02/10/22 1600  97.3 °F (36.3 °C) Bladder 67 25 96 %   @      Intake/Output Summary (Last 24 hours) at 2/11/2022 1528  Last data filed at 2/11/2022 1400  Gross per 24 hour   Intake 515 ml   Output 1940 ml   Net -1425 ml         Wt Readings from Last 3 Encounters:   02/08/22 234 lb 9.1 oz (106.4 kg)   02/04/22 203 lb (92.1 kg)   01/06/22 203 lb (92.1 kg)       Constitutional: Appears critically ill on ventilator via ETT, though in no acute distress on vent  HEENT: NC/AT,  mucus membranes moist, ETT extends from oropharynx  Neck: Trachea midline, no JVD, no bruit  Cardiovascular: S1, S2 regular rhythm, no murmur,or rub  Respiratory:  No crackles, no wheeze  Gastrointestinal:  Soft, nontender, nondistended, NABS  Ext: no edema, feet warm  Skin: dry, no rash  Neuro: Unresponsive to verbal and tactile stimuli. Moves all 4 extremities      DATA:    Recent Labs     02/09/22  0357 02/10/22  0403 02/11/22  0406   WBC 12.1* 12.5* 12.2*   HGB 9.8* 9.8* 11.5   HCT 29.4* 29.9* 35.4   MCV 94.5 95.5 97.0   * 165 198     Recent Labs     02/09/22  0357 02/10/22  0403 02/11/22  0406    140  135 141   K 4.1 4.1  4.0 3.6    106  102 106   CO2 19* 19*  19* 21*   MG 2.5 2.3  --    PHOS 3.4  --   --    BUN 55* 59*  59* 58*   CREATININE 1.1* 1.2*  1.2* 1.1*       Lab Results   Component Value Date    LABPROT 0.3 (H) 02/04/2022    LABPROT 0.3 02/04/2022    LABPROT 0.2 01/02/2022    LABPROT 0.2 01/02/2022       Assessment  1. Acute kidney injury, relatively mild at this point, hemodynamically mediated due to hypotension, atrial fibrillation with RVR, volume contraction. Urinary indices are consistent with prerenal azotemia. With IV fluid resuscitation and blood pressure improvement, azotemia is improving. Urine output has picked up. Resolving  2. Chronic kidney disease stage IIIa, baseline creatinine 1 to 1.2 mg/dL    3. Hyponatremia, chronic, in January admission serum sodium ranging 126 and 130 mmol/L. Etiology is multifactorial, due in part to chronically poorly compensated CHF exacerbated by atrial fibrillation, further exacerbated by hyperglycemia. With IV fluid resuscitation, serum sodium has improved somewhat. Urinary indices are consistent with hypovolemic hyponatremia     Resolved  4. Anemia due to CKD, chronic illness, phlebotomy associated with recent hospitalization    5. Edema, marked chronically due to combination of decompensated CHF, atrial fibrillation, CKD.   The degree of skin wrinkling increasing in her distal lower extremities suggest that she has recently been far more edematous      Azotemia improved, stable at baseline  [Na+] improved, stable at low normal  K+ improved status post supplementation    Recommendations  1. Continue intensive supportive care  2. Follow labs, UO  3.  Avoid nephrotoxins      Electronically signed by Paty Umanzor MD on 2/11/2022

## 2022-02-11 NOTE — PROGRESS NOTES
Palliative Care Department  [phone]  Palliative Care Progress Note  Provider Tsering Lozoya, APRN - CNP     Susan Washington  [id number]  Hospital Day: 6  Date of Initial Consult: 2/7/2022  Referring Provider: Nae Castillo MD  Palliative Medicine was consulted for assistance with:  Code Status Discussion, Goals of Care    HPI:   Susan Washington is a 81 y.o. with a medical history of atrial fibrillation on anticoagulation, hyperlipidemia, hypertension, CHF, DM, sinus node dysfunction s/p pacemaker placement, MR, TR, pulmonary hypertension who was admitted on 2/6/2022 from facility with a CHIEF COMPLAINT of confusion. Patient tested positive for Covid 10 days prior to admission. Patient was febrile in the ED, found to be in atrial fibrillation with RVR, and also hypotensive. Patient currently intubated in ICU. Palliative medicine consulted for goals of care and code status discussion. Patient transferred from [facility] to [facility] for neuro eval.   ASSESSMENT/PLAN:     Pertinent Hospital Diagnoses   Acute hypoxic respiratory failure due to Covid 19   Sepsis   Atrial fibrillation with RVR   ISIDRO   Acute on chronic CHF      Palliative Care Encounter / Counseling Regarding Goals of Care  Please see detailed goals of care discussion as below   At this time, Susan Washington, Does Not have capacity for medical decision-making.   Capacity is time limited and situation/question specific   During encounter, lauren Kramer, was surrogate medical decision-maker   Outcome of goals of care meeting  o Continue current plan over the weekend  o If no significant improvement by Monday open to rediscussing goals of care potential withdrawal  o He states they are very unlikely to pursue trach and PEG   Code status DNR-CCA   Advanced Directives: no POA or living will in [facility]   Surrogate/Legal NOK:  Irramu Jaimes, nephew, [phone]    Spiritual assessment: no spiritual distress identified  Bereavement and grief: to be determined  Referrals to: none today  SUBJECTIVE:   Details of Conversation:    Chart reviewed, update received from the bedside nurse, patient observed in the ICU unit. I did speak with the patient's nephew, Renee Lazo, again. He states he would like to see how she progresses over the weekend, and is open to readdressing goals of care on Monday pending her clinical progression. He does state they are very unlikely to pursue tracheostomy and PEG tube placement if she is not conscious, and I encouraged them to consider withdrawing care pursuing comfort focused care if there is no significant improvement in her clinical status by Monday. OBJECTIVE:   Prognosis: Poor    Physical Exam:  /63   Pulse 75   Temp 96.8 °F (36 °C) (Bladder)   Resp 17   Ht 5' (1.524 m)   Wt 234 lb 9.1 oz (106.4 kg)   SpO2 97%   BMI 45.81 kg/m²   Due to the current efforts to prevent transmission of COVID-19 and also the need to preserve PPE for other caregivers, a face-to-face encounter with the patient was not performed. That being said, all relevant records and diagnostic tests were reviewed, including laboratory results and imaging. Please reference any relevant documentation elsewhere. Care will be coordinated with the primary service. Objective data reviewed: labs, images, records, medication use, vitals and chart    Discussed patient and the plan of care with the other IDT members: Palliative Medicine IDT Team    Time/Communication  Greater than 50% of time spent, total 15 minutes in counseling and coordination of care at the bedside regarding goals of care, diagnosis and prognosis and see above. Thank you for allowing Palliative Medicine to participate in the care of [facility].

## 2022-02-11 NOTE — PROGRESS NOTES
Critical Care Progress Note         Patient Christina Montano   MRN -  [id number]   Acct # - [de-identified]   - 1935      Date of Admission -  2022  3:00 PM  Date of evaluation -  2022  [address] - [address]            ADMIT/CONSULT DETAILS     Reason for Admit/Consult   AMS   Unresponsiveness  COVID 19  Acute respiratory failure. Ventilator management. Floyd Barbour MD  Primary Care Physician - Mariah Bermeo MD         Subjective    No acute events overnight  VSS  Afebrile  No pressor requirements  Minimal neurological response. Mechanical ventilator. Past Medical History         Diagnosis Date    Arthritis     Atrial fibrillation ([address]     Hyperlipidemia     Hypertension     Non-pressure chronic ulcer of other part of right lower leg with fat layer exposed ([facility] [address] 2020    Thyroid disease         Past Surgical History           Procedure Laterality Date    PACEMAKER INSERTION  2019    PPM GENT CHANGE  (Critical access hospital)   DR. TONG     Current Medications   Current Medications    lidocaine PF  5 mL IntraDERmal Once    sodium chloride flush  5-40 mL IntraVENous 2 times per day    heparin flush  3 mL IntraVENous 2 times per day    warfarin  2 mg Oral Once    acetylcysteine  600 mg Oral BID    insulin glargine  10 Units SubCUTAneous Nightly    dexamethasone  10 mg IntraVENous Q24H    vancomycin  1,250 mg IntraVENous Q24H    warfarin placeholder: dosing by pharmacy   Other RX Placeholder    metoprolol tartrate  50 mg Oral BID    levetiracetam  750 mg IntraVENous Q12H    chlorhexidine  15 mL Mouth/Throat BID    mupirocin   Nasal Daily    insulin lispro  0-12 Units SubCUTAneous Q4H    sodium chloride flush  5-40 mL IntraVENous 2 times per day    piperacillin-tazobactam  3,375 mg IntraVENous Q8H    pantoprazole  40 mg IntraVENous Daily    And    sodium chloride (PF)  10 mL IntraVENous Daily     sodium chloride flush, sodium chloride, heparin flush, ipratropium-albuterol, midazolam, LORazepam, perflutren lipid microspheres, sodium chloride flush, sodium chloride, acetaminophen **OR** acetaminophen, glucose, dextrose, glucagon (rDNA), dextrose  IV Drips/Infusions   sodium chloride      sodium chloride      dextrose       Home Medications  Medications Prior to Admission: [] benzonatate (TESSALON) 200 MG capsule, Take 200 mg by mouth three times daily  dilTIAZem (CARDIZEM CD) 120 MG extended release capsule, Take 120 mg by mouth daily  docusate sodium (COLACE) 100 MG capsule, Take 100 mg by mouth daily  metOLazone (ZAROXOLYN) 2.5 MG tablet, Take 2.5 mg by mouth every other day  Multiple Vitamins-Minerals (THERAPEUTIC MULTIVITAMIN-MINERALS) tablet, Take 1 tablet by mouth daily  warfarin (COUMADIN) 4 MG tablet, Take 1 tablet by mouth daily  bumetanide (BUMEX) 1 MG tablet, Take 1 tablet by mouth 2 times daily  spironolactone (ALDACTONE) 25 MG tablet, Take 1 tablet by mouth daily  [] polyethylene glycol (GLYCOLAX) 17 g packet, Take 17 g by mouth daily as needed for Constipation  metoprolol (LOPRESSOR) 100 MG tablet, Take 200 mg by mouth 2 times daily   simvastatin (ZOCOR) 20 MG tablet, Take 20 mg by mouth nightly  levothyroxine (SYNTHROID) 125 MCG tablet, Take 125 mcg by mouth Daily     Diet/Nutrition   Diet NPO  ADULT TUBE FEEDING; Nasogastric; Diabetic; Continuous; 20; Yes; 10; Q 4 hours; 45; 30; Q 4 hours    Allergies   Patient has no known allergies. Social History   Tobacco   reports that she has never smoked. She has never used smokeless tobacco.    Alcohol     reports no history of alcohol use. Occupational history :    Family History   No family history on file. ROS     REVIEW OF SYSTEMS:  Review of systems not obtained due to patient factors - intubation    Lines and Devices    22  Right Femoral TLC    Right radial arterial line   22 Urethral catheter.    22  OGT  22 ETT # 7.5      Mechanical Ventilation Data   VENT SETTINGS (Comprehensive)  Vent Information  $Ventilation: $Subsequent Day  Skin Assessment: Clean, dry, & intact  Equipment ID: [id number]  Vent Type: 980  Vent Mode: AC/VC  Vt Ordered: 450 mL  Rate Set: 10 bmp  Peak Flow: 65 L/min  Pressure Support: 0 cmH20  FiO2 : 30 %  SpO2: 97 %  SpO2/FiO2 ratio: 323.33  Sensitivity: 3  PEEP/CPAP: 5  I Time/ I Time %: 0 s  Humidification Source: Heated wire  Humidification Temp: 36.1  Humidification Temp Measured: 37  Circuit Condensation: Drained  Additional Respiratory  Assessments  Pulse: 71  Resp: 20  SpO2: 97 %  Position: Semi-Pineda's  Humidification Source: Heated wire  Humidification Temp: 36.1  Circuit Condensation: Drained  Oral Care: Mouth swabbed,Mouth suctioned  Subglottic Suction Done?: Yes  Cuff Pressure (cm H2O): 29 cm H2O    ABG  Lab Results   Component Value Date    PH 7.452 2022    PCO2 29.7 2022    PO2 98.0 2022    HCO3 20.3 2022    O2SAT 97.1 2022     Lab Results   Component Value Date    MODE AC 2022           Vitals    height is 5' (1.524 m) and weight is 234 lb 9.1 oz (106.4 kg). Her bladder temperature is 96.8 °F (36 °C). Her blood pressure is 154/73 (abnormal) and her pulse is 71. Her respiration is 20 and oxygen saturation is 97%.        Temperature Range: Temp: 96.8 °F (36 °C) Temp  Av.8 °F (36 °C)  Min: 96.4 °F (35.8 °C)  Max: 97.3 °F (36.3 °C)  BP Range:  Systolic (43NOT), DHB:981 , Min:130 , WMK:140     Diastolic (37IEB), FRB:92, Min:64, Max:79    Pulse Range: Pulse  Av.9  Min: 66  Max: 79  Respiration Range: Resp  Av.7  Min: 13  Max: 25  Current Pulse Ox[de-identified]  SpO2: 97 %  24HR Pulse Ox Range:  SpO2  Av.9 %  Min: 96 %  Max: 100 %  Oxygen Amount and Delivery:        I/O (24 Hours)    Patient Vitals for the past 8 hrs:   BP Temp Temp src Pulse Resp SpO2   22 1300 (!) 154/73   71 20 97 %   22 1200 (!) 160/79 96.8 °F (36 °C) Bladder 70 20 97 %   02/11/22 1100 130/64   70 15 97 %   02/11/22 1000    71 18 98 %   02/11/22 0900    72 20 98 %   02/11/22 0853    70 19 98 %   02/11/22 0800  96.4 °F (35.8 °C) Bladder 72 18 99 %   02/11/22 0700    66 17 98 %   02/11/22 0600    70 22 99 %       Intake/Output Summary (Last 24 hours) at 2/11/2022 1312  Last data filed at 2/11/2022 1300  Gross per 24 hour   Intake 590 ml   Output 2220 ml   Net -1630 ml     I/O last 3 completed shifts: In: 520 [I.V.:440; NG/GT:80]  Out: 6539 [Urine:3625]   Date 02/11/22 0000 - 02/11/22 2359   Shift 8934-0097 1952-9324 1173-9318 24 Hour Total   INTAKE   I.V.(mL/kg/hr)  70  70   Shift Total(mL/kg)  70(0.7)  70(0.7)   OUTPUT   Urine(mL/kg/hr) 600(0.7) 355  955   Emesis/NG output  40  40   Shift Total(mL/kg) 600(5.6) 395(3.7)  995(9.4)   Weight (kg) 106.4 106.4 106.4 106.4     Patient Vitals for the past 96 hrs (Last 3 readings):   Weight   02/08/22 0200 234 lb 9.1 oz (106.4 kg)     Exam     PHYSICAL EXAM:  CONSTITUTIONAL:  Lying in bed monitored. Orally intubated NAD. EYES:  lids and lashes normal and left pupils 4 to 5mm right pupil 3 mm reactive bilaterally sluggish. Upward gaze present   ENT:  normocepalic, without obvious abnormality, atraumatic  NECK:  supple, symmetrical, trachea midline, skin normal and no stridor  HEMATOLOGIC/LYMPHATICS:  no cervical lymphadenopathy and no supraclavicular lymphadenopathy  LUNGS:  Mechanical ventilator. Equal expansion. BBS aerating all lobes. Diminished bilaterally in bases. Few upper lobe scattered rhonchi present. CARDIOVASCULAR:  irregularly irregular rhythm and normal S1 and S2  ABDOMEN:  No scars, normal bowel sounds, soft, non-distended, non-tender, no masses palpated, no hepatosplenomegally  MUSCULOSKELETAL:  there is no redness, warmth, or swelling of the joints  tone is normal  NEUROLOGIC:  No response to noxious stimuli does not withdraw to painful stimuli. Gag and cocugh present.   Limited neurological exam.   SKIN:  Right lower leg wound present. Data   Old records and images have been reviewed    Lab Results   CBC     Lab Results   Component Value Date    WBC 12.2 02/11/2022    RBC 3.65 02/11/2022    HGB 11.5 02/11/2022    HCT 35.4 02/11/2022     02/11/2022    MCV 97.0 02/11/2022    MCH 31.5 02/11/2022    MCHC 32.5 02/11/2022    RDW 17.2 02/11/2022    NRBC 1.7 02/09/2022    METASPCT 1.7 02/09/2022    LYMPHOPCT 2.6 02/11/2022    PROMYELOPCT 0.9 02/06/2022    MONOPCT 8.0 02/11/2022    MYELOPCT 1.8 02/11/2022    BASOPCT 0.3 02/11/2022    MONOSABS 0.98 02/11/2022    LYMPHSABS 0.37 02/11/2022    EOSABS 0.00 02/11/2022    BASOSABS 0.00 02/11/2022       BMP   Lab Results   Component Value Date     02/11/2022    K 3.6 02/11/2022     02/11/2022    CO2 21 02/11/2022    BUN 58 02/11/2022    CREATININE 1.1 02/11/2022    GLUCOSE 190 02/11/2022    CALCIUM 9.4 02/11/2022       LFTS  Lab Results   Component Value Date    ALKPHOS 112 02/06/2022    ALT 13 02/06/2022    AST 34 02/06/2022    PROT 6.4 02/06/2022    BILITOT 1.4 02/06/2022    LABALBU 3.7 02/10/2022       INR  Recent Labs     02/09/22  0357 02/10/22  0403 02/11/22  0406   PROTIME 28.8* 27.7* 24.1*   INR 2.6 2.5 2.2       APTT  No results for input(s): APTT in the last 72 hours. Lactic Acid  Lab Results   Component Value Date    LACTA 2.1 02/06/2022    LACTA 2.0 02/06/2022    LACTA 2.1 02/06/2022        BNP   No results for input(s): BNP in the last 72 hours. Cultures     Recent Labs     02/09/22  1035   BC 24 Hours no growth     Recent Labs     02/09/22  1038   BLOODCULT2 24 Hours no growth       No results for input(s): LABURIN in the last 72 hours. Radiology   CXR        CT Scans  Head CT 02/05/22    No acute intracranial hemorrhage mass effect or midline shift. No evidence of hydrocephalus. SYSTEMS ASSESSMENT    Neuro   Acute encephalopathy unknown etiology metabolic or anoxic.    Seizure disorder  Questionable cardio embolic  Stroke  Neurology consulted appreciate recommendations. Continue Keppra IV for seizure prophylaxis. 750 mg BID. Ativan prn for seizures  Seizure precautions. Unable to obtain MRI due to pacemaker   CT head no intracranial hemorrhage. Respiratory   Acute hypoxic respiratory failure secondary to COVID 19 pneumonia. COVID + 1/25 received no treatment  Respiratory panel COVID + 2/11/22  Decadron IV 10 mg for 5 days. Enhanced droplet precautions  Chest x ray in AM   Daily ABG while intubated. Plan for SBT     Will not extubate due to neurological status and inability to protect airway. Chest x ray improved with mucomyst and aggressive pulmonary toilette   Position with right side down left lung up   Chest pt   Wean oxygen as tolerated. Keep O2 sat 90-92%    Cardiovascular   Acute on chronic CHF   Cardiomegaly   Echo with bubble study. EF 50% Trace ,mitral regurgitation, mild pulmonary hypertension, No aoratic stenosis. Metoprolol 50 g BID   Maintain MAP > 65   Continuous telemetry. ProBNP  14,350  02/10  Cardiology consulted for Afib with RVR  Pharmacy to dose Coumadin INR 2.2  Goal INR 2 to 3     Gastrointestinal   NPO  Protonix daily  Tube feedings at goal.  Glucerna. Dietary consulted appreciate input. Renal   Acute on chronic kidney injury   Chronic hyponatremia sodium range 126 to 130. Normalized 141   Avoid nephrotoxic medications. Strict I and O. Renal dose medications for GFR. Baseline creatine 1.2 to 1.3  IVF discontinued   Nephrology consulted Dr. Kemal Epstein group       Infectious Disease   Sepsis   Infectious disease consulted appreciated recommendations. Gram + cocci beta strep in blood   Gram negative rods , Proteus species, Staph aureus species wound cultures   Resp culture candida and staph. Will check fungitell   Improving WBC 12.2>   12.5 > 12.1  Wound Culture proteus species. MRSA nasal colonization   Bactroban nasally.    Continue antibiotics as per ID  Continue Zosyn. Vancomycin added for staph. To stop 2/16/22  Blood cultures repeated. Procal 0.99 decreasing from 4.55  2/06/22  Podiatry consulted for foot wound. Hematology/Oncology   Supratherapetic INR received reversal agent  Coumadin restarted   INR 2.2  Goal INR 2.0 to 3.0  Pharmacy consulted for warfarin     Endocrine   No record of diabetes. Hgb A1C 7.0   Low dose insulin coverage. Hypoglycemic protocol. Hyponatremia chronic  133    Social/Spiritual/DNR/Other   DNR CCA  Palliative care consulted. Palliative spoke with family yesterday, family would like to continue care       Plan of care discussed in multidisciplinary rounds. Dr. Lucho Aviles is the collaborating physician. Milton Zamudio CNP   Medical Intensive Care Unit.

## 2022-02-11 NOTE — PROGRESS NOTES
[facility] MD Aundrea Jarvis, MD Nayana Henderson, MD Abelardo Short, MD Rakesh Whipple, CNP   Ann-Marie Soto, NIDIA  Progress Note    2/10/2022    SUBJECTIVE:   2/6: Seen this early afternoon. Remains critically ill, intubated via ETT. Vent setting stable. BP stable. No meaningful neurologic recovery, she is due for transfer to [address] [place] [address] for further neurologic evaluation. 2/7: Patient was seen, evaluated this afternoon she remains in critical condition with mechanical ventilation via ETT, vital signs remained stable with adequate urine output for now. Case was discussed with the staff and the MICU nurse, patient white count today is 30.1 with hemoglobin of 9.2 no BMP today yesterday his BUN was 54 and creatinine is 1.3.  2/8: Remains critically ill. Intubated via ETT. FiO2 30% PEEP 5 unresponsive to verbal and tactile stimuli. Hemodynamically stable. 2/9: Patient remains in critical medical condition, she still on mechanical ventilation with no dramatic improvements in clinical status, her BUN today is 55 and creatinine is 1.1 with resolving ISIDRO. 2/10 : mechanically ventilated via ET tube . In isolation for COVID 19 PNA . Remain critically ill . Isidro is resolving    PROBLEM LIST:    Active Problems:    Acute respiratory failure due to COVID-19 [facility])    Acute respiratory failure (HCC)    Cerebrovascular accident (CVA) due to embolism of cerebral artery ([facility] [address]  Resolved Problems:    * No resolved hospital problems. *         DIET:    Diet NPO     MEDS (scheduled):       MEDS (infusions):      MEDS (prn):       PHYSICAL EXAM:     Patient Vitals for the past 24 hrs:   Temp Temp src Pulse Resp SpO2   02/10/22 2019   71 18 98 %   02/10/22 2000 96.8 °F (36 °C) Bladder 76 23 97 %   02/10/22 1900   70 17 96 %   02/10/22 1800   70 18 97 %   02/10/22 1700   70 19 97 %   02/10/22 1600 97.3 °F (36.3 °C) Bladder 67 25 96 %   02/10/22 1500   79 18 97 % 02/10/22 1400   71 22 100 %   02/10/22 1300   70 17 97 %   02/10/22 1200 97.5 °F (36.4 °C) Bladder 67 17 97 %   02/10/22 1134   75 21 97 %   02/10/22 1100   72 17 97 %   02/10/22 1000   70 19 96 %   02/10/22 0917   72 18 97 %   02/10/22 0900   71 19 97 %   02/10/22 0800 97.9 °F (36.6 °C) Bladder 70 18 97 %   02/10/22 0700   70 19 96 %   02/10/22 0600   70 26 96 %   02/10/22 0500   72 22 96 %   02/10/22 0400 97.7 °F (36.5 °C) Bladder 70 19 96 %   02/10/22 0358   72 21 96 %   02/10/22 0300   75 20 96 %   02/10/22 0200   73 22 95 %   02/10/22 0100   73 21 95 %   02/10/22 0035   70 19 95 %   02/10/22 0000 97 °F (36.1 °C) Bladder 71 15 96 %   02/09/22 2300   71 28 96 %   02/09/22 2200   70 16 96 %   02/09/22 2104   59 19 96 %   02/09/22 2100   70 17 95 %   @      Intake/Output Summary (Last 24 hours) at 2/10/2022 2034  Last data filed at 2/10/2022 2000  Gross per 24 hour   Intake 440 ml   Output 2675 ml   Net -2235 ml         Wt Readings from Last 3 Encounters:   02/08/22 234 lb 9.1 oz (106.4 kg)   02/04/22 203 lb (92.1 kg)   01/06/22 203 lb (92.1 kg)       Constitutional: Appears critically ill on ventilator via ETT, though in no acute distress on vent  HEENT: NC/AT,  mucus membranes moist, ETT extends from oropharynx  Neck: Trachea midline, no JVD, no bruit  Cardiovascular: S1, S2 regular rhythm, no murmur,or rub  Respiratory:  No crackles, no wheeze  Gastrointestinal:  Soft, nontender, nondistended, NABS  Ext: no edema, feet warm  Skin: dry, no rash  Neuro: Unresponsive to verbal and tactile stimuli.   Moves all 4 extremities      DATA:    Recent Labs     02/08/22  0330 02/09/22  0357 02/10/22  0403   WBC 9.5 12.1* 12.5*   HGB 9.2* 9.8* 9.8*   HCT 27.6* 29.4* 29.9*   MCV 93.2 94.5 95.5   PLT 82* 123* 165     Recent Labs     02/08/22  0330 02/09/22  0357 02/10/22  0403    136 140  135   K 3.6 4.1 4.1  4.0   CL 99 102 106  102   CO2 17* 19* 19*  19*   MG 2.4 2.5 2.3 PHOS 3.0 3.4  --    BUN 49* 55* 59*  59*   CREATININE 1.1* 1.1* 1.2*  1.2*       Lab Results   Component Value Date    LABPROT 0.3 (H) 02/04/2022    LABPROT 0.3 02/04/2022    LABPROT 0.2 01/02/2022    LABPROT 0.2 01/02/2022       Assessment  1. Acute kidney injury, relatively mild at this point, hemodynamically mediated due to hypotension, atrial fibrillation with RVR, volume contraction. Urinary indices are consistent with prerenal azotemia. With IV fluid resuscitation and blood pressure improvement, azotemia is improving. Urine output has picked up. Resolving  2. Chronic kidney disease stage IIIa, baseline creatinine 1 to 1.2 mg/dL    3. Hyponatremia, chronic, in January admission serum sodium ranging 126 and 130 mmol/L. Etiology is multifactorial, due in part to chronically poorly compensated CHF exacerbated by atrial fibrillation, further exacerbated by hyperglycemia. With IV fluid resuscitation, serum sodium has improved somewhat. Urinary indices are consistent with hypovolemic hyponatremia     Resolved  4. Anemia due to CKD, chronic illness, phlebotomy associated with recent hospitalization    5. Edema, marked chronically due to combination of decompensated CHF, atrial fibrillation, CKD. The degree of skin wrinkling increasing in her distal lower extremities suggest that she has recently been far more edematous      Azotemia improved, stable at baseline  [Na+] improved, stable at low normal  K+ improved status post supplementation    Recommendations  1. Continue intensive supportive care  2. Follow labs, UO  3.  Avoid nephrotoxins      Electronically signed by Kalen Henry MD on 2/10/2022

## 2022-02-11 NOTE — PROGRESS NOTES
Infectious Disease  Progress Note  [facility]    Chief Complaint: no complaint    Subjective: grp b strep sepsis    Scheduled Meds:   lidocaine PF  5 mL IntraDERmal Once    sodium chloride flush  5-40 mL IntraVENous 2 times per day    heparin flush  3 mL IntraVENous 2 times per day    acetylcysteine  600 mg Oral BID    insulin glargine  10 Units SubCUTAneous Nightly    dexamethasone  10 mg IntraVENous Q24H    vancomycin  1,250 mg IntraVENous Q24H    warfarin placeholder: dosing by pharmacy   Other RX Placeholder    metoprolol tartrate  50 mg Oral BID    levetiracetam  750 mg IntraVENous Q12H    chlorhexidine  15 mL Mouth/Throat BID    mupirocin   Nasal Daily    insulin lispro  0-12 Units SubCUTAneous Q4H    sodium chloride flush  5-40 mL IntraVENous 2 times per day    piperacillin-tazobactam  3,375 mg IntraVENous Q8H    pantoprazole  40 mg IntraVENous Daily    And    sodium chloride (PF)  10 mL IntraVENous Daily     Continuous Infusions:   sodium chloride      sodium chloride      dextrose       PRN Meds:sodium chloride flush, sodium chloride, heparin flush, ipratropium-albuterol, midazolam, LORazepam, perflutren lipid microspheres, sodium chloride flush, sodium chloride, acetaminophen **OR** acetaminophen, glucose, dextrose, glucagon (rDNA), dextrose    Patient Vitals for the past 24 hrs:   Temp Temp src Pulse Resp SpO2   02/11/22 0800 96.4 °F (35.8 °C) Bladder 72 18 99 %   02/11/22 0700   66 17 98 %   02/11/22 0600   70 22 99 %   02/11/22 0500   71 19 99 %   02/11/22 0408   70 18 99 %   02/11/22 0400 96.6 °F (35.9 °C) Bladder 72 17 99 %   02/11/22 0300   70 19 98 %   02/11/22 0200   74 17 98 %   02/11/22 0100   70 14 98 %   02/11/22 0007   70 17 98 %   02/11/22 0000 97 °F (36.1 °C) Bladder 70 20 98 %   02/10/22 2300   70 19 99 %   02/10/22 2200   71 21 99 %   02/10/22 2100   71 13 98 %   02/10/22 2019   71 18 98 %   02/10/22 2000 96.8 °F (36 °C) Bladder 76 23 97 %   02/10/22 1900   70 17 96 %   02/10/22 1800   70 18 97 %   02/10/22 1700   70 19 97 %   02/10/22 1600 97.3 °F (36.3 °C) Bladder 67 25 96 %   02/10/22 1500   79 18 97 %   02/10/22 1400   71 22 100 %   02/10/22 1300   70 17 97 %   02/10/22 1200 97.5 °F (36.4 °C) Bladder 67 17 97 %   02/10/22 1134   75 21 97 %   02/10/22 1100   72 17 97 %   02/10/22 1000   70 19 96 %   02/10/22 0917   72 18 97 %   02/10/22 0900   71 19 97 %       CBC with Differential:    Lab Results   Component Value Date    WBC 12.2 02/11/2022    RBC 3.65 02/11/2022    HGB 11.5 02/11/2022    HCT 35.4 02/11/2022     02/11/2022    MCV 97.0 02/11/2022    MCH 31.5 02/11/2022    MCHC 32.5 02/11/2022    RDW 17.2 02/11/2022    NRBC 1.7 02/09/2022    METASPCT 1.7 02/09/2022    LYMPHOPCT 2.6 02/11/2022    PROMYELOPCT 0.9 02/06/2022    MONOPCT 8.0 02/11/2022    MYELOPCT 1.8 02/11/2022    BASOPCT 0.3 02/11/2022    MONOSABS 0.98 02/11/2022    LYMPHSABS 0.37 02/11/2022    EOSABS 0.00 02/11/2022    BASOSABS 0.00 02/11/2022     CMP:    Lab Results   Component Value Date     02/11/2022    K 3.6 02/11/2022     02/11/2022    CO2 21 02/11/2022    BUN 58 02/11/2022    CREATININE 1.1 02/11/2022    GFRAA 57 02/11/2022    LABGLOM 47 02/11/2022    GLUCOSE 190 02/11/2022    PROT 6.4 02/06/2022    LABALBU 3.7 02/10/2022    CALCIUM 9.4 02/11/2022    BILITOT 1.4 02/06/2022    ALKPHOS 112 02/06/2022    AST 34 02/06/2022    ALT 13 02/06/2022       BP (!) 145/52   Pulse 72   Temp 96.4 °F (35.8 °C) (Bladder)   Resp 18   Ht 5' (1.524 m)   Wt 234 lb 9.1 oz (106.4 kg)   SpO2 99%   BMI 45.81 kg/m²     Physical Exam  Const/Neuro- unchanged, no signs of acute distress, Alert  ENMT- Within Normal Limits, Normocephalic, mucous membranes pink/moist, No thrush  Neck: Neck supple  Heart- Regular, Rate, Rhythm- no murmur appreciated. Lungs- clear to ascultation. Respirations even and nonlabored.   Abdomen- Soft, bowel sounds positive, non tender  Musculo/Extremities-  Equal and symmetrical, no edema. No tenderness. Skin:  Warm and dry, free from rashes. Cultures reviewed    Radiology reviewed  XR CHEST PORTABLE   Final Result   1. Improved aeration of the right upper lobe when compared to the prior study. 2. Persistent patchy airspace disease within the right perihilar region and   right lower lobe. 3. Stable marked cardiomegaly. XR CHEST PORTABLE   Final Result   1. Endotracheal tube close to the chika. To be at the level of the upper   contour of the arch of the aorta can be pulled back 2 cm. 2.  Persistent cardiomegaly with signs of volume overload. See above   comments. XR CHEST PORTABLE   Final Result   1. There is no interval change in the multifocal bilateral airspace disease. 2. Bilateral pleural effusions. 3. Cardiomegaly. XR CHEST PORTABLE   Final Result   1. Medical support devices as above. 2.  Retrocardiac and bibasilar opacities appears stable. Medial right upper   lung opacity appears stable. Atherosclerotic disease and cardiomegaly no new   cardiopulmonary pathology.          XR CHEST PORTABLE    (Results Pending)                           Assessment  grp b strep septic shock and bacteremia   Pacer Rule out pacer infection and endocarditis   cRP 53 and procal 7.53   Started on decadron   Atrial fibrillation   WBC 13,100 better   grp b strep endocardits not as common as other organisms   Will have to wait to see if pacer seeded   CXR retrocardiac and bibasilar opacities appears stable Medial right upper lung opacity appears stable   CT head diffuse areas of low attenuation/edema iinvolving the convexities bilaterally not present on scan from 1;16pm  Neurology note  Bilateral areas of edema suspicious for CVA  resp culture candida   Check fungitell  Candida albicans not a cause of pneumonia  Wbc down to 12,200  Afebrile   Wound culture polymicrobial   Staph aureus and mixed gm neg rods   MRSA in sputum    Plan  Cont zosyn need to determine stop date   With pacer may be looking at four weeks  Stop vanco on feb 16   Should be seven days will double check start date   Bc repeated   Long talk with Dr Alma Mao  from cardiology  He did 2D  Hold off on LON until more stable   Agree   2D echo  No valvular echodensity or vegetation seen   [facility] podiatry consult for left foot        Electronically signed by Jayesh Quigley MD on 2/11/2022 at 8:38 AM

## 2022-02-11 NOTE — CONSULTS
Podiatry Consult H&P  2/11/2022   Evangelina Michaels       SUBJECTIVE: This is an 81 y. o.covid positive female with past medical history significant for CVA, PAD, and venous stasis dermatitis who is being seen at bedside for ulceration to left heel and venous ulcers on right leg. Patient currently on mechanical ventilation thus history obtained through thorough chart check. Wounds currently appear stable and dressing change was performed at bedside. Foot xrays and noninvasive vascular studies have not been performed and will be ordered today with hopes of a clearer picture regarding any underlying vascular or venous diseases. Past Medical History:   Diagnosis Date    Arthritis     Atrial fibrillation ([address]     Hyperlipidemia     Hypertension     Non-pressure chronic ulcer of other part of right lower leg with fat layer exposed ([facility] [address] 8/21/2020    Thyroid disease         Past Surgical History:   Procedure Laterality Date    PACEMAKER INSERTION  06/04/2019    PPM GENT CHANGE  ([facility])   DR. TONG         No family history on file. Social History     Tobacco Use    Smoking status: Never Smoker    Smokeless tobacco: Never Used   Substance Use Topics    Alcohol use: No        Prior to Admission medications    Medication Sig Start Date End Date Taking?  Authorizing Provider   dilTIAZem (CARDIZEM CD) 120 MG extended release capsule Take 120 mg by mouth daily    Historical Provider, MD   docusate sodium (COLACE) 100 MG capsule Take 100 mg by mouth daily    Historical Provider, MD   metOLazone (ZAROXOLYN) 2.5 MG tablet Take 2.5 mg by mouth every other day 2/2/22 2/12/22  Historical Provider, MD   Multiple Vitamins-Minerals (THERAPEUTIC MULTIVITAMIN-MINERALS) tablet Take 1 tablet by mouth daily    Historical Provider, MD   warfarin (COUMADIN) 4 MG tablet Take 1 tablet by mouth daily 1/7/22   Aracely Flores MD   bumetanide (BUMEX) 1 MG tablet Take 1 tablet by mouth 2 times daily 1/7/22   Aracely Flores MD spironolactone (ALDACTONE) 25 MG tablet Take 1 tablet by mouth daily 1/8/22   Kelli Eagle MD   metoprolol (LOPRESSOR) 100 MG tablet Take 200 mg by mouth 2 times daily     Historical Provider, MD   simvastatin (ZOCOR) 20 MG tablet Take 20 mg by mouth nightly    Historical Provider, MD   levothyroxine (SYNTHROID) 125 MCG tablet Take 125 mcg by mouth Daily     Historical Provider, MD        Patient has no known allergies. OBJECTIVE:        Vitals:    02/11/22 1500   BP: 138/64   Pulse: 72   Resp: 18   Temp:    SpO2: 98%              EXAM:        Pt is AAOx3    Vascular Exam: Pedal pulses nonpalpable b/l. Lower extremity warm to cool from proximal tibial tuberosity to distal digits b/l. No pedal hair growth noted b/l. Cap refill brisk to all digits. No edema to b/l foot      Neuro Exam: Deferred due to patient's condition. Dermatologic Exam:   RIGHT:  Full thickness ulceration noted to left posterior heel with surrounding cellulitis and firm, necrotic wound base. No focal openings, no evidence of purulence/proximal streaking/crepitus/malodor. Chronic skin hyperpigmentation secondary to chronic venous stasis dermatitis noted  Tissue injury to medial 1st mpj head. No acute signs of infection. LEFT:  Venous ulceration noted to left anterior leg and wrapping circumferentially to posterior leg. Chronic hyperpigmentation secondary to venous stasis dermatitis noted to skin. Skin is chafed and raw in appearance; healthy wound bed noted with pinpoint bleeding      MSK: ROM testing deferred. Soft, compressible posterior muscle compartments b/l. Evidence of b/l hallux valgus deformity.                          Current Facility-Administered Medications   Medication Dose Route Frequency Provider Last Rate Last Admin    lidocaine PF 1 % injection 5 mL  5 mL IntraDERmal Once HIMANSHU Manjarrez CNP        sodium chloride flush 0.9 % injection 5-40 mL  5-40 mL IntraVENous 2 times per day HIMANSHU Manjarrez CNP 10 mL at 02/11/22 0920    sodium chloride flush 0.9 % injection 5-40 mL  5-40 mL IntraVENous PRN Bryn Altamirano, APRN - CNP   10 mL at 02/11/22 1236    0.9 % sodium chloride infusion  25 mL IntraVENous PRN Bryn Altamirano, APRN - CNP        heparin flush 100 UNIT/ML injection 300 Units  3 mL IntraVENous 2 times per day Bryn Altamirano, APRN - CNP        heparin flush 100 UNIT/ML injection 300 Units  3 mL IntraCATHeter PRN Bryn Altamirano, APRN - CNP        warfarin (COUMADIN) tablet 2 mg  2 mg Oral Once Bryn Altamirano APRN - CNP        acetylcysteine (MUCOMYST) 20 % solution 600 mg  600 mg Oral BID Bryn Altamirano, APRN - CNP        ipratropium-albuterol (DUONEB) nebulizer solution 1 ampule  1 ampule Inhalation Q4H PRN Bryn Altamirano, APRN - CNP        insulin glargine-yfgn (SEMGLEE-YFGN) injection vial 10 Units  10 Units SubCUTAneous Nightly Bryn Altamirano APRN - CNP   10 Units at 02/10/22 2038    dexamethasone (DECADRON) injection 10 mg  10 mg IntraVENous Q24H Bryn Altamirano, APRN - CNP   10 mg at 02/11/22 0630    vancomycin (VANCOCIN) 1,250 mg in dextrose 5 % 250 mL IVPB  1,250 mg IntraVENous Q24H Joaquín Calzada MD   Stopped at 02/11/22 1354    warfarin placeholder: dosing by pharmacy   Other [address], APRN - CNP        metoprolol tartrate (LOPRESSOR) tablet 50 mg  50 mg Oral BID Bryn Altamirano, APRN - CNP   50 mg at 02/11/22 0816    levETIRAcetam (KEPPRA) 750 mg/50 mL IVPB  750 mg IntraVENous Q12H HIMANSHU Mccarty  mL/hr at 02/11/22 1506 750 mg at 02/11/22 1506    chlorhexidine (PERIDEX) 0.12 % solution 15 mL  15 mL Mouth/Throat BID Bryn Altamirano, APRN - CNP   15 mL at 02/11/22 0808    mupirocin (BACTROBAN) 2 % ointment   Nasal Daily WaHIMANSHU Reeves CNP   Given at 02/11/22 0808    insulin lispro (HUMALOG) injection vial 0-12 Units  0-12 Units SubCUTAneous Q4H HIMANSHU De Jesus CNP   4 Units at 02/11/22 1510    midazolam PF (VERSED) injection 2 mg  2 mg IntraVENous Q4H PRN Serrano Florinda, APRN - CNP   2 mg at 02/11/22 1236    LORazepam (ATIVAN) injection 1 mg  1 mg IntraVENous Q5 Min PRN Beth Lust, APRN - CNP   1 mg at 02/07/22 0331    perflutren lipid microspheres (DEFINITY) injection 1.65 mg  1.5 mL IntraVENous ONCE PRN Magui Gould MD        sodium chloride flush 0.9 % injection 5-40 mL  5-40 mL IntraVENous 2 times per day Robert Ratliff MD   10 mL at 02/11/22 0809    sodium chloride flush 0.9 % injection 5-40 mL  5-40 mL IntraVENous PRN Robert Ratliff MD        0.9 % sodium chloride infusion  25 mL IntraVENous PRN Robert Ratliff MD        acetaminophen (TYLENOL) tablet 650 mg  650 mg Oral Q6H PRN Robert Ratliff MD   650 mg at 02/08/22 2207    Or    acetaminophen (TYLENOL) suppository 650 mg  650 mg Rectal Q6H PRN Robert Ratliff MD        piperacillin-tazobactam (ZOSYN) 3,375 mg in dextrose 5 % 100 mL IVPB extended infusion (mini-bag)  3,375 mg IntraVENous Q8H Robert Ratliff MD   Stopped at 02/11/22 1354    pantoprazole (PROTONIX) injection 40 mg  40 mg IntraVENous Daily Destiny Aaron, APRN - CNP   40 mg at 02/11/22 0810    And    sodium chloride (PF) 0.9 % injection 10 mL  10 mL IntraVENous Daily Beth Lust, APRN - CNP   10 mL at 02/11/22 0809    glucose (GLUTOSE) 40 % oral gel 15 g  15 g Oral PRN Destiny Aaron, APRN - CNP        dextrose 50 % IV solution  12.5 g IntraVENous PRN Beth Lust, APRN - CNP        glucagon (rDNA) injection 1 mg  1 mg IntraMUSCular PRN Beth Lust, APRN - CNP        dextrose 5 % solution  100 mL/hr IntraVENous PRN Beth Lust, APRN - CNP            Lab Results   Component Value Date    WBC 12.2 (H) 02/11/2022    HCT 35.4 02/11/2022    HGB 11.5 02/11/2022     02/11/2022     02/11/2022    K 3.6 02/11/2022     02/11/2022    CO2 21 (L) 02/11/2022    BUN 58 (H) 02/11/2022    CREATININE 1.1 (H) 02/11/2022    GLUCOSE 190 (H) 02/11/2022    CRP 2.0 (H) 02/10/2022 Radiographs:    ASSESEMENT:  -Full thickness ulceration, left heel  -Venous stasis ulceration, right leg  -Chronic venous stasis dermatitis, b/l leg   -PAD  -Covid-19 infection      PLAN:  - Examined and evaluated  - All labs, imaging, and charts reviewed  -NIVS ordered. Results pending  -Foot and tib/fib xrays ordered. Results pending  -ID: zosyn and vanco  -Will proceed with conservative wound care for now  -QoD dressing changes  -Dressing changed today: Xeroform DSD to b/l lower extremity. Next dressing change:2/13  -Prevalon offloading boots while in bed  -Will follow along    -Patient discussed with:  Edin Stinson DPM FACFAS  Fellowship-Trained Foot and Ankle Surgeon  Diplomate, [employer] Board of Foot and Ankle Surgeons  [phone]     Mimi Denise  Podiatry      Thank you for involving podiatry in this patient's care. Please do not hesitate to call with any questions or concerns.                 Michael Lindsey DPM   2/11/2022   3:44 PM

## 2022-02-11 NOTE — PROGRESS NOTES
Pharmacy Consultation Note  (Antibiotic Dosing and Monitoring)    Initial consult date: 2/10/22  Consulting physician/provider: Dr. Jessica Simms  Drug: Vancomycin  Indication: Skin and soft tissue infection    Age/  Gender Height Weight IBW  Allergy Information   86 y.o./female 5' (152.4 cm) 233 lb 6.4 oz (105.9 kg)     Ideal body weight: 45.5 kg (100 lb 4.9 oz)  Adjusted ideal body weight: 69.9 kg (154 lb 0.2 oz)   Patient has no known allergies. Renal Function:  Recent Labs     02/09/22  0357 02/10/22  0403 02/11/22  0406   BUN 55* 59*  59* 58*   CREATININE 1.1* 1.2*  1.2* 1.1*       Intake/Output Summary (Last 24 hours) at 2/11/2022 1211  Last data filed at 2/11/2022 1000  Gross per 24 hour   Intake 560 ml   Output 2200 ml   Net -1640 ml       Vancomycin Monitoring:  Trough:  No results for input(s): VANCOTROUGH in the last 72 hours. Random:  No results for input(s): VANCORANDOM in the last 72 hours. Vancomycin Administration Times:  Recent vancomycin administrations                   vancomycin (VANCOCIN) 1,250 mg in dextrose 5 % 250 mL IVPB (mg) 1,250 mg New Bag 02/10/22 0645                Assessment:  · Patient is a 81 y.o. female who has been initiated on vancomycin  Estimated Creatinine Clearance: 41 mL/min (A) (based on SCr of 1.1 mg/dL (H)).     Plan:  · Will continue vancomycin 1250 IV every 24 hours  · Will check vancomycin levels when appropriate  · Will continue to monitor renal function   · Clinical pharmacy to follow      YAMILE Gaspar [facility] 2/11/2022 12:11 PM

## 2022-02-12 NOTE — PROGRESS NOTES
Infectious Disease  Progress Note  [facility]    C/C: covid pneumonia, gr B bacteremia/skin abscess    Subjective: she is afebrile over night. A line was removed. Off pressors. On spontaenous over the vent.      Scheduled Meds:   warfarin  2 mg Oral Once    insulin glargine  15 Units SubCUTAneous Nightly    bisacodyl  10 mg Rectal Once    docusate sodium  100 mg Oral BID    sennosides  5 mL Oral Nightly    lidocaine PF  5 mL IntraDERmal Once    sodium chloride flush  5-40 mL IntraVENous 2 times per day    heparin flush  3 mL IntraVENous 2 times per day    acetylcysteine  600 mg Oral BID    dexamethasone  10 mg IntraVENous Q24H    vancomycin  1,250 mg IntraVENous Q24H    warfarin placeholder: dosing by pharmacy   Other RX Placeholder    metoprolol tartrate  50 mg Oral BID    levetiracetam  750 mg IntraVENous Q12H    chlorhexidine  15 mL Mouth/Throat BID    mupirocin   Nasal Daily    insulin lispro  0-12 Units SubCUTAneous Q4H    sodium chloride flush  5-40 mL IntraVENous 2 times per day    piperacillin-tazobactam  3,375 mg IntraVENous Q8H    pantoprazole  40 mg IntraVENous Daily    And    sodium chloride (PF)  10 mL IntraVENous Daily     Continuous Infusions:   sodium chloride      sodium chloride      dextrose       PRN Meds:sodium chloride flush, sodium chloride, heparin flush, ipratropium-albuterol, midazolam, LORazepam, perflutren lipid microspheres, sodium chloride flush, sodium chloride, acetaminophen **OR** acetaminophen, glucose, dextrose, glucagon (rDNA), dextrose    Patient Vitals for the past 24 hrs:   BP Temp Temp src Pulse Resp SpO2 Weight   02/12/22 1604    80 29 98 %    02/12/22 1600 (!) 151/80 97.7 °F (36.5 °C) Bladder 76 26 97 %    02/12/22 1500 (!) 130/58   74 21 97 %    02/12/22 1450    82 20 97 %    02/12/22 1400 131/66   71 18 100 %    02/12/22 1300 130/64   71 20 100 %    02/12/22 1200 132/73 98.8 °F (37.1 °C) Bladder 72 20 100 %    02/12/22 1100 (!) 146/65   71 19 98 %    02/12/22 1000 134/64   70 21 97 %    02/12/22 0900 (!) 135/59   74 23 96 %    02/12/22 0832    72 24 96 %    02/12/22 0800 (!) 148/73 98.6 °F (37 °C) Bladder 80 14 95 %    02/12/22 0700 119/61   75 19 96 %    02/12/22 0600 (!) 131/59   73 18 96 % 234 lb 12.6 oz (106.5 kg)   02/12/22 0500 (!) 143/55   77 16 97 %    02/12/22 0401 (!) 127/59 99 °F (37.2 °C) Bladder 80 18 97 %    02/12/22 0300 (!) 135/56   79 20 97 %    02/12/22 0100 (!) 140/62   78 16 95 %    02/12/22 0002    76 17 96 %    02/12/22 0001 138/65 98.6 °F (37 °C) Bladder 80 14 96 %    02/11/22 2300 133/60   74 17 96 %    02/11/22 2200 (!) 143/61   78 19 96 %    02/11/22 2014    76 16 96 %    02/11/22 2000 (!) 146/66 97.9 °F (36.6 °C) Bladder 75 18 93 %    02/11/22 1800 (!) 141/66   75 18 97 %    02/11/22 1700 132/62   75 16 97 %        CBC with Differential:    Lab Results   Component Value Date    WBC 12.0 02/12/2022    RBC 3.48 02/12/2022    HGB 10.7 02/12/2022    HCT 33.2 02/12/2022     02/12/2022    MCV 95.4 02/12/2022    MCH 30.7 02/12/2022    MCHC 32.2 02/12/2022    RDW 17.7 02/12/2022    NRBC 1.7 02/09/2022    METASPCT 1.7 02/12/2022    LYMPHOPCT 2.6 02/12/2022    PROMYELOPCT 0.9 02/06/2022    MONOPCT 11.3 02/12/2022    MYELOPCT 0.9 02/12/2022    BASOPCT 0.3 02/12/2022    MONOSABS 1.32 02/12/2022    LYMPHSABS 0.36 02/12/2022    EOSABS 0.00 02/12/2022    BASOSABS 0.00 02/12/2022     CMP:    Lab Results   Component Value Date     02/12/2022    K 3.8 02/12/2022    K 3.8 02/12/2022     02/12/2022    CO2 20 02/12/2022    BUN 66 02/12/2022    CREATININE 1.2 02/12/2022    GFRAA 52 02/12/2022    LABGLOM 43 02/12/2022    GLUCOSE 196 02/12/2022    PROT 5.6 02/12/2022    LABALBU 3.5 02/12/2022    CALCIUM 9.2 02/12/2022    BILITOT 2.3 02/12/2022    ALKPHOS 112 02/12/2022     02/12/2022    ALT 60 02/12/2022       BP (!) 151/80   Pulse 80   Temp 97.7 °F (36.5 °C) (Bladder)   Resp 29   Ht 5' (1.524 m)   Wt 234 lb 12.6 oz (106.5 kg)   SpO2 98%   BMI 45.85 kg/m²     Physical Exam  Const/Neuro- unchanged, no signs of acute distress,   ENMT- Within Normal Limits, Normocephalic,intubated  Neck: Neck supple  Heart- Regular, Rate, Rhythm- no murmur appreciated. Lungs- clear to ascultation anteriorly. ,on the vent: spontaneous  Abdomen- Soft, bowel sounds positive, non tender  Musculo/Extremities-  Equal and symmetrical, no edema. No tenderness. Skin:  Warm and dry, free from rashes. Labs, Cultures reviewed    Radiology reviewed    Microbiology:   Blood cx 2/4: gr B strep  Blood cx 2/5, 2/6, 2/9 are negative  MRSA nasal screen 2/4: positive  Wound cx 2/4: MRSA  resp cx 2/6: MRSA    Assessment  · Acute hypoxic resp failure from Covid pneumonia  · Group B Streptoccus bacteremia:   · Superimposed MRSA bacterial pneumonia  · Wound infection: MRSA  · Leucocytosis from above: imporinvg    Plan  · Continue vancomycin (PTD) - end date 2/16  · Continue Zosyn (day9)   · Will follow with you.      Electronically signed by Yvonne Boudreaux MD on 2/12/2022 at 4:12 PM

## 2022-02-12 NOTE — PROGRESS NOTES
Pharmacy Consultation Note  (Antibiotic Dosing and Monitoring)    Initial consult date: 2/10/22  Consulting physician/provider: Dr. Kit Cevallos  Drug: Vancomycin  Indication: Skin and soft tissue infection    Age/  Gender Height Weight IBW  Allergy Information   86 y.o./female 5' (152.4 cm) 233 lb 6.4 oz (105.9 kg)     Ideal body weight: 45.5 kg (100 lb 4.9 oz)  Adjusted ideal body weight: 69.9 kg (154 lb 1.6 oz)   Patient has no known allergies. Renal Function:  Recent Labs     02/10/22  0403 02/11/22  0406 02/12/22  0410   BUN 59*  59* 58* 66*   CREATININE 1.2*  1.2* 1.1* 1.2*       Intake/Output Summary (Last 24 hours) at 2/12/2022 0942  Last data filed at 2/12/2022 0800  Gross per 24 hour   Intake 1619 ml   Output 975 ml   Net 644 ml       Vancomycin Monitoring:  Trough:  No results for input(s): VANCOTROUGH in the last 72 hours. Random:  No results for input(s): VANCORANDOM in the last 72 hours. Vancomycin Administration Times:  Recent vancomycin administrations                   vancomycin (VANCOCIN) 1,250 mg in dextrose 5 % 250 mL IVPB (mg) 1,250 mg New Bag 02/11/22 1227     1,250 mg New Bag 02/10/22 1354                Assessment:  · Patient is a 81 y.o. female who has been initiated on vancomycin  Estimated Creatinine Clearance: 38 mL/min (A) (based on SCr of 1.2 mg/dL (H)).     Plan:  · Will continue vancomycin 1250 IV every 24 hours  · Will check vancomycin levels when appropriate  · Will continue to monitor renal function   · Clinical pharmacy to follow      Sonya Richardson [facility] 2/12/2022 9:42 AM

## 2022-02-12 NOTE — PROGRESS NOTES
Pharmacy Consultation Note  (Warfarin Dosing and Monitoring)    Initial consult date: 2/9/22  Consulting Provider: Nayana BAUGH    Jessie Gan is a 81 y.o. female for whom pharmacy has been asked to manage warfarin therapy. Weight:   Wt Readings from Last 1 Encounters:   02/12/22 234 lb 12.6 oz (106.5 kg)       TSH:    Lab Results   Component Value Date    TSH 1.790 02/04/2022       Hepatic Function Panel:                            Lab Results   Component Value Date    ALKPHOS 112 02/12/2022    ALT 60 02/12/2022     02/12/2022    PROT 5.6 02/12/2022    BILITOT 2.3 02/12/2022    LABALBU 3.5 02/12/2022       Current warfarin drug-drug interactions include: steroids (variable/dose dependent)    Recent Labs     02/10/22  0403 02/11/22  0406 02/12/22  0410   HGB 9.8* 11.5 10.7*    198 214     Date Warfarin Dose INR Heparin or LMWH Comment   2/9 2 mg 2.6 -----    2/10 2 mg 2.5 ------    2/11 2 mg 2.2 ------    2/12 2 mg 2.6 ------             Assessment:  · Patient is a 81 y.o. female on warfarin for Atrial Fibrillation. Patient's home warfarin dosing regimen is 4mg daily.    · Goal INR 2 - 3  · INR 2.6 today    Plan:  · Warfarin 2mg tonight  · Daily PT/INR until the INR is stable within the therapeutic range  · Pharmacist will follow and monitor/adjust dosing as necessary    Thank you for this consult,    Yazan Maria, [facility] 2/12/2022 9:45 AM

## 2022-02-12 NOTE — PROGRESS NOTES
Subjective:    Remains intubated and sedated  Seizure episodes intermittently  Remains in ICU setting    Objective:    BP (!) 141/66   Pulse 76   Temp 96.8 °F (36 °C) (Bladder)   Resp 16   Ht 5' (1.524 m)   Wt 234 lb 9.1 oz (106.4 kg)   SpO2 96%   BMI 45.81 kg/m²     In: 1070 [I.V.:776; NG/GT:294]  Out: 1470   In: 1070   Out: 1470 [Urine:1370]    General appearance: Intubated sedated  HEENT: AT/NC, MMM  Neck: FROM, supple  Lungs: Clear to auscultation  CV: RRR, no MRGs  Vasc: Radial pulses 2+  Abdomen: Soft, non-tender; no masses or HSM  Extremities: No peripheral edema or digital cyanosis  Skin: no rash, lesions or ulcers       Recent Labs     02/09/22  0357 02/10/22  0403 02/11/22  0406   WBC 12.1* 12.5* 12.2*   HGB 9.8* 9.8* 11.5   HCT 29.4* 29.9* 35.4   * 165 198       Recent Labs     02/09/22  0357 02/10/22  0403 02/11/22  0406    140  135 141   K 4.1 4.1  4.0 3.6    106  102 106   CO2 19* 19*  19* 21*   BUN 55* 59*  59* 58*   CREATININE 1.1* 1.2*  1.2* 1.1*   CALCIUM 9.2 9.0  9.1 9.4       Assessment:    Active Problems:    Acute respiratory failure due to COVID-19 [facility])    Acute respiratory failure (HCC)    Cerebrovascular accident (CVA) due to embolism of cerebral artery ([facility] [address]  Resolved Problems:    * No resolved hospital problems. *      Plan:    Admit to MICU for neurology evaluation secondary to ams requiring intubating in pt with covid pna  Unable to get mri d/t pacer in place   Wean to extubate as mentation improves  On no tx for covid as currently that is not the cause of her intubated status   moitor rate and rhythm, echocardiogram with ejection fraction 50% no evidence of vegetations on transthoracic echo  Will need eeg and full neuro work upunderway   empiric keppra bidseizures overnight to 6/20/2022, Keppra increased to 750 IV twice daily by neurology  ?  LPnot yet donepatient remains on vancomycin and Zosyn  Check lipids , a1c , follow bp closely   Palliative care service consult  Tube feeds initiated today for nutrition    No real change in status  All all guarded to poor prognosis  Family wishes to continue full support at this point  Multiple consultants on board        DVT Prophylaxis   PT/OT  Discharge planning           Sadaf Desai MD  8:39 PM  2/11/2022

## 2022-02-12 NOTE — PROGRESS NOTES
Critical Care Progress Note         Patient José Gonzalez   MRN -  [id number]   Acct # - [de-identified]   - 1935      Date of Admission -  2022  3:00 PM  Date of evaluation -  2022  4409/4409-A   Hospital Day - 6            ADMIT/CONSULT DETAILS     Reason for Admit/Consult   AMS   Unresponsiveness  COVID 19  Acute respiratory failure. Ventilator management. Yadi Gann MD  Primary Care Physician - Charissa Pryor MD         Subjective    No acute events overnight. Neurological minimal response. Pupils reactive gag and cough present. VSS afebrile No pressor requirements. No seizures overnight. Tolerating tube feedings. Past Medical History         Diagnosis Date    Arthritis     Atrial fibrillation ([address]     Hyperlipidemia     Hypertension     Non-pressure chronic ulcer of other part of right lower leg with fat layer exposed ([facility] [address] 2020    Thyroid disease         Past Surgical History           Procedure Laterality Date    PACEMAKER INSERTION  2019    PPM GENT CHANGE  ([facility])   DR. TONG     Current Medications   Current Medications    warfarin  2 mg Oral Once    lidocaine PF  5 mL IntraDERmal Once    sodium chloride flush  5-40 mL IntraVENous 2 times per day    heparin flush  3 mL IntraVENous 2 times per day    acetylcysteine  600 mg Oral BID    insulin glargine  10 Units SubCUTAneous Nightly    dexamethasone  10 mg IntraVENous Q24H    vancomycin  1,250 mg IntraVENous Q24H    warfarin placeholder: dosing by pharmacy   Other RX Placeholder    metoprolol tartrate  50 mg Oral BID    levetiracetam  750 mg IntraVENous Q12H    chlorhexidine  15 mL Mouth/Throat BID    mupirocin   Nasal Daily    insulin lispro  0-12 Units SubCUTAneous Q4H    sodium chloride flush  5-40 mL IntraVENous 2 times per day    piperacillin-tazobactam  3,375 mg IntraVENous Q8H    pantoprazole  40 mg IntraVENous Daily    And    sodium chloride (PF)  10 mL IntraVENous Daily     sodium chloride flush, sodium chloride, heparin flush, ipratropium-albuterol, midazolam, LORazepam, perflutren lipid microspheres, sodium chloride flush, sodium chloride, acetaminophen **OR** acetaminophen, glucose, dextrose, glucagon (rDNA), dextrose  IV Drips/Infusions   sodium chloride      sodium chloride      dextrose       Home Medications  Medications Prior to Admission: [] benzonatate (TESSALON) 200 MG capsule, Take 200 mg by mouth three times daily  dilTIAZem (CARDIZEM CD) 120 MG extended release capsule, Take 120 mg by mouth daily  docusate sodium (COLACE) 100 MG capsule, Take 100 mg by mouth daily  metOLazone (ZAROXOLYN) 2.5 MG tablet, Take 2.5 mg by mouth every other day  Multiple Vitamins-Minerals (THERAPEUTIC MULTIVITAMIN-MINERALS) tablet, Take 1 tablet by mouth daily  warfarin (COUMADIN) 4 MG tablet, Take 1 tablet by mouth daily  bumetanide (BUMEX) 1 MG tablet, Take 1 tablet by mouth 2 times daily  spironolactone (ALDACTONE) 25 MG tablet, Take 1 tablet by mouth daily  [] polyethylene glycol (GLYCOLAX) 17 g packet, Take 17 g by mouth daily as needed for Constipation  metoprolol (LOPRESSOR) 100 MG tablet, Take 200 mg by mouth 2 times daily   simvastatin (ZOCOR) 20 MG tablet, Take 20 mg by mouth nightly  levothyroxine (SYNTHROID) 125 MCG tablet, Take 125 mcg by mouth Daily     Diet/Nutrition   Diet NPO  ADULT TUBE FEEDING; Nasogastric; Diabetic; Continuous; 20; Yes; 10; Q 4 hours; 45; 30; Q 4 hours    Allergies   Patient has no known allergies. Social History   Tobacco   reports that she has never smoked. She has never used smokeless tobacco.    Alcohol     reports no history of alcohol use. Occupational history :    Family History   No family history on file.       ROS     REVIEW OF SYSTEMS:  Review of systems not obtained due to patient factors - intubation    Lines and Devices    22  Right Femoral TLC    Right radial arterial line   22 Urethral catheter. 22  OGT  22  ETT # 7.5      Mechanical Ventilation Data   VENT SETTINGS (Comprehensive)  Vent Information  $Ventilation: $Subsequent Day  Skin Assessment: Clean, dry, & intact  Equipment ID: [id number]  Vent Type: [phone]  Vent Mode: PS  Vt Ordered: 0 mL  Rate Set: 0 bmp  Peak Flow: 0 L/min  Pressure Support: 12 cmH20  FiO2 : 35 %  SpO2: 97 %  SpO2/FiO2 ratio: 277.14  Sensitivity: 3  PEEP/CPAP: 5  I Time/ I Time %: 0 s  Humidification Source: Heated wire  Humidification Temp: 37  Humidification Temp Measured: 37  Circuit Condensation: Drained  Additional Respiratory  Assessments  Pulse: 70  Resp: 21  SpO2: 97 %  Position: Semi-Pineda's  Humidification Source: Heated wire  Humidification Temp: 37  Circuit Condensation: Drained  Oral Care: Mouth suctioned,Mouth moisturizer,Mouth swabbed  Subglottic Suction Done?: Yes  Cuff Pressure (cm H2O): 29 cm H2O    ABG  Lab Results   Component Value Date    PH 7.476 2022    PCO2 25.5 2022    PO2 89.5 2022    HCO3 18.4 2022    O2SAT 96.6 2022     Lab Results   Component Value Date    MODE AC 2022           Vitals    height is 5' (1.524 m) and weight is 234 lb 12.6 oz (106.5 kg). Her bladder temperature is 98.6 °F (37 °C). Her blood pressure is 134/64 and her pulse is 70. Her respiration is 21 and oxygen saturation is 97%.        Temperature Range: Temp: 98.6 °F (37 °C) Temp  Av °F (36.7 °C)  Min: 96.8 °F (36 °C)  Max: 99 °F (37.2 °C)  BP Range:  Systolic (43VMZ), KQU:757 , Min:119 , HT     Diastolic (94TUM), AQL:94, Min:55, Max:79    Pulse Range: Pulse  Av.8  Min: 70  Max: 80  Respiration Range: Resp  Av  Min: 14  Max: 24  Current Pulse Ox[de-identified]  SpO2: 97 %  24HR Pulse Ox Range:  SpO2  Av.4 %  Min: 93 %  Max: 98 %  Oxygen Amount and Delivery:        I/O (24 Hours)    Patient Vitals for the past 8 hrs:   BP Temp Temp src Pulse Resp SpO2 Weight   22 1000 134/64   70 21 97 %    02/12/22 0900 (!) 135/59   74 23 96 %    02/12/22 0832    72 24 96 %    02/12/22 0800 (!) 148/73 98.6 °F (37 °C) Bladder 80 14 95 %    02/12/22 0700 119/61   75 19 96 %    02/12/22 0600 (!) 131/59   73 18 96 % 234 lb 12.6 oz (106.5 kg)   02/12/22 0500 (!) 143/55   77 16 97 %    02/12/22 0401 (!) 127/59 99 °F (37.2 °C) Bladder 80 18 97 %        Intake/Output Summary (Last 24 hours) at 2/12/2022 1154  Last data filed at 2/12/2022 1000  Gross per 24 hour   Intake 1649 ml   Output 960 ml   Net 689 ml     I/O last 3 completed shifts: In: 0011 [I.V.:1041; NG/GT:668]  Out: 1885 [VVSEC:7140; Emesis/NG output:100]   Date 02/12/22 0000 - 02/12/22 2359   Shift 6777-4610 7444-0112 7219-8969 24 Hour Total   INTAKE   I.V.(mL/kg/hr) 156(0.2) 60  216   NG/   228   Shift Total(mL/kg) 384(3.6) 60(0.6)  444(4.2)   OUTPUT   Urine(mL/kg/hr) 265(0.3) 150  415   Shift Total(mL/kg) 265(2.5) 150(1.4)  415(3.9)   Weight (kg) 106.5 106.5 106.5 106.5     Patient Vitals for the past 96 hrs (Last 3 readings):   Weight   02/12/22 0600 234 lb 12.6 oz (106.5 kg)     Exam     PHYSICAL EXAM:  CONSTITUTIONAL:  Lying in bed monitored. Orally intubated NAD. EYES:  lids and lashes normal and left pupils 4 to 5mm right pupil 3 mm reactive bilaterally sluggish. Upward gaze present   ENT:  normocepalic, without obvious abnormality, atraumatic  NECK:  supple, symmetrical, trachea midline, skin normal and no stridor  HEMATOLOGIC/LYMPHATICS:  no cervical lymphadenopathy and no supraclavicular lymphadenopathy  LUNGS:  Mechanical ventilator. Equal expansion. BBS aerating all lobes. Diminished bilaterally in bases. Few upper lobe scattered rhonchi present.     CARDIOVASCULAR:  irregularly irregular rhythm and normal S1 and S2  ABDOMEN:  No scars, normal bowel sounds, soft, non-distended, non-tender, no masses palpated, no hepatosplenomegally  MUSCULOSKELETAL:  there is no redness, warmth, or swelling of the joints  tone is normal  NEUROLOGIC:  No response to noxious stimuli does not withdraw to painful stimuli. Gag and cocugh present. Limited neurological exam.   SKIN:  Right lower leg wound present. Data   Old records and images have been reviewed    Lab Results   CBC     Lab Results   Component Value Date    WBC 12.0 02/12/2022    RBC 3.48 02/12/2022    HGB 10.7 02/12/2022    HCT 33.2 02/12/2022     02/12/2022    MCV 95.4 02/12/2022    MCH 30.7 02/12/2022    MCHC 32.2 02/12/2022    RDW 17.7 02/12/2022    NRBC 1.7 02/09/2022    METASPCT 1.7 02/12/2022    LYMPHOPCT 2.6 02/12/2022    PROMYELOPCT 0.9 02/06/2022    MONOPCT 11.3 02/12/2022    MYELOPCT 0.9 02/12/2022    BASOPCT 0.3 02/12/2022    MONOSABS 1.32 02/12/2022    LYMPHSABS 0.36 02/12/2022    EOSABS 0.00 02/12/2022    BASOSABS 0.00 02/12/2022       BMP   Lab Results   Component Value Date     02/12/2022    K 3.8 02/12/2022    K 3.8 02/12/2022     02/12/2022    CO2 20 02/12/2022    BUN 66 02/12/2022    CREATININE 1.2 02/12/2022    GLUCOSE 196 02/12/2022    CALCIUM 9.2 02/12/2022       LFTS  Lab Results   Component Value Date    ALKPHOS 112 02/12/2022    ALT 60 02/12/2022     02/12/2022    PROT 5.6 02/12/2022    BILITOT 2.3 02/12/2022    LABALBU 3.5 02/12/2022       INR  Recent Labs     02/10/22  0403 02/11/22  0406 02/12/22  0410   PROTIME 27.7* 24.1* 29.5*   INR 2.5 2.2 2.6       APTT  No results for input(s): APTT in the last 72 hours. Lactic Acid  Lab Results   Component Value Date    LACTA 2.1 02/06/2022    LACTA 2.0 02/06/2022    LACTA 2.1 02/06/2022        BNP   No results for input(s): BNP in the last 72 hours. Cultures     No results for input(s): BC in the last 72 hours. No results for input(s): Judy Budd in the last 72 hours. No results for input(s): LABURIN in the last 72 hours. Radiology   CXR        CT Scans  Head CT 02/05/22    No acute intracranial hemorrhage mass effect or midline shift.  No evidence of hydrocephalus. SYSTEMS ASSESSMENT    Neuro   Acute encephalopathy unknown etiology metabolic or anoxic. Seizure disorder  Questionable cardio embolic  Stroke  Neurology consulted appreciate recommendations. Continue Keppra IV for seizure prophylaxis. 750 mg BID. Ativan prn for seizures  Seizure precautions. Unable to obtain MRI due to pacemaker   CT head no intracranial hemorrhage. Respiratory   Acute hypoxic respiratory failure secondary to COVID 19 pneumonia. COVID + 1/25 received no treatment  Respiratory panel COVID + 2/11/22  Resp culture today. Continue Mucomyst and Duo neb treatments   RLL infiltrate   Decadron IV 10 mg for 5 days. Enhanced droplet precautions  Chest x ray in AM   Daily ABG while intubated. Plan for SBT     Will not extubate due to neurological status and inability to protect airway. Position with right side down left lung up   Chest pt   Wean oxygen as tolerated. Keep O2 sat 90-92%    Cardiovascular   Acute on chronic CHF   Afib with RVR now rate controlled. Permanent pacemaker. Cardiomegaly   Echo with bubble study. EF 50% Trace ,mitral regurgitation, mild pulmonary hypertension, No Aortic  stenosis. Metoprolol 50 g BID   Maintain MAP > 65   Continuous telemetry. ProBNP  14,350  02/10 Repeat in AM   Cardiology following appreciate recommendations. Pharmacy to dose Coumadin INR 2.6  Goal INR 2 to 3     Gastrointestinal   NPO  Protonix daily  Tube feedings at goal.  Glucerna. Dietary consulted appreciate input. Renal   Acute on chronic kidney injury   Chronic hyponatremia sodium range 126 to 130. Normalized 141   Avoid nephrotoxic medications. Strict I and O. Renal dose medications for GFR. Baseline creatine 1.2 to 1.3  Replete electrolytes Mag > 2.0 K+ > 4.0   Nephrology consulted Dr. Sanya Lantigua group       Infectious Disease   Sepsis   Infectious disease consulted appreciated recommendations.    Gram + cocci beta strep in blood   Gram negative rods , Proteus species, Staph aureus species wound cultures   Repeat blood and respiratory cultures. Blood cultures 2/06/22 NGTD. Resp culture candida and staph. Will check fungitell   Improving WBC 12.0>  12.2>   12.5 > 12.1  Wound Culture proteus species. MRSA nasal colonization   Bactroban nasally. Continue antibiotics as per ID  Continue Zosyn. Vancomycin added for staph. To stop 2/16/22  Blood cultures repeated. Procal 0.99  0.45 > 0.99> 4.55 > 7.53  Podiatry consulted for foot wound. Foot and tib/fib xrays no osseous erosion, advance degenerative changed, soft tissue welling of the foot. Wound Care as per Podiatry       Hematology/Oncology   Supratherapetic INR received reversal agent  Coumadin restarted   INR 2.2  Goal INR 2.0 to 3.0  Pharmacy consulted for warfarin     Endocrine   No record of diabetes. Hgb A1C 7.0   Medium dose insulin coverage. Lantus insulin to 15 units nightly   Blood sugar range 175-215 last 24 hours. Hypoglycemic protocol. Hyponatremia chronic  Resolved Na 142    Social/Spiritual/DNR/Other   DNR CCA  Palliative care consulted. Palliative spoke with family yesterday, family would like to continue care       Plan of care discussed in multidisciplinary rounds. Dr. Tammy Hartley is the collaborating physician. Lucretia Holman CNP   Medical Intensive Care Unit.

## 2022-02-12 NOTE — PROGRESS NOTES
Subjective:    Remains intubated and sedated  Remains in ICU setting    Objective:    /66   Pulse 82   Temp 98.8 °F (37.1 °C) (Bladder)   Resp 20   Ht 5' (1.524 m)   Wt 234 lb 12.6 oz (106.5 kg)   SpO2 97%   BMI 45.85 kg/m²     In: 2478 [I.V.:1523; NG/GT:955]  Out: 1455   In: 2478   Out: 1455 [Urine:1355]    General appearance: Intubated sedated  HEENT: AT/NC, MMM  Neck: FROM, supple  Lungs: Clear to auscultation. Mechanical sounds. CV: RRR, no MRGs  Vasc: Radial pulses 2+  Abdomen: Soft, non-tender; no masses or HSM  Extremities: No peripheral edema or digital cyanosis  Skin: no rash, lesions or ulcers       Recent Labs     02/10/22  0403 02/11/22  0406 02/12/22  0410   WBC 12.5* 12.2* 12.0*   HGB 9.8* 11.5 10.7*   HCT 29.9* 35.4 33.2*    198 214       Recent Labs     02/10/22  0403 02/11/22  0406 02/12/22  0410     135 141 142   K 4.1  4.0 3.6 3.8  3.8     102 106 106   CO2 19*  19* 21* 20*   BUN 59*  59* 58* 66*   CREATININE 1.2*  1.2* 1.1* 1.2*   CALCIUM 9.0  9.1 9.4 9.2       Assessment:    Active Problems:    Acute respiratory failure due to COVID-19 [facility])    Acute respiratory failure (HCC)    Cerebrovascular accident (CVA) due to embolism of cerebral artery ([facility] [address]  Resolved Problems:    * No resolved hospital problems.  *      Plan:    Admit to MICU for neurology evaluation secondary to ams requiring intubating in pt with covid pna  Unable to get mri d/t pacer in place   Wean to extubate as mentation improves  On no tx for covid as currently that is not the cause of her intubated status   moitor rate and rhythm, echocardiogram with ejection fraction 50% no evidence of vegetations on transthoracic echo  Will need eeg and full neuro work upunderway   empiric keppra bidseizures overnight to 6/20/2022, Keppra increased to 750 IV twice daily by neurology  patient remains on vancomycin and Zosyn  Check lipids , a1c , follow bp closely   Palliative care service consult  Tube feeds initiated today for nutrition    No real change in status  All in all guarded to poor prognosis  Family wishes to continue full support at this point  Multiple consultants on board        DVT Prophylaxis   PT/OT  Discharge planning           Alan Velasquez DO  3:01 PM  2/12/2022

## 2022-02-12 NOTE — PROGRESS NOTES
Podiatry Progress Note  2/12/2022   Jessica Stern       SUBJECTIVE: Patient seen at bedside this morning. Remains intubated and sedated. Dressings cdi this morning with next change tomorrow(2/13). Past Medical History:   Diagnosis Date    Arthritis     Atrial fibrillation ([address]     Hyperlipidemia     Hypertension     Non-pressure chronic ulcer of other part of right lower leg with fat layer exposed ([facility] [address] 8/21/2020    Thyroid disease         Past Surgical History:   Procedure Laterality Date    PACEMAKER INSERTION  06/04/2019    PPM GENT CHANGE  (Critical access hospital)   DR. TONG         No family history on file. Social History     Tobacco Use    Smoking status: Never Smoker    Smokeless tobacco: Never Used   Substance Use Topics    Alcohol use: No        Prior to Admission medications    Medication Sig Start Date End Date Taking? Authorizing Provider   dilTIAZem (CARDIZEM CD) 120 MG extended release capsule Take 120 mg by mouth daily    Historical Provider, MD   docusate sodium (COLACE) 100 MG capsule Take 100 mg by mouth daily    Historical Provider, MD   metOLazone (ZAROXOLYN) 2.5 MG tablet Take 2.5 mg by mouth every other day 2/2/22 2/12/22  Historical Provider, MD   Multiple Vitamins-Minerals (THERAPEUTIC MULTIVITAMIN-MINERALS) tablet Take 1 tablet by mouth daily    Historical Provider, MD   warfarin (COUMADIN) 4 MG tablet Take 1 tablet by mouth daily 1/7/22   Kelli Eagle MD   bumetanide (BUMEX) 1 MG tablet Take 1 tablet by mouth 2 times daily 1/7/22   Kelli Eagle MD   spironolactone (ALDACTONE) 25 MG tablet Take 1 tablet by mouth daily 1/8/22   Kelli Eagle MD   metoprolol (LOPRESSOR) 100 MG tablet Take 200 mg by mouth 2 times daily     Historical Provider, MD   simvastatin (ZOCOR) 20 MG tablet Take 20 mg by mouth nightly    Historical Provider, MD   levothyroxine (SYNTHROID) 125 MCG tablet Take 125 mcg by mouth Daily     Historical Provider, MD        Patient has no known allergies. OBJECTIVE:        Vitals:    02/12/22 1000   BP: 134/64   Pulse: 70   Resp: 21   Temp:    SpO2: 97%              EXAM:        Pt is AAOx3  Dressing cdi. No drainage or dishevelment noted. PREVIOUS physical exam findings:  Vascular Exam: Pedal pulses nonpalpable b/l. Lower extremity warm to cool from proximal tibial tuberosity to distal digits b/l. No pedal hair growth noted b/l. Cap refill brisk to all digits. No edema to b/l foot      Neuro Exam: Deferred due to patient's condition. Dermatologic Exam:   RIGHT:  Full thickness ulceration noted to left posterior heel with surrounding cellulitis and firm, necrotic wound base. No focal openings, no evidence of purulence/proximal streaking/crepitus/malodor. Chronic skin hyperpigmentation secondary to chronic venous stasis dermatitis noted  Tissue injury to medial 1st mpj head. No acute signs of infection. LEFT:  Venous ulceration noted to left anterior leg and wrapping circumferentially to posterior leg. Chronic hyperpigmentation secondary to venous stasis dermatitis noted to skin. Skin is chafed and raw in appearance; healthy wound bed noted with pinpoint bleeding      MSK: ROM testing deferred. Soft, compressible posterior muscle compartments b/l. Evidence of b/l hallux valgus deformity.                          Current Facility-Administered Medications   Medication Dose Route Frequency Provider Last Rate Last Admin    warfarin (COUMADIN) tablet 2 mg  2 mg Oral Once Rosario Dolin, APRN - CNP        lidocaine PF 1 % injection 5 mL  5 mL IntraDERmal Once Rosario Dolin, APRN - CNP        sodium chloride flush 0.9 % injection 5-40 mL  5-40 mL IntraVENous 2 times per day Rosario Dolin, APRN - CNP   10 mL at 02/12/22 0802    sodium chloride flush 0.9 % injection 5-40 mL  5-40 mL IntraVENous PRN Rosario Dolin, APRN - CNP   10 mL at 02/11/22 1236    0.9 % sodium chloride infusion  25 mL IntraVENous PRN Rosario Dolin, APRN - CNP        heparin flush 100 UNIT/ML injection 300 Units  3 mL IntraVENous 2 times per day Trevin Patrick, APRN - CNP   300 Units at 02/11/22 2003    heparin flush 100 UNIT/ML injection 300 Units  3 mL IntraCATHeter PRN Trevin Patrick, APRN - CNP        acetylcysteine (MUCOMYST) 20 % solution 600 mg  600 mg Oral BID Trevin Patrick, APRN - CNP        ipratropium-albuterol (DUONEB) nebulizer solution 1 ampule  1 ampule Inhalation Q4H PRN Trevin Patrick, APRN - CNP        insulin glargine-yfgn (SEMGLEE-YFGN) injection vial 10 Units  10 Units SubCUTAneous Nightly Trevin Patrick, APRN - CNP   10 Units at 02/11/22 2004    dexamethasone (DECADRON) injection 10 mg  10 mg IntraVENous Q24H Trevin Patrick, APRN - CNP   10 mg at 02/12/22 0506    vancomycin (VANCOCIN) 1,250 mg in dextrose 5 % 250 mL IVPB  1,250 mg IntraVENous Q24H Leslie Garcia MD   Stopped at 02/11/22 1354    warfarin placeholder: dosing by pharmacy   Other [address], APRN - CNP        metoprolol tartrate (LOPRESSOR) tablet 50 mg  50 mg Oral BID Trevin Patrick, APRN - CNP   50 mg at 02/12/22 0802    levETIRAcetam (KEPPRA) 750 mg/50 mL IVPB  750 mg IntraVENous Q12H Willian Augustin APRN - NP   Stopped at 02/12/22 0231    chlorhexidine (PERIDEX) 0.12 % solution 15 mL  15 mL Mouth/Throat BID Trevin Patrick, APRN - CNP   15 mL at 02/12/22 0802    mupirocin (BACTROBAN) 2 % ointment   Nasal Daily Trevin Patrick, APRN - CNP   Given at 02/12/22 0755    insulin lispro (HUMALOG) injection vial 0-12 Units  0-12 Units SubCUTAneous Q4H Trevin Patrick, APRN - CNP   4 Units at 02/12/22 0748    midazolam PF (VERSED) injection 2 mg  2 mg IntraVENous Q4H PRN Katie Schwab, APRN - CNP   2 mg at 02/11/22 1236    LORazepam (ATIVAN) injection 1 mg  1 mg IntraVENous Q5 Min PRN Jennifer Frost, APRN - CNP   1 mg at 02/07/22 0331    perflutren lipid microspheres (DEFINITY) injection 1.65 mg  1.5 mL IntraVENous ONCE PRN Dalia Grady MD        sodium chloride flush 0.9 % injection 5-40 mL  5-40 mL IntraVENous 2 times per day Saulo Bustamante MD   10 mL at 02/12/22 0802    sodium chloride flush 0.9 % injection 5-40 mL  5-40 mL IntraVENous PRN Saulo Bustamante MD        0.9 % sodium chloride infusion  25 mL IntraVENous PRN Saulo Bustamante MD        acetaminophen (TYLENOL) tablet 650 mg  650 mg Oral Q6H PRN Saulo Bustamante MD   650 mg at 02/08/22 2207    Or    acetaminophen (TYLENOL) suppository 650 mg  650 mg Rectal Q6H PRN Saulo Bustamante MD        piperacillin-tazobactam (ZOSYN) 3,375 mg in dextrose 5 % 100 mL IVPB extended infusion (mini-bag)  3,375 mg IntraVENous Q8H Saulo Bustamante MD 25 mL/hr at 02/12/22 0952 3,375 mg at 02/12/22 0952    pantoprazole (PROTONIX) injection 40 mg  40 mg IntraVENous Daily Jerel Oden, APRN - CNP   40 mg at 02/12/22 0756    And    sodium chloride (PF) 0.9 % injection 10 mL  10 mL IntraVENous Daily Jerel Oden, APRN - CNP   10 mL at 02/12/22 0755    glucose (GLUTOSE) 40 % oral gel 15 g  15 g Oral PRN Destiny Aaron APRN - CNP        dextrose 50 % IV solution  12.5 g IntraVENous PRN Jerel Oden, APRN - CNP        glucagon (rDNA) injection 1 mg  1 mg IntraMUSCular PRN Tarzana Oden, APRN - CNP        dextrose 5 % solution  100 mL/hr IntraVENous PRN Jerel Oden, APRN - CNP            Lab Results   Component Value Date    WBC 12.0 (H) 02/12/2022    HCT 33.2 (L) 02/12/2022    HGB 10.7 (L) 02/12/2022     02/12/2022     02/12/2022    K 3.8 02/12/2022    K 3.8 02/12/2022     02/12/2022    CO2 20 (L) 02/12/2022    BUN 66 (H) 02/12/2022    CREATININE 1.2 (H) 02/12/2022    GLUCOSE 196 (H) 02/12/2022    CRP 2.0 (H) 02/10/2022         Radiographs:    ASSESEMENT:  -Full thickness ulceration, left heel  -Venous stasis ulceration, right leg  -Chronic venous stasis dermatitis, b/l leg   -PAD  -Covid-19 infection      PLAN:  - Examined and evaluated  - All labs, imaging, and charts reviewed  -NIVS ordered.  Results pending  -Foot and tib/fib xrays: No gross osseous erosions are seen. Advanced degenerative changes noted. Soft tissue swelling of the foot present. -ID: zosyn and vanco  -Will proceed with conservative wound care for now  -QoD dressing changes  -Dressing changed yesterday: Xeroform DSD to b/l lower extremity. Next dressing change:2/13  -Prevalon offloading boots while in bed  -Will follow along    -Patient discussed with:  Maryse Patiño DPM FACFAS  Fellowship-Trained Foot and Ankle Surgeon  Diplomate, American Board of Foot and Ankle Surgeons  [phone]     [facility]  Podiatry      Thank you for involving podiatry in this patient's care. Please do not hesitate to call with any questions or concerns.                 Zulma Llamas DPM   2/12/2022   10:11 AM

## 2022-02-13 NOTE — PROGRESS NOTES
Critical Care Progress Note         Patient Svetlana Duvall   MRN -  [id number]   Acct # - [de-identified]   - 1935      Date of Admission -  2022  3:00 PM  Date of evaluation -  2022  [address] - [address]            ADMIT/CONSULT DETAILS     Reason for Admit/Consult   AMS   Unresponsiveness  COVID 19  Acute respiratory failure. Ventilator management. Sarah Foster MD  Primary Care Physician - Penny Sin MD         Subjective    No acute events overnight. Neurological minimal response. Pupils reactive gag and cough present. VSS afebrile No pressor requirements. No seizures overnight. Sputum culture GNR. ID following   Tolerating tube feedings. Past Medical History         Diagnosis Date    Arthritis     Atrial fibrillation ([address]     Hyperlipidemia     Hypertension     Non-pressure chronic ulcer of other part of right lower leg with fat layer exposed ([facility] [address] 2020    Thyroid disease         Past Surgical History           Procedure Laterality Date    PACEMAKER INSERTION  2019    PPM GENT CHANGE  ([facility])   DR. TONG     Current Medications   Current Medications    warfarin  2 mg Oral Once    insulin glargine  15 Units SubCUTAneous Nightly    bisacodyl  10 mg Rectal Once    docusate sodium  100 mg Oral BID    sennosides  5 mL Oral Nightly    acetylcysteine  600 mg Inhalation BID    lidocaine PF  5 mL IntraDERmal Once    sodium chloride flush  5-40 mL IntraVENous 2 times per day    heparin flush  3 mL IntraVENous 2 times per day    dexamethasone  10 mg IntraVENous Q24H    vancomycin  1,250 mg IntraVENous Q24H    warfarin placeholder: dosing by pharmacy   Other RX Placeholder    metoprolol tartrate  50 mg Oral BID    levetiracetam  750 mg IntraVENous Q12H    chlorhexidine  15 mL Mouth/Throat BID    mupirocin   Nasal Daily    insulin lispro  0-12 Units SubCUTAneous Q4H    sodium chloride flush  5-40 mL IntraVENous 2 times per day    piperacillin-tazobactam  3,375 mg IntraVENous Q8H    pantoprazole  40 mg IntraVENous Daily    And    sodium chloride (PF)  10 mL IntraVENous Daily     sodium chloride flush, sodium chloride, heparin flush, ipratropium-albuterol, midazolam, LORazepam, perflutren lipid microspheres, sodium chloride flush, sodium chloride, acetaminophen **OR** acetaminophen, glucose, dextrose, glucagon (rDNA), dextrose  IV Drips/Infusions   sodium chloride      sodium chloride      dextrose       Home Medications  Medications Prior to Admission: [] benzonatate (TESSALON) 200 MG capsule, Take 200 mg by mouth three times daily  dilTIAZem (CARDIZEM CD) 120 MG extended release capsule, Take 120 mg by mouth daily  docusate sodium (COLACE) 100 MG capsule, Take 100 mg by mouth daily  [] metOLazone (ZAROXOLYN) 2.5 MG tablet, Take 2.5 mg by mouth every other day  Multiple Vitamins-Minerals (THERAPEUTIC MULTIVITAMIN-MINERALS) tablet, Take 1 tablet by mouth daily  warfarin (COUMADIN) 4 MG tablet, Take 1 tablet by mouth daily  bumetanide (BUMEX) 1 MG tablet, Take 1 tablet by mouth 2 times daily  spironolactone (ALDACTONE) 25 MG tablet, Take 1 tablet by mouth daily  [] polyethylene glycol (GLYCOLAX) 17 g packet, Take 17 g by mouth daily as needed for Constipation  metoprolol (LOPRESSOR) 100 MG tablet, Take 200 mg by mouth 2 times daily   simvastatin (ZOCOR) 20 MG tablet, Take 20 mg by mouth nightly  levothyroxine (SYNTHROID) 125 MCG tablet, Take 125 mcg by mouth Daily     Diet/Nutrition   Diet NPO  ADULT TUBE FEEDING; Nasogastric; Diabetic; Continuous; 20; Yes; 10; Q 4 hours; 45; 30; Q 4 hours    Allergies   Patient has no known allergies. Social History   Tobacco   reports that she has never smoked. She has never used smokeless tobacco.    Alcohol     reports no history of alcohol use. Occupational history :    Family History   No family history on file.       ROS     REVIEW OF SYSTEMS:  Review of systems not obtained due to patient factors - intubation    Lines and Devices    22  Right Femoral TLC Removed 22 Right radial arterial line   22 Urethral catheter. 22  OGT  22  ETT # 7.5      Mechanical Ventilation Data   VENT SETTINGS (Comprehensive)  Vent Information  $Ventilation: $Subsequent Day  Skin Assessment: Clean, dry, & intact  Equipment ID: [id number]  Vent Type: [id number]  Vent Mode: AC/VC  Vt Ordered: 450 mL  Rate Set: 10 bmp  Peak Flow: 65 L/min  Pressure Support: 0 cmH20  FiO2 : 30 %  SpO2: 95 %  SpO2/FiO2 ratio: 316.67  Sensitivity: 3  PEEP/CPAP: 5  I Time/ I Time %: 0 s  Humidification Source: Heated wire  Humidification Temp: 37  Humidification Temp Measured: 37  Circuit Condensation: Drained  Additional Respiratory  Assessments  Pulse: 80  Resp: 17  SpO2: 95 %  Position: Semi-Pineda's  Humidification Source: Heated wire  Humidification Temp: 37  Circuit Condensation: Drained  Oral Care: Mouth suctioned,Mouth moisturizer,Mouth swabbed,Mouthwash with chlorhexidine  Subglottic Suction Done?: Yes  Airway Type: ET  Airway Size: 7.5  Cuff Pressure (cm H2O): 29 cm H2O    ABG  Lab Results   Component Value Date    PH 7.459 2022    PCO2 29.5 2022    PO2 88.4 2022    HCO3 20.5 2022    O2SAT 96.6 2022     Lab Results   Component Value Date    MODE AC 2022           Vitals    height is 5' (1.524 m) and weight is 233 lb (105.7 kg). Her bladder temperature is 97 °F (36.1 °C). Her blood pressure is 124/67 and her pulse is 80. Her respiration is 17 and oxygen saturation is 95%.        Temperature Range: Temp: 97 °F (36.1 °C) Temp  Av.4 °F (36.3 °C)  Min: 97 °F (36.1 °C)  Max: 97.7 °F (36.5 °C)  BP Range:  Systolic (18KZV), QOP:906 , Min:113 , JVA:213     Diastolic (60GCF), JUAN MANUEL:99, Min:47, Max:80    Pulse Range: Pulse  Av.8  Min: 70  Max: 89  Respiration Range: Resp  Av.5  Min: 12  Max: 29  Current Pulse Ox[de-identified] SpO2: 95 %  24HR Pulse Ox Range:  SpO2  Av.3 %  Min: 95 %  Max: 100 %  Oxygen Amount and Delivery:        I/O (24 Hours)    Patient Vitals for the past 8 hrs:   BP Temp Temp src Pulse Resp SpO2 Weight   22 1300 124/67   80 17 95 %    22 1200 (!) 141/78 97 °F (36.1 °C) Bladder 82 15 97 %    22 1141    72 17 97 %    22 1100 (!) 119/55   71 18 96 %    22 1000 (!) 120/58   71 15 97 %    22 0956    71 12 98 %    22 0900 120/62   74 19 98 %    22 0800 (!) 125/53 97 °F (36.1 °C) Bladder 74 23 98 %    22 0700 (!) 113/47   71 22 98 %    22 0600 125/61   72 18 98 % 233 lb (105.7 kg)       Intake/Output Summary (Last 24 hours) at 2022 1345  Last data filed at 2022 1300  Gross per 24 hour   Intake 1894 ml   Output 1335 ml   Net 559 ml     I/O last 3 completed shifts: In: 7556 [I.V.:1085; NG/GT:1428]  Out: 7349 [Urine:1675]   Date 22 0000 - 22 2359   Shift 6621-9987 0798-3398 9296-7028 24 Hour Total   INTAKE   I.V.(mL/kg/hr) 143(0.2) 110  253   NG/   377   Shift Total(mL/kg) 520(4.9) 110(1)  630(6)   OUTPUT   Urine(mL/kg/hr) 375(0.4) 390  765   Emesis/NG output  30  30   Shift Total(mL/kg) 375(3.5) 420(4)  795(7.5)   Weight (kg) 105.7 105.7 105.7 105.7     Patient Vitals for the past 96 hrs (Last 3 readings):   Weight   22 0600 233 lb (105.7 kg)   22 0400 233 lb (105.7 kg)   22 0600 234 lb 12.6 oz (106.5 kg)     Exam     PHYSICAL EXAM:  CONSTITUTIONAL:  Lying in bed monitored. Orally intubated NAD. EYES:  lids and lashes normal and left pupils 4 to 5mm right pupil 3 mm reactive bilaterally sluggish. Upward gaze present   ENT:  normocepalic, without obvious abnormality, atraumatic  NECK:  supple, symmetrical, trachea midline, skin normal and no stridor  HEMATOLOGIC/LYMPHATICS:  no cervical lymphadenopathy and no supraclavicular lymphadenopathy  LUNGS:  Mechanical ventilator.   Equal expansion. BBS aerating all lobes. Diminished bilaterally in bases fine crackles present. . Few upper lobe scattered rhonchi present. CARDIOVASCULAR:  irregularly irregular rhythm and normal S1 and S2  ABDOMEN:  No scars, normal bowel sounds, soft, non-distended, non-tender, no masses palpated, no hepatosplenomegally  MUSCULOSKELETAL:  there is no redness, warmth, or swelling of the joints  tone is normal  NEUROLOGIC:  No response to noxious stimuli does not withdraw to painful stimuli. Gag and cocugh present. Limited neurological exam.   SKIN:  Right lower leg wound present. Data   Old records and images have been reviewed    Lab Results   CBC     Lab Results   Component Value Date    WBC 10.5 02/13/2022    RBC 3.63 02/13/2022    HGB 11.5 02/13/2022    HCT 35.8 02/13/2022     02/13/2022    MCV 98.6 02/13/2022    MCH 31.7 02/13/2022    MCHC 32.1 02/13/2022    RDW 17.7 02/13/2022    NRBC 1.7 02/09/2022    METASPCT 1.7 02/12/2022    LYMPHOPCT 4.9 02/13/2022    PROMYELOPCT 0.9 02/06/2022    MONOPCT 9.0 02/13/2022    MYELOPCT 0.9 02/12/2022    BASOPCT 0.3 02/13/2022    MONOSABS 0.94 02/13/2022    LYMPHSABS 0.51 02/13/2022    EOSABS 0.08 02/13/2022    BASOSABS 0.03 02/13/2022       BMP   Lab Results   Component Value Date     02/13/2022    K 4.1 02/13/2022    K 4.1 02/13/2022     02/13/2022    CO2 21 02/13/2022    BUN 61 02/13/2022    CREATININE 1.0 02/13/2022    GLUCOSE 190 02/13/2022    CALCIUM 8.7 02/13/2022       LFTS  Lab Results   Component Value Date    ALKPHOS 134 02/13/2022    ALT 59 02/13/2022    AST 79 02/13/2022    PROT 5.7 02/13/2022    BILITOT 1.8 02/13/2022    LABALBU 3.4 02/13/2022       INR  Recent Labs     02/11/22  0406 02/12/22  0410 02/13/22  0401   PROTIME 24.1* 29.5* 24.5*   INR 2.2 2.6 2.2       APTT  No results for input(s): APTT in the last 72 hours.     Lactic Acid  Lab Results   Component Value Date    LACTA 2.1 02/06/2022    LACTA 2.0 02/06/2022    LACTA 2.1 02/06/2022        BNP   No results for input(s): BNP in the last 72 hours. Cultures     No results for input(s): BC in the last 72 hours. No results for input(s): Brenita Ek in the last 72 hours. No results for input(s): LABURIN in the last 72 hours. Radiology   CXR        CT Scans  Head CT 02/05/22    No acute intracranial hemorrhage mass effect or midline shift. No evidence of hydrocephalus. SYSTEMS ASSESSMENT    Neuro   Acute encephalopathy unknown etiology metabolic or anoxic. Seizure disorder  Questionable cardio embolic  Stroke  Neurology consulted appreciate recommendations. Continue Keppra IV for seizure prophylaxis. 750 mg BID. Ativan prn for seizures  Seizure precautions. Unable to obtain MRI due to pacemaker   CT head no intracranial hemorrhage. Respiratory   Acute hypoxic respiratory failure secondary to COVID 19 pneumonia. COVID + 1/25 received no treatment  Respiratory panel COVID + 2/11/22  Resp culture today. Continue Mucomyst and Duo neb treatments   RLL infiltrate   Decadron IV 10 mg for 5 days. Enhanced droplet precautions  Chest x ray in AM   Daily ABG while intubated. Plan for SBT     Will not extubate due to neurological status and inability to protect airway. Position with right side down left lung up   Chest pt   Wean oxygen as tolerated. Keep O2 sat 90-92%    Cardiovascular   Acute on chronic CHF   Afib with RVR now rate controlled. Permanent pacemaker. Cardiomegaly   Echo with bubble study. EF 50% Trace ,mitral regurgitation, mild pulmonary hypertension, No Aortic  stenosis. Metoprolol 50 g BID   Maintain MAP > 65   Continuous telemetry. ProBNP 4763>  14,350  02/10 Repeat in AM   Cardiology following appreciate recommendations. Pharmacy to dose Coumadin INR 2.2  Goal INR 2 to 3     Gastrointestinal   NPO  Protonix daily  Tube feedings at goal.  Glucerna. Dietary consulted appreciate input.      Renal   Acute on chronic kidney injury

## 2022-02-13 NOTE — PROGRESS NOTES
Podiatry Progress Note  2/13/2022   Yusradedalton Akbar       SUBJECTIVE: Patient seen at bedside this morning for follow up of b/l lower extremity ulcers. Remains intubated and sedated. Dressings changed at bedside without incident. No acute overnight events. 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 TONG         No family history on file. Social History     Tobacco Use    Smoking status: Never Smoker    Smokeless tobacco: Never Used   Substance Use Topics    Alcohol use: No        Prior to Admission medications    Medication Sig Start Date End Date Taking? Authorizing Provider   dilTIAZem (CARDIZEM CD) 120 MG extended release capsule Take 120 mg by mouth daily    Historical Provider, MD   docusate sodium (COLACE) 100 MG capsule Take 100 mg by mouth daily    Historical Provider, MD   Multiple Vitamins-Minerals (THERAPEUTIC MULTIVITAMIN-MINERALS) tablet Take 1 tablet by mouth daily    Historical Provider, MD   warfarin (COUMADIN) 4 MG tablet Take 1 tablet by mouth daily 1/7/22   Margaret Nevarez MD   bumetanide (BUMEX) 1 MG tablet Take 1 tablet by mouth 2 times daily 1/7/22   Margaret Nevarez MD   spironolactone (ALDACTONE) 25 MG tablet Take 1 tablet by mouth daily 1/8/22   Margaret Nevarez MD   metoprolol (LOPRESSOR) 100 MG tablet Take 200 mg by mouth 2 times daily     Historical Provider, MD   simvastatin (ZOCOR) 20 MG tablet Take 20 mg by mouth nightly    Historical Provider, MD   levothyroxine (SYNTHROID) 125 MCG tablet Take 125 mcg by mouth Daily     Historical Provider, MD        Patient has no known allergies.          OBJECTIVE:        Vitals:    02/13/22 0900   BP: 120/62   Pulse: 74   Resp: 19   Temp:    SpO2: 98%              EXAM:        Pt is AAOx3  Dressings changed this morning. Physical exam findings:  Vascular Exam: Pedal pulses nonpalpable b/l. Lower extremity warm to cool from proximal tibial tuberosity to distal digits b/l. No pedal hair growth noted b/l. Cap refill brisk to all digits. No edema to b/l foot      Neuro Exam: Deferred due to patient's condition. Dermatologic Exam:   RIGHT:  Full thickness ulceration noted to left posterior heel with surrounding cellulitis and firm, necrotic wound base. No focal openings, no evidence of purulence/proximal streaking/crepitus/malodor. Chronic skin hyperpigmentation secondary to chronic venous stasis dermatitis noted  Tissue injury to medial 1st mpj head. No acute signs of infection. LEFT:  Venous ulceration noted to left anterior leg and wrapping circumferentially to posterior leg. Chronic hyperpigmentation secondary to venous stasis dermatitis noted to skin. Skin is chafed and raw in appearance; healthy wound bed noted with pinpoint bleeding      MSK: ROM testing deferred. Soft, compressible posterior muscle compartments b/l. Evidence of b/l hallux valgus deformity.                                  Current Facility-Administered Medications   Medication Dose Route Frequency Provider Last Rate Last Admin    insulin glargine-yfgn (SEMGLEE-YFGN) injection vial 15 Units  15 Units SubCUTAneous Nightly HIMANSHU Gerard CNP   15 Units at 02/12/22 1938    bisacodyl (DULCOLAX) suppository 10 mg  10 mg Rectal Once RaphaelHIMANSHU Arenas CNP        docusate sodium (COLACE) 150 MG/15ML liquid 100 mg  100 mg Oral BID RaphaelHIMANSHU Arenas CNP        sennosides (SENOKOT) 8.8 MG/5ML syrup 5 mL  5 mL Oral Nightly RaphaelHIMANSHU Arenas CNP        acetylcysteine (MUCOMYST) 20 % solution 600 mg  600 mg Inhalation BID HIMANSHU Gerard CNP        lidocaine PF 1 % injection 5 mL  5 mL IntraDERmal Once HIMANSHU Gerard CNP sodium chloride flush 0.9 % injection 5-40 mL  5-40 mL IntraVENous 2 times per day Colby Killer, APRN - CNP   10 mL at 02/13/22 0801    sodium chloride flush 0.9 % injection 5-40 mL  5-40 mL IntraVENous PRN Colby Killer, APRN - CNP   10 mL at 02/11/22 1236    0.9 % sodium chloride infusion  25 mL IntraVENous PRN Oclby Killer, APRN - CNP        heparin flush 100 UNIT/ML injection 300 Units  3 mL IntraVENous 2 times per day Colby Killer, APRN - CNP   300 Units at 02/11/22 2003    heparin flush 100 UNIT/ML injection 300 Units  3 mL IntraCATHeter PRN Colby Killer, APRN - CNP        ipratropium-albuterol (DUONEB) nebulizer solution 1 ampule  1 ampule Inhalation Q4H PRN Colby Killer, APRN - CNP        dexamethasone (DECADRON) injection 10 mg  10 mg IntraVENous Q24H Colby Killer, APRN - CNP   10 mg at 02/13/22 3892    vancomycin (VANCOCIN) 1,250 mg in dextrose 5 % 250 mL IVPB  1,250 mg IntraVENous Q24H Pallavi Epstein MD   Stopped at 02/12/22 1336    warfarin placeholder: dosing by pharmacy   Other [address], APRN - CNP        metoprolol tartrate (LOPRESSOR) tablet 50 mg  50 mg Oral BID Colby Killer, APRN - CNP   50 mg at 02/13/22 0919    levETIRAcetam (KEPPRA) 750 mg/50 mL IVPB  750 mg IntraVENous Q12H Giovany Vyas APRN - NP   Stopped at 02/13/22 0456    chlorhexidine (PERIDEX) 0.12 % solution 15 mL  15 mL Mouth/Throat BID Colby Killer, APRN - CNP   15 mL at 02/13/22 0802    mupirocin (BACTROBAN) 2 % ointment   Nasal Daily Colby Killer, APRN - CNP   Given at 02/13/22 0802    insulin lispro (HUMALOG) injection vial 0-12 Units  0-12 Units SubCUTAneous Q4H Colby Killer, APRN - CNP   2 Units at 02/13/22 6735    midazolam PF (VERSED) injection 2 mg  2 mg IntraVENous Q4H PRN Kip Perez, APRN - CNP   2 mg at 02/11/22 1236    LORazepam (ATIVAN) injection 1 mg  1 mg IntraVENous Q5 Min PRN HIMANSHU Scott CNP   1 mg at 02/07/22 0331    perflutren lipid microspheres (DEFINITY) injection 1.65 mg  1.5 mL IntraVENous ONCE PRN Judy House MD        sodium chloride flush 0.9 % injection 5-40 mL  5-40 mL IntraVENous 2 times per day Leonel Rodríguez MD   10 mL at 02/13/22 0803    sodium chloride flush 0.9 % injection 5-40 mL  5-40 mL IntraVENous PRN Leonel Rodríguez MD        0.9 % sodium chloride infusion  25 mL IntraVENous PRN Leonel Rodríguez MD        acetaminophen (TYLENOL) tablet 650 mg  650 mg Oral Q6H PRN Leonel Rodríguez MD   650 mg at 02/08/22 2207    Or    acetaminophen (TYLENOL) suppository 650 mg  650 mg Rectal Q6H PRN Leonel Rodríguez MD        piperacillin-tazobactam (ZOSYN) 3,375 mg in dextrose 5 % 100 mL IVPB extended infusion (mini-bag)  3,375 mg IntraVENous Q8H Leonel Rodríguez MD 25 mL/hr at 02/13/22 0952 3,375 mg at 02/13/22 0952    pantoprazole (PROTONIX) injection 40 mg  40 mg IntraVENous Daily Hope Ree, APRN - CNP   40 mg at 02/13/22 0801    And    sodium chloride (PF) 0.9 % injection 10 mL  10 mL IntraVENous Daily Destiny Aaron, APRN - CNP   10 mL at 02/13/22 0801    glucose (GLUTOSE) 40 % oral gel 15 g  15 g Oral PRN Destiny Aaron, APRN - CNP        dextrose 50 % IV solution  12.5 g IntraVENous PRN Hope Ree, APRN - CNP        glucagon (rDNA) injection 1 mg  1 mg IntraMUSCular PRN Hope Ree, APRN - CNP        dextrose 5 % solution  100 mL/hr IntraVENous PRN Hope Ree, APRN - CNP            Lab Results   Component Value Date    WBC 10.5 02/13/2022    HCT 35.8 02/13/2022    HGB 11.5 02/13/2022     02/13/2022     02/13/2022    K 4.1 02/13/2022    K 4.1 02/13/2022     02/13/2022    CO2 21 (L) 02/13/2022    BUN 61 (H) 02/13/2022    CREATININE 1.0 02/13/2022    GLUCOSE 190 (H) 02/13/2022    CRP 2.0 (H) 02/10/2022         Radiographs:    ASSESEMENT:  -Full thickness ulceration, left heel  -Venous stasis ulceration, right leg  -Chronic venous stasis dermatitis, b/l leg   -PAD  -Covid-19 infection      PLAN:  - Examined and evaluated  - All labs, imaging, and charts reviewed  -NIVS ordered. Results pending  -Foot and tib/fib xrays: No gross osseous erosions are seen. Advanced degenerative changes noted. Soft tissue swelling of the foot present. -ID: zosyn and vanco ongoing  -Will proceed with conservative wound care for now  -QoD dressing changes  -Dressings to b/l lower extremity changed today:  Xeroform DSD. Next dressing change:2/15  -Prevalon offloading boots while in bed  -Will follow along    -Patient discussed with:  Sho Soto DPM FACFAS  Fellowship-Trained Foot and Ankle Surgeon  Diplomate, [employer] Board of Foot and Ankle Surgeons  [phone]     Inga Ngo  Podiatry      Thank you for involving podiatry in this patient's care. Please do not hesitate to call with any questions or concerns.                 Lana Tan DPM   2/13/2022   9:58 AM

## 2022-02-13 NOTE — PROGRESS NOTES
Pharmacy Consultation Note  (Warfarin Dosing and Monitoring)    Initial consult date: 2/9/22  Consulting Provider: Adele BAUGH    Eliza Diop is a 81 y.o. female for whom pharmacy has been asked to manage warfarin therapy. Weight:   Wt Readings from Last 1 Encounters:   02/13/22 233 lb (105.7 kg)       TSH:    Lab Results   Component Value Date    TSH 1.790 02/04/2022       Hepatic Function Panel:                            Lab Results   Component Value Date    ALKPHOS 134 02/13/2022    ALT 59 02/13/2022    AST 79 02/13/2022    PROT 5.7 02/13/2022    BILITOT 1.8 02/13/2022    LABALBU 3.4 02/13/2022       Current warfarin drug-drug interactions include: steroids (variable/dose dependent)    Recent Labs     02/11/22  0406 02/12/22  0410 02/13/22  0401   HGB 11.5 10.7* 11.5    214 201     Date Warfarin Dose INR Heparin or LMWH Comment   2/9 2 mg 2.6 -----    2/10 2 mg 2.5 ------    2/11 2 mg 2.2 ------    2/12 2 mg 2.6 ------    2/13 2 mg 2.2 -------      Assessment:  · Patient is a 81 y.o. female on warfarin for Atrial Fibrillation. Patient's home warfarin dosing regimen is 4mg daily.    · Goal INR 2 - 3  · INR 2.2 today    Plan:  · Warfarin 2mg tonight  · Daily PT/INR until the INR is stable within the therapeutic range  · Pharmacist will follow and monitor/adjust dosing as necessary    Thank you for this consult,    Kermitt Sicard, [facility] 2/13/2022 10:06 AM

## 2022-02-13 NOTE — PROGRESS NOTES
picc Placement 2/13/2022    Product number: [id number]   Lot Number: [id number]      Ultrasound: yes   Right Brachial vein:                Upper Arm Circumference: 33cm    Size: 5.5fr    Exposed Length: 3cm    Internal Length: 37cm   Cut: 15cm   Vein Measurement: 0.54cm    Uma Bruno RN  2/13/2022  5:14 PM                 Xray ordered

## 2022-02-13 NOTE — PROGRESS NOTES
Subjective:    Remains intubated and sedated  Remains in ICU setting  Family updated at bedside. No changes overnight    Objective:    BP (!) 120/58   Pulse 71   Temp 97 °F (36.1 °C) (Bladder)   Resp 15   Ht 5' (1.524 m)   Wt 233 lb (105.7 kg)   SpO2 97%   BMI 45.50 kg/m²     In: 1954 [I.V.:900; NG/GT:1054]  Out: 1470   In: 1953   Out: [address] [Urine:1470]    General appearance: Intubated sedated  HEENT: AT/NC, MMM  Neck: FROM, supple  Lungs: Clear to auscultation. Mechanical sounds. CV: RRR, no MRGs  Vasc: Radial pulses 2+  Abdomen: Soft, non-tender; no masses or HSM  Extremities: No peripheral edema or digital cyanosis  Skin: no rash, lesions or ulcers       Recent Labs     02/11/22  0406 02/12/22  0410 02/13/22  0401   WBC 12.2* 12.0* 10.5   HGB 11.5 10.7* 11.5   HCT 35.4 33.2* 35.8    214 201       Recent Labs     02/11/22  0406 02/11/22  0406 02/12/22  0410 02/13/22  0401     --  142 141   K 3.6   < > 3.8  3.8 4.1  4.1     --  106 107   CO2 21*  --  20* 21*   BUN 58*  --  66* 61*   CREATININE 1.1*  --  1.2* 1.0   CALCIUM 9.4  --  9.2 8.7    < > = values in this interval not displayed. Assessment:    Active Problems:    Acute respiratory failure due to COVID-19 [facility])    Acute respiratory failure (HCC)    Cerebrovascular accident (CVA) due to embolism of cerebral artery ([address]  Resolved Problems:    * No resolved hospital problems.  *      Plan:    Admit to MICU for neurology evaluation secondary to ams requiring intubating in pt with covid pna  Unable to get mri d/t pacer in place   -Wean to extubate as mentation improves  -On no tx for covid as currently that is not the cause of her intubated status   -moitor rate and rhythm, echocardiogram with ejection fraction 50% no evidence of vegetations on transthoracic echo  -Will need eeg and full neuro work upunderway   empiric keppra bid-Keppra increased to 750 IV twice daily by neurology  patient remains on vancomycin and Zosyn  -Palliative care service consult  -Tube feeds initiated for nutrition    No real change in status  All in all guarded to poor prognosis  Family wishes to continue full support at this point  Multiple consultants on board        DVT Prophylaxis   PT/OT  Discharge [address] DO  10:33 AM  2/13/2022

## 2022-02-13 NOTE — PLAN OF CARE
Problem: Isolation Precautions - Risk of Spread of Infection  Goal: Prevent transmission of infection  Outcome: Met This Shift     Problem: Risk for Fluid Volume Deficit  Goal: Maintain normal heart rhythm  Outcome: Met This Shift     Problem: Falls - Risk of:  Goal: Will remain free from falls  Description: Will remain free from falls  Outcome: Met This Shift     Problem: Falls - Risk of:  Goal: Absence of physical injury  Description: Absence of physical injury  Outcome: Met This Shift     Problem: Skin Integrity:  Goal: Will show no infection signs and symptoms  Description: Will show no infection signs and symptoms  Outcome: Met This Shift     Problem: Skin Integrity:  Goal: Absence of new skin breakdown  Description: Absence of new skin breakdown  Outcome: Met This Shift     Problem: Airway Clearance - Ineffective  Goal: Achieve or maintain patent airway  Outcome: Ongoing     Problem: Gas Exchange - Impaired  Goal: Absence of hypoxia  Outcome: Ongoing     Problem: Gas Exchange - Impaired  Goal: Promote optimal lung function  Outcome: Ongoing     Problem: Breathing Pattern - Ineffective  Goal: Ability to achieve and maintain a regular respiratory rate  Outcome: Ongoing     Problem: Body Temperature -  Risk of, Imbalanced  Goal: Ability to maintain a body temperature within defined limits  Outcome: Ongoing     Problem: Body Temperature -  Risk of, Imbalanced  Goal: Will regain or maintain usual level of consciousness  Outcome: Ongoing     Problem:  Body Temperature -  Risk of, Imbalanced  Goal: Complications related to the disease process, condition or treatment will be avoided or minimized  Outcome: Ongoing     Problem: Nutrition Deficits  Goal: Optimize nutritional status  Outcome: Ongoing     Problem: Risk for Fluid Volume Deficit  Goal: Maintain absence of muscle cramping  Outcome: Ongoing     Problem: Risk for Fluid Volume Deficit  Goal: Maintain normal serum potassium, sodium, calcium, phosphorus, and pH  Outcome: Ongoing     Problem: Loneliness or Risk for Loneliness  Goal: Demonstrate positive use of time alone when socialization is not possible  Outcome: Ongoing     Problem: Fatigue  Goal: Verbalize increase energy and improved vitality  Outcome: Ongoing     Problem: Patient Education: Go to Patient Education Activity  Goal: Patient/Family Education  Outcome: Ongoing     Problem: Gas Exchange - Impaired:  Goal: Levels of oxygenation will improve  Description: Levels of oxygenation will improve  Outcome: Ongoing     Problem: Infection, Septic Shock:  Goal: Will show no infection signs and symptoms  Description: Will show no infection signs and symptoms  Outcome: Ongoing     Problem: Infection - Ventilator-Associated Pneumonia:  Goal: Absence of pulmonary infection  Description: Absence of pulmonary infection  Outcome: Ongoing

## 2022-02-13 NOTE — PROGRESS NOTES
Pharmacy Consultation Note  (Antibiotic Dosing and Monitoring)    Initial consult date: 2/10/22  Consulting physician/provider: Dr. Binu Sandoval  Drug: Vancomycin  Indication: Skin and soft tissue infection    Age/  Gender Height Weight IBW  Allergy Information   86 y.o./female 5' (152.4 cm) 233 lb 6.4 oz (105.9 kg)     Ideal body weight: 45.5 kg (100 lb 4.9 oz)  Adjusted ideal body weight: 69.6 kg (153 lb 6.2 oz)   Patient has no known allergies. Renal Function:  Recent Labs     02/11/22  0406 02/12/22  0410 02/13/22  0401   BUN 58* 66* 61*   CREATININE 1.1* 1.2* 1.0       Intake/Output Summary (Last 24 hours) at 2/13/2022 1007  Last data filed at 2/13/2022 0900  Gross per 24 hour   Intake 1864 ml   Output 1260 ml   Net 604 ml       Vancomycin Monitoring:  Trough:    Recent Labs     02/13/22  1019   VANCOTROUGH 17.2*     Random:  No results for input(s): VANCORANDOM in the last 72 hours. Vancomycin Administration Times:  Recent vancomycin administrations                   vancomycin (VANCOCIN) 1,250 mg in dextrose 5 % 250 mL IVPB (mg) 1,250 mg New Bag 02/12/22 1213     1,250 mg New Bag 02/11/22 1227     1,250 mg New Bag 02/10/22 1354              Assessment:  · Patient is a 81 y.o. female who has been initiated on vancomycin  Estimated Creatinine Clearance: 45 mL/min (based on SCr of 1 mg/dL).    Trough today = 17.9 mcg/mL @ 1019     Plan:  · Will continue vancomycin 1250 IV every 24 hours  · Per ID, planning to continue vancomycin through 2/16  · Will check vancomycin levels when appropriate  · Will continue to monitor renal function   · Clinical pharmacy to follow      YAMILE Roberto [facility] 2/13/2022 10:07 AM

## 2022-02-13 NOTE — PROGRESS NOTES
Infectious Disease  Progress Note  [facility]    C/C: covid pneumonia, gr B bacteremia/skin abscess    Subjective: she is afebrile over night. A line was removed. Off pressors. On spontaenous over the vent.      Scheduled Meds:   warfarin  2 mg Oral Once    insulin glargine  15 Units SubCUTAneous Nightly    bisacodyl  10 mg Rectal Once    docusate sodium  100 mg Oral BID    sennosides  5 mL Oral Nightly    acetylcysteine  600 mg Inhalation BID    lidocaine PF  5 mL IntraDERmal Once    sodium chloride flush  5-40 mL IntraVENous 2 times per day    heparin flush  3 mL IntraVENous 2 times per day    dexamethasone  10 mg IntraVENous Q24H    vancomycin  1,250 mg IntraVENous Q24H    warfarin placeholder: dosing by pharmacy   Other RX Placeholder    metoprolol tartrate  50 mg Oral BID    levetiracetam  750 mg IntraVENous Q12H    chlorhexidine  15 mL Mouth/Throat BID    mupirocin   Nasal Daily    insulin lispro  0-12 Units SubCUTAneous Q4H    sodium chloride flush  5-40 mL IntraVENous 2 times per day    piperacillin-tazobactam  3,375 mg IntraVENous Q8H    pantoprazole  40 mg IntraVENous Daily    And    sodium chloride (PF)  10 mL IntraVENous Daily     Continuous Infusions:   sodium chloride      sodium chloride      dextrose       PRN Meds:sodium chloride flush, sodium chloride, heparin flush, ipratropium-albuterol, midazolam, LORazepam, perflutren lipid microspheres, sodium chloride flush, sodium chloride, acetaminophen **OR** acetaminophen, glucose, dextrose, glucagon (rDNA), dextrose    Patient Vitals for the past 24 hrs:   BP Temp Temp src Pulse Resp SpO2 Weight   02/13/22 1200    82 15 97 %    02/13/22 1141    72 17 97 %    02/13/22 1100 (!) 119/55   71 18 96 %    02/13/22 1000 (!) 120/58   71 15 97 %    02/13/22 0956    71 12 98 %    02/13/22 0900 120/62   74 19 98 %    02/13/22 0800 (!) 125/53 97 °F (36.1 °C) Bladder 74 23 98 %    02/13/22 0700 (!) 113/47   71 22 98 %  02/13/22 0600 125/61   72 18 98 % 233 lb (105.7 kg)   02/13/22 0500 121/63   70 15 98 %    02/13/22 0400 (!) 126/56 97.2 °F (36.2 °C)  71 14 97 % 233 lb (105.7 kg)   02/13/22 0300    79 16 96 %    02/13/22 0200 (!) 124/51   82 17 97 %    02/13/22 0100 (!) 119/50   75 13 97 %    02/13/22 0000 (!) 123/54 97.7 °F (36.5 °C) Bladder 70 13 98 %    02/12/22 2300 (!) 122/49   71 13 98 %    02/12/22 2200 126/62   71 17 98 %    02/12/22 2100 (!) 121/56   71 21 98 %    02/12/22 2000 (!) 128/59   71 18 98 %    02/12/22 1929 (!) 133/49 97.5 °F (36.4 °C) Bladder 77 17 95 %    02/12/22 1900 (!) 132/58   89 23 97 %    02/12/22 1850    79 25 97 %    02/12/22 1800 (!) 116/54   75 22 97 %    02/12/22 1700 (!) 129/59   73 21 98 %    02/12/22 1604    80 29 98 %    02/12/22 1600 (!) 151/80 97.7 °F (36.5 °C) Bladder 76 26 97 %    02/12/22 1500 (!) 130/58   74 21 97 %    02/12/22 1450    82 20 97 %    02/12/22 1400 131/66   71 18 100 %    02/12/22 1300 130/64   71 20 100 %        CBC with Differential:    Lab Results   Component Value Date    WBC 10.5 02/13/2022    RBC 3.63 02/13/2022    HGB 11.5 02/13/2022    HCT 35.8 02/13/2022     02/13/2022    MCV 98.6 02/13/2022    MCH 31.7 02/13/2022    MCHC 32.1 02/13/2022    RDW 17.7 02/13/2022    NRBC 1.7 02/09/2022    METASPCT 1.7 02/12/2022    LYMPHOPCT 4.9 02/13/2022    PROMYELOPCT 0.9 02/06/2022    MONOPCT 9.0 02/13/2022    MYELOPCT 0.9 02/12/2022    BASOPCT 0.3 02/13/2022    MONOSABS 0.94 02/13/2022    LYMPHSABS 0.51 02/13/2022    EOSABS 0.08 02/13/2022    BASOSABS 0.03 02/13/2022     CMP:    Lab Results   Component Value Date     02/13/2022    K 4.1 02/13/2022    K 4.1 02/13/2022     02/13/2022    CO2 21 02/13/2022    BUN 61 02/13/2022    CREATININE 1.0 02/13/2022    GFRAA >60 02/13/2022    LABGLOM 53 02/13/2022    GLUCOSE 190 02/13/2022    PROT 5.7 02/13/2022    LABALBU 3.4 02/13/2022    CALCIUM 8.7 02/13/2022 BILITOT 1.8 02/13/2022    ALKPHOS 134 02/13/2022    AST 79 02/13/2022    ALT 59 02/13/2022       BP (!) 119/55   Pulse 82   Temp 97 °F (36.1 °C) (Bladder)   Resp 15   Ht 5' (1.524 m)   Wt 233 lb (105.7 kg)   SpO2 97%   BMI 45.50 kg/m²     Physical Exam  Const/Neuro- unchanged, no signs of acute distress,   ENMT- Within Normal Limits, Normocephalic,intubated  Neck: Neck supple  Heart- Regular, Rate, Rhythm- no murmur appreciated. Lungs- clear to ascultation anteriorly. ,on the vent: AC fio2 of 30%  Abdomen- Soft, bowel sounds positive, non tender  Musculo/Extremities-  Equal and symmetrical, no edema. No tenderness. Skin:  Warm and dry, free from rashes. Labs, Cultures reviewed    Radiology reviewed    Microbiology:   Blood cx 2/4: gr B strep  Blood cx 2/5, 2/6, 2/9 are negative  MRSA nasal screen 2/4: positive  Wound cx 2/4: MRSA  resp cx 2/6: MRSA  resp cx 2/12: gram negative cindi    Assessment  · Acute hypoxic resp failure from Covid pneumonia  · Group B Streptoccus bacteremia:   · Superimposed MRSA bacterial pneumonia  · Wound infection: MRSA  · Leucocytosis from above: imporinvg    Plan  · Continue vancomycin (PTD) - end date 2/16  · Continue Zosyn (day10)   · Ok for PICC Placement. · No need for repeat blood cx today. · Will follow with you.      Electronically signed by Oralia Beebe MD on 2/13/2022 at 12:43 PM

## 2022-02-14 NOTE — CARE COORDINATION
2/14: Update CM Note: Pt came in from [facility] ER with being unresponsive/Covid +2/4. Pt was intubated & transferred to MICU for neuro evaluation. Pt remains intubated, SPo2 97% & Fio2 30%. CM spoke with lauren Sidhu [phone] on 2/8. He would prefer his Aunt to go to another facility for rehab. Cm explained that it will depend on her 02 needs & mobility to where she can go. At this point the pt is Covid + & will need to go to a Covid SNF. He preferred [address] [place] [address]. Cm called left [facility] referral. Aleyda Seed following as well. Pt is a DNRCCA, Iv Vanco, Iv Decadron, Iv Keppra & Iv Zosyn. 2 D Echo & EEG awake & sleep moderate non-specific cerebral dysfunction of the left frontal temporal region, moderate to severe encephalopathy. Needs unclear until pt more medically stable. Sw/VINEET will continue to follow for dc planning.  Electronically signed by Tabby Yin RN on 2/14/2022 at 2:07 PM

## 2022-02-14 NOTE — PROGRESS NOTES
Pharmacy Consultation Note  (Antibiotic Dosing and Monitoring)    Initial consult date: 2/10/22  Consulting physician/provider: Dr. Antonio Ham  Drug: Vancomycin  Indication: Skin and soft tissue infection    Age/  Gender Height Weight IBW  Allergy Information   86 y.o./female 5' (152.4 cm) 233 lb 6.4 oz (105.9 kg)     Ideal body weight: 45.5 kg (100 lb 4.9 oz)  Adjusted ideal body weight: 69.6 kg (153 lb 6.2 oz)   Patient has no known allergies. Renal Function:  Recent Labs     02/12/22  0410 02/13/22  0401 02/14/22  0430   BUN 66* 61* 58*   CREATININE 1.2* 1.0 0.8       Intake/Output Summary (Last 24 hours) at 2/14/2022 1404  Last data filed at 2/14/2022 1000  Gross per 24 hour   Intake 1624.31 ml   Output 930 ml   Net 694.31 ml       Vancomycin Monitoring:  Trough:    Recent Labs     02/13/22  1019   VANCOTROUGH 17.2*     Random:  No results for input(s): VANCORANDOM in the last 72 hours. Vancomycin Administration Times:  Recent vancomycin administrations                   vancomycin (VANCOCIN) 1,250 mg in dextrose 5 % 250 mL IVPB (mg) 1,250 mg New Bag 02/14/22 1129     1,250 mg New Bag 02/13/22 1157     1,250 mg New Bag 02/12/22 1213                Assessment:  · Patient is a 81 y.o. female who has been initiated on vancomycin  Estimated Creatinine Clearance: 56 mL/min (based on SCr of 0.8 mg/dL).      Plan:  · Will continue vancomycin 1250 IV every 24 hours  · Per ID, planning to continue vancomycin through 2/16  · Will check vancomycin levels when appropriate  · Will continue to monitor renal function   · Clinical pharmacy to follow      YAMILE Rojas [facility] 2/14/2022 2:04 PM

## 2022-02-14 NOTE — PROGRESS NOTES
Comprehensive Nutrition Assessment    Type and Reason for Visit:  Reassess    Nutrition Recommendations/Plan: Continue NPO. Rec to Modify Current EN to meet estimated needs. TF Recommendations:  Diabetic(Glucerna1.5) @ 40ml/hr Continuous x 24hr/d plus 1 Protein Modular Daily= 960ml TV, 1440kcal, 79gm Pro, 729ml free water. (1544kcal, 105gm Pro w/ Pro Mod). Flush per critical care mgmt      Nutrition Assessment:  Pt remains at risk s/p transfer from [facility] d/t seizure activity/multiple strokes. Initially admitted +COVID-19 PNA/Sepsis. Noted hx CHF, DM, PVD/multiple chronic wounds. Pt remains intubated w/ noted plans for possible extubation soon. Will provide updated EN recs and monitor. Malnutrition Assessment:  Malnutrition Status:   At risk for malnutrition (Comment)    Context:  Acute Illness     Findings of the 6 clinical characteristics of malnutrition:  Energy Intake:  7 - 50% or less of estimated energy requirements for 5 or more days  Weight Loss:  Unable to assess (limited wt hx on file)     Body Fat Loss:  Unable to assess (d/t covid iso)     Muscle Mass Loss:  Unable to assess (d/t covid iso)    Fluid Accumulation:  No significant fluid accumulation     Strength:  Not Performed    Estimated Daily Nutrient Needs:  Energy (kcal):  ; ; Weight Used for Energy Requirements:  Usual     Protein (g):  2.0-2.3gm/kg= ; Weight Used for Protein Requirements:  Ideal        Fluid (ml/day):  per critical care mgmt; Method Used for Fluid Requirements:  Other (Comment)      Nutrition Related Findings:  intubated/unresponsive, Abd/BS WDL, +OGT, +diarrhea, +2 edema, MAP83, I/O's WNL      Wounds:  Pressure Injury,Unstageable,Full Thickness,Deep Tissue Injury,Venous Stasis       Current Nutrition Therapies:    Current Tube Feeding (TF) Orders:  · Feeding Route: Orogastric (clamped currently)  · Formula:    · Schedule:    · Additives/Modulars:    · Water Flushes:    · Current TF & Flush Orders Provides:    · Goal TF & Flush Orders Provides:      Additional Calorie Sources:   none currently    Anthropometric Measures:  · Height: 5' (152.4 cm)  · Current Body Weight: 203 lb (92.1 kg) (1/1/22 stand scale per EMR d/t both CBW and adm wt currently elevated likely 2/2 fluids)   · Admission Body Weight: 233 lb (105.7 kg) (2/7 actual - likely elevated)    · Usual Body Weight: 203 lb (92.1 kg) (1/1 standing scale)     · Ideal Body Weight: 100 lbs; % Ideal Body Weight 203 %   · BMI: 39.6  · Adjusted Body Weight:  ; No Adjustment   · Adjusted BMI:      · BMI Categories: Obese Class 2 (BMI 35.0 -39.9)       Nutrition Diagnosis:   · Inadequate oral intake related to impaired respiratory function as evidenced by NPO or clear liquid status due to medical condition,intubation      Nutrition Interventions:   Food and/or Nutrient Delivery:  Continue NPO,Modify Tube Feeding (Continue NPO. Rec to Modify Current EN to meet estimated needs. TF Rec:  Diabetic(Glucerna1.5) @ 40ml/hr Continuous x 24hr/d plus 1 Protein Modular Daily= 960ml TV, 1440kcal, 79gm Pro, 729ml free water. (1544kcal, 105gm Pro w/ Pro Mod). )  Nutrition Education/Counseling:  No recommendation at this time   Coordination of Nutrition Care:  Continue to monitor while inpatient    Goals:  EN tolerance at goal.       Nutrition Monitoring and Evaluation:   Behavioral-Environmental Outcomes:  None Identified   Food/Nutrient Intake Outcomes:  Enteral Nutrition Intake/Tolerance  Physical Signs/Symptoms Outcomes:  Biochemical Data,Diarrhea,GI Status,Fluid Status or Edema,Hemodynamic Status,Nutrition Focused Physical Findings,Skin,Weight     Discharge Planning:     Too soon to determine     Electronically signed by Ashli Murray RD, LD on 2/14/22 at 3:55 PM EST    Contact: ext [phone]

## 2022-02-14 NOTE — PROGRESS NOTES
Pharmacy Consultation Note  (Warfarin Dosing and Monitoring)    Initial consult date: 2/9/22  Consulting Provider: Lesli BAUGH    Jessica Macdonald is a 81 y.o. female for whom pharmacy has been asked to manage warfarin therapy. Weight:   Wt Readings from Last 1 Encounters:   02/13/22 233 lb (105.7 kg)       TSH:    Lab Results   Component Value Date    TSH 1.790 02/04/2022       Hepatic Function Panel:                            Lab Results   Component Value Date    ALKPHOS 147 02/14/2022    ALT 57 02/14/2022    AST 63 02/14/2022    PROT 5.2 02/14/2022    BILITOT 1.6 02/14/2022    LABALBU 3.2 02/14/2022       Current warfarin drug-drug interactions include: steroids (variable/dose dependent)    Recent Labs     02/12/22  0410 02/13/22  0401 02/14/22  0430   HGB 10.7* 11.5 10.9*    201 191     Date Warfarin Dose INR Heparin or LMWH Comment   2/9 2 mg 2.6 -----    2/10 2 mg 2.5 ------    2/11 2 mg 2.2 ------    2/12 2 mg 2.6 ------    2/13 2 mg 2.2 -------    2/14 4 mg 2 -------             Assessment:  · Patient is a 81 y.o. female on warfarin for Atrial Fibrillation. Patient's home warfarin dosing regimen is 4mg daily.    · Goal INR 2 - 3  · INR 2 today    Plan:  · Warfarin 4mg tonight  · Daily PT/INR until the INR is stable within the therapeutic range  · Pharmacist will follow and monitor/adjust dosing as necessary    Thank you for this consult,    Livia Shen, [facility] 2/14/2022 2:03 PM

## 2022-02-14 NOTE — PROGRESS NOTES
Palliative Care Department  [phone]  Palliative Care Progress Note  Provider Kulwinder Ingram, APRN - CNP     Sho Jo  [id number]  Hospital Day: 9  Date of Initial Consult: 2/7/2022  Referring Provider: Judd Bhatia MD  Palliative Medicine was consulted for assistance with:  Code Status Discussion, Goals of Care    HPI:   Sho Jo is a 81 y.o. with a medical history of atrial fibrillation on anticoagulation, hyperlipidemia, hypertension, CHF, DM, sinus node dysfunction s/p pacemaker placement, MR, TR, pulmonary hypertension who was admitted on 2/6/2022 from facility with a CHIEF COMPLAINT of confusion. Patient tested positive for Covid 10 days prior to admission. Patient was febrile in the ED, found to be in atrial fibrillation with RVR, and also hypotensive. Patient currently intubated in ICU. Palliative medicine consulted for goals of care and code status discussion. Patient transferred from [facility] to [facility] for neuro eval.   ASSESSMENT/PLAN:     Pertinent Hospital Diagnoses   Acute hypoxic respiratory failure due to Covid 19   Sepsis   Atrial fibrillation with RVR   ISIDRO   Acute on chronic CHF    Palliative Care Encounter / Counseling Regarding Goals of Care  Please see detailed goals of care discussion as below   At this time, Sho Jo, Does Not have capacity for medical decision-making.   Capacity is time limited and situation/question specific   During encounter, Amber Talavera, nephew, was surrogate medical decision-maker   Outcome of goals of care meeting  o Amber Ilan expresses that he would not want a trach or PEG  o Plan for extubation and see how patient does  o If respiratory status were to decline: no reintubation   Code status Limited DNRCCA/ DNI   Advanced Directives: no POA or living will in [facility]   Surrogate/Legal NOK:  o Simone Marinelli, nephew, [phone]    Spiritual assessment: no spiritual distress identified  Bereavement and grief: to be determined  Referrals to: none today  SUBJECTIVE:   Details of Conversation:    Chart reviewed and spoke with the ICU team. I called and spoke with her nephew Amber Talavera. Provided an update. He expressed that he has been thinking about the trach and Peg and that is not something that he would want. We discussed that her breathing is doing well enough that they think they will be able to extubate. With her altered mental status the concern would be that she would not be able to maintain her airway. In the event her breathing were to decline he would not want her to be re-intubated. With no re-intubation we discussed that if her breathing declined we would then transition to comfort care. He is in agreement. Will continue to follow. OBJECTIVE:   Prognosis: Poor    Physical Exam:  BP (!) 115/56   Pulse 71   Temp 98.4 °F (36.9 °C) (Bladder)   Resp 20   Ht 5' (1.524 m)   Wt 233 lb (105.7 kg)   SpO2 99%   BMI 45.50 kg/m²      Due to the current efforts to prevent transmission of COVID-19 and also the need to preserve PPE for other caregivers, a face-to-face encounter with the patient was not performed. That being said, all relevant records and diagnostic tests were reviewed, including laboratory results and imaging. Please reference any relevant documentation elsewhere. Care will be coordinated with the primary service. Objective data reviewed: labs, images, records, medication use, vitals and chart    Discussed patient and the plan of care with the other IDT members: Palliative Medicine IDT Team    Time/Communication  Greater than 50% of time spent, total 15 minutes in counseling and coordination of care at the bedside regarding goals of care, diagnosis and prognosis and see above. Thank you for allowing Palliative Medicine to participate in the care of [facility].

## 2022-02-14 NOTE — PLAN OF CARE
Problem: Airway Clearance - Ineffective  Goal: Achieve or maintain patent airway  Outcome: Met This Shift     Problem: Gas Exchange - Impaired  Goal: Absence of hypoxia  Outcome: Met This Shift     Problem: Body Temperature -  Risk of, Imbalanced  Goal: Ability to maintain a body temperature within defined limits  Outcome: Met This Shift     Problem: Risk for Fluid Volume Deficit  Goal: Maintain normal heart rhythm  Outcome: Met This Shift     Problem: Falls - Risk of:  Goal: Will remain free from falls  Description: Will remain free from falls  Outcome: Met This Shift  Goal: Absence of physical injury  Description: Absence of physical injury  Outcome: Met This Shift     Problem: Infection, Septic Shock:  Goal: Will show no infection signs and symptoms  Description: Will show no infection signs and symptoms  Outcome: Met This Shift     Problem: Inadequate oral food/beverage intake (NI-2.1)  Goal: Food and/or Nutrient Delivery  Description: Individualized approach for food/nutrient provision.   2/14/2022 1554 by Johanne Kent RD, LD  Outcome: Met This Shift

## 2022-02-14 NOTE — PROGRESS NOTES
[address] [facility]   Department of Internal Medicine   MICU Progress Note    Patient:  Pancho Blood 81 y.o. female   MRN: [id number]       Date of Service: 2022    Allergy: Patient has no known allergies. CC seizure and encephalopathy  Subjective   Intubated/no sedation Open eyes, does not follow commands  We will place an SBT trial this a.m. Discussed goals of care with family for no intubation after extubation. temp of 98.4  No overnight event per nurse    Objective     TEMPERATURE:  Current - Temp: 98.4 °F (36.9 °C); Max - Temp  Av.9 °F (36.6 °C)  Min: 97 °F (36.1 °C)  Max: 98.6 °F (37 °C)    RESPIRATIONS RANGE: Resp  Av.1  Min: 12  Max: 23    PULSE RANGE: Pulse  Av.8  Min: 71  Max: 98    BLOOD PRESSURE RANGE:  Systolic (26NSR), GTR:084 , Min:110 , KRY:156   ; Diastolic (83LTR), SBQ:09, Min:53, Max:97      PULSE OXIMETRY RANGE: SpO2  Av.5 %  Min: 95 %  Max: 100 %    I & O - 24hr:    Intake/Output Summary (Last 24 hours) at 2022 0959  Last data filed at 2022 0800  Gross per 24 hour   Intake 1684.31 ml   Output 1155 ml   Net 529.31 ml     I/O last 3 completed shifts: In: 2476.3 [I.V.:950.3; NG/GT:1526]  Out: 1239 [Urine:1825; Emesis/NG output:30] I/O this shift:  In: -   Out: 100 [Urine:100]   Weight change:     Physical Exam:  General Appearance: alert, no distress and Intubated/no sedation. Open eyes does not follow  HEENT:  Head: Normocephalic, no lesions, without obvious abnormality. Eye: Normal external eye, conjunctiva, lids cornea, NATE. Nose: Normal external nose, mucus membranes and septum. Pharynx: Dental Hygiene adequate. Normal buccal mucosa. Normal pharynx. Neck: no adenopathy, no carotid bruit, no JVD, supple, symmetrical, trachea midline and thyroid not enlarged, symmetric, no tenderness/mass/nodules  Lung: clear to auscultation bilaterally and No rhonchi rales noted.   Heart: regular rate and rhythm, S1, S2 normal, no murmur, click, rub or gallop  Abdomen: soft, non-tender; bowel sounds normal; no masses,  no organomegaly  Extremities:  edema 2+, peripheral vascular discoloration. Peripheral pulses 2+. Musculokeletal: No joint swelling, no muscle tenderness. ROM normal in all joints of extremities. Neurologic: Mental status: Intubated/no sedation.       Medications     Continuous Infusions:   sodium chloride      sodium chloride      dextrose       Scheduled Meds:   nystatin   Topical BID    insulin glargine  15 Units SubCUTAneous Nightly    bisacodyl  10 mg Rectal Once    docusate sodium  100 mg Oral BID    sennosides  5 mL Oral Nightly    acetylcysteine  600 mg Inhalation BID    lidocaine PF  5 mL IntraDERmal Once    sodium chloride flush  5-40 mL IntraVENous 2 times per day    heparin flush  3 mL IntraVENous 2 times per day    dexamethasone  10 mg IntraVENous Q24H    vancomycin  1,250 mg IntraVENous Q24H    warfarin placeholder: dosing by pharmacy   Other RX Placeholder    metoprolol tartrate  50 mg Oral BID    levetiracetam  750 mg IntraVENous Q12H    chlorhexidine  15 mL Mouth/Throat BID    insulin lispro  0-12 Units SubCUTAneous Q4H    sodium chloride flush  5-40 mL IntraVENous 2 times per day    piperacillin-tazobactam  3,375 mg IntraVENous Q8H    pantoprazole  40 mg IntraVENous Daily    And    sodium chloride (PF)  10 mL IntraVENous Daily     PRN Meds: sodium chloride flush, sodium chloride, heparin flush, ipratropium-albuterol, midazolam, LORazepam, perflutren lipid microspheres, sodium chloride flush, sodium chloride, acetaminophen **OR** acetaminophen, glucose, dextrose, glucagon (rDNA), dextrose  Nutrition:   Tube feeds     Labs and Imaging Studies     CBC:   Recent Labs     02/12/22  0410 02/13/22  0401 02/14/22  0430   WBC 12.0* 10.5 12.4*   RBC 3.48* 3.63 3.47*   HGB 10.7* 11.5 10.9*   HCT 33.2* 35.8 34.4   MCV 95.4 98.6 99.1   MCH 30.7 31.7 31.4   MCHC 32.2 32.1 31.7*   RDW 17.7* 17.7* 18.1*    201 191   MPV 10.7 10.4 10.8 BMP:    Recent Labs     02/12/22  0410 02/12/22 0410 02/13/22  0401 02/14/22  0430     --  141 143   K 3.8  3.8   < > 4.1  4.1 3.9  3.9     --  107 109*   CO2 20*  --  21* 19*   BUN 66*  --  61* 58*   CREATININE 1.2*  --  1.0 0.8   GLUCOSE 196*  --  190* 186*   CALCIUM 9.2  --  8.7 8.5*   PROT 5.6*  --  5.7* 5.2*   LABALBU 3.5  --  3.4* 3.2*   BILITOT 2.3*  --  1.8* 1.6*   ALKPHOS 112*  --  134* 147*   *  --  79* 63*   ALT 60*  --  59* 57*    < > = values in this interval not displayed. LIVER PROFILE:   Recent Labs     02/12/22 0410 02/13/22 0401 02/14/22 0430   * 79* 63*   ALT 60* 59* 57*   BILITOT 2.3* 1.8* 1.6*   ALKPHOS 112* 134* 147*       PT/INR:   Recent Labs     02/12/22 0410 02/13/22  0401 02/14/22  0430   PROTIME 29.5* 24.5* 21.9*   INR 2.6 2.2 2.0       APTT:   No results for input(s): APTT in the last 72 hours. Fasting Lipid Panel:    No results found for: CHOL, TRIG, HDL    Cardiac Enzymes:    Lab Results   Component Value Date    CKTOTAL 185 (H) 02/06/2022       Notable Cultures:      Blood cultures   Blood Culture, Routine   Date Value Ref Range Status   02/09/2022 24 Hours no growth  Preliminary     Respiratory cultures No results found for: RESPCULTURE No results found for: LABGRAM  Urine   Urine Culture, Routine   Date Value Ref Range Status   02/04/2022 <10,000 CFU/mL  Mixed gram positive organisms    Final     Legionella No results found for: LABLEGI  C Diff PCR No results found for: CDIFPCR  Wound culture/abscess: No results for input(s): WNDABS in the last 72 hours. Tip culture:No results for input(s): CXCATHTIP in the last 72 hours.      Antibiotic  Days  Day started   Vancomycin     Zosyn                      Oxygen:     Vent Information  $Ventilation: $Subsequent Day  Skin Assessment: Clean, dry, & intact  Equipment ID: [id number]  Vent Type: [id number]  Vent Mode: AC/VC  Vt Ordered: 450 mL  Rate Set: 10 bmp  Peak Flow: 65 L/min  Pressure Support: 0 cmH20  FiO2 : 30 %  SpO2: 96 %  SpO2/FiO2 ratio: 320  Sensitivity: 3  PEEP/CPAP: 5  I Time/ I Time %: 0 s  Humidification Source: Heated wire  Humidification Temp: 37  Humidification Temp Measured: 37  Circuit Condensation: Drained    Additional Respiratory  Assessments  Pulse: 77  Resp: 18  SpO2: 96 %  Position: Semi-Pineda's  Humidification Source: Heated wire  Humidification Temp: 37  Circuit Condensation: Drained  Oral Care: Mouth suctioned  Subglottic Suction Done?: Yes  Airway Type: ET  Airway Size: 7.5  Cuff Pressure (cm H2O): 7.5 cm H2O     Nasal cannula L/min     Face mask %     Reservoirs mask %       ABG     PH   7.45   PCO2   27   PO2  88   HCO3  18   Sat%  30   FIO2  96   DATES 2/14/22       Lines:  Site  Day  Date inserted     TLC              PICC   ABDI           Arterial line              Peripheral line              HD cath            [REMOVED] Urethral Catheter Temperature probe 16 fr-Output (mL): 85 mL  Urethral Catheter-Output (mL): 100 mL  [REMOVED] Urethral Catheter Straight-tip 16 fr-Output (mL): 425 mL    Imaging Studies:    XR CHEST PORTABLE   Final Result   Stable cardiomegaly, pleural effusions and adjacent atelectasis and or   infiltrate. XR CHEST PORTABLE   Final Result   1. PICC line tip is in the upper SVC expected location, limited evaluation   due to patient positioning   2. ET tip is approximately 2 cm above the chika   3. Suspicious for CHF as described above         XR CHEST PORTABLE   Final Result   No significant interval change, when compared to the previous study performed   1 day earlier. US DUP LOWER EXTREMITIES BILATERAL VENOUS   Final Result   No evidence of DVT in either lower extremity. XR CHEST PORTABLE   Final Result   1. Stable chest   2. The position and alignment of the tubes and catheters are within normal   range   3. Small bilateral pleural effusions stable   4.  The subtle bilateral areas of patchy consolidative disease, unchanged XR TIBIA FIBULA RIGHT (2 VIEWS)   Final Result   THIS REPORT IS FOR THE BILATERAL TIBIA/FIBULA/FEET:      No gross osseous erosions are seen. Note that MRI would better evaluate for   osteomyelitis, if clinically indicated. Advanced degenerative changes as detailed above      No fracture or dislocation. XR TIBIA FIBULA LEFT (2 VIEWS)   Final Result   THIS REPORT IS FOR THE BILATERAL TIBIA/FIBULA/FEET:      No gross osseous erosions are seen. Note that MRI would better evaluate for   osteomyelitis, if clinically indicated. Advanced degenerative changes as detailed above      No fracture or dislocation. XR FOOT RIGHT (2 VIEWS)   Final Result   THIS REPORT IS FOR THE BILATERAL TIBIA/FIBULA/FEET:      No gross osseous erosions are seen. Note that MRI would better evaluate for   osteomyelitis, if clinically indicated. Advanced degenerative changes as detailed above      No fracture or dislocation. XR FOOT LEFT (2 VIEWS)   Final Result   THIS REPORT IS FOR THE BILATERAL TIBIA/FIBULA/FEET:      No gross osseous erosions are seen. Note that MRI would better evaluate for   osteomyelitis, if clinically indicated. Advanced degenerative changes as detailed above      No fracture or dislocation. XR CHEST PORTABLE   Final Result   1. Improved aeration of the right upper lobe when compared to the prior study. 2. Persistent patchy airspace disease within the right perihilar region and   right lower lobe. 3. Stable marked cardiomegaly. XR CHEST PORTABLE   Final Result   1. Endotracheal tube close to the chika. To be at the level of the upper   contour of the arch of the aorta can be pulled back 2 cm. 2.  Persistent cardiomegaly with signs of volume overload. See above   comments. XR CHEST PORTABLE   Final Result   1. There is no interval change in the multifocal bilateral airspace disease. 2. Bilateral pleural effusions. 3. Cardiomegaly. XR CHEST PORTABLE   Final Result   1. Medical support devices as above. 2.  Retrocardiac and bibasilar opacities appears stable. Medial right upper   lung opacity appears stable. Atherosclerotic disease and cardiomegaly no new   cardiopulmonary pathology. VL LOWER EXTREMITY ARTERIAL SEGMENTAL PRESSURES W PPG BILATERAL    (Results Pending)   XR CHEST PORTABLE    (Results Pending)        APRN- CNP Assessment and PLan   In Summary, 60-year-old female with past medical history of arthritis, A. fib, hyperlipidemia, hypertension, and for some chronic ulcer of right lower leg with fat layer exposed, thyroid disease, recent admission on 1/1/2022 and discharged on 1/7/2022 with hyperkalemia, fall, venous status and status dermatitis of both bilateral lower extremities. Patient was readmitted on 2/4/2022 with altered mental status, positive for COVID-19. Patient was intubated for airway protection and noted to have seizure x2 therefore was transferred to [facility] for neurological evaluation.     Assessment:  Acute hypoxemic and hypercapnic respiratory failure on the vent (2/4/22)  SARS-CoV2 PNA with superimposed MRSA bacterial infection  Septic shock secondary to group B streptococcus bacteremia  MRSA on wound infection right leg  Acute metabolic or anoxic encephalopathy  Multiple stroke with large amount of edema-unable to get MRI due to PPM.  Seizure disorder  Leukocytosis  Right lower lobe infiltrates  Compensated heart failure  A. fib RVR on Coumadin at home  Acute on chronic kidney disease  Chronic hyponatremia sodium ranges 1 26-1 30  Supratherapeutic INR > resolved  History of permanent pacemaker  History of arthritis  History of A. fib on Coumadin  History of hyperlipidemia  History of hypertension  History of chronic right lower leg wound  History of thyroid disease        Plan:  -Currently on vent day #10, wean FiO2 to keep SPO2 goal above 92%  -Bronchodilators scheduled and as needed, aggressive pulmonary toileting, on Mucomyst  -Status post dexamethasone 10 mg x 5 doses  - keep MAP greater than 60 mmHg  -Continue Keppra 750 mg twice daily  -Cardiac care from neurology, unable to MRI secondary to PPM compatibility  -Seizure precaution  -Neurology consulted, input appreciated currently signed off  -Cardiology following, input appreciated, mild with systolic dysfunction 60% EF, mild to moderate tricuspid valve and mitral valve regurgitation  -Blood cultures positive group B streptococcus bacteremia, COVID-19 PCR positive, RVP negative, repeat blood cultures (NGTD), wound culture positive for MRSA, MRSA nares positive, sputum culture positive for stenotrophomonas maltophilia. -ID following, input appreciated, currently on Comycin and Zosyn. -Nephrology following, input appreciated  -Labs and chest x-ray in a.m.  -Insulin sliding scale  -F/E/N none/replace electrolytes/NPO-tube feeds  -DVT/GI; Coumadin/Protonix  - Code Status DNR limited; palliative was consulted, but appreciated. Patient was changed from DNR CCA to DNR limited today, plan for SBT in the past will extubate with no reintubation. HIMANSHU Quintana-CNP   Critical Care     Discussed case with Attending Physician: Dr. Joan Ortiz     I personally saw, examined and provided care for the patient. Radiographs, labs and medication list were reviewed by me independently. I spoke with bedside nursing, therapists and consultants. Critical care services and times documented are independent of procedures and multidisciplinary rounds with CNP. Additionally comprehensive, multidisciplinary rounds were conducted with the MICU team. The case was discussed in detail and plans for care were established. Review of CNP documentation was conducted and revisions were made as appropriate. I agree with the above documented exam, problem list and plan of care.    Ruiz Jacob MD   CCT excluding procedures :45'

## 2022-02-14 NOTE — PROGRESS NOTES
Infectious Disease  Progress Note  [facility]    C/C: covid pneumonia, gr B bacteremia/skin abscess    Subjective: she is afebrile over night. A line was removed. Off pressors. On spontaenous over the vent.      Scheduled Meds:   nystatin   Topical BID    insulin glargine  15 Units SubCUTAneous Nightly    bisacodyl  10 mg Rectal Once    docusate sodium  100 mg Oral BID    sennosides  5 mL Oral Nightly    acetylcysteine  600 mg Inhalation BID    lidocaine PF  5 mL IntraDERmal Once    sodium chloride flush  5-40 mL IntraVENous 2 times per day    heparin flush  3 mL IntraVENous 2 times per day    dexamethasone  10 mg IntraVENous Q24H    vancomycin  1,250 mg IntraVENous Q24H    warfarin placeholder: dosing by pharmacy   Other RX Placeholder    metoprolol tartrate  50 mg Oral BID    levetiracetam  750 mg IntraVENous Q12H    chlorhexidine  15 mL Mouth/Throat BID    insulin lispro  0-12 Units SubCUTAneous Q4H    sodium chloride flush  5-40 mL IntraVENous 2 times per day    piperacillin-tazobactam  3,375 mg IntraVENous Q8H    pantoprazole  40 mg IntraVENous Daily    And    sodium chloride (PF)  10 mL IntraVENous Daily     Continuous Infusions:   sodium chloride      sodium chloride      dextrose       PRN Meds:sodium chloride flush, sodium chloride, heparin flush, ipratropium-albuterol, midazolam, LORazepam, perflutren lipid microspheres, sodium chloride flush, sodium chloride, acetaminophen **OR** acetaminophen, glucose, dextrose, glucagon (rDNA), dextrose    Patient Vitals for the past 24 hrs:   BP Temp Temp src Pulse Resp SpO2   02/14/22 1000 138/66   82 19 96 %   02/14/22 0900 131/66   77 18 96 %   02/14/22 0841     15 97 %   02/14/22 0840    86 16 96 %   02/14/22 0800 121/61 98.4 °F (36.9 °C) Bladder 72 16 96 %   02/14/22 0700 (!) 110/54   74 16 97 %   02/14/22 0600 (!) 113/53   77 14 98 %   02/14/22 0500 124/68   74 15 99 %   02/14/22 0400 (!) 121/54 98.1 °F (36.7 °C) Bladder 76 14 99 %   02/14/22 0300 (!) 150/97   83 23 100 %   02/14/22 0200 (!) 140/57   82 12 100 %   02/14/22 0100 (!) 127/55   82 16 100 %   02/14/22 0000 127/78 97.9 °F (36.6 °C) Bladder 81 12 100 %   02/13/22 2300 (!) 144/56   87 13 100 %   02/13/22 2200 116/87   98 18 95 %   02/13/22 2112     21 99 %   02/13/22 2106     15 97 %   02/13/22 2105    91 16 98 %   02/13/22 2100 133/61   82 15 98 %   02/13/22 2000 (!) 147/59 98.6 °F (37 °C) Bladder 83 16 98 %   02/13/22 1900 129/61   81 18 97 %   02/13/22 1800 133/77   78 16 98 %   02/13/22 1700    79 15 97 %   02/13/22 1600  97.5 °F (36.4 °C) Bladder 80 18 96 %   02/13/22 1500 132/60   75 16 95 %   02/13/22 1400 (!) 139/57   80 16 96 %   02/13/22 1300 124/67   80 17 95 %   02/13/22 1200 (!) 141/78 97 °F (36.1 °C) Bladder 82 15 97 %   02/13/22 1141    72 17 97 %       CBC with Differential:    Lab Results   Component Value Date    WBC 12.4 02/14/2022    RBC 3.47 02/14/2022    HGB 10.9 02/14/2022    HCT 34.4 02/14/2022     02/14/2022    MCV 99.1 02/14/2022    MCH 31.4 02/14/2022    MCHC 31.7 02/14/2022    RDW 18.1 02/14/2022    NRBC 1.7 02/09/2022    METASPCT 1.7 02/12/2022    LYMPHOPCT 3.5 02/14/2022    PROMYELOPCT 0.9 02/06/2022    MONOPCT 5.2 02/14/2022    MYELOPCT 0.9 02/12/2022    BASOPCT 0.1 02/14/2022    MONOSABS 0.62 02/14/2022    LYMPHSABS 0.50 02/14/2022    EOSABS 0.11 02/14/2022    BASOSABS 0.00 02/14/2022     CMP:    Lab Results   Component Value Date     02/14/2022    K 3.9 02/14/2022    K 3.9 02/14/2022     02/14/2022    CO2 19 02/14/2022    BUN 58 02/14/2022    CREATININE 0.8 02/14/2022    GFRAA >60 02/14/2022    LABGLOM >60 02/14/2022    GLUCOSE 186 02/14/2022    PROT 5.2 02/14/2022    LABALBU 3.2 02/14/2022    CALCIUM 8.5 02/14/2022    BILITOT 1.6 02/14/2022    ALKPHOS 147 02/14/2022    AST 63 02/14/2022    ALT 57 02/14/2022       /66   Pulse 82   Temp 98.4 °F (36.9 °C) (Bladder)   Resp 19   Ht 5' (1.524 m)   Wt 233 lb (105.7 kg)   SpO2 96%   BMI 45.50 kg/m²     Physical Exam  Const/Neuro- unchanged, no signs of acute distress,   ENMT- Within Normal Limits, Normocephalic,intubated  Neck: Neck supple  Heart- Regular, Rate, Rhythm- no murmur appreciated. Lungs- clear to ascultation anteriorly. ,on the vent: AC fio2 of 30%  Abdomen- Soft, bowel sounds positive, non tender  Musculo/Extremities-  Equal and symmetrical, no edema. No tenderness. Skin:  Warm and dry, free from rashes. Labs, Cultures reviewed    Radiology reviewed    Microbiology:   Blood cx 2/4: gr B strep  Blood cx 2/5, 2/6, 2/9 are negative  MRSA nasal screen 2/4: positive  Wound cx 2/4: MRSA  resp cx 2/6: MRSA  resp cx 2/12: gram negative cindi    Assessment  · Acute hypoxic resp failure from Covid pneumonia  · Group B Streptoccus bacteremia:   · Superimposed MRSA bacterial pneumonia  · Wound infection: MRSA  · Leucocytosis from above: imporinvg    Plan  · Continue vancomycin (PTD) - end date 2/16  · Continue Zosyn (day10)  Gm neg cindi growing in resp culture  · ? Yeast in intertriginous areas   · [id number] Jill Delgado for PICC Placement. · No need for repeat blood cx today. · Will follow with you.      Electronically signed by Nehemias Payne MD on 2/14/2022 at 11:32 AM

## 2022-02-14 NOTE — PROGRESS NOTES
Podiatry Progress Note  2/14/2022   [facility]       SUBJECTIVE: Patient seen at bedside this morning for follow up of b/l lower extremity ulcers. Remains intubated and sedated. Dressings to b/l lower extremity cdi. No acute overnight events. Past Medical History:   Diagnosis Date    Arthritis     Atrial fibrillation ([facility] [address]     Hyperlipidemia     Hypertension     Non-pressure chronic ulcer of other part of right lower leg with fat layer exposed ([facility] [address] 8/21/2020    Thyroid disease         Past Surgical History:   Procedure Laterality Date    PACEMAKER INSERTION  06/04/2019    PPM GENT CHANGE  ([facility])   DR. TONG         No family history on file. Social History     Tobacco Use    Smoking status: Never Smoker    Smokeless tobacco: Never Used   Substance Use Topics    Alcohol use: No        Prior to Admission medications    Medication Sig Start Date End Date Taking? Authorizing Provider   dilTIAZem (CARDIZEM CD) 120 MG extended release capsule Take 120 mg by mouth daily    Historical Provider, MD   docusate sodium (COLACE) 100 MG capsule Take 100 mg by mouth daily    Historical Provider, MD   Multiple Vitamins-Minerals (THERAPEUTIC MULTIVITAMIN-MINERALS) tablet Take 1 tablet by mouth daily    Historical Provider, MD   warfarin (COUMADIN) 4 MG tablet Take 1 tablet by mouth daily 1/7/22   Meredith Peterson MD   bumetanide (BUMEX) 1 MG tablet Take 1 tablet by mouth 2 times daily 1/7/22   Meredith Peterson MD   spironolactone (ALDACTONE) 25 MG tablet Take 1 tablet by mouth daily 1/8/22   Meredith Peterson MD   metoprolol (LOPRESSOR) 100 MG tablet Take 200 mg by mouth 2 times daily     Historical Provider, MD   simvastatin (ZOCOR) 20 MG tablet Take 20 mg by mouth nightly    Historical Provider, MD   levothyroxine (SYNTHROID) 125 MCG tablet Take 125 mcg by mouth Daily     Historical Provider, MD        Patient has no known allergies.          OBJECTIVE:        Vitals:    02/14/22 1000   BP: 138/66   Pulse: 82   Resp: 19   Temp:    SpO2: 96%              EXAM:        Pt is AAOx3  Dressing cdi. No drainage or dishevelment noted. PREVIOUS physical exam findings:  Vascular Exam: Pedal pulses nonpalpable b/l. Lower extremity warm to cool from proximal tibial tuberosity to distal digits b/l. No pedal hair growth noted b/l. Cap refill brisk to all digits. No edema to b/l foot      Neuro Exam: Deferred due to patient's condition. Dermatologic Exam:   RIGHT:  Full thickness ulceration noted to left posterior heel with surrounding cellulitis and firm, necrotic wound base. No focal openings, no evidence of purulence/proximal streaking/crepitus/malodor. Chronic skin hyperpigmentation secondary to chronic venous stasis dermatitis noted  Tissue injury to medial 1st mpj head. No acute signs of infection. LEFT:  Venous ulceration noted to left anterior leg and wrapping circumferentially to posterior leg. Chronic hyperpigmentation secondary to venous stasis dermatitis noted to skin. Skin is chafed and raw in appearance; healthy wound bed noted with pinpoint bleeding      MSK: ROM testing deferred. Soft, compressible posterior muscle compartments b/l. Evidence of b/l hallux valgus deformity.                                  Current Facility-Administered Medications   Medication Dose Route Frequency Provider Last Rate Last Admin    nystatin (MYCOSTATIN) cream   Topical BID HIMANSHU Jarvis CNP        insulin glargine-yfgn (SEMGLEE-YFGN) injection vial 15 Units  15 Units SubCUTAneous Nightly HIMANSHU Mariscal CNP   15 Units at 02/13/22 2140    bisacodyl (DULCOLAX) suppository 10 mg  10 mg Rectal Once HIMANSHU Mariscal CNP        docusate sodium (COLACE) 150 MG/15ML liquid 100 mg  100 mg Oral BID HIMANSHU Mariscal CNP        sennosides (SENOKOT) 8.8 MG/5ML syrup 5 mL  5 mL Oral Nightly HIMANSHU Mariscal CNP   5 mL at 02/13/22 2251    acetylcysteine (MUCOMYST) 20 % solution 600 mg  600 mg Inhalation BID Per De Los Santos Soergel, APRN - CNP   600 mg at 02/14/22 0840    lidocaine PF 1 % injection 5 mL  5 mL IntraDERmal Once Lutricia Madelaine, APRN - CNP        sodium chloride flush 0.9 % injection 5-40 mL  5-40 mL IntraVENous 2 times per day Lutricia Madelaine, APRN - CNP   10 mL at 02/14/22 0820    sodium chloride flush 0.9 % injection 5-40 mL  5-40 mL IntraVENous PRN Lutricia Madelaine, APRN - CNP   10 mL at 02/11/22 1236    0.9 % sodium chloride infusion  25 mL IntraVENous PRN Lutricia Madelaine, APRN - CNP        heparin flush 100 UNIT/ML injection 300 Units  3 mL IntraVENous 2 times per day Lutricia Madelaine, APRN - CNP   300 Units at 02/14/22 0819    heparin flush 100 UNIT/ML injection 300 Units  3 mL IntraCATHeter PRN Lutricia Madelaine, APRN - CNP        ipratropium-albuterol (DUONEB) nebulizer solution 1 ampule  1 ampule Inhalation Q4H PRN Lutricia Madelaine, APRN - CNP   1 ampule at 02/14/22 0840    dexamethasone (DECADRON) injection 10 mg  10 mg IntraVENous Q24H Lutricia Madelaine, APRN - CNP   10 mg at 02/14/22 0631    vancomycin (VANCOCIN) 1,250 mg in dextrose 5 % 250 mL IVPB  1,250 mg IntraVENous Q24H Misael Santacruz MD   Stopped at 02/13/22 1330    warfarin placeholder: dosing by pharmacy   Other [address], APRN - CNP        metoprolol tartrate (LOPRESSOR) tablet 50 mg  50 mg Oral BID Lutricia Madelaine, APRN - CNP   50 mg at 02/14/22 0820    levETIRAcetam (KEPPRA) 750 mg/50 mL IVPB  750 mg IntraVENous Q12H HIMANSHU Pires NP   Stopped at 02/14/22 0253    chlorhexidine (PERIDEX) 0.12 % solution 15 mL  15 mL Mouth/Throat BID Lutricia Madelaine, APRN - CNP   15 mL at 02/14/22 0819    insulin lispro (HUMALOG) injection vial 0-12 Units  0-12 Units SubCUTAneous Q4H Lutricia Madelaine, APRN - CNP   2 Units at 02/14/22 0630    midazolam PF (VERSED) injection 2 mg  2 mg IntraVENous Q4H PRN Mary Gardner, APRN - CNP   2 mg at 02/11/22 1236    LORazepam (ATIVAN) injection 1 mg  1 mg IntraVENous Q5 Min PRN Marquis Middleton, APRN - CNP   1 leg   -PAD  -Covid-19 infection      PLAN:  - Examined and evaluated  - All labs, imaging, and charts reviewed  -NIVS ordered. Results pending  -Foot and tib/fib xrays: No gross osseous erosions are seen. Advanced degenerative changes noted. Soft tissue swelling of the foot present. -ID: zosyn and vanco ongoing  -Will proceed with conservative wound care for now  -QoD dressing changes  -Dressings to b/l lower extremity left cdi. Next dressing change 2/15. To consist of:  Xeroform DSD. -Prevalon offloading boots while in bed  -Palliative care on board  -Will follow along    -Patient discussed with:  CRISTOFER Latif 80 and Ankle Surgeon  Diplomate, [employer]  [phone]     Rebeca Davis  Podiatry      Thank you for involving podiatry in this patient's care. Please do not hesitate to call with any questions or concerns.                 Jeff Puckett DPM   2/14/2022   10:24 AM

## 2022-02-15 NOTE — PLAN OF CARE
Problem: Airway Clearance - Ineffective  Goal: Achieve or maintain patent airway  Outcome: Met This Shift     Problem: Gas Exchange - Impaired  Goal: Absence of hypoxia  Outcome: Met This Shift  Goal: Promote optimal lung function  Outcome: Met This Shift     Problem: Breathing Pattern - Ineffective  Goal: Ability to achieve and maintain a regular respiratory rate  Outcome: Met This Shift     Problem:  Body Temperature -  Risk of, Imbalanced  Goal: Ability to maintain a body temperature within defined limits  Outcome: Not Met This Shift  Goal: Will regain or maintain usual level of consciousness  Outcome: Not Met This Shift  Goal: Complications related to the disease process, condition or treatment will be avoided or minimized  Outcome: Not Met This Shift     Problem: Isolation Precautions - Risk of Spread of Infection  Goal: Prevent transmission of infection  Outcome: Met This Shift     Problem: Nutrition Deficits  Goal: Optimize nutritional status  Outcome: Not Met This Shift     Problem: Falls - Risk of:  Goal: Will remain free from falls  Description: Will remain free from falls  Outcome: Met This Shift  Goal: Absence of physical injury  Description: Absence of physical injury  Outcome: Met This Shift     Problem: Patient Education: Go to Patient Education Activity  Goal: Patient/Family Education  Outcome: Met This Shift     Problem: Skin Integrity:  Goal: Will show no infection signs and symptoms  Description: Will show no infection signs and symptoms  Outcome: Met This Shift  Goal: Absence of new skin breakdown  Description: Absence of new skin breakdown  Outcome: Met This Shift

## 2022-02-15 NOTE — PROGRESS NOTES
Pharmacy Consultation Note  (Antibiotic Dosing and Monitoring)    Initial consult date: 2/10/22  Consulting physician/provider: Dr. Della Fong  Drug: Vancomycin  Indication: Skin and soft tissue infection    Age/  Gender Height Weight IBW  Allergy Information   86 y.o./female 5' (152.4 cm) 233 lb 6.4 oz (105.9 kg)     Ideal body weight: 45.5 kg (100 lb 4.9 oz)  Adjusted ideal body weight: 68.3 kg (150 lb 10.7 oz)   Patient has no known allergies. Renal Function:  Recent Labs     02/13/22  0401 02/14/22  0430 02/15/22  0530   BUN 61* 58* 42*   CREATININE 1.0 0.8 0.7       Intake/Output Summary (Last 24 hours) at 2/15/2022 0953  Last data filed at 2/15/2022 0900  Gross per 24 hour   Intake 1133 ml   Output 1150 ml   Net -17 ml       Vancomycin Monitoring:  Trough:    Recent Labs     02/13/22  1019   [address] 17.2*     Random:  No results for input(s): VANCORANDOM in the last 72 hours. Vancomycin Administration Times:  Recent vancomycin administrations                   vancomycin (VANCOCIN) 1,250 mg in dextrose 5 % 250 mL IVPB (mg) 1,250 mg New Bag 02/14/22 1129     1,250 mg New Bag 02/13/22 1157     1,250 mg New Bag 02/12/22 1213                Assessment:  · Patient is a 81 y.o. female who has been initiated on vancomycin  Estimated Creatinine Clearance: 63 mL/min (based on SCr of 0.7 mg/dL).      Plan:  · Will continue vancomycin 1250 IV every 24 hours  · Per ID, planning to continue vancomycin through 2/16  · Will check vancomycin levels when appropriate  · Will continue to monitor renal function   · Clinical pharmacy to follow      YAMILE Vela ÁLVARO [facility] 2/15/2022 9:53 AM      Addendum:    Angie Vinod Vancomycin has been discontinued   [address] to [facility] Physician to re-consult pharmacy if future dosing is needed    Thank you for the consult,    Bruce Crockett PharmD, BCPS 2/15/2022 3:11 PM

## 2022-02-15 NOTE — PROGRESS NOTES
Infectious Disease  Progress Note  [facility]    C/C: covid pneumonia, gr B bacteremia/skin abscess    Subjective: she is afebrile over night. A line was removed. Off pressors. On spontaenous over the vent.      Scheduled Meds:   potassium bicarb-citric acid  20 mEq Oral Once    warfarin  4 mg Oral Once    albumin human  25 g IntraVENous Once    furosemide  40 mg IntraVENous Once    levoFLOXacin  500 mg Oral Daily    nystatin   Topical BID    insulin glargine  15 Units SubCUTAneous Nightly    bisacodyl  10 mg Rectal Once    docusate sodium  100 mg Oral BID    sennosides  5 mL Oral Nightly    acetylcysteine  600 mg Inhalation BID    lidocaine PF  5 mL IntraDERmal Once    sodium chloride flush  5-40 mL IntraVENous 2 times per day    heparin flush  3 mL IntraVENous 2 times per day    warfarin placeholder: dosing by pharmacy   Other RX Placeholder    metoprolol tartrate  50 mg Oral BID    levetiracetam  750 mg IntraVENous Q12H    chlorhexidine  15 mL Mouth/Throat BID    insulin lispro  0-12 Units SubCUTAneous Q4H    pantoprazole  40 mg IntraVENous Daily    And    sodium chloride (PF)  10 mL IntraVENous Daily     Continuous Infusions:   sodium chloride      dextrose       PRN Meds:sodium chloride flush, sodium chloride, heparin flush, ipratropium-albuterol, midazolam, LORazepam, perflutren lipid microspheres, acetaminophen **OR** acetaminophen, glucose, dextrose, glucagon (rDNA), dextrose    Patient Vitals for the past 24 hrs:   BP Temp Temp src Pulse Resp SpO2 Weight   02/15/22 1030     21 100 %    02/15/22 1014    70 22 99 %    02/15/22 1013    76 26 100 %    02/15/22 1005     16 100 %    02/15/22 1004     16 100 %    02/15/22 1000 124/81   70 14 100 %    02/15/22 0900    70 9 98 %    02/15/22 0800 117/71 96.3 °F (35.7 °C)  70 10 98 %    02/15/22 0700 138/73   71 20 98 %    02/15/22 0600 (!) 114/58   71 18 100 %    02/15/22 0500 136/81   80 15 99 %    02/15/22 0400 120/73 97.2 °F (36.2 °C) Bladder 72 15 99 %    02/15/22 0300 (!) 119/55   81 20 99 % 226 lb 3.2 oz (102.6 kg)   02/15/22 0200 (!) 111/58   80 16 100 %    02/15/22 0100 105/62   73 15 99 %    02/15/22 0000 (!) 115/57 97.5 °F (36.4 °C) Bladder 76 20 96 %    02/14/22 2300 101/62   72 16 96 %    02/14/22 2200 (!) 102/50   70 20 98 %    02/14/22 2100 85/64   77 13 97 %    02/14/22 2000 (!) 117/53 97.2 °F (36.2 °C) Bladder 70 17 98 %    02/14/22 1900 (!) 120/55   73 14 97 %    02/14/22 1828    74 19 96 %    02/14/22 1800 (!) 106/51   72 21 95 %    02/14/22 1700 (!) 107/52   70 20 94 %    02/14/22 1600 (!) 116/54 97.9 °F (36.6 °C) Bladder 73 15 96 %    02/14/22 1524    71 20 99 %    02/14/22 1500 (!) 115/56   73 17 94 %    02/14/22 1400 (!) 147/60   78 23 96 %    02/14/22 1300 (!) 114/53   70 27 95 %    02/14/22 1208    79 15 95 %    02/14/22 1200 123/60 98.4 °F (36.9 °C) Bladder 80 21 93 %        CBC with Differential:    Lab Results   Component Value Date    WBC 11.1 02/15/2022    RBC 3.38 02/15/2022    HGB 10.7 02/15/2022    HCT 33.7 02/15/2022     02/15/2022    MCV 99.7 02/15/2022    MCH 31.7 02/15/2022    MCHC 31.8 02/15/2022    RDW 17.9 02/15/2022    NRBC 1.7 02/09/2022    METASPCT 1.7 02/12/2022    LYMPHOPCT 5.2 02/15/2022    PROMYELOPCT 0.9 02/06/2022    MONOPCT 2.7 02/15/2022    MYELOPCT 0.9 02/12/2022    BASOPCT 0.1 02/15/2022    MONOSABS 0.33 02/15/2022    LYMPHSABS 0.00 02/15/2022    EOSABS 0.00 02/15/2022    BASOSABS 0.00 02/15/2022     CMP:    Lab Results   Component Value Date     02/15/2022    K 3.8 02/15/2022    K 3.8 02/15/2022     02/15/2022    CO2 23 02/15/2022    BUN 42 02/15/2022    CREATININE 0.7 02/15/2022    GFRAA >60 02/15/2022    LABGLOM >60 02/15/2022    GLUCOSE 121 02/15/2022    PROT 5.0 02/15/2022    LABALBU 3.0 02/15/2022    CALCIUM 8.4 02/15/2022    BILITOT 1.7 02/15/2022    ALKPHOS 112 02/15/2022    AST 47 02/15/2022    ALT 50 02/15/2022       /81   Pulse 70   Temp 96.3 °F (35.7 °C)   Resp 21   Ht 5' (1.524 m)   Wt 226 lb 3.2 oz (102.6 kg)   SpO2 100%   BMI 44.18 kg/m²     Physical Exam  Const/Neuro- unchanged, no signs of acute distress,   ENMT- Within Normal Limits, Normocephalic,intubated  Neck: Neck supple  Heart- Regular, Rate, Rhythm- no murmur appreciated. Lungs- clear to ascultation anteriorly. ,on the vent: AC fio2 of 30%  Abdomen- Soft, bowel sounds positive, non tender  Musculo/Extremities-  Equal and symmetrical, no edema. No tenderness. Skin:  Warm and dry, free from rashes. Labs, Cultures reviewed    Radiology reviewed    Microbiology:   Blood cx 2/4: gr B strep  Blood cx 2/5, 2/6, 2/9 are negative  MRSA nasal screen 2/4: positive  Wound cx 2/4: MRSA  resp cx 2/6: MRSA  resp cx 2/12: gram negative cindi    Assessment  · Acute hypoxic resp failure from Covid pneumonia  · Group B Streptoccus bacteremia:   · Superimposed MRSA bacterial pneumonia  · Wound infection: MRSA  · Leucocytosis from above: imporinvg    Plan  · ? Yeast in intertriginous areas   · [id number] [address] for PICC Placement. · No need for repeat blood cx today. · Will follow with you.    · Will stop vanco and zosyn and give po levaquin for the stenotrophomonas  Stop in seven days   Stop date feb 21    Electronically signed by Myranda Garcia MD on 2/15/2022 at 11:18 AM

## 2022-02-15 NOTE — CARE COORDINATION
2/15: Update CM Note: Pt came in from [facility] ER with being unresponsive/Covid +2/4. Pt was intubated & transferred to MICU for neuro evaluation. Pt remains intubated, SPo2 100% & Fio2 30%. CM spoke with nephew Karina Quevedo [phone] on 2/8. He would prefer his Aunt to go to another facility for rehab. Cm explained that it will depend on her 02 needs & mobility to where she can go. At this point the pt is Covid + & will need to go to a Covid SNF. He preferred [address] [place] [address] Street. Cm called left [facility] referral. Betty Moder following as well. Pt is a DNRCCA, Iv Lasix, Iv Vanco, Iv Spa 25%, Iv Keppra & Iv Zosyn. 2 D Echo done & EEG awake & sleep moderate non-specific cerebral dysfunction of the left frontal temporal region, moderate to severe encephalopathy. Will need PT/OT/Speech eval once medically stable. Sw/VINEET will continue to follow for dc planning.  Electronically signed by Ran Thomas RN on 2/15/2022 at 10:08 AM

## 2022-02-15 NOTE — PROGRESS NOTES
Pt alert and following commands. Order to extubate confirmed with Dr Yunier Conklin who performed weaning parameters.  balloon deflated and pt extubated to 4 lpm nasal cannula without complication. Pt resting comfortably with stable vitals as follows:    HR = 71 bpm    F = 18    BP =  114/69    SpO2 = 100% on 4 lpm NC.  Titrated to 3 lpm NC

## 2022-02-15 NOTE — PROGRESS NOTES
Pharmacy Consultation Note  (Warfarin Dosing and Monitoring)    Initial consult date: 2/9/22  Consulting Provider: Bailee BAUGH    Joyce Olsen is a 81 y.o. female for whom pharmacy has been asked to manage warfarin therapy. Weight:   Wt Readings from Last 1 Encounters:   02/15/22 226 lb 3.2 oz (102.6 kg)       TSH:    Lab Results   Component Value Date    TSH 1.790 02/04/2022       Hepatic Function Panel:                            Lab Results   Component Value Date    ALKPHOS 112 02/15/2022    ALT 50 02/15/2022    AST 47 02/15/2022    PROT 5.0 02/15/2022    BILITOT 1.7 02/15/2022    LABALBU 3.0 02/15/2022       Current warfarin drug-drug interactions include: quinolones (incr INR) and steroids (variable/dose dependent)    Recent Labs     02/13/22  0401 02/14/22  0430 02/15/22  0530   HGB 11.5 10.9* 10.7*    191 171     Date Warfarin Dose INR Heparin or LMWH Comment   2/9 2 mg 2.6 -----    2/10 2 mg 2.5 ------    2/11 2 mg 2.2 ------    2/12 2 mg 2.6 ------    2/13 2 mg 2.2 -------    2/14 4 mg 2 -------    2/15 2 mg 1.8 --------      Assessment:  · Patient is a 81 y.o. female on warfarin for Atrial Fibrillation. Patient's home warfarin dosing regimen is 4mg daily.    · Goal INR 2 - 3  · INR 1.8 today   · Levofloxacin 500mg started today     Plan:  · Warfarin 2mg tonight  · Daily PT/INR until the INR is stable within the therapeutic range  · Pharmacist will follow and monitor/adjust dosing as necessary    Thank you for this consult,    Julian Cortez, [facility] 2/15/2022 9:51 AM

## 2022-02-15 NOTE — PROGRESS NOTES
Subjective:    Remains intubated and sedated  Remains in ICU setting  No changes overnight    Objective:    BP (!) 117/53   Pulse 70   Temp 97.2 °F (36.2 °C) (Bladder)   Resp 17   Ht 5' (1.524 m)   Wt 233 lb (105.7 kg)   SpO2 98%   BMI 45.50 kg/m²     In: 1139.3 [I.V.:340.3; NG/GT:649]  Out: 1265   In: 1139.3   Out: 1265 [Urine:1265]    General appearance: Intubated sedated  HEENT: AT/NC, MMM  Neck: FROM, supple  Lungs: Clear to auscultation. Mechanical sounds. CV: RRR, no MRGs  Vasc: Radial pulses 2+  Abdomen: Soft, non-tender; no masses or HSM  Extremities: No peripheral edema or digital cyanosis  Skin: no rash, lesions or ulcers       Recent Labs     02/12/22 0410 02/13/22  0401 02/14/22  0430   WBC 12.0* 10.5 12.4*   HGB 10.7* 11.5 10.9*   HCT 33.2* 35.8 34.4    201 191       Recent Labs     02/12/22 0410 02/12/22  0410 02/13/22  0401 02/14/22  0430     --  141 143   K 3.8  3.8   < > 4.1  4.1 3.9  3.9     --  107 109*   CO2 20*  --  21* 19*   BUN 66*  --  61* 58*   CREATININE 1.2*  --  1.0 0.8   CALCIUM 9.2  --  8.7 8.5*    < > = values in this interval not displayed. Assessment:    Active Problems:    Acute respiratory failure due to COVID-19 [facility])    Acute respiratory failure (HCC)    Cerebrovascular accident (CVA) due to embolism of cerebral artery ([facility] [address]  Resolved Problems:    * No resolved hospital problems.  *      Plan:    Admit to MICU for neurology evaluation secondary to ams requiring intubating in pt with covid pna  Unable to get mri d/t pacer in place patient  -Wean to extubate as mentation improves  -On no tx for covid as currently that is not the cause of her intubated status   -moitor rate and rhythm, echocardiogram with ejection fraction 50% no evidence of vegetations on transthoracic echo  -Will need eeg and full neuro work upunderway   empiric keppra bid-Keppra increased to 750 IV twice daily by neurology  patient remains on vancomycin and

## 2022-02-15 NOTE — PROGRESS NOTES
Podiatry Progress Note  2/15/2022   Althia Meckel       SUBJECTIVE: Patient seen at bedside this morning for follow up of b/l lower extremity ulcers. Was just extubated and currently resting in bed comfortably. No acute overnight events. Past Medical History:   Diagnosis Date    Arthritis     Atrial fibrillation ([facility] [facility]     Hyperlipidemia     Hypertension     Non-pressure chronic ulcer of other part of right lower leg with fat layer exposed ([facility] [address] 8/21/2020    Thyroid disease         Past Surgical History:   Procedure Laterality Date    PACEMAKER INSERTION  06/04/2019    PPM GENT CHANGE  ([facility])   DR. TONG         No family history on file. Social History     Tobacco Use    Smoking status: Never Smoker    Smokeless tobacco: Never Used   Substance Use Topics    Alcohol use: No        Prior to Admission medications    Medication Sig Start Date End Date Taking? Authorizing Provider   dilTIAZem (CARDIZEM CD) 120 MG extended release capsule Take 120 mg by mouth daily    Historical Provider, MD   docusate sodium (COLACE) 100 MG capsule Take 100 mg by mouth daily    Historical Provider, MD   Multiple Vitamins-Minerals (THERAPEUTIC MULTIVITAMIN-MINERALS) tablet Take 1 tablet by mouth daily    Historical Provider, MD   warfarin (COUMADIN) 4 MG tablet Take 1 tablet by mouth daily 1/7/22   Buddy Preston MD   bumetanide (BUMEX) 1 MG tablet Take 1 tablet by mouth 2 times daily 1/7/22   Buddy Preston MD   spironolactone (ALDACTONE) 25 MG tablet Take 1 tablet by mouth daily 1/8/22   Buddy Preston MD   metoprolol (LOPRESSOR) 100 MG tablet Take 200 mg by mouth 2 times daily     Historical Provider, MD   simvastatin (ZOCOR) 20 MG tablet Take 20 mg by mouth nightly    Historical Provider, MD   levothyroxine (SYNTHROID) 125 MCG tablet Take 125 mcg by mouth Daily     Historical Provider, MD        Patient has no known allergies.          OBJECTIVE:        Vitals:    02/15/22 1300   BP: 120/65   Pulse: 77   Resp: 23 Temp:    SpO2: 99%              EXAM:        Pt is AAOx3  Dressing changed today    PHYSICAL exam findings:  Vascular Exam: Pedal pulses nonpalpable b/l. Lower extremity warm to cool from proximal tibial tuberosity to distal digits b/l. No pedal hair growth noted b/l. Cap refill brisk to all digits. No edema to b/l foot    Neuro Exam: Deferred due to patient's condition. Dermatologic Exam:   RIGHT:  Full thickness ulceration noted to left posterior heel with surrounding cellulitis and firm, necrotic wound base. No focal openings, no evidence of purulence/proximal streaking/crepitus/malodor. Chronic skin hyperpigmentation secondary to chronic venous stasis dermatitis noted  Tissue injury to medial 1st mpj head. No acute signs of infection. LEFT:  Venous ulceration noted to left anterior leg and wrapping circumferentially to posterior leg. Chronic hyperpigmentation secondary to venous stasis dermatitis noted to skin. Skin is chafed and raw in appearance; healthy wound bed noted with pinpoint bleeding      MSK: ROM testing deferred. Soft, compressible posterior muscle compartments b/l. Evidence of b/l hallux valgus deformity.                        Current Facility-Administered Medications   Medication Dose Route Frequency Provider Last Rate Last Admin    warfarin (COUMADIN) tablet 4 mg  4 mg Oral Once Nayana Cover, APRN - CNP        levoFLOXacin (LEVAQUIN) 500 MG/100ML infusion 500 mg  500 mg IntraVENous Q24H Drucella Petra, APRN - CNP   Stopped at 02/15/22 1257    Or    levoFLOXacin (LEVAQUIN) tablet 500 mg  500 mg Oral Q24H Drucella Petra, APRN - CNP        potassium chloride 10 mEq/100 mL IVPB (Peripheral Line)  10 mEq IntraVENous Q2H Drucella Petra, APRN -  mL/hr at 02/15/22 1153 10 mEq at 02/15/22 1153    nystatin (MYCOSTATIN) cream   Topical BID Drucella Petra, APRN - CNP   Given at 02/15/22 0930    insulin glargine-yfgn (SEMGLEE-YFGN) injection vial 15 Units  15 Units SubCUTAneous Nightly Vasu Pean Soergel, APRN - CNP   15 Units at 02/14/22 2052    bisacodyl (DULCOLAX) suppository 10 mg  10 mg Rectal Once Rubens Ochoa, APRN - CNP        docusate sodium (COLACE) 150 MG/15ML liquid 100 mg  100 mg Oral BID Rubens Ochoa, APRN - CNP   100 mg at 02/15/22 0929    sennosides (SENOKOT) 8.8 MG/5ML syrup 5 mL  5 mL Oral Nightly Rubens Ochoa, APRN - CNP   5 mL at 02/13/22 2251    acetylcysteine (MUCOMYST) 20 % solution 600 mg  600 mg Inhalation BID Rubens Ochoa, APRN - CNP   600 mg at 02/15/22 1005    lidocaine PF 1 % injection 5 mL  5 mL IntraDERmal Once Rubens Ochoa, APRN - CNP        sodium chloride flush 0.9 % injection 5-40 mL  5-40 mL IntraVENous 2 times per day Rubens Ochoa, APRN - CNP   10 mL at 02/15/22 0931    sodium chloride flush 0.9 % injection 5-40 mL  5-40 mL IntraVENous PRN Rubens Ochoa, APRN - CNP   10 mL at 02/11/22 1236    0.9 % sodium chloride infusion  25 mL IntraVENous PRN Rubens Ochoa, APRN - CNP        heparin flush 100 UNIT/ML injection 300 Units  3 mL IntraVENous 2 times per day Rubens Ochoa, APRN - CNP   300 Units at 02/15/22 0929    heparin flush 100 UNIT/ML injection 300 Units  3 mL IntraCATHeter PRN Rubens Ochoa, APRN - CNP        ipratropium-albuterol (DUONEB) nebulizer solution 1 ampule  1 ampule Inhalation Q4H PRN Rubens Ochoa, APRN - CNP   1 ampule at 02/15/22 1005    warfarin placeholder: dosing by pharmacy   Other [address], APRN - CNP        metoprolol tartrate (LOPRESSOR) tablet 50 mg  50 mg Oral BID Rubens Ochoa, APRN - CNP   50 mg at 02/15/22 0929    levETIRAcetam (KEPPRA) 750 mg/50 mL IVPB  750 mg IntraVENous Q12H Gela Crawley, APRN - NP   Stopped at 02/15/22 0249    chlorhexidine (PERIDEX) 0.12 % solution 15 mL  15 mL Mouth/Throat BID Rubens Ochoa, APRN - CNP   15 mL at 02/15/22 0986    insulin lispro (HUMALOG) injection vial 0-12 Units  0-12 Units SubCUTAneous Q4H Rubens Packer, APRN - CNP   2 Units at 02/14/22 5477    midazolam PF (VERSED) injection 2 mg  2 mg IntraVENous Q4H PRN Rashida ShawHIMANSHU - CNP   2 mg at 02/11/22 1236    LORazepam (ATIVAN) injection 1 mg  1 mg IntraVENous Q5 Min PRN HIMANSHU Saul - CNP   1 mg at 02/07/22 0331    perflutren lipid microspheres (DEFINITY) injection 1.65 mg  1.5 mL IntraVENous ONCE PRN Galileo Peterson MD        acetaminophen (TYLENOL) tablet 650 mg  650 mg Oral Q6H PRN Harvey Stanley MD   650 mg at 02/08/22 2207    Or    acetaminophen (TYLENOL) suppository 650 mg  650 mg Rectal Q6H PRN Harvey Stanley MD        pantoprazole (PROTONIX) injection 40 mg  40 mg IntraVENous Daily Neto HIMANSHU Rock - CNP   40 mg at 02/15/22 0930    And    sodium chloride (PF) 0.9 % injection 10 mL  10 mL IntraVENous Daily Destiny AaronTONEY ritterN - CNP   10 mL at 02/15/22 0930    glucose (GLUTOSE) 40 % oral gel 15 g  15 g Oral PRN Destiny Aaron APRN - CNP        dextrose 50 % IV solution  12.5 g IntraVENous PRN NetoHIMANSHU Tom - CNP        glucagon (rDNA) injection 1 mg  1 mg IntraMUSCular PRN NetoHIMANSHU Tom - CNP        dextrose 5 % solution  100 mL/hr IntraVENous PRN NetoHIMANSHU Tom - CNP            Lab Results   Component Value Date    WBC 11.1 02/15/2022    HCT 33.7 (L) 02/15/2022    HGB 10.7 (L) 02/15/2022     02/15/2022     02/15/2022    K 3.8 02/15/2022    K 3.8 02/15/2022     (H) 02/15/2022    CO2 23 02/15/2022    BUN 42 (H) 02/15/2022    CREATININE 0.7 02/15/2022    GLUCOSE 121 (H) 02/15/2022    CRP 2.0 (H) 02/10/2022         Radiographs:    ASSESEMENT:  -Full thickness ulceration, left heel  -Venous stasis ulceration, right leg  -Chronic venous stasis dermatitis, b/l leg   -Lower extremity edema, b/l  -PAD  -Covid-19 infection      PLAN:  - Examined and evaluated  - All labs, imaging, and charts reviewed  -NIVS ordered. Results pending  -Foot and tib/fib xrays: No gross osseous erosions are seen. Advanced degenerative changes noted.  Soft tissue swelling of the foot present. -ID: levaquin ongoing  -Will proceed with conservative wound care for now  -QoD dressing changes  -Dressings to b/l lower extremity changed today. Next dressing change 2/17. To consist of:  Xeroform DSD. -Prevalon offloading boots while in bed  -Palliative care on board  -Will follow along    -Patient discussed with:  CRISTOFER Capone 80 and Ankle Surgeon  Diplomate, [employer]  [phone]     [facility] [place]  [facility]      Thank you for involving podiatry in this patient's care. Please do not hesitate to call with any questions or concerns.                 Katharine Freeman DPM   2/15/2022   1:29 PM

## 2022-02-15 NOTE — PROGRESS NOTES
Physician Progress Note      Charlotte Schroeder  [facility] #:                  [id number]  :                       1935  ADMIT DATE:       2022 3:00 PM  DISCH DATE:  RESPONDING  PROVIDER #:        ZOIE Pham MD          QUERY TEXT:    Patient admitted with COVID 19 pneumonia on . Noted documentation of Sepsis   due to COVID with questionable bacterial component per CC consult Dr Shefali Jung   . In order to support the diagnosis of sepsis, please include additional   clinical indicators in your documentation. Or please document if the   diagnosis of sepsis has been ruled out after further study    The medical record reflects the following:  Risk Factors: COVID 19 pneumonia Hx MSSA obesity  Clinical Indicators: WBC 28. 5LA 4.6 temp 99.5 on adm acute respiratory failure   group B strep sepsis per ID  Treatment: Zosyn Vancomycin Decadron Vasopressors Intubation and ventilation    Lilia Joyce RN BSN CCDS  Options provided:  -- Sepsis present on adm as evidenced by, Please document evidence. -- Sepsis developed after admission.   -- Sepsis was ruled out after study  -- Other - I will add my own diagnosis  -- Disagree - Not applicable / Not valid  -- Disagree - Clinically unable to determine / Unknown  -- Refer to Clinical Documentation Reviewer    PROVIDER RESPONSE TEXT:    Sepsis is present on adm as evidenced by White count 28,000 lactic 4.6    Query created by: Yasmin Coleman on 2/10/2022 2:17 PM      Electronically signed by:  ZOIE Pham MD 2/15/2022 10:17 AM

## 2022-02-15 NOTE — PROGRESS NOTES
[address] [facility]   Department of Internal Medicine   MICU Progress Note    Patient:  Atif Bowden 81 y.o. female   MRN: [id number]       Date of Service: 2/15/2022    Allergy: Patient has no known allergies. CC seizure and encephalopathy  Subjective   On SBT- extubate on 2L NC  Patient is DNI- no reintubation   temp of 97.7   No overnight event per nurse    Objective     TEMPERATURE:  Current - Temp: 96.8 °F (36 °C); Max - Temp  Av.2 °F (36.2 °C)  Min: 96.3 °F (35.7 °C)  Max: 97.9 °F (36.6 °C)    RESPIRATIONS RANGE: Resp  Av.8  Min: 9  Max: 26    PULSE RANGE: Pulse  Av.1  Min: 70  Max: 81    BLOOD PRESSURE RANGE:  Systolic (36GZL), DQS:733 , Min:85 , VPA:172   ; Diastolic (97UEJ), LMW:51, Min:50, Max:81      PULSE OXIMETRY RANGE: SpO2  Av %  Min: 94 %  Max: 100 %    I & O - 24hr:    Intake/Output Summary (Last 24 hours) at 2/15/2022 1314  Last data filed at 2/15/2022 1200  Gross per 24 hour   Intake 1133 ml   Output 1005 ml   Net 128 ml     I/O last 3 completed shifts: In: 2122.3 [I.V.:1273.3; NG/GT:699; IV Piggyback:150]  Out: 3129 [CHGXU:5359] I/O this shift:  In: -   Out: 165 [Urine:165]   Weight change:     Physical Exam:  · General Appearance: open eyes and does not following commands, on 2L NC does not look in distress   · HEENT:  Head: Normocephalic, no lesions, without obvious abnormality. · Eye: Normal external eye, conjunctiva, lids cornea, NATE. · Nose: Normal external nose, mucus membranes and septum. · Pharynx: Dental Hygiene adequate. Normal buccal mucosa. Normal pharynx. · Neck: no adenopathy, no carotid bruit, no JVD, supple, symmetrical, trachea midline and thyroid not enlarged, symmetric, no tenderness/mass/nodules  · Lung: clear to auscultation bilaterally and No rhonchi rales noted.   · Heart: regular rate and rhythm, S1, S2 normal, no murmur, click, rub or gallop  · Abdomen: soft, non-tender; bowel sounds normal; no masses,  no organomegaly  · Extremities: edema 2+, peripheral vascular discoloration. Peripheral pulses 2+. · Musculokeletal: No joint swelling, no muscle tenderness. ROM normal in all joints of extremities.    · Neurologic: Mental status: open eyes, does not follow, limited neuro exam       Medications     Continuous Infusions:   sodium chloride      dextrose       Scheduled Meds:   warfarin  4 mg Oral Once    levofloxacin  500 mg IntraVENous Q24H    Or    levoFLOXacin  500 mg Oral Q24H    potassium chloride  10 mEq IntraVENous Q2H    nystatin   Topical BID    insulin glargine  15 Units SubCUTAneous Nightly    bisacodyl  10 mg Rectal Once    docusate sodium  100 mg Oral BID    sennosides  5 mL Oral Nightly    acetylcysteine  600 mg Inhalation BID    lidocaine PF  5 mL IntraDERmal Once    sodium chloride flush  5-40 mL IntraVENous 2 times per day    heparin flush  3 mL IntraVENous 2 times per day    warfarin placeholder: dosing by pharmacy   Other RX Placeholder    metoprolol tartrate  50 mg Oral BID    levetiracetam  750 mg IntraVENous Q12H    chlorhexidine  15 mL Mouth/Throat BID    insulin lispro  0-12 Units SubCUTAneous Q4H    pantoprazole  40 mg IntraVENous Daily    And    sodium chloride (PF)  10 mL IntraVENous Daily     PRN Meds: sodium chloride flush, sodium chloride, heparin flush, ipratropium-albuterol, midazolam, LORazepam, perflutren lipid microspheres, acetaminophen **OR** acetaminophen, glucose, dextrose, glucagon (rDNA), dextrose  Nutrition:   Tube feeds     Labs and Imaging Studies     CBC:   Recent Labs     02/13/22 0401 02/14/22  0430 02/15/22  0530   WBC 10.5 12.4* 11.1   RBC 3.63 3.47* 3.38*   HGB 11.5 10.9* 10.7*   HCT 35.8 34.4 33.7*   MCV 98.6 99.1 99.7   MCH 31.7 31.4 31.7   MCHC 32.1 31.7* 31.8*   RDW 17.7* 18.1* 17.9*    191 171   MPV 10.4 10.8 10.9       BMP:    Recent Labs     02/13/22 0401 02/13/22  0401 02/14/22  0430 02/15/22  0530     --  143 146   K 4.1  4.1   < > 3.9  3.9 3.8  3.8   --  109* 112*   CO2 21*  --  19* 23   BUN 61*  --  58* 42*   CREATININE 1.0  --  0.8 0.7   GLUCOSE 190*  --  186* 121*   CALCIUM 8.7  --  8.5* 8.4*   PROT 5.7*  --  5.2* 5.0*   LABALBU 3.4*  --  3.2* 3.0*   BILITOT 1.8*  --  1.6* 1.7*   ALKPHOS 134*  --  147* 112*   AST 79*  --  63* 47*   ALT 59*  --  57* 50*    < > = values in this interval not displayed. LIVER PROFILE:   Recent Labs     02/13/22  0401 02/14/22  0430 02/15/22  0530   AST 79* 63* 47*   ALT 59* 57* 50*   BILITOT 1.8* 1.6* 1.7*   ALKPHOS 134* 147* 112*       PT/INR:   Recent Labs     02/13/22  0401 02/14/22  0430 02/15/22  0530   PROTIME 24.5* 21.9* 19.6*   INR 2.2 2.0 1.8       APTT:   No results for input(s): APTT in the last 72 hours. Fasting Lipid Panel:    No results found for: CHOL, TRIG, HDL    Cardiac Enzymes:    Lab Results   Component Value Date    CKTOTAL 185 (H) 02/06/2022       Notable Cultures:      Blood cultures   Blood Culture, Routine   Date Value Ref Range Status   02/09/2022 5 Days no growth  Final     Respiratory cultures No results found for: RESPCULTURE No results found for: LABGRAM  Urine   Urine Culture, Routine   Date Value Ref Range Status   02/04/2022 <10,000 CFU/mL  Mixed gram positive organisms    Final     Legionella No results found for: LABLEGI  C Diff PCR No results found for: CDIFPCR  Wound culture/abscess: No results for input(s): WNDABS in the last 72 hours. Tip culture:No results for input(s): CXCATHTIP in the last 72 hours.      Antibiotic  Days  Day started   Levaquin                            Oxygen:     Vent Information  $Ventilation: (S) Off Vent  Skin Assessment: Clean, dry, & intact  Equipment ID: [id number]  Equipment Changed: Humidification  Vent Type: 980  Vent Mode: PS  Vt Ordered: 0 mL  Rate Set: 0 bmp  Peak Flow: 0 L/min  Pressure Support: 10 cmH20  FiO2 : 30 %  SpO2: 100 %  SpO2/FiO2 ratio: 333.33  Sensitivity: 3  PEEP/CPAP: 5  I Time/ I Time %: 0 s  Humidification Source: Heated wire  Humidification Temp: 37  Humidification Temp Measured: 37  Circuit Condensation: Drained    Additional Respiratory  Assessments  Pulse: 72  Resp: 21  SpO2: 100 %  Position: Semi-Pineda's  Humidification Source: Heated wire  Humidification Temp: 37  Circuit Condensation: Drained  Oral Care: Mouth suctioned,Mouth swabbed,Lip moisturizer applied,Mouth moisturizer,Mouthwash with chlorhexidine,Mouthwash,Suction toothette  Subglottic Suction Done?: Yes  Airway Type: ET  Airway Size: 7.5  Cuff Pressure (cm H2O): 7.5 cm H2O     Nasal cannula L/min     Face mask %     Reservoirs mask %       ABG     PH   7.49   PCO2  31   PO2  95   HCO3  23   Sat%  97   FIO2  30   DATES 2/15/22       Lines:  Site  Day  Date inserted     TLC              PICC   ABDI           Arterial line              Peripheral line              HD cath            [REMOVED] Urethral Catheter Temperature probe 16 fr-Output (mL): 85 mL  Urethral Catheter-Output (mL): 25 mL  [REMOVED] Urethral Catheter Straight-tip 16 fr-Output (mL): 425 mL    Imaging Studies:    XR CHEST PORTABLE   Final Result   1. There is no interval change in the bilateral lower lobe airspace disease   and small bilateral pleural effusions. 2. There is no pneumothorax or pneumomediastinum. XR CHEST PORTABLE   Final Result   Stable cardiomegaly, pleural effusions and adjacent atelectasis and or   infiltrate. XR CHEST PORTABLE   Final Result   1. PICC line tip is in the upper SVC expected location, limited evaluation   due to patient positioning   2. ET tip is approximately 2 cm above the chika   3. Suspicious for CHF as described above         XR CHEST PORTABLE   Final Result   No significant interval change, when compared to the previous study performed   1 day earlier. US DUP LOWER EXTREMITIES BILATERAL VENOUS   Final Result   No evidence of DVT in either lower extremity. XR CHEST PORTABLE   Final Result   1. Stable chest   2.  The position and alignment of the tubes and catheters are within normal   range   3. Small bilateral pleural effusions stable   4. The subtle bilateral areas of patchy consolidative disease, unchanged         XR TIBIA FIBULA RIGHT (2 VIEWS)   Final Result   THIS REPORT IS FOR THE BILATERAL TIBIA/FIBULA/FEET:      No gross osseous erosions are seen. Note that MRI would better evaluate for   osteomyelitis, if clinically indicated. Advanced degenerative changes as detailed above      No fracture or dislocation. XR TIBIA FIBULA LEFT (2 VIEWS)   Final Result   THIS REPORT IS FOR THE BILATERAL TIBIA/FIBULA/FEET:      No gross osseous erosions are seen. Note that MRI would better evaluate for   osteomyelitis, if clinically indicated. Advanced degenerative changes as detailed above      No fracture or dislocation. XR FOOT RIGHT (2 VIEWS)   Final Result   THIS REPORT IS FOR THE BILATERAL TIBIA/FIBULA/FEET:      No gross osseous erosions are seen. Note that MRI would better evaluate for   osteomyelitis, if clinically indicated. Advanced degenerative changes as detailed above      No fracture or dislocation. XR FOOT LEFT (2 VIEWS)   Final Result   THIS REPORT IS FOR THE BILATERAL TIBIA/FIBULA/FEET:      No gross osseous erosions are seen. Note that MRI would better evaluate for   osteomyelitis, if clinically indicated. Advanced degenerative changes as detailed above      No fracture or dislocation. XR CHEST PORTABLE   Final Result   1. Improved aeration of the right upper lobe when compared to the prior study. 2. Persistent patchy airspace disease within the right perihilar region and   right lower lobe. 3. Stable marked cardiomegaly. XR CHEST PORTABLE   Final Result   1. Endotracheal tube close to the chika. To be at the level of the upper   contour of the arch of the aorta can be pulled back 2 cm. 2.  Persistent cardiomegaly with signs of volume overload.   See above comments. XR CHEST PORTABLE   Final Result   1. There is no interval change in the multifocal bilateral airspace disease. 2. Bilateral pleural effusions. 3. Cardiomegaly. XR CHEST PORTABLE   Final Result   1. Medical support devices as above. 2.  Retrocardiac and bibasilar opacities appears stable. Medial right upper   lung opacity appears stable. Atherosclerotic disease and cardiomegaly no new   cardiopulmonary pathology. VL LOWER EXTREMITY ARTERIAL SEGMENTAL PRESSURES W PPG BILATERAL    (Results Pending)        APRN- CNP Assessment and PLan   In Summary, 80-year-old female with past medical history of arthritis, A. fib, hyperlipidemia, hypertension, and for some chronic ulcer of right lower leg with fat layer exposed, thyroid disease, recent admission on 1/1/2022 and discharged on 1/7/2022 with hyperkalemia, fall, venous status and status dermatitis of both bilateral lower extremities. Patient was readmitted on 2/4/2022 with altered mental status, positive for COVID-19. Patient was intubated for airway protection and noted to have seizure x2 therefore was transferred to [facility] for neurological evaluation.     Assessment:  · Acute hypoxemic and hypercapnic respiratory failure on the vent (2/4/22)  · SARS-CoV2 PNA with superimposed MRSA bacterial infection  · Septic shock secondary to group B streptococcus bacteremia  · MRSA on wound infection right leg  · Acute metabolic or anoxic encephalopathy  · Multiple stroke with large amount of edemaunable to get MRI due to PPM.  · Seizure disorder  · Leukocytosis  · Right lower lobe infiltrates  · Compensated heart failure  · A. fib RVR on Coumadin at home  · Acute on chronic kidney disease  · Chronic hyponatremia sodium ranges 1 26-1 30  · Supratherapeutic INR > resolved  · History of permanent pacemaker  · History of arthritis  · History of A. fib on Coumadin  · History of hyperlipidemia  · History of was conducted and revisions were made as appropriate. I agree with the above documented exam, problem list and plan of care.   Leslie Jeffers MD   CCT excluding procedures:38'

## 2022-02-16 NOTE — PROGRESS NOTES
Pharmacy Consultation Note  (Warfarin Dosing and Monitoring)    Initial consult date: 2/9/22  Consulting Provider: Lesli DOWNS-ESTELLE    Jessica Macdonald is a 81 y.o. female for whom pharmacy has been asked to manage warfarin therapy. Weight:   Wt Readings from Last 1 Encounters:   02/15/22 226 lb 3.2 oz (102.6 kg)       TSH:    Lab Results   Component Value Date    TSH 1.790 02/04/2022       Hepatic Function Panel:                            Lab Results   Component Value Date    ALKPHOS 103 02/16/2022    ALT 48 02/16/2022    AST 46 02/16/2022    PROT 5.6 02/16/2022    BILITOT 2.0 02/16/2022    LABALBU 3.6 02/16/2022       Current warfarin drug-drug interactions include: quinolones (incr INR) and steroids (variable/dose dependent)    Recent Labs     02/14/22  0430 02/15/22  0530 02/16/22  0454   HGB 10.9* 10.7* 10.8*    171 161     Date Warfarin Dose INR Heparin or LMWH Comment   2/9 2 mg 2.6 -----    2/10 2 mg 2.5 ------    2/11 2 mg 2.2 ------    2/12 2 mg 2.6 ------    2/13 2 mg 2.2 -------    2/14 4 mg 2 -------    2/15 2 mg 1.8 --------    2/16 3 mg 1.7       Assessment:  · Patient is a 81 y.o. female on warfarin for Atrial Fibrillation. Patient's home warfarin dosing regimen is 4mg daily. · Goal INR 2 - 3   · Levofloxacin 500mg started on 2/15 takes about 2 days to see  Drug-drug ix.  (levofloxiacin increase anticoagulant effect of warfarin)  · 2/16: INR 1.7     Plan:  · Warfarin 3 mg tonight  · Daily PT/INR until the INR is stable within the therapeutic range  · Pharmacist will follow and monitor/adjust dosing as necessary    Thank you for this consult,    Manoj Perez, [facility] 2/16/2022 10:48 AM

## 2022-02-16 NOTE — PROGRESS NOTES
[address] [facility]   Department of Internal Medicine   MICU Progress Note    Patient:  Jessie Gan 81 y.o. female   MRN: [id number]       Date of Service: 2022    Allergy: Patient has no known allergies. CC seizure and encephalopathy  Subjective   On 2 LNC- non verbal  Patient is DNI- no reintubation - may need to address code status to make her DNR-CC only  temp of 97.0   No overnight event per nurse    Objective     TEMPERATURE:  Current - Temp: 97 °F (36.1 °C); Max - Temp  Av.2 °F (36.2 °C)  Min: 96.8 °F (36 °C)  Max: 97.7 °F (36.5 °C)    RESPIRATIONS RANGE: Resp  Av.9  Min: 14  Max: 30    PULSE RANGE: Pulse  Av.5  Min: 70  Max: 89    BLOOD PRESSURE RANGE:  Systolic (44FZT), MCQ:145 , Min:96 , LQ   ; Diastolic (70ZNE), TVE:74, Min:47, Max:81      PULSE OXIMETRY RANGE: SpO2  Av %  Min: 94 %  Max: 100 %    I & O - 24hr:    Intake/Output Summary (Last 24 hours) at 2022 0907  Last data filed at 2022 0510  Gross per 24 hour   Intake 1477 ml   Output 2285 ml   Net -808 ml     I/O last 3 completed shifts: In: 2460 [I.V.:2410; NG/GT:50]  Out: 2750 [Urine:2750] No intake/output data recorded. Weight change:     Physical Exam:  · General Appearance: open eyes and does not following commands, on 2L NC does not look in distress   · HEENT:  Head: Normocephalic, no lesions, without obvious abnormality. · Eye: Normal external eye, conjunctiva, lids cornea, NATE. · Nose: Normal external nose, mucus membranes and septum. · Pharynx: Dental Hygiene adequate. Normal buccal mucosa. Normal pharynx. · Neck: no adenopathy, no carotid bruit, no JVD, supple, symmetrical, trachea midline and thyroid not enlarged, symmetric, no tenderness/mass/nodules  · Lung: clear to auscultation bilaterally and No rhonchi rales noted.   · Heart: regular rate and rhythm, S1, S2 normal, no murmur, click, rub or gallop  · Abdomen: soft, non-tender; bowel sounds normal; no masses,  no organomegaly  · Extremities:  edema 2+, peripheral vascular discoloration. Peripheral pulses 2+. · Musculokeletal: No joint swelling, no muscle tenderness. ROM normal in all joints of extremities.    · Neurologic: Mental status: open eyes, does not follow, limited neuro exam       Medications     Continuous Infusions:   sodium chloride      dextrose       Scheduled Meds:   furosemide  40 mg IntraVENous Once    potassium phosphate IVPB  10 mmol IntraVENous Once    levofloxacin  500 mg IntraVENous Q24H    Or    levoFLOXacin  500 mg Oral Q24H    nystatin   Topical BID    insulin glargine  15 Units SubCUTAneous Nightly    bisacodyl  10 mg Rectal Once    docusate sodium  100 mg Oral BID    sennosides  5 mL Oral Nightly    acetylcysteine  600 mg Inhalation BID    lidocaine PF  5 mL IntraDERmal Once    sodium chloride flush  5-40 mL IntraVENous 2 times per day    heparin flush  3 mL IntraVENous 2 times per day    warfarin placeholder: dosing by pharmacy   Other RX Placeholder    metoprolol tartrate  50 mg Oral BID    levetiracetam  750 mg IntraVENous Q12H    chlorhexidine  15 mL Mouth/Throat BID    insulin lispro  0-12 Units SubCUTAneous Q4H    pantoprazole  40 mg IntraVENous Daily    And    sodium chloride (PF)  10 mL IntraVENous Daily     PRN Meds: sodium chloride flush, sodium chloride, heparin flush, ipratropium-albuterol, midazolam, LORazepam, perflutren lipid microspheres, acetaminophen **OR** acetaminophen, glucose, dextrose, glucagon (rDNA), dextrose  Nutrition:   Tube feeds     Labs and Imaging Studies     CBC:   Recent Labs     02/14/22  0430 02/15/22  0530 02/16/22  0454   WBC 12.4* 11.1 10.4   RBC 3.47* 3.38* 3.38*   HGB 10.9* 10.7* 10.8*   HCT 34.4 33.7* 34.2   MCV 99.1 99.7 101.2*   MCH 31.4 31.7 32.0   MCHC 31.7* 31.8* 31.6*   RDW 18.1* 17.9* 18.1*    171 161   MPV 10.8 10.9 10.9       BMP:    Recent Labs     02/14/22  0430 02/14/22  0430 02/15/22  0530 02/16/22  0454    --  146 143   K 3.9  3.9   < > 3.8  3.8 3.8  3.8   *  --  112* 108*   CO2 19*  --  23 23   BUN 58*  --  42* 38*   CREATININE 0.8  --  0.7 0.7   GLUCOSE 186*  --  121* 121*   CALCIUM 8.5*  --  8.4* 8.9   PROT 5.2*  --  5.0* 5.6*   LABALBU 3.2*  --  3.0* 3.6   BILITOT 1.6*  --  1.7* 2.0*   ALKPHOS 147*  --  112* 103   AST 63*  --  47* 46*   ALT 57*  --  50* 48*    < > = values in this interval not displayed. LIVER PROFILE:   Recent Labs     02/14/22  0430 02/15/22  0530 02/16/22  0454   AST 63* 47* 46*   ALT 57* 50* 48*   BILITOT 1.6* 1.7* 2.0*   ALKPHOS 147* 112* 103       PT/INR:   Recent Labs     02/14/22  0430 02/15/22  0530 02/16/22  0454   PROTIME 21.9* 19.6* 18.8*   INR 2.0 1.8 1.7       APTT:   No results for input(s): APTT in the last 72 hours. Fasting Lipid Panel:    No results found for: CHOL, TRIG, HDL    Cardiac Enzymes:    Lab Results   Component Value Date    CKTOTAL 185 (H) 02/06/2022       Notable Cultures:      Blood cultures   Blood Culture, Routine   Date Value Ref Range Status   02/09/2022 5 Days no growth  Final     Respiratory cultures No results found for: RESPCULTURE No results found for: LABGRAM  Urine   Urine Culture, Routine   Date Value Ref Range Status   02/04/2022 <10,000 CFU/mL  Mixed gram positive organisms    Final     Legionella No results found for: LABLEGI  C Diff PCR No results found for: CDIFPCR  Wound culture/abscess: No results for input(s): WNDABS in the last 72 hours. Tip culture:No results for input(s): CXCATHTIP in the last 72 hours.      Antibiotic  Days  Day started   Levaquin                            Oxygen:     Vent Information  $Ventilation: (S) Off Vent  Skin Assessment: Clean, dry, & intact  Equipment ID: [id number]  Equipment Changed: Humidification  Vent Type: 980  Vent Mode: PS  Vt Ordered: 0 mL  Rate Set: 0 bmp  Peak Flow: 0 L/min  Pressure Support: 10 cmH20  FiO2 : 30 %  SpO2: 99 %  SpO2/FiO2 ratio: 333.33  Sensitivity: 3  PEEP/CPAP: 5  I Time/ I Time %: 0 s  Humidification Source: Heated wire  Humidification Temp: 37  Humidification Temp Measured: 37  Circuit Condensation: Drained    Additional Respiratory  Assessments  Pulse: 71  Resp: 24  SpO2: 99 %  Position: Semi-Pineda's  Humidification Source: Heated wire  Humidification Temp: 37  Circuit Condensation: Drained  Oral Care: Mouthwash with chlorhexidine,Mouth swabbed,Mouth suctioned,Suction toothette  Subglottic Suction Done?: Yes  Airway Type: ET  Airway Size: 7.5  Cuff Pressure (cm H2O): 7.5 cm H2O     Nasal cannula L/min     Face mask %     Reservoirs mask %       ABG     PH   7.49   PCO2  31   PO2  95   HCO3  23   Sat%  97   FIO2  30   DATES 2/15/22       Lines:  Site  Day  Date inserted     TLC              PICC   ABDI           Arterial line              Peripheral line              HD cath            [REMOVED] Urethral Catheter Temperature probe 16 fr-Output (mL): 85 mL  Urethral Catheter-Output (mL): 40 mL  [REMOVED] Urethral Catheter Straight-tip 16 fr-Output (mL): 425 mL    Imaging Studies:    XR CHEST PORTABLE   Final Result   1. There is no interval change in the bilateral lower lobe airspace disease   and small bilateral pleural effusions. 2. There is no pneumothorax or pneumomediastinum. XR CHEST PORTABLE   Final Result   Stable cardiomegaly, pleural effusions and adjacent atelectasis and or   infiltrate. XR CHEST PORTABLE   Final Result   1. PICC line tip is in the upper SVC expected location, limited evaluation   due to patient positioning   2. ET tip is approximately 2 cm above the chika   3. Suspicious for CHF as described above         XR CHEST PORTABLE   Final Result   No significant interval change, when compared to the previous study performed   1 day earlier. US DUP LOWER EXTREMITIES BILATERAL VENOUS   Final Result   No evidence of DVT in either lower extremity. XR CHEST PORTABLE   Final Result   1. Stable chest   2.  The position and alignment of the tubes and catheters are within normal   range   3. Small bilateral pleural effusions stable   4. The subtle bilateral areas of patchy consolidative disease, unchanged         XR TIBIA FIBULA RIGHT (2 VIEWS)   Final Result   THIS REPORT IS FOR THE BILATERAL TIBIA/FIBULA/FEET:      No gross osseous erosions are seen. Note that MRI would better evaluate for   osteomyelitis, if clinically indicated. Advanced degenerative changes as detailed above      No fracture or dislocation. XR TIBIA FIBULA LEFT (2 VIEWS)   Final Result   THIS REPORT IS FOR THE BILATERAL TIBIA/FIBULA/FEET:      No gross osseous erosions are seen. Note that MRI would better evaluate for   osteomyelitis, if clinically indicated. Advanced degenerative changes as detailed above      No fracture or dislocation. XR FOOT RIGHT (2 VIEWS)   Final Result   THIS REPORT IS FOR THE BILATERAL TIBIA/FIBULA/FEET:      No gross osseous erosions are seen. Note that MRI would better evaluate for   osteomyelitis, if clinically indicated. Advanced degenerative changes as detailed above      No fracture or dislocation. XR FOOT LEFT (2 VIEWS)   Final Result   THIS REPORT IS FOR THE BILATERAL TIBIA/FIBULA/FEET:      No gross osseous erosions are seen. Note that MRI would better evaluate for   osteomyelitis, if clinically indicated. Advanced degenerative changes as detailed above      No fracture or dislocation. XR CHEST PORTABLE   Final Result   1. Improved aeration of the right upper lobe when compared to the prior study. 2. Persistent patchy airspace disease within the right perihilar region and   right lower lobe. 3. Stable marked cardiomegaly. XR CHEST PORTABLE   Final Result   1. Endotracheal tube close to the chika. To be at the level of the upper   contour of the arch of the aorta can be pulled back 2 cm. 2.  Persistent cardiomegaly with signs of volume overload.   See above comments. XR CHEST PORTABLE   Final Result   1. There is no interval change in the multifocal bilateral airspace disease. 2. Bilateral pleural effusions. 3. Cardiomegaly. XR CHEST PORTABLE   Final Result   1. Medical support devices as above. 2.  Retrocardiac and bibasilar opacities appears stable. Medial right upper   lung opacity appears stable. Atherosclerotic disease and cardiomegaly no new   cardiopulmonary pathology. VL LOWER EXTREMITY ARTERIAL SEGMENTAL PRESSURES W PPG BILATERAL    (Results Pending)        APRN- CNP Assessment and PLan   In Summary, 27-year-old female with past medical history of arthritis, A. fib, hyperlipidemia, hypertension, and for some chronic ulcer of right lower leg with fat layer exposed, thyroid disease, recent admission on 1/1/2022 and discharged on 1/7/2022 with hyperkalemia, fall, venous status and status dermatitis of both bilateral lower extremities. Patient was readmitted on 2/4/2022 with altered mental status, positive for COVID-19. Patient was intubated for airway protection and noted to have seizure x2 therefore was transferred to [facility] for neurological evaluation.     Assessment:  · Acute hypoxemic and hypercapnic respiratory failure on the vent (2/4/22)  · SARS-CoV2 PNA with superimposed MRSA bacterial infection  · Septic shock secondary to group B streptococcus bacteremia  · MRSA on wound infection right leg  · Acute metabolic or anoxic encephalopathy  · Multiple stroke with large amount of edemaunable to get MRI due to PPM.  · Seizure disorder  · Leukocytosis  · Right lower lobe infiltrates  · Compensated heart failure  · A. fib RVR on Coumadin at home  · Acute on chronic kidney disease  · Chronic hyponatremia sodium ranges 1 26-1 30  · Supratherapeutic INR > resolved  · History of permanent pacemaker  · History of arthritis  · History of A. fib on Coumadin  · History of hyperlipidemia  · History of hypertension  · History of chronic right lower leg wound  · History of thyroid disease        Plan:  · Extubated, on 2L NC, wean FiO2 to keep SPO2 goal above 92%  · Bronchodilators scheduled and as needed, aggressive pulmonary toileting, on Mucomyst  · Status post dexamethasone 10 mg x 5 doses  · keep MAP greater than 60 mmHg  · Continue Keppra 750 mg twice daily  · EEG on 2/9 shows moderate nonspecific cerebral dysfunction of the left frontotemporal region   ·  will notify neurology on how long they want to continue keppra. · Consult to neurology, input appreciated s/o, unable to MRI secondary to PPM compatibility  · Seizure precaution  · Cardiology following, input appreciated, mild with systolic dysfunction 58% EF, mild to moderate tricuspid valve and mitral valve regurgitation  · Blood cultures positive group B streptococcus bacteremia, COVID-19 PCR positive, RVP negative, repeat blood cultures (NGTD), wound culture positive for MRSA, MRSA nares positive, sputum culture positive for stenotrophomonas maltophilia. · ID following, input appreciated, d/c vanc and zosyn now on Levaquin   · Nephrology following, input appreciated; albumin and lasix x1 ; diuresis 2.3L yesterday, still looks edematous   · Labs and chest x-ray in a.m. · Insulin sliding scale  · F/E/N none/replace electrolytes/NPO-tube feeds - IV team for corpak  · DVT/GI; Coumadin/Protonix  · Code Status DNR limited; palliative was consulted, but appreciated. Patient was changed from DNR CCA to DNR limited today, plan for SBT in the past will extubate with no reintubation. · May need to readdress code status given her mentation- patient may benefit hospice or DNR-CC only. Kip Perez, APRN-CNP   Critical Care     Discussed case with Attending Physician: Dr. Nataly Ha     I personally saw, examined and provided care for the patient. Radiographs, labs and medication list were reviewed by me independently.  I spoke with bedside nursing, therapists and consultants. Critical care services and times documented are independent of procedures and multidisciplinary rounds with CNP. Additionally comprehensive, multidisciplinary rounds were conducted with the MICU team. The case was discussed in detail and plans for care were established. Review of CNP documentation was conducted and revisions were made as appropriate. I agree with the above documented exam, problem list and plan of care.   Socorro Bynum MD   CCT excluding procedures:35'

## 2022-02-16 NOTE — PROGRESS NOTES
12 fr. Small bowel feeding tube placed right nares per Tricia Correia RN using [facility] tracking device. Rogelio Landers tube advanced 95. Cm. . 45 cm exposed. . bridal placed with ease. . KUB ordered. . pt. Tolerated well.

## 2022-02-16 NOTE — PLAN OF CARE
Problem: Airway Clearance - Ineffective  Goal: Achieve or maintain patent airway  2/16/2022 1032 by Meagan Rodriguez RN  Outcome: Ongoing  2/15/2022 2041 by Latosha Cabello RN  Outcome: Ongoing     Problem: Gas Exchange - Impaired  Goal: Absence of hypoxia  Outcome: Ongoing  Goal: Promote optimal lung function  Outcome: Ongoing     Problem: Breathing Pattern - Ineffective  Goal: Ability to achieve and maintain a regular respiratory rate  Outcome: Ongoing     Problem:  Body Temperature -  Risk of, Imbalanced  Goal: Ability to maintain a body temperature within defined limits  2/16/2022 1032 by Meagan Rodriguez RN  Outcome: Ongoing  2/15/2022 2041 by Latosha Cabello RN  Outcome: Ongoing  Goal: Will regain or maintain usual level of consciousness  2/16/2022 1032 by Meagan Rodriguez RN  Outcome: Ongoing  2/15/2022 2041 by Latosha Cabello RN  Outcome: Ongoing  Goal: Complications related to the disease process, condition or treatment will be avoided or minimized  2/16/2022 1032 by Meagan Rodriguez RN  Outcome: Ongoing  2/15/2022 2041 by Latosha Cabello RN  Outcome: Ongoing     Problem: Isolation Precautions - Risk of Spread of Infection  Goal: Prevent transmission of infection  Outcome: Ongoing     Problem: Nutrition Deficits  Goal: Optimize nutritional status  Outcome: Ongoing     Problem: Risk for Fluid Volume Deficit  Goal: Maintain normal heart rhythm  2/16/2022 1032 by Meagan Rodriguez RN  Outcome: Ongoing  2/15/2022 2041 by Latosha Cabello RN  Outcome: Ongoing  Goal: Maintain absence of muscle cramping  2/16/2022 1032 by Meagan Rodriguez RN  Outcome: Ongoing  2/15/2022 2041 by Latosha Cabello RN  Outcome: Ongoing  Goal: Maintain normal serum potassium, sodium, calcium, phosphorus, and pH  2/16/2022 1032 by Meagan Rodriguez RN  Outcome: Ongoing  2/15/2022 2041 by Latosha Cabello RN  Outcome: Ongoing     Problem: Loneliness or Risk for Loneliness  Goal: Demonstrate positive use of time alone when socialization is not possible  Outcome: Ongoing     Problem: Fatigue  Goal: Verbalize increase energy and improved vitality  Outcome: Ongoing     Problem: Patient Education: Go to Patient Education Activity  Goal: Patient/Family Education  Outcome: Ongoing     Problem: Falls - Risk of:  Goal: Will remain free from falls  Description: Will remain free from falls  Outcome: Ongoing  Goal: Absence of physical injury  Description: Absence of physical injury  Outcome: Ongoing     Problem: Skin Integrity:  Goal: Will show no infection signs and symptoms  Description: Will show no infection signs and symptoms  Outcome: Ongoing  Goal: Absence of new skin breakdown  Description: Absence of new skin breakdown  Outcome: Ongoing     Problem: Gas Exchange - Impaired:  Goal: Levels of oxygenation will improve  Description: Levels of oxygenation will improve  Outcome: Ongoing     Problem: Infection, Septic Shock:  Goal: Will show no infection signs and symptoms  Description: Will show no infection signs and symptoms  Outcome: Ongoing     Problem: Infection - Ventilator-Associated Pneumonia:  Goal: Absence of pulmonary infection  Description: Absence of pulmonary infection  Outcome: Ongoing

## 2022-02-16 NOTE — PROGRESS NOTES
Podiatry Progress Note  2/16/2022   Angelina Elizondo       SUBJECTIVE: Patient seen at bedside this morning for follow up of b/l lower extremity ulcers. Currently resting in bed with dressings to b/l foot cdi. No acute overnight events. Past Medical History:   Diagnosis Date    Arthritis     Atrial fibrillation ([facility] [address]     Hyperlipidemia     Hypertension     Non-pressure chronic ulcer of other part of right lower leg with fat layer exposed ([facility] [address] 8/21/2020    Thyroid disease         Past Surgical History:   Procedure Laterality Date    PACEMAKER INSERTION  06/04/2019    PPM GENT CHANGE  ([facility])   DR. TONG         No family history on file. Social History     Tobacco Use    Smoking status: Never Smoker    Smokeless tobacco: Never Used   Substance Use Topics    Alcohol use: No        Prior to Admission medications    Medication Sig Start Date End Date Taking? Authorizing Provider   dilTIAZem (CARDIZEM CD) 120 MG extended release capsule Take 120 mg by mouth daily    Historical Provider, MD   docusate sodium (COLACE) 100 MG capsule Take 100 mg by mouth daily    Historical Provider, MD   Multiple Vitamins-Minerals (THERAPEUTIC MULTIVITAMIN-MINERALS) tablet Take 1 tablet by mouth daily    Historical Provider, MD   warfarin (COUMADIN) 4 MG tablet Take 1 tablet by mouth daily 1/7/22   Shin Tucker MD   bumetanide (BUMEX) 1 MG tablet Take 1 tablet by mouth 2 times daily 1/7/22   Shin Tucker MD   spironolactone (ALDACTONE) 25 MG tablet Take 1 tablet by mouth daily 1/8/22   Shin Tucker MD   metoprolol (LOPRESSOR) 100 MG tablet Take 200 mg by mouth 2 times daily     Historical Provider, MD   simvastatin (ZOCOR) 20 MG tablet Take 20 mg by mouth nightly    Historical Provider, MD   levothyroxine (SYNTHROID) 125 MCG tablet Take 125 mcg by mouth Daily     Historical Provider, MD        Patient has no known allergies.          OBJECTIVE:        Vitals:    02/16/22 0900   BP: (!) 110/49   Pulse: 75   Resp: 22 Temp: 97.2 °F (36.2 °C)   SpO2: 100%              EXAM:        Pt is AAOx3  Dressing cdi with no dishevelment or strike-through drainage noted. PREVIOUS physical exam findings:  Vascular Exam: Pedal pulses nonpalpable b/l. Lower extremity warm to cool from proximal tibial tuberosity to distal digits b/l. No pedal hair growth noted b/l. Cap refill brisk to all digits. No edema to b/l foot    Neuro Exam: Deferred due to patient's condition. Dermatologic Exam:   RIGHT:  Full thickness ulceration noted to left posterior heel with surrounding cellulitis and firm, necrotic wound base. No focal openings, no evidence of purulence/proximal streaking/crepitus/malodor. Chronic skin hyperpigmentation secondary to chronic venous stasis dermatitis noted  Tissue injury to medial 1st mpj head. No acute signs of infection. LEFT:  Venous ulceration noted to left anterior leg and wrapping circumferentially to posterior leg. Chronic hyperpigmentation secondary to venous stasis dermatitis noted to skin. Skin is chafed and raw in appearance; healthy wound bed noted with pinpoint bleeding      MSK: ROM testing deferred. Soft, compressible posterior muscle compartments b/l. Evidence of b/l hallux valgus deformity.                        Current Facility-Administered Medications   Medication Dose Route Frequency Provider Last Rate Last Admin    potassium phosphate 10 mmol in dextrose 5 % 250 mL IVPB  10 mmol IntraVENous Once Evelio Lan APRN - CNP        levoFLOXacin (LEVAQUIN) 500 MG/100ML infusion 500 mg  500 mg IntraVENous Q24H Stana Riky, APRN - CNP   Stopped at 02/15/22 1257    Or    levoFLOXacin (LEVAQUIN) tablet 500 mg  500 mg Oral Q24H Stana Shaguftaom, APRN - CNP        nystatin (MYCOSTATIN) cream   Topical BID Evelio Lan, APRN - CNP   Given at 02/16/22 0911    insulin glargine-yfgn (SEMGLEE-YFGN) injection vial 15 Units  15 Units SubCUTAneous Nightly Adele Krause APRN - CNP   15 Units at 02/15/22 1959    bisacodyl (DULCOLAX) suppository 10 mg  10 mg Rectal Once Bryn Altamirano APRN - CNP        docusate sodium (COLACE) 150 MG/15ML liquid 100 mg  100 mg Oral BID Bryn Altamirano APRN - CNP   100 mg at 02/15/22 0929    sennosides (SENOKOT) 8.8 MG/5ML syrup 5 mL  5 mL Oral Nightly Bryn Altamirano APRN - CNP   5 mL at 02/13/22 2251    acetylcysteine (MUCOMYST) 20 % solution 600 mg  600 mg Inhalation BID Bryn Altamirano APRN - CNP   600 mg at 02/16/22 0835    lidocaine PF 1 % injection 5 mL  5 mL IntraDERmal Once Bryn Altamirano APRN - CNP        sodium chloride flush 0.9 % injection 5-40 mL  5-40 mL IntraVENous 2 times per day Bryn Altamirano, APRN - CNP   10 mL at 02/16/22 0911    sodium chloride flush 0.9 % injection 5-40 mL  5-40 mL IntraVENous PRN Bryn Altaimrano APRN - CNP   10 mL at 02/11/22 1236    0.9 % sodium chloride infusion  25 mL IntraVENous PRN Bryn Altamirano APRN - CNP        heparin flush 100 UNIT/ML injection 300 Units  3 mL IntraVENous 2 times per day Bryn Altamirano APRN - CNP   300 Units at 02/16/22 0911    heparin flush 100 UNIT/ML injection 300 Units  3 mL IntraCATHeter PRN Bryn Altamirano APRN - CNP        ipratropium-albuterol (DUONEB) nebulizer solution 1 ampule  1 ampule Inhalation Q4H PRN Bryn Altamirano APRN - CNP   1 ampule at 02/16/22 8201    warfarin placeholder: dosing by pharmacy   Other [address], APRN - CNP        metoprolol tartrate (LOPRESSOR) tablet 50 mg  50 mg Oral BID Bryn Altamirano APRN - CNP   50 mg at 02/15/22 0929    levETIRAcetam (KEPPRA) 750 mg/50 mL IVPB  750 mg IntraVENous Q12H HIMANSHU Mccarty - NP   Stopped at 02/16/22 0300    chlorhexidine (PERIDEX) 0.12 % solution 15 mL  15 mL Mouth/Throat BID HIMANSHU De Jesus CNP   15 mL at 02/16/22 0911    insulin lispro (HUMALOG) injection vial 0-12 Units  0-12 Units SubCUTAneous Q4H HIMANSHU De Jesus CNP   2 Units at 02/15/22 1759    midazolam PF (VERSED) injection 2 mg  2 mg IntraVENous Q4H PRN Katie Gilless, APRN - CNP   2 mg at 02/11/22 1236    LORazepam (ATIVAN) injection 1 mg  1 mg IntraVENous Q5 Min PRN [facility], APRN - CNP   1 mg at 02/07/22 0331    perflutren lipid microspheres (DEFINITY) injection 1.65 mg  1.5 mL IntraVENous ONCE PRN Dalia Grady MD        acetaminophen (TYLENOL) tablet 650 mg  650 mg Oral Q6H PRN Lynsey Wright MD   650 mg at 02/08/22 2207    Or    acetaminophen (TYLENOL) suppository 650 mg  650 mg Rectal Q6H PRN Lynsey Wright MD        pantoprazole (PROTONIX) injection 40 mg  40 mg IntraVENous Daily [facility], APRN - CNP   40 mg at 02/16/22 7038    And    sodium chloride (PF) 0.9 % injection 10 mL  10 mL IntraVENous Daily [facility], APRN - CNP   10 mL at 02/16/22 0912    glucose (GLUTOSE) 40 % oral gel 15 g  15 g Oral PRN [facility], APRN - CNP        dextrose 50 % IV solution  12.5 g IntraVENous PRN [facility], APRN - CNP        glucagon (rDNA) injection 1 mg  1 mg IntraMUSCular PRN [facility], APRN - CNP        dextrose 5 % solution  100 mL/hr IntraVENous PRN [facility], APRN - CNP            Lab Results   Component Value Date    WBC 10.4 02/16/2022    HCT 34.2 02/16/2022    HGB 10.8 (L) 02/16/2022     02/16/2022     02/16/2022    K 3.8 02/16/2022    K 3.8 02/16/2022     (H) 02/16/2022    CO2 23 02/16/2022    BUN 38 (H) 02/16/2022    CREATININE 0.7 02/16/2022    GLUCOSE 121 (H) 02/16/2022    CRP 2.0 (H) 02/10/2022         Radiographs:    ASSESEMENT:  -Full thickness ulceration, left heel  -Venous stasis ulceration, right leg  -Chronic venous stasis dermatitis, b/l leg   -Lower extremity edema, b/l  -PAD  -Covid-19 infection      PLAN:  - Examined and evaluated  - All labs, imaging, and charts reviewed  -Venous US: No DVT  -Vascular studies:pending  -Foot and tib/fib xrays: No gross osseous erosions are seen. Advanced degenerative changes noted. Soft tissue swelling of the foot present.   -ID: levaquin ongoing  -Will proceed with conservative wound care for now  -QoD dressing changes  -Dressings to b/l lower extremity cdi today. Next dressing change 2/17. To consist of:  Xeroform DSD. -Prevalon offloading boots while in bed  -Palliative care on board  -Will follow along    Discussed with:  Darius Rodney DPM [id number]  Fellowship-Trained Foot and Ankle Surgeon  Diplomate, [employer] Foot [employer] Ankle Sur[employer]  [phone]       Thank you for involving podiatry in this patient's care. Please do not hesitate to call with any questions or concerns.       Jeff Puckett DPM   2/16/2022   9:46 AM

## 2022-02-16 NOTE — PROGRESS NOTES
Infectious Disease  Progress Note  [facility]    C/C: covid pneumonia, gr B bacteremia/skin abscess    Subjective: she is afebrile over night. A line was removed. Off pressors. On spontaenous over the vent.      Scheduled Meds:   potassium phosphate IVPB  10 mmol IntraVENous Once    warfarin  3 mg Oral Once    levofloxacin  500 mg IntraVENous Q24H    Or    levoFLOXacin  500 mg Oral Q24H    nystatin   Topical BID    bisacodyl  10 mg Rectal Once    docusate sodium  100 mg Oral BID    sennosides  5 mL Oral Nightly    lidocaine PF  5 mL IntraDERmal Once    sodium chloride flush  5-40 mL IntraVENous 2 times per day    heparin flush  3 mL IntraVENous 2 times per day    warfarin placeholder: dosing by pharmacy   Other RX Placeholder    metoprolol tartrate  50 mg Oral BID    levetiracetam  750 mg IntraVENous Q12H    chlorhexidine  15 mL Mouth/Throat BID    insulin lispro  0-12 Units SubCUTAneous Q4H    pantoprazole  40 mg IntraVENous Daily    And    sodium chloride (PF)  10 mL IntraVENous Daily     Continuous Infusions:   sodium chloride      dextrose       PRN Meds:sodium chloride flush, sodium chloride, heparin flush, ipratropium-albuterol, midazolam, LORazepam, perflutren lipid microspheres, acetaminophen **OR** acetaminophen, glucose, dextrose, glucagon (rDNA), dextrose    Patient Vitals for the past 24 hrs:   BP Temp Temp src Pulse Resp SpO2   02/16/22 1000 (!) 99/51   73 18 97 %   02/16/22 0900 (!) 110/49 97.2 °F (36.2 °C) Bladder 75 22 100 %   02/16/22 0837     24 99 %   02/16/22 0836     17 99 %   02/16/22 0800 (!) 103/54   71 19 100 %   02/16/22 0700 (!) 97/47   71 16 100 %   02/16/22 0600 (!) 96/51   71 19 99 %   02/16/22 0500 109/61   82 21 99 %   02/16/22 0400 119/66 97 °F (36.1 °C) Bladder 83 22 94 %   02/16/22 0300 116/62   82 22 98 %   02/16/22 0200 107/64   79 21 98 %   02/16/22 0100 114/76   77 21 99 %   02/16/22 0035     21 99 %   02/16/22 0000 (!) 118/56 97.2 °F (36.2 °C) Bladder 82 19 97 %   02/15/22 2300 125/68   75 21 99 %   02/15/22 2200 (!) 114/54   87 21 100 %   02/15/22 2100 117/61   74 22 99 %   02/15/22 2000 (!) 110/59 97 °F (36.1 °C) Bladder 77 18 100 %   02/15/22 1900 107/69   82 25 99 %   02/15/22 1800 114/62   83 21 99 %   02/15/22 1700 (!) 114/52   80 23 99 %   02/15/22 1600 (!) 114/58 97.2 °F (36.2 °C) Bladder 89 30 100 %   02/15/22 1500 (!) 111/55   77 21 98 %   02/15/22 1400 (!) 107/59 97.7 °F (36.5 °C) Bladder 81 20 98 %   02/15/22 1300 120/65   77 23 99 %   02/15/22 1200 102/63 96.8 °F (36 °C) Bladder 72 21 100 %       CBC with Differential:    Lab Results   Component Value Date    WBC 10.4 02/16/2022    RBC 3.38 02/16/2022    HGB 10.8 02/16/2022    HCT 34.2 02/16/2022     02/16/2022    .2 02/16/2022    MCH 32.0 02/16/2022    MCHC 31.6 02/16/2022    RDW 18.1 02/16/2022    NRBC 1.7 02/09/2022    METASPCT 1.7 02/12/2022    LYMPHOPCT 6.9 02/16/2022    PROMYELOPCT 0.9 02/06/2022    MONOPCT 13.4 02/16/2022    MYELOPCT 0.9 02/12/2022    BASOPCT 0.2 02/16/2022    MONOSABS 1.40 02/16/2022    LYMPHSABS 0.72 02/16/2022    EOSABS 0.03 02/16/2022    BASOSABS 0.02 02/16/2022     CMP:    Lab Results   Component Value Date     02/16/2022    K 3.8 02/16/2022    K 3.8 02/16/2022     02/16/2022    CO2 23 02/16/2022    BUN 38 02/16/2022    CREATININE 0.7 02/16/2022    GFRAA >60 02/16/2022    LABGLOM >60 02/16/2022    GLUCOSE 121 02/16/2022    PROT 5.6 02/16/2022    LABALBU 3.6 02/16/2022    CALCIUM 8.9 02/16/2022    BILITOT 2.0 02/16/2022    ALKPHOS 103 02/16/2022    AST 46 02/16/2022    ALT 48 02/16/2022       BP (!) 99/51   Pulse 73   Temp 97.2 °F (36.2 °C) (Bladder)   Resp 18   Ht 5' (1.524 m)   Wt 226 lb 3.2 oz (102.6 kg)   SpO2 97%   BMI 44.18 kg/m²     Physical Exam  Const/Neuro- unchanged, no signs of acute distress,   ENMT- Within Normal Limits, Normocephalic,intubated  Neck: Neck supple  Heart- Regular, Rate, Rhythm- no murmur appreciated. Lungs- clear to ascultation anteriorly. ,on the vent: AC fio2 of 30%  Abdomen- Soft, bowel sounds positive, non tender  Musculo/Extremities-  Equal and symmetrical, no edema. No tenderness. Skin:  Warm and dry, free from rashes. Labs, Cultures reviewed    Radiology reviewed    Microbiology:   Blood cx 2/4: gr B strep  Blood cx 2/5, 2/6, 2/9 are negative  MRSA nasal screen 2/4: positive  Wound cx 2/4: MRSA  resp cx 2/6: MRSA  resp cx 2/12: gram negative cindi    Assessment  · Acute hypoxic resp failure from Covid pneumonia  · Group B Streptoccus bacteremia:   · Superimposed MRSA bacterial pneumonia  · Wound infection: MRSA  · Leucocytosis from above: imporinvg    Plan  · ? Yeast in intertriginous areas   · Gui Katherin for PICC Placement. · No need for repeat blood cx today. · Will follow with you.    · Will stop vanco and zosyn and give po levaquin for the stenotrophomonas  Stop in seven days   Stop date feb 21  · Podiatry notes greatly appreciated   · Repeat one more set of blood cultures    Electronically signed by Olga Fagan MD on 2/16/2022 at 11:02 AM

## 2022-02-16 NOTE — CARE COORDINATION
2/16: Update CM Note: Pt came in from [facility] ER with being unresponsive/Covid +2/4. Pt was intubated & transferred to MICU for neuro evaluation. Pt was extubated on 2/15 & is on 2L/Nc, SPo2 98%. CM spoke with nephheather Ely [phone] EN 2/8. He would prefer his Aunt to go to another facility for rehab. Cm explained that it will depend on her 02 needs & mobility to where she can go. At this point the pt is Covid + & will need to go to a Covid SNF. He preferred [address] [place] [address]. Cm called left [facility] referral. Lauren Menard following as well. Pt is a DNRCCA, Iv Levaquin& Iv Keppra. 2 D Echo done & EEG awake & sleep moderate non-specific cerebral dysfunction of the left frontal temporal region, moderate to severe encephalopathy. Corepak placed today. [facility] stated they can accept a Corepak. Will need PT/OT/Speech eval once medically stable. Sw/VINEET will continue to follow for dc planning.  Electronically signed by Marilyn Evans RN on 2/16/2022 at 4:15 PM

## 2022-02-16 NOTE — PROGRESS NOTES
Admit subjective:    Extubated today  No plans to escalate care if patient were to go back into respiratory distress  DNI    Objective:    BP (!) 110/59   Pulse 77   Temp 97 °F (36.1 °C) (Bladder)   Resp 18   Ht 5' (1.524 m)   Wt 226 lb 3.2 oz (102.6 kg)   SpO2 100%   BMI 44.18 kg/m²     In: 1447 [I.V.:1397; NG/GT:50]  Out: 2215   In: 1447   Out: 2215 [Urine:2215]    General appearance: I extubated, resting comfortably  HEENT: AT/NC, MMM  Neck: FROM, supple  Lungs: Clear to auscultation. Mechanical sounds. CV: RRR, no MRGs  Vasc: Radial pulses 2+  Abdomen: Soft, non-tender; no masses or HSM  Extremities: No peripheral edema or digital cyanosis  Skin: no rash, lesions or ulcers       Recent Labs     02/13/22  0401 02/14/22  0430 02/15/22  0530   WBC 10.5 12.4* 11.1   HGB 11.5 10.9* 10.7*   HCT 35.8 34.4 33.7*    191 171       Recent Labs     02/13/22  0401 02/13/22  0401 02/14/22  0430 02/15/22  0530     --  143 146   K 4.1  4.1   < > 3.9  3.9 3.8  3.8     --  109* 112*   CO2 21*  --  19* 23   BUN 61*  --  58* 42*   CREATININE 1.0  --  0.8 0.7   CALCIUM 8.7  --  8.5* 8.4*    < > = values in this interval not displayed. Assessment:    Active Problems:    Acute respiratory failure due to COVID-19 [facility])    Acute respiratory failure (HCC)    Cerebrovascular accident (CVA) due to embolism of cerebral artery ([facility] [address]  Resolved Problems:    * No resolved hospital problems.  *      Plan:    Admit to MICU for neurology evaluation secondary to ams requiring intubating in pt with covid pna  Unable to get mri d/t pacer in place patient  -Extubated 2/15/2022no plans for reintubation or escalation of care  -moitor rate and rhythm, echocardiogram with ejection fraction 50% no evidence of vegetations on transthoracic echo  -EEG  empiric keppra bid-Keppra increased to 750 IV twice daily by neurology  patient remains on vancomycin and Zosyn  -Palliative care service consult  -Tube feeds initiated for nutrition          Critical care team following, infectious disease following  Palliative care continues to follow    Discussed with critical care NP    DVT Prophylaxis   PT/OT  Discharge planning           Jessica Colunga MD  9:01 PM  2/15/2022

## 2022-02-17 NOTE — PROGRESS NOTES
[address] [facility]   Department of Internal Medicine   MICU Progress Note    Patient:  Althia Meckel 81 y.o. female   MRN: [id number]       Date of Service: 2022    Allergy: Patient has no known allergies. CC seizure and encephalopathy  Subjective   On 2 LNC- non verbal  Patient is DNI- no reintubation - may need to address code status to make her DNR-CC only  temp of 98.2  CorPak placed, tube feeds running. No overnight event per nurse    Objective     TEMPERATURE:  Current - Temp: 97.5 °F (36.4 °C); Max - Temp  Av.1 °F (36.7 °C)  Min: 97.2 °F (36.2 °C)  Max: 99.7 °F (37.6 °C)    RESPIRATIONS RANGE: Resp  Av.5  Min: 17  Max: 31    PULSE RANGE: Pulse  Av.2  Min: 73  Max: 100    BLOOD PRESSURE RANGE:  Systolic (55XAB), HXN:192 , Min:91 , MBX:797   ; Diastolic (03JOZ), AEI:50, Min:41, Max:76      PULSE OXIMETRY RANGE: SpO2  Av %  Min: 95 %  Max: 100 %    I & O - 24hr:    Intake/Output Summary (Last 24 hours) at 2022 0820  Last data filed at 2022 4913  Gross per 24 hour   Intake 1214.38 ml   Output 1915 ml   Net -700.62 ml     I/O last 3 completed shifts: In: 2227.4 [I.V.:1743; NG/GT:100; IV Piggyback:384.4]  Out: 4332 [Urine:2830] I/O this shift:  In: -   Out: 60 [Urine:60]   Weight change:     Physical Exam:  · General Appearance: open eyes and does not following commands, on 2L NC does not look in distress   · HEENT:  Head: Normocephalic, no lesions, without obvious abnormality. · Eye: Normal external eye, conjunctiva, lids cornea, NATE. · Nose: Normal external nose, mucus membranes and septum. · Pharynx: Dental Hygiene adequate. Normal buccal mucosa. Normal pharynx. · Neck: no adenopathy, no carotid bruit, no JVD, supple, symmetrical, trachea midline and thyroid not enlarged, symmetric, no tenderness/mass/nodules  · Lung: clear to auscultation bilaterally and No rhonchi rales noted.   · Heart: regular rate and rhythm, S1, S2 normal, no murmur, click, rub or gallop  · Abdomen: soft, non-tender; bowel sounds normal; no masses,  no organomegaly  · Extremities:  edema 2+, peripheral vascular discoloration. Peripheral pulses 2+. · Musculokeletal: No joint swelling, no muscle tenderness. ROM normal in all joints of extremities.    · Neurologic: Mental status: open eyes, does not follow, limited neuro exam       Medications     Continuous Infusions:   sodium chloride      dextrose       Scheduled Meds:   furosemide  40 mg IntraVENous Once    levofloxacin  500 mg IntraVENous Q24H    Or    levoFLOXacin  500 mg Oral Q24H    nystatin   Topical BID    bisacodyl  10 mg Rectal Once    docusate sodium  100 mg Oral BID    sennosides  5 mL Oral Nightly    lidocaine PF  5 mL IntraDERmal Once    sodium chloride flush  5-40 mL IntraVENous 2 times per day    heparin flush  3 mL IntraVENous 2 times per day    warfarin placeholder: dosing by pharmacy   Other RX Placeholder    metoprolol tartrate  50 mg Oral BID    levetiracetam  750 mg IntraVENous Q12H    chlorhexidine  15 mL Mouth/Throat BID    insulin lispro  0-12 Units SubCUTAneous Q4H    pantoprazole  40 mg IntraVENous Daily    And    sodium chloride (PF)  10 mL IntraVENous Daily     PRN Meds: sodium chloride flush, sodium chloride, heparin flush, ipratropium-albuterol, midazolam, LORazepam, perflutren lipid microspheres, acetaminophen **OR** acetaminophen, glucose, dextrose, glucagon (rDNA), dextrose  Nutrition:   Tube feeds     Labs and Imaging Studies     CBC:   Recent Labs     02/15/22  0530 02/16/22  0454 02/17/22  0400   WBC 11.1 10.4 8.3   RBC 3.38* 3.38* 3.25*   HGB 10.7* 10.8* 10.4*   HCT 33.7* 34.2 33.3*   MCV 99.7 101.2* 102.5*   MCH 31.7 32.0 32.0   MCHC 31.8* 31.6* 31.2*   RDW 17.9* 18.1* 18.1*    161 135   MPV 10.9 10.9 10.7       BMP:    Recent Labs     02/15/22  0530 02/15/22  0530 02/16/22  0454 02/17/22  0400     --  143 143   K 3.8  3.8   < > 3.8  3.8 3.6  3.6   *  --  108* 108*   CO2 23  --  23 24   BUN 42*  --  38* 38*   CREATININE 0.7  --  0.7 0.8   GLUCOSE 121*  --  121* 107*   CALCIUM 8.4*  --  8.9 9.1   PROT 5.0*  --  5.6* 5.5*   LABALBU 3.0*  --  3.6 3.5   BILITOT 1.7*  --  2.0* 2.0*   ALKPHOS 112*  --  103 113*   AST 47*  --  46* 56*   ALT 50*  --  48* 53*    < > = values in this interval not displayed. LIVER PROFILE:   Recent Labs     02/15/22  0530 02/16/22  0454 02/17/22  0400   AST 47* 46* 56*   ALT 50* 48* 53*   BILITOT 1.7* 2.0* 2.0*   ALKPHOS 112* 103 113*       PT/INR:   Recent Labs     02/15/22  0530 02/16/22  0454 02/17/22  0400   PROTIME 19.6* 18.8* 20.9*   INR 1.8 1.7 1.9       APTT:   No results for input(s): APTT in the last 72 hours. Fasting Lipid Panel:    No results found for: CHOL, TRIG, HDL    Cardiac Enzymes:    Lab Results   Component Value Date    CKTOTAL 185 (H) 02/06/2022       Notable Cultures:      Blood cultures   Blood Culture, Routine   Date Value Ref Range Status   02/09/2022 5 Days no growth  Final     Respiratory cultures No results found for: RESPCULTURE No results found for: LABGRAM  Urine   Urine Culture, Routine   Date Value Ref Range Status   02/04/2022 <10,000 CFU/mL  Mixed gram positive organisms    Final     Legionella No results found for: LABLEGI  C Diff PCR No results found for: CDIFPCR  Wound culture/abscess: No results for input(s): WNDABS in the last 72 hours. Tip culture:No results for input(s): CXCATHTIP in the last 72 hours.      Antibiotic  Days  Day started   Levaquin                            Oxygen:     Vent Information  $Ventilation: (S) Off Vent  Skin Assessment: Clean, dry, & intact  Equipment ID: [id number]  Equipment Changed: Humidification  Vent Type: 980  Vent Mode: PS  Vt Ordered: 0 mL  Rate Set: 0 bmp  Peak Flow: 0 L/min  Pressure Support: 10 cmH20  FiO2 : 30 %  SpO2: 99 %  SpO2/FiO2 ratio: 333.33  Sensitivity: 3  PEEP/CPAP: 5  I Time/ I Time %: 0 s  Humidification Source: Heated wire  Humidification Temp: 37  Humidification Temp Measured: 37  Circuit Condensation: Drained    Additional Respiratory  Assessments  Pulse: 80  Resp: 29  SpO2: 99 %  Position: Semi-Pineda's  Humidification Source: Heated wire  Humidification Temp: 37  Circuit Condensation: Drained  Oral Care: Mouth swabbed,Mouth suctioned  Subglottic Suction Done?: Yes  Airway Type: ET  Airway Size: 7.5  Cuff Pressure (cm H2O): 7.5 cm H2O     Nasal cannula L/min     Face mask %     Reservoirs mask %       ABG     PH   7.49   PCO2  31   PO2  95   HCO3  23   Sat%  97   FIO2  30   DATES 2/15/22       Lines:  Site  Day  Date inserted     TLC              PICC   ABDI           Arterial line              Peripheral line              HD cath            [REMOVED] Urethral Catheter Temperature probe 16 fr-Output (mL): 85 mL  Urethral Catheter-Output (mL): 60 mL  [REMOVED] Urethral Catheter Straight-tip 16 fr-Output (mL): 425 mL    Imaging Studies:    XR ABDOMEN (KUB) (SINGLE AP VIEW)   Final Result   Enteric catheter passes into the proximal small bowel. No complications. XR CHEST PORTABLE   Final Result   1. There is no interval change in the bilateral lower lobe airspace disease   and small bilateral pleural effusions. 2. There is no pneumothorax or pneumomediastinum. XR CHEST PORTABLE   Final Result   Stable cardiomegaly, pleural effusions and adjacent atelectasis and or   infiltrate. XR CHEST PORTABLE   Final Result   1. PICC line tip is in the upper SVC expected location, limited evaluation   due to patient positioning   2. ET tip is approximately 2 cm above the chika   3. Suspicious for CHF as described above         XR CHEST PORTABLE   Final Result   No significant interval change, when compared to the previous study performed   1 day earlier. US DUP LOWER EXTREMITIES BILATERAL VENOUS   Final Result   No evidence of DVT in either lower extremity. XR CHEST PORTABLE   Final Result   1. Stable chest   2.  The position above   comments. XR CHEST PORTABLE   Final Result   1. There is no interval change in the multifocal bilateral airspace disease. 2. Bilateral pleural effusions. 3. Cardiomegaly. XR CHEST PORTABLE   Final Result   1. Medical support devices as above. 2.  Retrocardiac and bibasilar opacities appears stable. Medial right upper   lung opacity appears stable. Atherosclerotic disease and cardiomegaly no new   cardiopulmonary pathology. VL LOWER EXTREMITY ARTERIAL SEGMENTAL PRESSURES W PPG BILATERAL    (Results Pending)        APRN- CNP Assessment and PLan   In Summary, 78-year-old female with past medical history of arthritis, A. fib, hyperlipidemia, hypertension, and for some chronic ulcer of right lower leg with fat layer exposed, thyroid disease, recent admission on 1/1/2022 and discharged on 1/7/2022 with hyperkalemia, fall, venous status and status dermatitis of both bilateral lower extremities. Patient was readmitted on 2/4/2022 with altered mental status, positive for COVID-19. Patient was intubated for airway protection and noted to have seizure x2 therefore was transferred to [facility] for neurological evaluation.     Assessment:  · Acute hypoxemic and hypercapnic respiratory failure on the vent (2/4/22)  · SARS-CoV2 PNA with superimposed MRSA bacterial infection  · Septic shock secondary to group B streptococcus bacteremia  · MRSA on wound infection right leg  · Acute metabolic or anoxic encephalopathy  · Multiple stroke with large amount of edemaunable to get MRI due to PPM.  · Seizure disorder  · Leukocytosis  · Right lower lobe infiltrates  · Compensated heart failure  · A. fib RVR on Coumadin at home  · Acute on chronic kidney disease  · Chronic hyponatremia sodium ranges 1 26-1 30  · Supratherapeutic INR > resolved  · History of permanent pacemaker  · History of arthritis  · History of A. fib on Coumadin  · History of hyperlipidemia  · History of hypertension  · History of chronic right lower leg wound  · History of thyroid disease        Plan:  · Extubated, on 2L NC, wean FiO2 to keep SPO2 goal above 92%  · Bronchodilators scheduled and as needed, aggressive pulmonary toileting, on Mucomyst  · Status post dexamethasone 10 mg x 5 doses  · keep MAP greater than 60 mmHg  · Continue Keppra 750 mg twice daily  · EEG on 2/9 shows moderate nonspecific cerebral dysfunction of the left frontotemporal region   · Consult to neurology, input appreciated s/o, unable to MRI secondary to PPM compatibility  · Seizure precaution  · Cardiology following, input appreciated, mild with systolic dysfunction 09% EF, mild to moderate tricuspid valve and mitral valve regurgitation  · Blood cultures positive group B streptococcus bacteremia, COVID-19 PCR positive, RVP negative, repeat blood cultures (NGTD), wound culture positive for MRSA, MRSA nares positive, sputum culture positive for stenotrophomonas maltophilia. · ID following, input appreciated, d/c vanc and zosyn now on Levaquin   · Nephrology following, input appreciated; Lasix x1 today, diuresis 1.9 L yesterday  · Change labs to 3 days, PT/INR Coumadin  · Insulin sliding scale  · F/E/N none/replace electrolytes/NPO-tube feeds via CorPak  · DVT/GI; Coumadin/lansoprazole; INR in a.m. · Code Status DNR limited. · May need to readdress code status given her mentation- patient may benefit hospice or DNR-CC only. · Okay to transfer out of MICU from critical care standpoint, could go to general medical floor or a nursing home facility. · PT OT consulted spoke with       Kip Perez, APRN-CNP   Critical Care     Discussed case with Attending Physician: Dr. Nataly Ha     I personally saw, examined and provided care for the patient. Radiographs, labs and medication list were reviewed by me independently. I spoke with bedside nursing, therapists and consultants.  Critical care services and times documented are independent of procedures and multidisciplinary rounds with CNP. Additionally comprehensive, multidisciplinary rounds were conducted with the MICU team. The case was discussed in detail and plans for care were established. Review of CNP documentation was conducted and revisions were made as appropriate. I agree with the above documented exam, problem list and plan of care.   Sandeep Joseph MD   CCT excluding procedures:33'

## 2022-02-17 NOTE — PROGRESS NOTES
Palliative Care Department  [phone]  Palliative Care Progress Note  Provider Savilla Severe, APRN - CNP     Jessica Stern  [id number]  Hospital Day: 11  Date of Initial Consult: 2/7/2022  Referring Provider: Constantino Smith MD  Palliative Medicine was consulted for assistance with:  Code Status Discussion, Goals of Care    HPI:   Jessica Stern is a 81 y.o. with a medical history of atrial fibrillation on anticoagulation, hyperlipidemia, hypertension, CHF, DM, sinus node dysfunction s/p pacemaker placement, MR, TR, pulmonary hypertension who was admitted on 2/6/2022 from facility with a CHIEF COMPLAINT of confusion. Patient tested positive for Covid 10 days prior to admission. Patient was febrile in the ED, found to be in atrial fibrillation with RVR, and also hypotensive. Patient currently intubated in ICU. Palliative medicine consulted for goals of care and code status discussion. Patient transferred from [facility] to [facility] for neuro eval.   ASSESSMENT/PLAN:     Pertinent Hospital Diagnoses   Acute hypoxic respiratory failure due to Covid 19   Acute CVA    Palliative Care Encounter / Counseling Regarding Goals of Care  Please see detailed goals of care discussion as below   At this time, Jessica Stern, Does Not have capacity for medical decision-making.   Capacity is time limited and situation/question specific   During encounter, lauren Gusman, was surrogate medical decision-maker   Outcome of goals of care meeting  o Nate Lantigua expresses that he would not want a trach or PEG  o Plan for extubation and see how patient does  o If respiratory status were to decline: no reintubation   Code status Limited DNRCCA/ DNI   Advanced Directives: no POA or living will in [facility]   Surrogate/Legal NOK:  o lauren Mcdonald, [phone]    Spiritual assessment: no spiritual distress identified  Bereavement and grief: to be determined  Referrals to: none today  SUBJECTIVE:   Details of Conversation: Chart reviewed, case discussed juan m with the attending and patient seen in the ICU. She remains extubate, on nasal canula without respiratory distress. Unfortunately she has not had any significant neurological improvement. She did have a NET tube placed today. At this time the plan of care remains unchanged and SNF placement is being sought. As per family wishes she will remain a DNR-CCA/DNI with no escalation of care if she develops repeated respiratory distress. OBJECTIVE:   Prognosis: Poor    Physical Exam:  BP (!) 102/47   Pulse 85   Temp 99.7 °F (37.6 °C) (Bladder)   Resp 23   Ht 5' (1.524 m)   Wt 226 lb 3.2 oz (102.6 kg)   SpO2 97%   BMI 44.18 kg/m²      Due to the current efforts to prevent transmission of COVID-19 and also the need to preserve PPE for other caregivers, a face-to-face encounter with the patient was not performed. That being said, all relevant records and diagnostic tests were reviewed, including laboratory results and imaging. Please reference any relevant documentation elsewhere. Care will be coordinated with the primary service. Objective data reviewed: labs, images, records, medication use, vitals and chart    Discussed patient and the plan of care with the other IDT members: Palliative Medicine IDT Team    Time/Communication  Greater than 50% of time spent, total 15 minutes in counseling and coordination of care at the bedside regarding goals of care, diagnosis and prognosis and see above. Thank you for allowing Palliative Medicine to participate in the care of [facility].

## 2022-02-17 NOTE — CARE COORDINATION
2/17: Update CM Note: Pt came in from [facility] ER with being unresponsive/Covid +2/4. Pt was intubated & transferred to MICU for neuro evaluation. Pt was extubated on 2/15 & is on 2L/Nc, SPo2 98%. CM spoke with lauren Thornton [phone]  2/8. He would prefer his Aunt to go to another facility for rehab. Cm explained that it will depend on her 02 needs & mobility to where she can go. At this point the pt is Covid + & will need to go to a Covid SNF. He preferred [address] [facility] [address]. Cm called left Rosy[facility] referral.  Lodema Crumbly following as well. Pt is a DNRCCA/DNI, Iv Levaquin& Iv Keppra. 2 D Echo done & EEG awake & sleep moderate non-specific cerebral dysfunction of the left frontal temporal region, moderate to severe encephalopathy. Corepak placed 2/16. [facility] they can accept a Corepak. Pt opens eye but doesn't follow commands. Will need PT/OT/Speech eval once medically stable. Cm spoke with Mayank Browne today. He would like list of Hospice Companies sent to him via email [email or web address] or text via [phone]. Sw/[facility]INEET will continue to follow for dc planning. Electronically signed by Yevgeniy Huang RN on 2/17/2022 at 12:07 PM     The Plan for Transition of Care is related to the following treatment goals: Hospice    The Patient and/or patient representative was provided with a choice of provider and agrees   with the discharge plan. [x] Yes [] No    Freedom of choice list was provided with basic dialogue that supports the patient's individualized plan of care/goals, treatment preferences and shares the quality data associated with the providers.  [x] Yes [] No

## 2022-02-17 NOTE — PROGRESS NOTES
Pharmacy Consultation Note  (Warfarin Dosing and Monitoring)    Initial consult date: 2/9/22  Consulting Provider: Rosario BAUGH    Hola Baldwin is a 81 y.o. female for whom pharmacy has been asked to manage warfarin therapy. Weight:   Wt Readings from Last 1 Encounters:   02/15/22 226 lb 3.2 oz (102.6 kg)       TSH:    Lab Results   Component Value Date    TSH 1.790 02/04/2022       Hepatic Function Panel:                            Lab Results   Component Value Date    ALKPHOS 113 02/17/2022    ALT 53 02/17/2022    AST 56 02/17/2022    PROT 5.5 02/17/2022    BILITOT 2.0 02/17/2022    LABALBU 3.5 02/17/2022       Current warfarin drug-drug interactions include: quinolones (incr INR) and steroids (variable/dose dependent)    Recent Labs     02/15/22  0530 02/16/22  0454 02/17/22  0400   HGB 10.7* 10.8* 10.4*    161 135     Date Warfarin Dose INR Heparin or LMWH Comment   2/9 2 mg 2.6 -----    2/10 2 mg 2.5 ------    2/11 2 mg 2.2 ------    2/12 2 mg 2.6 ------    2/13 2 mg 2.2 -------    2/14 4 mg 2 -------    2/15 2 mg 1.8 --------    2/16 3 mg 1.7 --    2/17 3 mg 1.9 --      Assessment:  · Patient is a 81 y.o. female on warfarin for Atrial Fibrillation. Patient's home warfarin dosing regimen is 4mg daily. · Goal INR 2 - 3   · Levofloxacin 500mg started on 2/15 takes about 2 days to see  Drug-drug ix.  (levofloxiacin increase anticoagulant effect of warfarin)  · 2/16: INR 1.7   · 2/17: INR 1.9, H&H and platelets are stable    Plan:  · Warfarin 3 mg tonight  · Daily PT/INR until the INR is stable within the therapeutic range  · Pharmacist will follow and monitor/adjust dosing as necessary    Thank you for this consult,    Darlene Silverio, MoraimaD, [facility] 2/17/2022 9:06 AM

## 2022-02-17 NOTE — PROGRESS NOTES
Infectious Disease  Progress Note  [facility]    C/C: covid pneumonia, gr B bacteremia/skin abscess    Subjective: she is afebrile over night. A line was removed. Off pressors. On spontaenous over the vent.      Scheduled Meds:   warfarin  3 mg Oral Once    levETIRAcetam  750 mg Oral BID    levoFLOXacin  500 mg Oral Daily    [START ON 2/18/2022] lansoprazole  30 mg Oral QAM AC    nystatin   Topical BID    bisacodyl  10 mg Rectal Once    docusate sodium  100 mg Oral BID    sennosides  5 mL Oral Nightly    lidocaine PF  5 mL IntraDERmal Once    sodium chloride flush  5-40 mL IntraVENous 2 times per day    heparin flush  3 mL IntraVENous 2 times per day    warfarin placeholder: dosing by pharmacy   Other RX Placeholder    metoprolol tartrate  50 mg Oral BID    insulin lispro  0-12 Units SubCUTAneous Q4H     Continuous Infusions:   sodium chloride      dextrose       PRN Meds:sodium chloride flush, sodium chloride, heparin flush, ipratropium-albuterol, midazolam, LORazepam, perflutren lipid microspheres, acetaminophen **OR** acetaminophen, glucose, dextrose, glucagon (rDNA), dextrose    Patient Vitals for the past 24 hrs:   BP Temp Temp src Pulse Resp SpO2   02/17/22 1000 134/61   92 25 99 %   02/17/22 0900 117/65   78 16 100 %   02/17/22 0800 127/70 97.5 °F (36.4 °C) Bladder 80 29 99 %   02/17/22 0700 (!) 125/55   81 17 100 %   02/17/22 0600 122/75   80 20 100 %   02/17/22 0500 116/76   95 22 100 %   02/17/22 0400 (!) 114/53 98.2 °F (36.8 °C) Bladder 92 22 100 %   02/17/22 0300 (!) 106/41   97 20 99 %   02/17/22 0200 (!) 116/46   93 22 100 %   02/17/22 0100 (!) 111/54   93 18 97 %   02/17/22 0000 (!) 109/46   91 23 97 %   02/16/22 2300 (!) 103/52   81 22 97 %   02/16/22 2200 (!) 101/50   97 26 97 %   02/16/22 2100 (!) 102/47   85 23 97 %   02/16/22 2000 (!) 105/41 99.7 °F (37.6 °C) Bladder 81 22 97 %   02/16/22 1900 (!) 105/49   100 25 97 %   02/16/22 1800 (!) 101/42   85 27 97 % 02/16/22 1700 (!) 113/45   82 23 97 %   02/16/22 1600 (!) 94/47 98.2 °F (36.8 °C) Bladder 80 22 98 %   02/16/22 1500 (!) 93/48   77 20 97 %   02/16/22 1400 (!) 91/56   86 22 98 %   02/16/22 1300 (!) 95/48   78 (!) 31 95 %   02/16/22 1200 (!) 110/50 97.7 °F (36.5 °C) Bladder 83 27 96 %   02/16/22 1100 (!) 114/55   79 22 98 %       CBC with Differential:    Lab Results   Component Value Date    WBC 8.3 02/17/2022    RBC 3.25 02/17/2022    HGB 10.4 02/17/2022    HCT 33.3 02/17/2022     02/17/2022    .5 02/17/2022    MCH 32.0 02/17/2022    MCHC 31.2 02/17/2022    RDW 18.1 02/17/2022    NRBC 1.7 02/09/2022    METASPCT 1.7 02/12/2022    LYMPHOPCT 10.6 02/17/2022    PROMYELOPCT 0.9 02/06/2022    MONOPCT 15.0 02/17/2022    MYELOPCT 0.9 02/12/2022    BASOPCT 0.1 02/17/2022    MONOSABS 1.25 02/17/2022    LYMPHSABS 0.88 02/17/2022    EOSABS 0.09 02/17/2022    BASOSABS 0.01 02/17/2022     CMP:    Lab Results   Component Value Date     02/17/2022    K 3.6 02/17/2022    K 3.6 02/17/2022     02/17/2022    CO2 24 02/17/2022    BUN 38 02/17/2022    CREATININE 0.8 02/17/2022    GFRAA >60 02/17/2022    LABGLOM >60 02/17/2022    GLUCOSE 107 02/17/2022    PROT 5.5 02/17/2022    LABALBU 3.5 02/17/2022    CALCIUM 9.1 02/17/2022    BILITOT 2.0 02/17/2022    ALKPHOS 113 02/17/2022    AST 56 02/17/2022    ALT 53 02/17/2022       /61   Pulse 92   Temp 97.5 °F (36.4 °C) (Bladder)   Resp 25   Ht 5' (1.524 m)   Wt 226 lb 3.2 oz (102.6 kg)   SpO2 99%   BMI 44.18 kg/m²     Physical Exam  Const/Neuro- unchanged, no signs of acute distress,   ENMT- Within Normal Limits, Normocephalic,intubated  Neck: Neck supple  Heart- Regular, Rate, Rhythm- no murmur appreciated. Lungs- clear to ascultation anteriorly. ,on the vent: AC fio2 of 30%  Abdomen- Soft, bowel sounds positive, non tender  Musculo/Extremities-  Equal and symmetrical, no edema. No tenderness. Skin:  Warm and dry, free from rashes. Labs, Cultures reviewed    Radiology reviewed    Microbiology:   Blood cx 2/4: gr B strep  Blood cx 2/5, 2/6, 2/9 are negative  MRSA nasal screen 2/4: positive  Wound cx 2/4: MRSA  resp cx 2/6: MRSA  resp cx 2/12: gram negative cindi    Assessment  · Acute hypoxic resp failure from Covid pneumonia  · Group B Streptoccus bacteremia:   · Superimposed MRSA bacterial pneumonia  · Wound infection: MRSA  · Leucocytosis from above: imporinvg    Plan  · ? Yeast in intertriginous areas   · Maybelle Payer for PICC Placement. · No need for repeat blood cx today. · Will follow with you.    · Will stop vanco and zosyn and give po levaquin for the stenotrophomonas  Stop in seven days   Stop date feb 21  · Podiatry notes greatly appreciated   No surgical intervention  · Repeat one more set of blood cultures  · Still on track to stop on feb21    Electronically signed by Deo Nichols MD on 2/17/2022 at 10:32 AM

## 2022-02-17 NOTE — PROGRESS NOTES
Podiatry Progress Note  2/17/2022   Hola Baldwin       SUBJECTIVE: Patient seen at bedside this morning for follow up of b/l lower extremity ulcers. Currently resting in bed, nasal cannula noted. No acute overnight events. Past Medical History:   Diagnosis Date    Arthritis     Atrial fibrillation ([facility]     Hyperlipidemia     Hypertension     Non-pressure chronic ulcer of other part of right lower leg with fat layer exposed ([facility] 8/21/2020    Thyroid disease         Past Surgical History:   Procedure Laterality Date    PACEMAKER INSERTION  06/04/2019    PPM GENT CHANGE  ([facility])   DR. TONG         No family history on file. Social History     Tobacco Use    Smoking status: Never Smoker    Smokeless tobacco: Never Used   Substance Use Topics    Alcohol use: No        Prior to Admission medications    Medication Sig Start Date End Date Taking? Authorizing Provider   dilTIAZem (CARDIZEM CD) 120 MG extended release capsule Take 120 mg by mouth daily    Historical Provider, MD   docusate sodium (COLACE) 100 MG capsule Take 100 mg by mouth daily    Historical Provider, MD   Multiple Vitamins-Minerals (THERAPEUTIC MULTIVITAMIN-MINERALS) tablet Take 1 tablet by mouth daily    Historical Provider, MD   warfarin (COUMADIN) 4 MG tablet Take 1 tablet by mouth daily 1/7/22   Jamaica Ramirez MD   bumetanide (BUMEX) 1 MG tablet Take 1 tablet by mouth 2 times daily 1/7/22   Jamaica Ramirez MD   spironolactone (ALDACTONE) 25 MG tablet Take 1 tablet by mouth daily 1/8/22   Jamaica Ramirez MD   metoprolol (LOPRESSOR) 100 MG tablet Take 200 mg by mouth 2 times daily     Historical Provider, MD   simvastatin (ZOCOR) 20 MG tablet Take 20 mg by mouth nightly    Historical Provider, MD   levothyroxine (SYNTHROID) 125 MCG tablet Take 125 mcg by mouth Daily     Historical Provider, MD        Patient has no known allergies.          OBJECTIVE:        Vitals:    02/17/22 0800   BP: 127/70   Pulse: 80   Resp: 29   Temp: 97.5 °F (36.4 °C)   SpO2: 99%              EXAM:        Pt is AAOx3  Dressing change today. Physical exam findings:  Vascular Exam: Pedal pulses nonpalpable b/l. Lower extremity warm to cool from proximal tibial tuberosity to distal digits b/l. No pedal hair growth noted b/l. Cap refill brisk to all digits. No edema to b/l foot    Neuro Exam: Deferred due to patient's condition. Dermatologic Exam:   RIGHT:  Full thickness ulceration noted to left posterior heel with surrounding cellulitis and firm, necrotic wound base. No focal openings, no evidence of purulence/proximal streaking/crepitus/malodor. Chronic skin hyperpigmentation secondary to chronic venous stasis dermatitis noted  Tissue injury to medial 1st mpj head. No acute signs of infection. LEFT:  Venous ulceration noted to left anterior leg and wrapping circumferentially to posterior leg. Chronic hyperpigmentation secondary to venous stasis dermatitis noted to skin. Skin is chafed and raw in appearance; healthy wound bed noted with pinpoint bleeding      MSK: ROM testing deferred. Soft, compressible posterior muscle compartments b/l. Evidence of b/l hallux valgus deformity.                                  Current Facility-Administered Medications   Medication Dose Route Frequency Provider Last Rate Last Admin    warfarin (COUMADIN) tablet 3 mg  3 mg Oral Once Sabrina Halsted, APRN - CNP        levoFLOXacin (LEVAQUIN) 500 MG/100ML infusion 500 mg  500 mg IntraVENous Q24H HIMANSHU Henson - CNP   Stopped at 02/16/22 1352    Or    levoFLOXacin (LEVAQUIN) tablet 500 mg  500 mg Oral Q24H HIMANSHU Henson CNP        nystatin (MYCOSTATIN) cream   Topical BID HIMANSHU Henson CNP   Given at 02/17/22 0759    bisacodyl (DULCOLAX) suppository 10 mg  10 mg Rectal Once Sabrina Halsted, HIMANSHU - CNP        docusate sodium (COLACE) 150 MG/15ML liquid 100 mg  100 mg Oral BID Sabrina Halsted, APRN - CNP   100 mg at 02/17/22 0759    sennosides (SENOKOT) 8.8 MG/5ML syrup 5 mL  5 mL Oral Nightly Fariha , APRN - CNP   5 mL at 02/16/22 2116    lidocaine PF 1 % injection 5 mL  5 mL IntraDERmal Once Fariha Gomes, APRN - CNP        sodium chloride flush 0.9 % injection 5-40 mL  5-40 mL IntraVENous 2 times per day Fariha , APRN - CNP   10 mL at 02/17/22 0758    sodium chloride flush 0.9 % injection 5-40 mL  5-40 mL IntraVENous PRN Farihamerna Gomes, APRN - CNP   10 mL at 02/11/22 1236    0.9 % sodium chloride infusion  25 mL IntraVENous PRN Fariha Gallardoo, APRN - CNP        heparin flush 100 UNIT/ML injection 300 Units  3 mL IntraVENous 2 times per day Farihamerna Gomes, APRN - CNP   300 Units at 02/17/22 0758    heparin flush 100 UNIT/ML injection 300 Units  3 mL IntraCATHeter PRN Fariha , APRN - CNP        ipratropium-albuterol (DUONEB) nebulizer solution 1 ampule  1 ampule Inhalation Q4H PRN Fariha  APRN - CNP   1 ampule at 02/16/22 6948    warfarin placeholder: dosing by pharmacy   Other [address], APRN - CNP        metoprolol tartrate (LOPRESSOR) tablet 50 mg  50 mg Oral BID Fariha Gomes, APRN - CNP   50 mg at 02/17/22 0804    levETIRAcetam (KEPPRA) 750 mg/50 mL IVPB  750 mg IntraVENous Q12H HIMANHSU Robles NP   Stopped at 02/17/22 0428    chlorhexidine (PERIDEX) 0.12 % solution 15 mL  15 mL Mouth/Throat BID Fariha Gomes, APRN - CNP   15 mL at 02/17/22 0759    insulin lispro (HUMALOG) injection vial 0-12 Units  0-12 Units SubCUTAneous Q4H Farihamerna Gomes, APRN - CNP   2 Units at 02/15/22 1759    midazolam PF (VERSED) injection 2 mg  2 mg IntraVENous Q4H PRN Torey Mon APRLEILANI - CNP   2 mg at 02/11/22 1236    LORazepam (ATIVAN) injection 1 mg  1 mg IntraVENous Q5 Min PRN Viki Echevarria APRN - CNP   1 mg at 02/07/22 0331    perflutren lipid microspheres (DEFINITY) injection 1.65 mg  1.5 mL IntraVENous ONCE PRN Brian Dickey MD        acetaminophen (TYLENOL) tablet 650 mg  650 mg Oral Q6H PRN Gee Calhoun MD   650 mg at 02/08/22 2207    Or    acetaminophen (TYLENOL) suppository 650 mg  650 mg Rectal Q6H PRN Leonel Rodríguez MD        pantoprazole (PROTONIX) injection 40 mg  40 mg IntraVENous Daily Hope Ree, APRN - CNP   40 mg at 02/17/22 0759    And    sodium chloride (PF) 0.9 % injection 10 mL  10 mL IntraVENous Daily Destiny Aaron, APRN - CNP   10 mL at 02/17/22 0759    glucose (GLUTOSE) 40 % oral gel 15 g  15 g Oral PRN Destiny Aaron, APRN - CNP        dextrose 50 % IV solution  12.5 g IntraVENous PRN Hope Ree, APRN - CNP        glucagon (rDNA) injection 1 mg  1 mg IntraMUSCular PRN Hope Ree, APRN - CNP        dextrose 5 % solution  100 mL/hr IntraVENous PRN Hope Ree, APRN - CNP            Lab Results   Component Value Date    WBC 8.3 02/17/2022    HCT 33.3 (L) 02/17/2022    HGB 10.4 (L) 02/17/2022     02/17/2022     02/17/2022    K 3.6 02/17/2022    K 3.6 02/17/2022     (H) 02/17/2022    CO2 24 02/17/2022    BUN 38 (H) 02/17/2022    CREATININE 0.8 02/17/2022    GLUCOSE 107 (H) 02/17/2022    CRP 2.0 (H) 02/10/2022         Radiographs:    ASSESEMENT:  -Full thickness ulceration, left heel  -Venous stasis ulceration, right leg  -Chronic venous stasis dermatitis, b/l leg   -Lower extremity edema, b/l  -PAD  -Covid-19 infection      PLAN:  - Examined and evaluated  - All labs, imaging, and charts reviewed  -Venous US: No DVT  -Vascular studies:pending  -Foot and tib/fib xrays: No gross osseous erosions are seen. Advanced degenerative changes noted. Soft tissue swelling of the foot present. -ID: levaquin ongoing  -Will proceed with conservative wound care for now. No immediate need for surgical intervention  -QoD dressing changes  -Dressings to b/l lower extremity changed today. Next dressing change 2/19. To consist of:  Xeroform DSD.   -Prevalon offloading boots while in bed  -Palliative care on board  -Will follow along    Discussed with:  Edin Stinson, [address] [facility]  Fellowship-Trained Foot and Ankle Surgeon  Diplomate, American Board of Foot and Ankle Surgeons  [phone]       Thank you for involving podiatry in this patient's care. Please do not hesitate to call with any questions or concerns.       Alondra Huerta DPM   2/17/2022   9:56 AM

## 2022-02-17 NOTE — PROGRESS NOTES
Admit subjective:    Extubated 2/15   No plans to escalate care if patient were to go back into respiratory distress  DNI  Does nto respond to any questions , doesn't open eyes     Objective:    BP (!) 105/49   Pulse 100   Temp 98.2 °F (36.8 °C) (Bladder)   Resp 25   Ht 5' (1.524 m)   Wt 226 lb 3.2 oz (102.6 kg)   SpO2 97%   BMI 44.18 kg/m²     In: 1397.4 [I.V.:1013]  Out: 2400   In: 1397.4   Out: 2400 [Urine:2400]    General appearance: extubated, resting comfortably  HEENT: AT/NC, MMM  Neck: FROM, supple  Lungs: Clear to auscultation. Mechanical sounds. CV: RRR, no MRGs  Vasc: Radial pulses 2+  Abdomen: Soft, non-tender; no masses or HSM  Extremities: No peripheral edema or digital cyanosis  Skin: no rash, lesions or ulcers       Recent Labs     02/14/22  0430 02/15/22  0530 02/16/22  0454   WBC 12.4* 11.1 10.4   HGB 10.9* 10.7* 10.8*   HCT 34.4 33.7* 34.2    171 161       Recent Labs     02/14/22  0430 02/14/22  0430 02/15/22  0530 02/16/22  0454     --  146 143   K 3.9  3.9   < > 3.8  3.8 3.8  3.8   *  --  112* 108*   CO2 19*  --  23 23   BUN 58*  --  42* 38*   CREATININE 0.8  --  0.7 0.7   CALCIUM 8.5*  --  8.4* 8.9    < > = values in this interval not displayed. Assessment:    Active Problems:    Acute respiratory failure due to COVID-19 [facility])    Acute respiratory failure (HCC)    Cerebrovascular accident (CVA) due to embolism of cerebral artery ([address]  Resolved Problems:    * No resolved hospital problems.  *      Plan:    Admit to MICU for neurology evaluation secondary to ams requiring intubating in pt with covid pna  Unable to get mri d/t pacer in place patient  -Extubated 2/15/2022no plans for reintubation or escalation of care  -moitor rate and rhythm, echocardiogram with ejection fraction 50% no evidence of vegetations on transthoracic echo  -EEG  empiric keppra bid-Keppra increased to 750 IV twice daily by neurology  patient remains on vancomycin and Zosyn  -Palliative care service consult  -Tube feeds initiated for nutrition    Critical care team following, infectious disease following      Discussed with pall care today   Family still wishes to continue current care   ongoing discussion     DVT Prophylaxis   PT/OT  Discharge planning     Ova MD Aden  8:21 PM  2/16/2022

## 2022-02-18 NOTE — CARE COORDINATION
2/18: Update CM Note: Pt came in from [facility] ER with being unresponsive/Covid +2/4. Pt was intubated & transferred to MICU for neuro evaluation. Pt was extubated on 2/15 & is on 2L/Nc, SPo2 99%. BLAKE spoke with lauren Thornton [phone]  2//8. He would prefer his Aunt to go to another facility for rehab. Cm explained that it will depend on her 02 needs & mobility to where she can go. At this point the pt is Covid + & will need to go to a Covid SNF. He preferred [address]. Cm called left [facility].  [facility]. Pt is a DNRCCA/DNI, Iv Levaquin& Iv Keppra. 2 D Echo done & EEG awake & sleep moderate non-specific cerebral dysfunction of the left frontal temporal region, moderate to severe encephalopathy. Corepak placed 2/16. [facility] stated they can accept a Corepak.  Pt opens eye but doesn't follow commands. Will need PT/OT/Speech eval once medically stable. Blake spoke with Mayank Browne 2/17. BLAKE sent a list of Hospice Companies to his phone. If nephew would like her to go to Hospice at [address] [place] [address] , the cost is $275.00 private/ $245.00 semi private nightly for room & board. Medicare covers the cost for Hospice. Lauren sent me a request to see if pt would qualify for inpatient hospice at [facility]. Blake called floor last night to request a consult to [facility], for options . Ray/BLAKE will continue to follow for dc planning.  Electronically signed by Yevgeniy Huang RN on 2/18/2022 at 7:34 AM

## 2022-02-18 NOTE — PROGRESS NOTES
HOSPICE [facility]     Liaison Information Visit Note              Patient Name: Sho Jo   Admit date:  2/6/2022   Hospital Admitting Physician:  Anabell Navarro MD   PCP:  Alexis Ríos MD  Primary Insurance: Payor: [facility] /  /  /    Emergency Contact:   Simone Marinelli [phone]  Terminal Diagnosis stroke with cognitive decline per Abby Lucas NP Palliative medicine     Current Hospital Problem List:   Patient Active Problem List   Diagnosis Code    Venous stasis dermatitis of both lower extremities I87.2    Onychomycosis B35.1    Pain of toe of right foot M79.674    Pain of toe of left foot M79.675    PVD (peripheral vascular disease) ([address] I73.9    Difficulty walking R26.2    Hyperkalemia E87.5    Hypertension I10    Atrial fibrillation (HCC) I48.91    Chronic diastolic heart failure (HCC) I50.32    Chronic right-sided heart failure (HCC) I50.812    Sepsis ([address] A41.9    Acute respiratory failure due to COVID-19 ([address] U07.1, J96.00    Acute respiratory failure ([address] J96.00    Cerebrovascular accident (CVA) due to embolism of cerebral artery ([address] I63.40     Code Status Order: Limited   Allergies:  Patient has no known allergies. Family Goal: comfort  Meeting held with Amber Talavera over the phone  The hospice benefit and philosophy were explained including that hospice is end of life care in which, per Medicare, a patient has a terminal diagnosis that life expectancy would be 6 months or less. Hospice care is a service that is covered by most insurance plans. The following levels of hospice care were discussed: routine level of hospice care at [facility] or [facility]- patient/family are responsible for any room and board fees at the facility, and general in patient level of care (GIP) at [facility] for short term symptom management.   Per Medicare guidelines, a patient is considered appropriate for GIP if they have uncontrolled symptoms such as pain, agitation, labored breathing or nausea/vomiting that are not being managed with current medication regimen. Once symptoms become managed, the patient would need to be moved to a lower level of care such as home with hospice, or ECF with hospice. Family informed that with the routine level of care at home or ECF,  the hospice team consists of the RN, a , and personal care team who make intermittent visits along with optional services of non-medical volunteers and Chaplains. Explained that at home in routine level of care, familles are responsible for the 24 hour care. Discussed that under hospice care patient would not receive chemotherapy, radiation, immune therapy, IV antibiotics, dialysis or blood transfusions. Explained that once in hospice care, all aggressive treatments would be stopped and allow nature to takes its course with focus on comfort care for the patient. Silvia Herrmann believes that patient had qualified for Medicaid in the past and if not he does believe that she would. Silvia Herrmann is interested in [facility] with hospice. I will update CM/SW. I will continue to follow and assist with discharge    Discharge Plan:  Discharge Disposition; ecf with hospice  [facility] plan:  1. I will continue to follow. 2. Please call [facility] [phone] with any questions. 3. Patient not currently under the care of hospice.     Electronically signed by Rosa Maria Aviles RN on 2/18/2022 at 9:22 AM

## 2022-02-18 NOTE — PROGRESS NOTES
Podiatry Progress Note  2/18/2022   Jessie Gan       SUBJECTIVE: Patient seen at bedside this morning for follow up of b/l lower extremity ulcers. Currently resting in bed, nasal cannula noted. Patient does not respond to questions. No acute overnight events. Past Medical History:   Diagnosis Date    Arthritis     [facility]     Hyperlipidemia     Hypertension     Non-pressure chronic ulcer of other part of right lower leg with fat layer exposed ([facility] 8/21/2020    Thyroid disease         Past Surgical History:   Procedure Laterality Date    PACEMAKER INSERTION  06/04/2019    PPM GENT CHANGE  ([facility])   DR. TONG         No family history on file. Social History     Tobacco Use    Smoking status: Never Smoker    Smokeless tobacco: Never Used   Substance Use Topics    Alcohol use: No        Prior to Admission medications    Medication Sig Start Date End Date Taking? Authorizing Provider   dilTIAZem (CARDIZEM CD) 120 MG extended release capsule Take 120 mg by mouth daily    Historical Provider, MD   docusate sodium (COLACE) 100 MG capsule Take 100 mg by mouth daily    Historical Provider, MD   Multiple Vitamins-Minerals (THERAPEUTIC MULTIVITAMIN-MINERALS) tablet Take 1 tablet by mouth daily    Historical Provider, MD   warfarin (COUMADIN) 4 MG tablet Take 1 tablet by mouth daily 1/7/22   Jose Enrique Flatteryamila, MD   bumetanide (BUMEX) 1 MG tablet Take 1 tablet by mouth 2 times daily 1/7/22   Jose Enrique Parra, MD   spironolactone (ALDACTONE) 25 MG tablet Take 1 tablet by mouth daily 1/8/22   Jose Enrique Flatteryamila, MD   metoprolol (LOPRESSOR) 100 MG tablet Take 200 mg by mouth 2 times daily     Historical Provider, MD   simvastatin (ZOCOR) 20 MG tablet Take 20 mg by mouth nightly    Historical Provider, MD   levothyroxine (SYNTHROID) 125 MCG tablet Take 125 mcg by mouth Daily     Historical Provider, MD        Patient has no known allergies.          OBJECTIVE:        Vitals:    02/18/22 0230   BP: (!) 119/59   Pulse: 71   Resp:    Temp: 96.3 °F (35.7 °C)   SpO2:               EXAM:        Pt is AAOx3  Dressing cdi with no dishevelment or drainage noted    Vascular Exam: Pedal pulses nonpalpable b/l. Lower extremity warm to cool from proximal tibial tuberosity to distal digits b/l. No pedal hair growth noted b/l. Cap refill brisk to all digits. No edema to b/l foot    Neuro Exam: Deferred due to patient's condition. Dermatologic Exam:   RIGHT:  Full thickness ulceration noted to left posterior heel with surrounding cellulitis and firm, necrotic wound base. No focal openings, no evidence of purulence/proximal streaking/crepitus/malodor. Chronic skin hyperpigmentation secondary to chronic venous stasis dermatitis noted  Tissue injury to medial 1st mpj head. No acute signs of infection. LEFT:  Venous ulceration noted to left anterior leg and wrapping circumferentially to posterior leg. Chronic hyperpigmentation secondary to venous stasis dermatitis noted to skin. Skin is chafed and raw in appearance; healthy wound bed noted with pinpoint bleeding      MSK: ROM testing deferred. Soft, compressible posterior muscle compartments b/l. Evidence of b/l hallux valgus deformity.                                  Current Facility-Administered Medications   Medication Dose Route Frequency Provider Last Rate Last Admin    levETIRAcetam (KEPPRA) 100 MG/ML solution 750 mg  750 mg Oral BID HIMANSHU Shah CNP   750 mg at 02/17/22 2159    levoFLOXacin (LEVAQUIN) tablet 500 mg  500 mg Oral Daily HIMANSHU Shah CNP   500 mg at 02/17/22 1256    lansoprazole suspension SUSP 30 mg  30 mg Oral QAM AC HIMANSHU Shah CNP        nystatin (MYCOSTATIN) cream   Topical BID HIMANSHU Shah CNP   Given at 02/17/22 2154    bisacodyl (DULCOLAX) suppository 10 mg  10 mg Rectal Once HIMANSHU Henley CNP        docusate sodium (COLACE) 150 MG/15ML liquid 100 mg  100 mg Oral BID HIMANSHU Henley CNP   100 mg at 02/17/22 0759    sennosides (SENOKOT) 8.8 MG/5ML syrup 5 mL  5 mL Oral Nightly Sherron Priest APRN - CNP   5 mL at 02/16/22 2116    lidocaine PF 1 % injection 5 mL  5 mL IntraDERmal Once Reeda Cem, APRN - CNP        sodium chloride flush 0.9 % injection 5-40 mL  5-40 mL IntraVENous 2 times per day Sherron Priest APRN - CNP   10 mL at 02/17/22 2151    sodium chloride flush 0.9 % injection 5-40 mL  5-40 mL IntraVENous PRN Sherron Hassano, APRN - CNP   10 mL at 02/11/22 1236    0.9 % sodium chloride infusion  25 mL IntraVENous PRN Sherron Priest, APRN - CNP        heparin flush 100 UNIT/ML injection 300 Units  3 mL IntraVENous 2 times per day Sherron Hassano, APRN - CNP   300 Units at 02/17/22 2154    heparin flush 100 UNIT/ML injection 300 Units  3 mL IntraCATHeter PRN Sherron Priest, APRN - CNP        ipratropium-albuterol (DUONEB) nebulizer solution 1 ampule  1 ampule Inhalation Q4H PRN Sherron Priest APRN - CNP   1 ampule at 02/16/22 6676    warfarin placeholder: dosing by pharmacy   Other [address], APRN - CNP        metoprolol tartrate (LOPRESSOR) tablet 50 mg  50 mg Oral BID Sherron Priest, APRN - CNP   50 mg at 02/17/22 2151    insulin lispro (HUMALOG) injection vial 0-12 Units  0-12 Units SubCUTAneous Q4H Sherron Priest APRN - CNP   2 Units at 02/15/22 1759    midazolam PF (VERSED) injection 2 mg  2 mg IntraVENous Q4H PRN Keily Villagran APRN - CNP   2 mg at 02/11/22 1236    LORazepam (ATIVAN) injection 1 mg  1 mg IntraVENous Q5 Min PRN Barbara Obando, APRN - CNP   1 mg at 02/07/22 0331    perflutren lipid microspheres (DEFINITY) injection 1.65 mg  1.5 mL IntraVENous ONCE PRN Kevin Rivas MD        acetaminophen (TYLENOL) tablet 650 mg  650 mg Oral Q6H PRN Nora Richards MD   650 mg at 02/08/22 2207    Or    acetaminophen (TYLENOL) suppository 650 mg  650 mg Rectal Q6H PRN Nora Richards MD        glucose (GLUTOSE) 40 % oral gel 15 g  15 g Oral PRN Barbara Obando, APRN - CNP  dextrose 50 % IV solution  12.5 g IntraVENous PRN [place]ounds, APRN - CNP        glucagon (rDNA) injection 1 mg  1 mg IntraMUSCular PRN Gabino Pounds, APRN - CNP        dextrose 5 % solution  100 mL/hr IntraVENous PRN Gabino Pounds, APRN - CNP            Lab Results   Component Value Date    WBC 8.3 02/17/2022    HCT 33.3 (L) 02/17/2022    HGB 10.4 (L) 02/17/2022     02/17/2022     02/17/2022    K 3.6 02/17/2022    K 3.6 02/17/2022     (H) 02/17/2022    CO2 24 02/17/2022    BUN 38 (H) 02/17/2022    CREATININE 0.8 02/17/2022    GLUCOSE 107 (H) 02/17/2022    CRP 2.0 (H) 02/10/2022         Radiographs:    ASSESEMENT:  -Full thickness ulceration, left heel  -Venous stasis ulceration, right leg  -Chronic venous stasis dermatitis, b/l leg   -Lower extremity edema, b/l  -PAD  -Covid-19 infection      PLAN:  - Examined and evaluated  - All labs, imaging, and charts reviewed  -Venous US: No DVT  -Vascular studies:pending  -Foot and tib/fib xrays: No gross osseous erosions are seen. Advanced degenerative changes noted. Soft tissue swelling of the foot present. -ID: levaquin ongoing  -Will proceed with conservative wound care for now. No immediate need for surgical intervention  -QoD dressing changes  -Dressings to b/l lower extremity cdi today. Next dressing change 2/19. To consist of:  Xeroform DSD. -Prevalon offloading boots while in bed  -Palliative care on board  -[address] [place] [address] on board  -Will follow along    Discussed with:  CRISTOFER Kowalski  Fellowship-Trained Foot and Ankle Surgeon  Diplomate, American Board of Foot and Ankle Surgeons  [phone]       Thank you for involving podiatry in this patient's care. Please do not hesitate to call with any questions or concerns.       Brady Conklin DPM   2/18/2022   10:04 AM

## 2022-02-18 NOTE — PROGRESS NOTES
Infectious Disease  Progress Note  [facility]    C/C: covid pneumonia, gr B bacteremia/skin abscess    Subjective: she is afebrile over night. A line was removed. Off pressors. On spontaenous over the vent.      Scheduled Meds:   levETIRAcetam  750 mg Oral BID    levoFLOXacin  500 mg Oral Daily    lansoprazole  30 mg Oral QAM AC    nystatin   Topical BID    bisacodyl  10 mg Rectal Once    docusate sodium  100 mg Oral BID    sennosides  5 mL Oral Nightly    lidocaine PF  5 mL IntraDERmal Once    sodium chloride flush  5-40 mL IntraVENous 2 times per day    heparin flush  3 mL IntraVENous 2 times per day    warfarin placeholder: dosing by pharmacy   Other RX Placeholder    metoprolol tartrate  50 mg Oral BID    insulin lispro  0-12 Units SubCUTAneous Q4H     Continuous Infusions:   sodium chloride      dextrose       PRN Meds:sodium chloride flush, sodium chloride, heparin flush, ipratropium-albuterol, midazolam, LORazepam, perflutren lipid microspheres, acetaminophen **OR** acetaminophen, glucose, dextrose, glucagon (rDNA), dextrose    Patient Vitals for the past 24 hrs:   BP Temp Temp src Pulse Resp SpO2   02/18/22 1000 116/69 97.2 °F (36.2 °C) Axillary  30    02/18/22 0230 (!) 119/59 96.3 °F (35.7 °C) Temporal 71     02/17/22 2300 128/65   71 (!) 37 99 %   02/17/22 2200 (!) 130/54   94 26 99 %   02/17/22 2151 121/68   92 19    02/17/22 2100 113/71   82 14 100 %   02/17/22 2000 125/79 96.1 °F (35.6 °C) Temporal 90 18 99 %   02/17/22 1900 (!) 126/59   86 20 99 %   02/17/22 1800 129/62   83 17 99 %   02/17/22 1700 (!) 116/59   84 26 100 %   02/17/22 1600 129/60 97 °F (36.1 °C) Temporal 90 (!) 34 99 %   02/17/22 1500 132/60   85 20 100 %   02/17/22 1400 134/66   87 (!) 35 99 %   02/17/22 1300 (!) 129/56   97 21 98 %   02/17/22 1200 128/81 97.7 °F (36.5 °C) Temporal 104 (!) 31 98 %   02/17/22 1100 125/61   94 19 99 %       CBC with Differential:    Lab Results   Component Value Date WBC 8.3 02/17/2022    RBC 3.25 02/17/2022    HGB 10.4 02/17/2022    HCT 33.3 02/17/2022     02/17/2022    .5 02/17/2022    MCH 32.0 02/17/2022    MCHC 31.2 02/17/2022    RDW 18.1 02/17/2022    NRBC 1.7 02/09/2022    METASPCT 1.7 02/12/2022    LYMPHOPCT 10.6 02/17/2022    PROMYELOPCT 0.9 02/06/2022    MONOPCT 15.0 02/17/2022    MYELOPCT 0.9 02/12/2022    BASOPCT 0.1 02/17/2022    MONOSABS 1.25 02/17/2022    LYMPHSABS 0.88 02/17/2022    EOSABS 0.09 02/17/2022    BASOSABS 0.01 02/17/2022     CMP:    Lab Results   Component Value Date     02/17/2022    K 3.6 02/17/2022    K 3.6 02/17/2022     02/17/2022    CO2 24 02/17/2022    BUN 38 02/17/2022    CREATININE 0.8 02/17/2022    GFRAA >60 02/17/2022    LABGLOM >60 02/17/2022    GLUCOSE 107 02/17/2022    PROT 5.5 02/17/2022    LABALBU 3.5 02/17/2022    CALCIUM 9.1 02/17/2022    BILITOT 2.0 02/17/2022    ALKPHOS 113 02/17/2022    AST 56 02/17/2022    ALT 53 02/17/2022       /69   Pulse 71   Temp 97.2 °F (36.2 °C) (Axillary)   Resp 30   Ht 5' (1.524 m)   Wt 226 lb 3.2 oz (102.6 kg)   SpO2 99%   BMI 44.18 kg/m²     Physical Exam  Const/Neuro- unchanged, no signs of acute distress,   ENMT- Within Normal Limits, Normocephalic,intubated  Neck: Neck supple  Heart- Regular, Rate, Rhythm- no murmur appreciated. Lungs- clear to ascultation anteriorly. ,on the vent: AC fio2 of 30%  Abdomen- Soft, bowel sounds positive, non tender  Musculo/Extremities-  Equal and symmetrical, no edema. No tenderness. Skin:  Warm and dry, free from rashes.       Labs, Cultures reviewed    Radiology reviewed    Microbiology:   Blood cx 2/4: gr B strep  Blood cx 2/5, 2/6, 2/9 are negative  MRSA nasal screen 2/4: positive  Wound cx 2/4: MRSA  resp cx 2/6: MRSA  resp cx 2/12: gram negative cindi    Assessment  · Acute hypoxic resp failure from Covid pneumonia  · Group B Streptoccus bacteremia:   · Superimposed MRSA bacterial pneumonia  · Wound infection: MRSA  · Leucocytosis from above: imporinvg    Plan  · ? Yeast in intertriginous areas   · Shayna Bucio for PICC Placement. · No need for repeat blood cx today. · Will follow with you.    · Will stop vanco and zosyn and give po levaquin for the stenotrophomonas  Stop in seven days   Stop date feb 21  · Podiatry notes greatly appreciated   No surgical intervention  · Repeat one more set of blood cultures  · Still on track to stop on feb21  · After I stop iv antibiotics   Will give po suppressive until seen as outpt with ID    Electronically signed by Nehemias Payne MD on 2/18/2022 at 10:29 AM

## 2022-02-18 NOTE — PROGRESS NOTES
Physical Therapy    PT evaluation order received and chart review completed. Per RN, patient is not following commands at this time. Per chart, patient is to DC with hospice. Will DC PT evaluation order at this time. Please reconsult if status changes. Thank you.      Lashae Cruz PT, DPT  [id number]

## 2022-02-18 NOTE — CARE COORDINATION
Per Claudia from [facility], [facility], nephew Nate Lantigua believes that patient had qualified for Medicaid in the past and if not he does believe that she would. Nate Lantigua is interested in [facility] with Hospice. I called and spoke to Jacy at [facility]. She said that she is unaware of patient having Medicaid. She said that if patient goes to the facility under Hospice she would not be able to go with a Pending Medicaid. Maria Esther said she also would not be able to go there skilled due to patient does not have a skillable need and he would be sent right back to the hospital. I informed the patient's nephew Nate Lantigua of all of the above and he swears that patient has Medicaid and he will look for the paperwork. He will follow up with [facility]. Sw starting Hens and will need to be completed once the discharge order goes in. Ambulance form in envelope in soft chart. Landry Weiss RN, CM        Received call back from Jacy at [facility]. If patient discharges there with her Envue tube and a documented 30 day plan for the tube, she would be able to discharge there skilled and they could have [address] [facility] care follow her there. Message left for Dr. Leopoldo Villanueva via [employer]. Nephheather Zen updated as well. Await response from Dr. Leopoldo Villanueva. Beverley started and will need to be completed once the discharge order goes in. Ambulance form in envelope in soft chart. No Covid-19 test needed since patient is Covid positive.   Landry Weiss RN, CM

## 2022-02-18 NOTE — PROGRESS NOTES
Pharmacy Consultation Note  (Warfarin Dosing and Monitoring)    Initial consult date: 2/9/22  Consulting Provider: Olegario Fineny Xochilt is a 81 y.o. female for whom pharmacy has been asked to manage warfarin therapy. Weight:   Wt Readings from Last 1 Encounters:   02/15/22 226 lb 3.2 oz (102.6 kg)       TSH:    Lab Results   Component Value Date    TSH 1.790 02/04/2022       Hepatic Function Panel:                            Lab Results   Component Value Date    ALKPHOS 113 02/17/2022    ALT 53 02/17/2022    AST 56 02/17/2022    PROT 5.5 02/17/2022    BILITOT 2.0 02/17/2022    LABALBU 3.5 02/17/2022       Current warfarin drug-drug interactions include: quinolones (incr INR) and steroids (variable/dose dependent)    Recent Labs     02/16/22  0454 02/17/22  0400 02/18/22  1347   HGB 10.8* 10.4* 11.5    135 128*     Date Warfarin Dose INR Heparin or LMWH Comment   2/9 2 mg 2.6 -----    2/10 2 mg 2.5 ------    2/11 2 mg 2.2 ------    2/12 2 mg 2.6 ------    2/13 2 mg 2.2 -------    2/14 4 mg 2 -------    2/15 2 mg 1.8 --------    2/16 3 mg 1.7 --    2/17 3 mg 1.9 --    2/18 3 mg 2.1       Assessment:  · Patient is a 81 y.o. female on warfarin for Atrial Fibrillation. Patient's home warfarin dosing regimen is 4mg daily. · Goal INR 2 - 3   · Levofloxacin 500mg started on 2/15 takes about 2 days to see  Drug-drug ix.  (levofloxiacin increase anticoagulant effect of warfarin)  · 2/16: INR 1.7   · 2/17: INR 1.9, H&H and platelets are stable  · 2/18: INR 2.1    Plan:  · Warfarin 3 mg tonight  · Daily PT/INR until the INR is stable within the therapeutic range  · Pharmacist will follow and monitor/adjust dosing as necessary    Thank you for this consult,    Alfred Painting, PharmD 2/18/2022 2:36 PM

## 2022-02-18 NOTE — PROGRESS NOTES
Admit subjective:    Extubated 2/15   No plans to escalate care if patient were to go back into respiratory distress  DNI  Does not  respond to any questions , doesn't open eyes   Unchanged status    Objective:    /62   Pulse 83   Temp 97 °F (36.1 °C) (Temporal)   Resp 17   Ht 5' (1.524 m)   Wt 226 lb 3.2 oz (102.6 kg)   SpO2 99%   BMI 44.18 kg/m²     In: 949.7 [I.V.:812.7; NG/GT:137]  Out: 1065   In: 949.7   Out: 1065 [Urine:1065]    General appearance: extubated, resting comfortably  HEENT: AT/NC, MMM  Neck: FROM, supple  Lungs: Clear to auscultation. Mechanical sounds. CV: RRR, no MRGs  Vasc: Radial pulses 2+  Abdomen: Soft, non-tender; no masses or HSM  Extremities: No peripheral edema or digital cyanosis  Skin: no rash, lesions or ulcers       Recent Labs     02/15/22  0530 02/16/22  0454 02/17/22  0400   WBC 11.1 10.4 8.3   HGB 10.7* 10.8* 10.4*   HCT 33.7* 34.2 33.3*    161 135       Recent Labs     02/15/22  0530 02/15/22  0530 02/16/22  0454 02/17/22  0400     --  143 143   K 3.8  3.8   < > 3.8  3.8 3.6  3.6   *  --  108* 108*   CO2 23  --  23 24   BUN 42*  --  38* 38*   CREATININE 0.7  --  0.7 0.8   CALCIUM 8.4*  --  8.9 9.1    < > = values in this interval not displayed. Assessment:    Active Problems:    Acute respiratory failure due to COVID-19 [facility])    Acute respiratory failure (HCC)    Cerebrovascular accident (CVA) due to embolism of cerebral artery ([facility] [address]  Resolved Problems:    * No resolved hospital problems.  *      Plan:    Admit to MICU for neurology evaluation secondary to ams requiring intubating in pt with covid pna  Unable to get mri d/t pacer in place patient  -Extubated 2/15/2022no plans for reintubation or escalation of care  -moitor rate and rhythm, echocardiogram with ejection fraction 50% no evidence of vegetations on transthoracic echo  -EEG  empiric keppra bid-Keppra increased to 750 IV twice daily by neurology  patient remains on vancomycin and Zosyn  -Palliative care service consult  -Tube feeds initiated for nutrition    Critical care team following, infectious disease following      Discussed with pall care today   Family still wishes to continue current care   Hospice choice noted, consideration may be given to hospice house    DVT Prophylaxis   PT/OT  Discharge planning     Zhou Xavier MD  7:34 PM  2/17/2022

## 2022-02-18 NOTE — PROGRESS NOTES
Occupational Therapy  Date:2022  Patient Name: Leni Cavanaugh  MRN: [id number]  : 1935  Room: [address] [place] [address]     OT order received and appreciated. Chart reviewed. Per nursing, pt not following commands and will transition to ECF with hospice. Skilled OT evaluation not indicated. Will d/c orders. Please reconsult if change in status  .      Chacho Odom OTR/L #[id number]

## 2022-02-18 NOTE — PROGRESS NOTES
care service consult  -Tube feeds initiated for nutrition  -Pallative care following as well as hospice       DVT Prophylaxis   PT/OT  Discharge planning awaiting final placement needs    HIMANSHU Keller CNP  3:37 PM  2/18/2022     Above note edited to reflect my thoughts     Patient okay to continue nutrition by Envue tube  Patient will need ongoing nutrition with the Envue NG  tube for at least 30 days  Okay for discharge from medicine standpoint  Case discussed with case management  Okay for discharge to subacute rehab under palliative care    I personally saw, examined and provided care for the patient. Radiographs, labs and medication list were reviewed by me independently. The case was discussed in detail and plans for care were established. Review of AMAURI Keller   , documentation was conducted and revisions were made as appropriate directly by me. I agree with the above documented exam, problem list, and plan of care.      Dipti Stacy MD  7:39 PM  2/18/2022

## 2022-02-18 NOTE — PLAN OF CARE
Problem: Airway Clearance - Ineffective  Goal: Achieve or maintain patent airway  Outcome: Met This Shift     Problem: Gas Exchange - Impaired  Goal: Absence of hypoxia  Outcome: Met This Shift  Goal: Promote optimal lung function  Outcome: Met This Shift     Problem: Breathing Pattern - Ineffective  Goal: Ability to achieve and maintain a regular respiratory rate  Outcome: Met This Shift     Problem:  Body Temperature -  Risk of, Imbalanced  Goal: Ability to maintain a body temperature within defined limits  Outcome: Met This Shift  Goal: Will regain or maintain usual level of consciousness  Outcome: Met This Shift  Goal: Complications related to the disease process, condition or treatment will be avoided or minimized  Outcome: Met This Shift     Problem: Isolation Precautions - Risk of Spread of Infection  Goal: Prevent transmission of infection  Outcome: Met This Shift     Problem: Nutrition Deficits  Goal: Optimize nutritional status  Outcome: Met This Shift     Problem: Risk for Fluid Volume Deficit  Goal: Maintain normal heart rhythm  Outcome: Met This Shift  Goal: Maintain absence of muscle cramping  Outcome: Met This Shift  Goal: Maintain normal serum potassium, sodium, calcium, phosphorus, and pH  Outcome: Met This Shift     Problem: Loneliness or Risk for Loneliness  Goal: Demonstrate positive use of time alone when socialization is not possible  Outcome: Met This Shift     Problem: Fatigue  Goal: Verbalize increase energy and improved vitality  Outcome: Met This Shift     Problem: Patient Education: Go to Patient Education Activity  Goal: Patient/Family Education  Outcome: Met This Shift     Problem: Falls - Risk of:  Goal: Will remain free from falls  Description: Will remain free from falls  Outcome: Met This Shift  Goal: Absence of physical injury  Description: Absence of physical injury  Outcome: Met This Shift     Problem: Skin Integrity:  Goal: Will show no infection signs and symptoms  Description: Will show no infection signs and symptoms  Outcome: Met This Shift  Goal: Absence of new skin breakdown  Description: Absence of new skin breakdown  Outcome: Met This Shift     Problem: Gas Exchange - Impaired:  Goal: Levels of oxygenation will improve  Description: Levels of oxygenation will improve  Outcome: Met This Shift     Problem: Infection, Septic Shock:  Goal: Will show no infection signs and symptoms  Description: Will show no infection signs and symptoms  Outcome: Met This Shift     Problem: Infection - Ventilator-Associated Pneumonia:  Goal: Absence of pulmonary infection  Description: Absence of pulmonary infection  Outcome: Met This Shift

## 2022-02-18 NOTE — PROGRESS NOTES
Visit to patients room- she is resting comfortably with no s/s of distress and currently has not been receiving any comfort medications. I will reach out to Orion Velarde to provide hospice information.

## 2022-02-19 NOTE — DISCHARGE INSTR - COC
Continuity of Care Form    Patient Name: Sharmin Lemon   :  1935  MRN:  [id number]    Admit date:  2022  Discharge date:  22    Code Status Order: Limited   Advance Directives:      Admitting Physician:  Angelina Mendez MD  PCP: Mary Lee MD    Discharging Nurse: Negin Brand [facility] Unit/Room#: 7630/0493-M  Discharging Unit Phone Number: [phone]    Emergency Contact:   Extended Emergency Contact Information  Primary Emergency Contact:  [facility]  Mobile Phone: [phone]  Relation: Niece/Nephew   needed? No    Past Surgical History:  Past Surgical History:   Procedure Laterality Date    PACEMAKER INSERTION  2019    PPM GENT CHANGE  ([facility])   [address]       Immunization History:   Immunization History   Administered Date(s) Administered    COVID-19, [employer] top, DILUTE for use, 12+ yrs, 30mcg/0.3mL dose 2021, 2021       Active Problems:  Patient Active Problem List   Diagnosis Code    Venous stasis dermatitis of both lower extremities I87.2    Onychomycosis B35.1    Pain of toe of right foot M79.674    Pain of toe of left foot M79.675    PVD (peripheral vascular disease) ([facility]) I73.9    Difficulty walking R26.2    Hyperkalemia E87.5    Hypertension I10    Atrial fibrillation (HCC) I48.91    Chronic diastolic heart failure (HCC) I50.32    Chronic right-sided heart failure (HCC) I50.812    Sepsis ([facility]) A41.9    Acute respiratory failure due to COVID-19 ([facility] [address] U07.1, J96.00    Acute respiratory failure (HCC) J96.00    Cerebrovascular accident (CVA) due to embolism of cerebral artery ([facility] [address] I63.40       Isolation/Infection:   Isolation            Droplet Plus  Droplet Plus  Droplet Plus          Patient Infection Status       Infection Onset Added Last Indicated Last Indicated By Review Planned Expiration Resolved Resolved By    MRSA 22 Culture, Wound        Endotracheal, 2022  MRSA nares 2022, 2022  MRSA wound right leg 2/4/2022, 2/6/2022    COVID-19 02/04/22 02/04/22 02/11/22 Respiratory Panel, Molecular, with COVID-19 (Restricted: peds pts or suitable admitted adults) 02/18/22 02/25/22      Resolved    COVID-19 (Rule Out) 02/11/22 02/11/22 02/11/22 Respiratory Panel, Molecular, with COVID-19 (Restricted: peds pts or suitable admitted adults) (Ordered)   02/11/22 Rule-Out Test Resulted    COVID-19 (Rule Out) 02/04/22 02/04/22 02/04/22 Respiratory Panel, Molecular, with COVID-19 (Restricted: peds pts or suitable admitted adults) (Ordered)   02/04/22 Rule-Out Test Resulted    COVID-19 (Rule Out) 01/01/22 01/01/22 01/01/22 COVID-19, Rapid (Ordered)   01/01/22 Rule-Out Test Resulted            Nurse Assessment:  Last Vital Signs: BP (!) 135/96   Pulse 91   Temp 97.5 °F (36.4 °C) (Axillary)   Resp 19   Ht 5' (1.524 m)   Wt 226 lb 3.2 oz (102.6 kg)   SpO2 98%   BMI 44.18 kg/m²     Last documented pain score (0-10 scale): Pain Level: 4  Last Weight:   Wt Readings from Last 1 Encounters:   02/15/22 226 lb 3.2 oz (102.6 kg)     Mental Status:   not responsive    IV Access:  none    Nursing Mobility/ADLs:  Walking   Dependent  Transfer  Dependent  Bathing  Dependent  Dressing  Dependent  Toileting  Dependent  Feeding  Dependent  Med Admin  Dependent  Med Delivery   crushed and Envue tube     Wound Care Documentation and Therapy:  Wound 02/04/22 Heel Left (Active)   Wound Image   02/09/22 1105   Wound Etiology Pressure Unstageable 02/18/22 2221   Dressing Status Clean;Dry; Intact 02/18/22 2221   Dressing/Treatment Other (comment) 02/18/22 2221   Wound Length (cm) 3.4 cm 02/09/22 1105   Wound Width (cm) 3 cm 02/09/22 1105   Wound Depth (cm) 0 cm 02/07/22 2015   Wound Surface Area (cm^2) 10.2 cm^2 02/09/22 1105   Change in Wound Size % (l*w) 15 02/09/22 1105   Wound Volume (cm^3) 0 cm^3 02/07/22 2015   Wound Assessment Dry 02/18/22 2221   Drainage Amount None 02/18/22 2221   Number of days: 14       Wound 02/04/22 Leg Right; Lower; Lateral (Active)   Wound Image   02/09/22 1105   Wound Etiology Venous 02/18/22 2221   Dressing Status Clean;Dry; Intact 02/18/22 2221   Wound Cleansed Cleansed with saline;Irrigated with saline; Soap and water 02/12/22 1400   Dressing/Treatment Other (comment) 02/18/22 2221   Dressing Change Due 02/17/22 02/18/22 2221   Wound Length (cm) 16 cm 02/09/22 1105   Wound Width (cm) 5 cm 02/09/22 1105   Wound Depth (cm) 0.1 cm 02/09/22 1105   Wound Surface Area (cm^2) 80 cm^2 02/09/22 1105   Change in Wound Size % (l*w) -48.15 02/09/22 1105   Wound Volume (cm^3) 8 cm^3 02/09/22 1105   Wound Healing % -48 02/09/22 1105   Wound Assessment Bleeding;Pink/red 02/12/22 1400   Drainage Amount Small 02/18/22 2221   Drainage Description Serosanguinous 02/18/22 2221   Odor None 02/18/22 2221   Letty-wound Assessment Dry/flaky 02/18/22 2221   Wound Thickness Description not for Pressure Injury Partial thickness 02/18/22 2221   Number of days: 14       Wound 02/04/22 Foot Right;Medial (Active)   Wound Image   02/09/22 1105   Wound Etiology Deep tissue/Injury 02/18/22 2221   Dressing Status Intact;Dry 02/18/22 2221   Dressing/Treatment Other (comment) 02/18/22 2221   Wound Length (cm) 0.8 cm 02/09/22 1105   Wound Width (cm) 0.8 cm 02/09/22 1105   Wound Surface Area (cm^2) 0.64 cm^2 02/09/22 1105   Change in Wound Size % (l*w) -156 02/09/22 1105   Wound Assessment Purple/maroon 02/15/22 0600   Drainage Amount None 02/18/22 2221   Odor None 02/18/22 2221   Letty-wound Assessment Dry/flaky 02/18/22 2221   Number of days: 14        Elimination:  Continence: Bowel: No  Bladder: No  Urinary Catheter: None   Colostomy/Ileostomy/Ileal Conduit: No       Date of Last BM: ***    Intake/Output Summary (Last 24 hours) at 2/19/2022 1031  Last data filed at 2/19/2022 0645  Gross per 24 hour   Intake 1122 ml   Output 400 ml   Net 722 ml     I/O last 3 completed shifts:   In: 5210 [NG/GT:1298]  Out: 800 [Urine:800]    Safety Concerns: None    Impairments/Disabilities:      None    Nutrition Therapy:  Current Nutrition Therapy:   - Tube Feedings:  Diabetic    Routes of Feeding: Nasogastric  Liquids: No Liquids  Daily Fluid Restriction: no  Last Modified Barium Swallow with Video (Video Swallowing Test): not done    Treatments at the Time of Hospital Discharge:   Respiratory Treatments: ***  Oxygen Therapy:  2 Liters   Ventilator:    - No ventilator support    Rehab Therapies: {THERAPEUTIC INTERVENTION:9603953395}  Weight Bearing Status/Restrictions: No weight bearing restirctions  Other Medical Equipment (for information only, NOT a DME order):  {EQUIPMENT:[id number]}  Other Treatments: dressings to L heel and RLE Right lower leg: Cleanse with saline, Apply xeroform, dry dressing, kerlix. Change daily and PRN    Patient's personal belongings (please select all that are sent with patient):  None    RN SIGNATURE:  Electronically signed by Keny De Leon RN on 2/19/22 at 11:52 AM EST    CASE MANAGEMENT/SOCIAL WORK SECTION    Inpatient Status Date: ***    Readmission Risk Assessment Score:  Readmission Risk              Risk of Unplanned Readmission:  26           Discharging to Facility/ Agency   Name:   Address:  Phone:  Fax:    Dialysis Facility (if applicable)   Name:  Address:  Dialysis Schedule:  Phone:  Fax:    / signature: Electronically signed by RANDALL Gee on 2/19/2022 at 10:31 AM      PHYSICIAN SECTION    Prognosis: Fair    Condition at Discharge: Stable    Rehab Potential (if transferring to Rehab): Fair    Recommended Labs or Other Treatments After Discharge: cbc and bmp in 3 days     Physician Certification: I certify the above information and transfer of Heaven Cheng  is necessary for the continuing treatment of the diagnosis listed and that she requires [facility] for less 30 days.      Update Admission H&P: No change in H&P    PHYSICIAN SIGNATURE:  Electronically signed by Lillian Herzog MD on 2/19/22 at 2:10 PM EST2

## 2022-02-19 NOTE — CARE COORDINATION
SOCIAL WORK/CASEMANAGEMENT TRANSITION OF CARE [id number] [facility], [address], [place], [phone]): Charley Yaz the nephew and he is in agreement. Pt is to go to [facility] with  at 1 p.m. via [facility]. Hens completed.  Alta Ratliff, LSW  2/19/2022

## 2022-02-19 NOTE — PROGRESS NOTES
Infectious Disease  Progress Note  [facility]    C/C: covid pneumonia, gr B bacteremia/skin abscess    Subjective: she is afebrile over night. A line was removed. Off pressors. On spontaenous over the vent.      Scheduled Meds:   levETIRAcetam  750 mg Oral BID    levoFLOXacin  500 mg Oral Daily    lansoprazole  30 mg Oral QAM AC    nystatin   Topical BID    bisacodyl  10 mg Rectal Once    docusate sodium  100 mg Oral BID    sennosides  5 mL Oral Nightly    lidocaine PF  5 mL IntraDERmal Once    sodium chloride flush  5-40 mL IntraVENous 2 times per day    heparin flush  3 mL IntraVENous 2 times per day    warfarin placeholder: dosing by pharmacy   Other RX Placeholder    metoprolol tartrate  50 mg Oral BID    insulin lispro  0-12 Units SubCUTAneous Q4H     Continuous Infusions:   sodium chloride      dextrose       PRN Meds:sodium chloride flush, sodium chloride, heparin flush, ipratropium-albuterol, midazolam, LORazepam, perflutren lipid microspheres, acetaminophen **OR** acetaminophen, glucose, dextrose, glucagon (rDNA), dextrose    Patient Vitals for the past 24 hrs:   BP Temp Temp src Pulse Resp SpO2   02/19/22 0745 (!) 135/96 97.5 °F (36.4 °C) Axillary 91 19 98 %   02/18/22 2310 130/67 97.2 °F (36.2 °C) Temporal 81 18 97 %   02/18/22 2054 (!) 125/58   85     02/18/22 1000 116/69 97.2 °F (36.2 °C) Axillary  30        CBC with Differential:    Lab Results   Component Value Date    WBC 5.5 02/18/2022    RBC 3.63 02/18/2022    HGB 11.5 02/18/2022    HCT 36.8 02/18/2022     02/18/2022    .4 02/18/2022    MCH 31.7 02/18/2022    MCHC 31.3 02/18/2022    RDW 18.6 02/18/2022    NRBC 1.7 02/09/2022    METASPCT 1.7 02/12/2022    LYMPHOPCT 10.6 02/17/2022    PROMYELOPCT 0.9 02/06/2022    MONOPCT 15.0 02/17/2022    MYELOPCT 0.9 02/12/2022    BASOPCT 0.1 02/17/2022    MONOSABS 1.25 02/17/2022    LYMPHSABS 0.88 02/17/2022    EOSABS 0.09 02/17/2022    BASOSABS 0.01 02/17/2022     CMP:    Lab Results   Component Value Date     02/18/2022    K 3.6 02/18/2022    K 3.6 02/17/2022     02/18/2022    CO2 27 02/18/2022    BUN 32 02/18/2022    CREATININE 0.7 02/18/2022    GFRAA >60 02/18/2022    LABGLOM >60 02/18/2022    GLUCOSE 138 02/18/2022    PROT 5.5 02/17/2022    LABALBU 3.5 02/17/2022    CALCIUM 8.5 02/18/2022    BILITOT 2.0 02/17/2022    ALKPHOS 113 02/17/2022    AST 56 02/17/2022    ALT 53 02/17/2022       BP (!) 135/96   Pulse 91   Temp 97.5 °F (36.4 °C) (Axillary)   Resp 19   Ht 5' (1.524 m)   Wt 226 lb 3.2 oz (102.6 kg)   SpO2 98%   BMI 44.18 kg/m²     Physical Exam  Const/Neuro- unchanged, no signs of acute distress,   ENMT- Within Normal Limits, Normocephalic,intubated  Neck: Neck supple  Heart- Regular, Rate, Rhythm- no murmur appreciated. Lungs- clear to ascultation anteriorly. ,on the vent: AC fio2 of 30%  Abdomen- Soft, bowel sounds positive, non tender  Musculo/Extremities-  Equal and symmetrical, no edema. No tenderness. Skin:  Warm and dry, free from rashes. Labs, Cultures reviewed    Radiology reviewed    Microbiology:   Blood cx 2/4: gr B strep  Blood cx 2/5, 2/6, 2/9 are negative  MRSA nasal screen 2/4: positive  Wound cx 2/4: MRSA  resp cx 2/6: MRSA  resp cx 2/12: gram negative cindi    Assessment  · Acute hypoxic resp failure from Covid pneumonia  · Group B Streptoccus bacteremia:   · Superimposed MRSA bacterial pneumonia  · Wound infection: MRSA  · Leucocytosis from above: imporinvg    Plan  · ? Yeast in intertriginous areas   · [id number] [address] for PICC Placement. · No need for repeat blood cx today. · Will follow with you.    · Will stop vanco and zosyn and give po levaquin for the stenotrophomonas  Stop in seven days   Stop date feb 21  · Podiatry notes greatly appreciated   No surgical intervention  · Repeat one more set of blood cultures  · Still on track to stop on feb21  · After I stop iv antibiotics   Will give po suppressive until seen as outpt with ID  · No new ID suggestions    Electronically signed by Lisset Rincon MD on 2/19/2022 at 9:44 AM

## 2022-02-19 NOTE — PROGRESS NOTES
Patient is in isolation for [place]. In to assess her. She is minimal/unresponsive. Moan and grimace with hands on care. Respirations are shallow, and regular at 24/min, lung sounds are diminished on 4L n/c. Heart sounds are irregular rate in the 80's. 3+ edema to R hand, fingers are cyanotic. 2+ edema to L hand, fingers are cyanotic. Bilateral lower extremities are cyanotic, with poor pulses 1+ edema, knees are warm to touch. Abdomen is large, soft with hypoactive bowel sounds, urinary catheter in place. Patient appears peaceful. Call to patient Bernard Hayess her nephew, [phone]. He was told patient [facility] [id number]. He confirmed that she has medicaid. We reviewed hospice philosophy, and . we discussed the inability of the body to process TF and third spacing at the end of life. He will reach out when she will transition to hospice services. He was understanding of her poor condition. She does not have criteria for GIP level of care at the [facility] at this time, she has needed no comfort medications.    Thank you for the referral,  Ann King [phone]

## 2022-02-19 NOTE — PLAN OF CARE
Problem: Airway Clearance - Ineffective  Goal: Achieve or maintain patent airway  2/18/2022 2249 by Delmar Avila RN  Outcome: Met This Shift  2/18/2022 1458 by Jodi Bonilla RN  Outcome: Met This Shift     Problem: Gas Exchange - Impaired  Goal: Absence of hypoxia  2/18/2022 2249 by Delmar Avila RN  Outcome: Met This Shift  2/18/2022 1458 by Jodi Bonilla RN  Outcome: Met This Shift  Goal: Promote optimal lung function  2/18/2022 2249 by Delmar Avila RN  Outcome: Met This Shift  2/18/2022 1458 by Jodi Bonilla RN  Outcome: Met This Shift     Problem: Breathing Pattern - Ineffective  Goal: Ability to achieve and maintain a regular respiratory rate  2/18/2022 2249 by Delmar Avila RN  Outcome: Met This Shift  2/18/2022 1458 by Jodi Bonilla RN  Outcome: Met This Shift     Problem:  Body Temperature -  Risk of, Imbalanced  Goal: Ability to maintain a body temperature within defined limits  2/18/2022 2249 by Delmar Avila RN  Outcome: Met This Shift  2/18/2022 1458 by Jodi Bonilla RN  Outcome: Met This Shift  Goal: Will regain or maintain usual level of consciousness  2/18/2022 2249 by Delmar Avila RN  Outcome: Met This Shift  2/18/2022 1458 by Jodi Bonilla RN  Outcome: Met This Shift  Goal: Complications related to the disease process, condition or treatment will be avoided or minimized  2/18/2022 2249 by Delmar Avila RN  Outcome: Met This Shift  2/18/2022 1458 by Jodi Bonilla RN  Outcome: Met This Shift     Problem: Isolation Precautions - Risk of Spread of Infection  Goal: Prevent transmission of infection  2/18/2022 2249 by Delmar Avila RN  Outcome: Met This Shift  2/18/2022 1458 by Jodi Bonilla RN  Outcome: Met This Shift     Problem: Nutrition Deficits  Goal: Optimize nutritional status  2/18/2022 2249 by Delmar Avila RN  Outcome: Met This Shift  2/18/2022 1458 by Jodi Bonilla RN  Outcome: Met This Shift     Problem: Risk for Fluid Volume Deficit  Goal: Maintain normal heart rhythm  2/18/2022 2249 by Maria Del Carmen Rivera RN  Outcome: Met This Shift  2/18/2022 1458 by Gareth Raza RN  Outcome: Met This Shift  Goal: Maintain absence of muscle cramping  2/18/2022 2249 by Maria Del Carmen Rivera RN  Outcome: Met This Shift  2/18/2022 1458 by Gareth Raza RN  Outcome: Met This Shift  Goal: Maintain normal serum potassium, sodium, calcium, phosphorus, and pH  2/18/2022 2249 by Maria Del Carmen Rivera RN  Outcome: Met This Shift  2/18/2022 1458 by Gareth Raza RN  Outcome: Met This Shift     Problem: Loneliness or Risk for Loneliness  Goal: Demonstrate positive use of time alone when socialization is not possible  2/18/2022 2249 by Maria Del Carmen Rivera RN  Outcome: Met This Shift  2/18/2022 1458 by Gareth Raza RN  Outcome: Met This Shift     Problem: Fatigue  Goal: Verbalize increase energy and improved vitality  2/18/2022 2249 by Maria Del Carmen Rivera RN  Outcome: Met This Shift  2/18/2022 1458 by Gareth Raza RN  Outcome: Met This Shift     Problem: Patient Education: Go to Patient Education Activity  Goal: Patient/Family Education  2/18/2022 2249 by Maria Del Carmen Rivera RN  Outcome: Met This Shift  2/18/2022 1458 by Gareth Raza RN  Outcome: Met This Shift     Problem: Falls - Risk of:  Goal: Will remain free from falls  2/18/2022 2249 by Maria Del Carmen Rivera RN  Outcome: Met This Shift  2/18/2022 1458 by Gareth Raza RN  Outcome: Met This Shift  Goal: Absence of physical injury  2/18/2022 2249 by Maria Del Carmen Rivera RN  Outcome: Met This Shift  2/18/2022 1458 by Gareth Raza RN  Outcome: Met This Shift     Problem: Skin Integrity:  Goal: Will show no infection signs and symptoms  2/18/2022 2249 by Maria eDl Carmen Rivera RN  Outcome: Met This Shift  2/18/2022 1458 by Gareth Raza RN  Outcome: Met This Shift  Goal: Absence of new skin breakdown  2/18/2022 2249 by Maria Del Carmen Rivera RN  Outcome: Met This Shift  2/18/2022 1458 by Gareth Raza RN  Outcome: Met This Shift     Problem: Gas Exchange - Impaired:  Goal: Levels of oxygenation will improve  2/18/2022 5495 by Avery Yepez RN  Outcome: Met This Shift  2/18/2022 1458 by Maude Mckeon RN  Outcome: Met This Shift     Problem: Infection, Septic Shock:  Goal: Will show no infection signs and symptoms  2/18/2022 2249 by Avery Yepez RN  Outcome: Met This Shift  2/18/2022 1458 by Maude Mckeon RN  Outcome: Met This Shift     Problem: Infection - Ventilator-Associated Pneumonia:  Goal: Absence of pulmonary infection  2/18/2022 2249 by Avery Yepez RN  Outcome: Met This Shift  2/18/2022 1458 by Maude Mckeon RN  Outcome: Met This Shift

## 2022-02-19 NOTE — PROGRESS NOTES
Pharmacy Consultation Note  (Warfarin Dosing and Monitoring)    Initial consult date: 2/9/22  Consulting Provider: Rubens BAUGH    Pancho Blood is a 81 y.o. female for whom pharmacy has been asked to manage warfarin therapy. Weight:   Wt Readings from Last 1 Encounters:   02/15/22 226 lb 3.2 oz (102.6 kg)       TSH:    Lab Results   Component Value Date    TSH 1.790 02/04/2022       Hepatic Function Panel:                            Lab Results   Component Value Date    ALKPHOS 113 02/17/2022    ALT 53 02/17/2022    AST 56 02/17/2022    PROT 5.5 02/17/2022    BILITOT 2.0 02/17/2022    LABALBU 3.5 02/17/2022       Current warfarin drug-drug interactions include: quinolones (incr INR) and steroids (variable/dose dependent)    Recent Labs     02/17/22  0400 02/18/22  1347   HGB 10.4* 11.5    128*     Date Warfarin Dose INR Heparin or LMWH Comment   2/9 2 mg 2.6 -----    2/10 2 mg 2.5 ------    2/11 2 mg 2.2 ------    2/12 2 mg 2.6 ------    2/13 2 mg 2.2 -------    2/14 4 mg 2 -------    2/15 2 mg 1.8 --------    2/16 3 mg 1.7 --    2/17 3 mg 1.9 --    2/18 3 mg 2.1     2/19 < 3 mg > 1.9              Assessment:  · Patient is a 81 y.o. female on warfarin for Atrial Fibrillation. Patient's home warfarin dosing regimen is 4mg daily. · Goal INR 2 - 3   · Levofloxacin 500mg started on 2/15 takes about 2 days to see  Drug-drug ix.  (levofloxiacin increase anticoagulant effect of warfarin)  · 2/16: INR 1.7   · 2/17: INR 1.9, H&H and platelets are stable  · 2/18: INR 2.1  · 2/19: INR 1.9    Plan:  · Warfarin 3 mg tonight, ordered to be given prior to discharge  · Daily PT/INR until the INR is stable within the therapeutic range  · Pharmacist will follow and monitor/adjust dosing as necessary    Thank you for this consult,    Reuben Staton, PharmD   Pharmacy Resident   Phone: [phone]  2/19/2022 11:18 AM

## 2022-02-19 NOTE — DISCHARGE SUMMARY
Physician Discharge Summary     Patient ID:  Evangelina Michaels  [id number]  81 y.o.  1935    Admit date: 2/6/2022    Discharge date and time: 2/19/2022    Admission Diagnoses:   Patient Active Problem List   Diagnosis    Venous stasis dermatitis of both lower extremities    Onychomycosis    Pain of toe of right foot    Pain of toe of left foot    PVD [facility]peripheral vascular disease) ([facility]    Difficulty walking    Hyperkalemia    Hypertension    Atrial fibrillation (HCC)    Chronic diastolic heart failure (HCC)    Chronic right-sided heart failure (HCC)    Sepsis (HCC)    Acute respiratory failure due to COVID-19 ([facility]    Acute respiratory failure ([facility]    Cerebrovascular accident (CVA) due to embolism of cerebral artery ([facility]       Discharge Diagnoses: ams, , covid , respiratory failure     Consults:critical care, neuro, Id , pall care     Procedures: intubation / extubation imaging , ng insertion     Hospital Course:    Admit to MICU for neurology evaluation secondary to ams requiring intubating in pt with covid pna  Unable to get mri d/t pacer in place patient  -Extubated 2/15/2022no plans for reintubation or escalation of care  -moitor rate and rhythm, echocardiogram with ejection fraction 50% no evidence of vegetations on transthoracic echo  -EEG completed   empiric keppra bid-Keppra increased to 750   - stop abx on dc   -Tube feeds initiated for nutrition  Etiology of ams not determined     Pt to be under pall care at dc to facility          Patient okay to continue nutrition by Envue tube  Patient will need ongoing nutrition with the Envue NG  tube for at least 30 days  Okay for discharge from medicine standpoint  Case discussed with case management  Okay for discharge to subacute rehab under palliative care  Recent Labs     02/17/22  0400 02/18/22  1347   WBC 8.3 5.5   HGB 10.4* 11.5   HCT 33.3* 36.8    128*       Recent Labs     02/17/22  0400 02/18/22  1347    145   K 3.6  3.6 3.6   * 110*   CO2 24 27   BUN 38* 32*   CREATININE 0.8 0.7   CALCIUM 9.1 8.5*       XR CHEST PORTABLE    Result Date: 2/6/2022  EXAMINATION: ONE XRAY VIEW OF THE CHEST 2/6/2022 7:19 pm COMPARISON: Chest series from February 6, 2022 at 06:04 HISTORY: ORDERING SYSTEM PROVIDED HISTORY: On Vent TECHNOLOGIST PROVIDED HISTORY: Reason for exam:->On Vent What reading provider will be dictating this exam?->CRC FINDINGS: Medical support devices: Endotracheal tube terminates in the midthoracic trachea. Enteric tube extends subdiaphragmatic. There appears to be a temperature probe projecting over the upper mediastinum. Left anterior chest wall cardiac support device. Lungs: Retrocardiac and bibasilar opacities. Medial right upper lung opacity. No new airspace disease. No pneumothorax. Cardiomediastinal silhouette and pulmonary vascularity: Atherosclerotic disease and cardiomegaly. Fullness of the bilateral pulmonary waldemar. Osseous and soft tissue structures: No acute findings. 1.  Medical support devices as above. 2.  Retrocardiac and bibasilar opacities appears stable. Medial right upper lung opacity appears stable. Atherosclerotic disease and cardiomegaly no new cardiopulmonary pathology. XR CHEST PORTABLE    Result Date: 2/6/2022  EXAMINATION: ONE XRAY VIEW OF THE CHEST 2/6/2022 7:13 am COMPARISON: 02/05/2022 HISTORY: ORDERING SYSTEM PROVIDED HISTORY: intubation TECHNOLOGIST PROVIDED HISTORY: Reason for exam:->intubation FINDINGS: Portable chest reveals heart to be enlarged. Pacer leads in satisfactory position. Endotracheal tube and nasogastric tube are unchanged. There are increased markings seen within the right upper lobe. Small bilateral pleural effusions. Prominence seen of the pulmonary vascularity. Stable chest as above with heart enlarged and small bilateral pleural effusions with increased markings seen in the right upper lung field.   Stable prominence of the pulmonary vascularity recommended by them    Note that over 30 minutes was spent in preparing discharge papers, discussing discharge with patient, medication review, etc.    Signed:  Romeo Alcantara MD  2/19/2022  2:04 PM

## 2022-02-21 NOTE — PROGRESS NOTES
[facility] Admission History and Physical  [place] ([facility]) Physicians    Pancho Blood  : 1935  Visit Date: 2022  Facility:  [facility]      CC: Admission History and Physical for:   Chief Complaint   Patient presents with    Cerebrovascular Accident    H&P     for skilled rehabilitation       History of Present Illness:      Hospital Course:  Pancho Blood is a 81 y.o. female with a past medical history of hypertension, atrial fibrillation, hyperlipidemia, hypothyroidism, and chronic lower extremity ulcers who was admitted to [place] [address] on 2022 for evaluation of unresponsiveness and seizures. She initially presented to [facility] on 2022 after being found unresponsive and was intubated and sent to the ICU. She was started on treatment for septic shock and seen by multiple specialties. She had AF RVR requiring diltiazem infusion in addition to her vasopressors for her shock. The evening after admission, the patient began to have seizures so she was transferred to [facility] for neurologic intervention. She was started on levetiracetam.  She was seen by neurology and noted to have multifocal strokes on neuroimaging concerning for a cardioembolic etiology. She continued to have breakthrough seizures. EEG was consistent with a nonspecific cerebral dysfunction in the left frontotemporal region. She was followed by critical care, neurology, and palliative medicine throughout the admission. Family did not want the patient had tracheostomy and PEG placement and the decision was made to medically extubate with no plans for reintubation should she have decline in respiratory status. She was extubated on 2/15/2022. Family did request hospice referral but then elected for continued tube feeding at a facility with palliative care following. She was then transferred to [facility] on 2022 for further care.     Functional Status Prior to Admission: The patient was admitted to the hospital from SNF. Prior to this, she lives alone in a one-story home and ambulated with a wheeled walker. Hospital Labs: Labs on presentation: CBC with WC 21.4, hemoglobin 12.8; CMP with sodium 126, potassium 3.3, bicarbonate 17, glucose 240, BUN 71, creatinine 1.7, protein 6.8, albumin 2.0, alkaline phosphatase 186, AST 38; proBNP 64398; lactic acid 4.7; INR >10.0. Blood cultures positive for Streptococcus agalactiae. Hemoglobin A1c 7.0%, TSH 1.790. Labs prior to discharge: CBC with WBC 5.5, hemoglobin 11.5, .4, platelets 809; BMP with BUN 32, creatinine 0.7. Hospital Imaging: CT head on presentation: No acute intracranial abnormality or hemorrhage. Atrophy and periventricular leukomalacia. CT CAP: Cardiomegaly and small to moderate pleural effusions, uncomplicated cholelithiasis, no acute findings. CT head, day 2: New diffuse areas of low-attenuation and edema bilaterally. CXR 2/15/2022: Bilateral lower lobe airspace disease and small bilateral pleural effusions, unchanged. Interval History: The patient was seen and examined in her room. The patient was unresponsive and unable to provide history. She did grimace with palpation of her arms and abdomen. Nursing noted that the patient has been responsive since arrival to the facility. Called and discussed the patient's case and goals of care with her nephew, with facility director of nursing present, for approximately 15 minutes by phone. Discussed concerns that the patient is in the dying process. Discussed concerns that she may not be tolerating the tube feedings and appears to be good spacing fluid. Discussed her hospital course and the previous hospice referral.  He was under the impression that the patient did have hospice coming by.   He is unsure what they would do if she stops tolerating the tube feeding, and is hesitant to make the sort of decisions on her behalf. Provided reassurance regarding the complex neurohumeral mechanisms for hunger indigestion. He did state that their goal is for control of symptoms and for the patient to not suffer. He was agreeable to initiation of comfort medications. He did state that the patient has Medicaid; discussed that if he can get us the card, that removes financial barriers to consideration of hospice services. He asked her prognosis; discussed my concerns that it is in the days to short weeks range at the moment. All questions were answered. Advance Care Planning: DNR-CCA    Lab Results   Component Value Date    WBC 5.5 02/18/2022    HGB 11.5 02/18/2022    HCT 36.8 02/18/2022    .4 (H) 02/18/2022     (L) 02/18/2022    LYMPHOPCT 10.6 (L) 02/17/2022    RBC 3.63 02/18/2022    MCH 31.7 02/18/2022    MCHC 31.3 (L) 02/18/2022    RDW 18.6 (H) 02/18/2022       Lab Results   Component Value Date     02/18/2022    K 3.6 02/18/2022     (H) 02/18/2022    CO2 27 02/18/2022    BUN 32 (H) 02/18/2022    CREATININE 0.7 02/18/2022    GLUCOSE 138 (H) 02/18/2022    CALCIUM 8.5 (L) 02/18/2022    PROT 5.5 (L) 02/17/2022    LABALBU 3.5 02/17/2022    BILITOT 2.0 (H) 02/17/2022    ALKPHOS 113 (H) 02/17/2022    AST 56 (H) 02/17/2022    ALT 53 (H) 02/17/2022    LABGLOM >60 02/18/2022    GFRAA >60 02/18/2022       Lab Results   Component Value Date    TSH 1.790 02/04/2022       Hemoglobin A1C   Date Value Ref Range Status   02/04/2022 7.0 (H) 4.0 - 5.6 % Final       Lab Results   Component Value Date    INR 1.9 02/19/2022    INR 2.1 02/18/2022    INR 1.9 02/17/2022    PROTIME 20.4 (H) 02/19/2022    PROTIME 23.7 (H) 02/18/2022    PROTIME 20.9 (H) 02/17/2022         Allergies:  Patient has no known allergies.     Past Medical History:   Diagnosis Date    Arthritis     Atrial fibrillation ([address]     Hyperlipidemia     Hypertension     Non-pressure chronic ulcer of other part of right lower leg with fat layer exposed ([address] 8/21/2020    Thyroid disease        Current Outpatient Medications   Medication Sig Dispense Refill    insulin lispro (HUMALOG) 100 UNIT/ML injection vial Inject into the skin 4 times daily Per sliding scale      acetaminophen (TYLENOL) 325 MG tablet Take 650 mg by mouth every 6 hours as needed for Pain or Fever      bisacodyl (DULCOLAX) 10 MG suppository Place 10 mg rectally daily as needed for Constipation      Sodium Phosphates (FLEET) 7-19 GM/118ML Place 1 enema rectally daily as needed      magnesium hydroxide (MILK OF MAGNESIA) 400 MG/5ML suspension Take 30 mLs by mouth daily as needed for Constipation      metoprolol tartrate (LOPRESSOR) 50 MG tablet Take 1 tablet by mouth 2 times daily 60 tablet 3    levETIRAcetam (KEPPRA) 100 MG/ML solution Take 7.5 mLs by mouth 2 times daily 360 mL 3    docusate sodium (COLACE) 100 MG capsule Take 100 mg by mouth daily      Multiple Vitamins-Minerals (THERAPEUTIC MULTIVITAMIN-MINERALS) tablet Take 1 tablet by mouth daily      levothyroxine (SYNTHROID) 125 MCG tablet Take 125 mcg by mouth Daily        No current facility-administered medications for this visit. Past Surgical History:   Procedure Laterality Date    PACEMAKER INSERTION  06/04/2019    PPM GENT CHANGE  ([facility])   DR. TONG       History reviewed. No pertinent family history.     Social History     Socioeconomic History    Marital status: Single     Spouse name: Not on file    Number of children: Not on file    Years of education: Not on file    Highest education level: Not on file   Occupational History    Not on file   Tobacco Use    Smoking status: Never Smoker    Smokeless tobacco: Never Used   Vaping Use    Vaping Use: Never used   Substance and Sexual Activity    Alcohol use: No    Drug use: No    Sexual activity: Not on file   Other Topics Concern    Not on file   Social History Narrative    Not on file     Social Determinants of Health     Financial Resource Strain:     murmur heard. No gallop. Pulmonary:      Breath sounds: Rhonchi (coarse bilaterally) present. Comments: Mildly increased work of breathing. Abdominal:      General: Bowel sounds are normal. There is no distension. Palpations: Abdomen is soft. There is no hepatomegaly. Tenderness: There is abdominal tenderness (grimace with palpation). Musculoskeletal:         General: Swelling (significant BUEs) present. Right lower leg: Edema present. Left lower leg: Edema present. Lymphadenopathy:      Cervical: No cervical adenopathy. Skin:     General: Skin is warm and dry. Findings: Wound present. Neurological:      Mental Status: She is unresponsive. Psychiatric:      Comments: Unresponsive, unable to follow commands. Assessment and Plan:    I have spent over 51% of the total time (60 minutes) of this patient encounter reviewing hospital, EPIC and facility records, coordinating care with facility staff and counseling/educating the patient/family regarding the patient's plan of care as outlined below. Acute respiratory failure  · Patient initially presented with acute respiratory failure secondary to combination of COVID-19 as well as sepsis and stroke and is now unresponsive and unable to protect her airway. · Patient has supplemental oxygen in place. Patient does not appear to be in significant respiratory distress at the moment. · Anticipate patient will have aspiration events related to her inability to protect her airway and will have progression of underlying respiratory symptoms. · Goal of care does not include escalation of care or reintubation. · Start low-dose morphine and lorazepam as needed to promote comfort. Cerebrovascular accident  · Most likely cardioembolic related to patient's underlying atrial fibrillation that was in RVR during her acute sepsis episode. · Patient was noted to have multifocal stroke findings on CT head.   · Underlying cause of patient's unresponsiveness and terminal condition. · Patient with NG tube in place for nutrition; continue tube feeding at this time however anticipate patient will begin to have more issues with third spacing of fluid, inability to digest tube feeding, and risk of aspiration as her condition progresses. · Do not anticipate patient will have a significant clinical improvement and recommend hospice services. Seizures  · Secondary to multifocal strokes. · Patient had breakthrough seizures despite levetiracetam during hospitalization. · History of multiple seizures likely contributing to patient's minimal responsiveness. · Continue levetiracetam monitor for new seizures. COVID-19  · Patient had presented to the hospital with COVID-19 which likely contributed to her sepsis. · Continue monitoring in the isolation unit. Chronic right leg wound  · Possibly the underlying source of her initial bacteremia and sepsis. · Patient was given antibiotics during hospitalization. · Continue wound care. Anticipate this wound will not be able to heal given her terminal condition. Essential hypertension  · Blood pressure controlled at this time. · Continue metoprolol at current dose. Atrial fibrillation  · Likely underlying cause of patient's strokes. · Patient appears to be in normal rhythm on exam.  · Continue metoprolol for rate control. · Patient is unlikely to benefit from anticoagulation given her poor clinical status. Type 2 diabetes mellitus  · Hemoglobin A1c 7.0%. · Continue sliding scale insulin with tube feeds to avoid significant hyperglycemia. Hypothyroidism  · Continue levothyroxine 125 mcg daily. Debility  · Multifactorial related to prolonged hospitalization, strokes, advanced age. · Patient is minimally responsive and would not be able to participate in therapy at this time. · Continue supportive care.     Encounter for palliative care  · Discussed patient's goals of care with her nephew/surrogate decision-maker by phone. · Family would not want to escalate care and in general wants to promote comfort and avoid suffering. · Family is unsure if they want to discontinue tube feeding at this time. · Family is interested in hospice supports at the facility. Awaiting Medicaid information to determine if there are financial implications to switching to hospice for the family. · Start low-dose morphine and lorazepam as needed to promote comfort. · Anticipate days to short weeks prognosis. · Currently DNR-CCA; anticipate this would change to [facility] if enrolling in hospice services. Diagnosis Orders   1. Acute respiratory failure, unspecified whether with hypoxia or hypercapnia (HCC)  morphine sulfate 20 MG/ML concentrated oral solution    LORazepam (ATIVAN) 0.5 MG tablet   2. Cerebrovascular accident (CVA) due to embolism of cerebral artery (HCC)  morphine sulfate 20 MG/ML concentrated oral solution    LORazepam (ATIVAN) 0.5 MG tablet   3. COVID-19  morphine sulfate 20 MG/ML concentrated oral solution    LORazepam (ATIVAN) 0.5 MG tablet   4. Wound of right lower extremity, initial encounter     5. Essential hypertension     6. Paroxysmal atrial fibrillation (HCC)     7. Type 2 diabetes mellitus with other circulatory complication, unspecified whether long term insulin use ([facility] [address]     8. Hypothyroidism, unspecified type     9. Debility     10. Encounter for palliative care  morphine sulfate 20 MG/ML concentrated oral solution    LORazepam (ATIVAN) 0.5 MG tablet       [place] continues to require nursing level care due to need for assistance with ADLs. Advanced directives, recent labs, MAR, TAR were reviewed. At risk for unavoidable skin breakdown due to incontinence and limited mobility. Continue preventive measures. Follow up: Return in about 1 week (around 2/28/2022), or for acute issues.      Lindy Virgen MD  2/21/2022     This report was generated using a computer driven dictation system. This system may results in transcription errors. Every effort is made to identify and correct errors, however errors may still occur.

## 2022-02-23 NOTE — PROGRESS NOTES
Received call today from patient's nurse practitioner at the facility. The patient was noted to have respirations in the 30s and was in respiratory distress. Her NP noted that the patient had not received comfort medications for this issue. Her NP felt that involving palliative care at the facility was the next best step. I discussed my concerns that she is not tolerating her tube feeding and that this is actively making the condition worse through third spacing and likely aspiration. This is a side effect of tube feeding was discussed with the patient's nephew on Monday, and he was coming into the facility to discuss goals of care with facility staff and to provide documentation of Medicaid that would allow them to transition to hospice services. After the discussion with the NP, a deidentified written notification was sent to the facility DON and  noting my concern regarding the harms of tube feeding and concerns that family primarily wanted tube feeding as they could not afford to private pay. A conference call was started to discuss the issue. Both myself and her NP were of the medical opinion that the tube feeding is actively worsening symptoms and causing discomfort, and not assisting with the patient's condition as she is actively dying. Recommended hospice services. Care options were discussed given the family's stated inability to private pay. Discussed that the patient does appear to have uncontrolled symptoms and may qualify for a [facility] level of care to treat symptoms related to end-of-life. The initial plan was to contact hospice regarding the possibility of enrollment and transfer for symptom management. Called and discussed with nurse manager at [address] [place] [address] to explain current clinical condition and concerns as well as uncontrolled symptoms.   Hospice felt that the patient would be reasonable for enrollment and could be transferred to [facility] for [facility] level of care if it is felt this is medically necessary. After this discussion, another conference call from [facility] was initiated including representatives from a clinical division of the [facility]. They stated that their clinical policy is that artificial nutrition and hydration cannot be discontinued without a plan to provide this in another form. They asked what the plan for nutrition would be; discussed that it would be offerings of pleasure food for comfort as desired or tolerated. The clinical division felt that this would be a formality and would not meet their clinical criteria. Discussed my concerns that the tube feeds are actively harming the patient and worsening her symptoms without providing a benefit in the setting of the last days of life. Discussed that [facility] was willing to accept the patient for GIP level of care for symptom management given her respiratory failure and tachypnea, and that with that he tube feeds could be stopped to avoid further harm. At the end of the conference call, the group was in agreement with this plan of care with the facility planning to call the nephew to run it by him. A signout was given to the NP at [facility] about the patient's clinical condition and need for symptom management. All questions were answered and [facility] providers were in agreement with the need for GIP level of care based on the history. After this, received notification from DON at the facility that the nephew had elected to remain at the facility under private pay with hospice services. Questioned how we were going to avoid worsening her clinical status by continuing the tube feedings given the facility policy, and requested that the DON ask dietary what the minimum amount needed to meet to their policy would be. The NP at the facility adjusted medications for comfort.     Called and discussed with the ethics committee given my concern of continuing tube feeding. It is my medical opinion that the patient is within the last days of life and that artificial nutrition is not reasonably expected to prolong life and is becoming excessively burdensome and causing significant physical discomfort through third spacing of fluids as well as likely aspiration causing increased respiratory distress and uncontrolled respiratory symptoms. According to the Ethical and Restorationism Directives for [facility], although there is an obligation to provide patients with food and water, this obligation extends to patients with conditions that can be reasonably expected to live indefinitely have given such care and becomes morally optional when it cannot be reasonably expected to prolong life or if it is excessively burdensome or would cause significant physical discomfort. It is my medical opinion that the tube feeds are not providing a benefit and are only providing harm, and that they should be discontinued. We discussed my concerns regarding signing off as the primary attending with this plan of care that I disagree with, and the possibility of transferring her care to a different physician who would be agreeable to continuing this plan of care. The case was discussed at length with plans for follow-up. Notified the facility DON of my disagreement with the plan of care and request to transfer the patient's care to an alternate physician. Approximately 60 minutes were spent in coordination of care regarding the patient's clinical condition and the attempt to transfer the patient to GIP level of care under hospice services, as well as an additional 20-30 minutes of discussion with the ethics committee regarding this complicated case.     Shon Garcia MD  2/23/2022

## 2023-07-27 NOTE — PROGRESS NOTES
Called for blood cultures What Is The Reason For Today's Visit?: Annual Full Body Skin Examination with No Concerns

## 2024-09-04 NOTE — PROGRESS NOTES
Palliative Care Department  [phone]  Palliative Care Progress Note  Provider Oswaldo Su, APRN - CNP     Susan Washington  [id number]  Hospital Day: 12  Date of Initial Consult: 2/7/2022  Referring Provider: Nae Castillo MD  Palliative Medicine was consulted for assistance with:  Code Status Discussion, Goals of Care    HPI:   Susan Washington is a 81 y.o. with a medical history of atrial fibrillation on anticoagulation, hyperlipidemia, hypertension, CHF, DM, sinus node dysfunction s/p pacemaker placement, MR, TR, pulmonary hypertension who was admitted on 2/6/2022 from facility with a CHIEF COMPLAINT of confusion. Patient tested positive for Covid 10 days prior to admission. Patient was febrile in the ED, found to be in atrial fibrillation with RVR, and also hypotensive. Patient currently intubated in ICU. Palliative medicine consulted for goals of care and code status discussion. Patient transferred from [facility] E[facility] to [facility] for neuro eval.   ASSESSMENT/PLAN:     Pertinent Hospital Diagnoses   · Acute hypoxic respiratory failure due to Covid 19   Acute CVA    Palliative Care Encounter / Counseling Regarding Goals of Care  Please see detailed goals of care discussion as below   At this time, Susan Washington, Does Not have capacity for medical decision-making.   Capacity is time limited and situation/question specific   During encounter, renny Kramerew, was surrogate medical decision-maker   Outcome of goals of care meeting  o DNR CCA/DNI: No escalation  o He would like to give the patient a few more days to see if mentation improves, if not he will likely transition to hospice care  o Hospice was consulted   Code status Limited DNRCCA/ DNI   Advanced Directives: no POA or living will in epic   Surrogate/Legal NOK:  o Melida Bello, nephew, [phone]    Spiritual assessment: no spiritual distress identified  Bereavement and grief: to be determined  Referrals to: Hospice   SUBJECTIVE: Subjective   Patient ID: Presley Nj is a 72 y.o. male who presents for Follow-up.  HPI  This patient presents for a follow-up visit.  In review, he initially saw me in May 2024 for bilateral/asymmetrical sensorineural hearing loss and vertigo.  And denies any recent vertiginous episodes.  MRI IAC was negative for any mass or lesion.  He was referred to neurosurgery for subdural fluid collection with mild mass effect on the left frontal lobe which was not unchanged from previous exam.  Patient has not yet been scheduled with neurosurgery.  Today, he complains of recent right-sided otalgia.  He thought this may be due to sinus issues.  His PCP thought it may be related to TMJ dysfunction.  He denies any otorrhea or change in hearing.  Review of Systems  A comprehensive or 10 points review of the patient's constitutional, neurological, HEENT, pulmonary, cardiovascular and genito-urinary systems showed only those mentioned in history of present illness.    Objective   Physical Exam  Constitutional: no fever, chills, weight loss or weight gain   General appearance: Appears well, well-nourished, well groomed. No acute distress.   Communication: Normal communication   Psychiatric: Oriented to person, place and time. Normal mood and affect.   Neurologic: Cranial nerves II-XII grossly intact and symmetric bilaterally.   Head and Face:   Head: Atraumatic with no masses, lesions or scarring.   Face: Normal symmetry, no paralysis, synkinesis or facial tic. No scars or deformities.   TMJ: Right crepitus and tenderness  Eyes: Conjunctiva not edematous or erythematous   Ears: External inspection of ears with no deformity, scars or masses. Bilateral EACs clear and bilateral TMs intact with no signs of effusions. both ears were examined under the microscope.  Nose: External inspection of nose: No nasal lesions, lacerations or scars.   Neck: Normal appearing, symmetric, trachea midline.   Cardiovascular: Examination of peripheral  Details of Conversation:    Chart reviewed and spoke with the patient's bedside nurse. Patient is on 2 L nasal cannula, possible transfer out of ICU. No improvement with mentation. Spoke with . I called and spoke with the patient's nephew Renee Lazo. We discussed goals and he expressed that he does not believe she would want to live like this. He is interested in getting information about hospice. He would like to give her a few more days to see if there is any improvement, but if not he will likely transition to hospice care. Currently CODE STATUS to remain a DNR CCA/DNI: no excalation of care. Hospice consult placed. OBJECTIVE:   Prognosis: Poor    Physical Exam:  /66   Pulse 87   Temp 97.7 °F (36.5 °C) (Temporal)   Resp (!) 35   Ht 5' (1.524 m)   Wt 226 lb 3.2 oz (102.6 kg)   SpO2 99%   BMI 44.18 kg/m²      Due to the current efforts to prevent transmission of COVID-19 and also the need to preserve PPE for other caregivers, a face-to-face encounter with the patient was not performed. That being said, all relevant records and diagnostic tests were reviewed, including laboratory results and imaging. Please reference any relevant documentation elsewhere. Care will be coordinated with the primary service. Objective data reviewed: labs, images, records, medication use, vitals and chart    Discussed patient and the plan of care with the other IDT members: Palliative Medicine IDT Team    Time/Communication  Greater than 50% of time spent, total 35 minutes in counseling and coordination of care at the bedside regarding goals of care, diagnosis and prognosis and see above. Thank you for allowing Palliative Medicine to participate in the care of [facility]. vascular system shows no clubbing or cyanosis.   Respiratory: No respiratory distress increased work of breathing. Inspection of the chest with symmetric chest expansion and normal respiratory effort.   Skin: No rashes in the head or neck    Assessment/Plan     This patient presents for subsequent evaluation of acute acquired right-sided otalgia as well as chronic TMJ dysfunction.    Reassurance given that otologic exam today is normal.  I believe his otalgia is secondary to chronic TMJ dysfunction.  I recommended starting with comfort measures.  If symptoms do not improve, I would then recommend physical therapy.  He may follow-up with me as needed.  All questions were answered to patient's satisfaction.    This note was created using speech recognition transcription software. Despite proofreading, several typographical errors might be present that might affect the meaning of the content. Please call with any questions.  I believe vertigo was most likely cervicogenic in origin.  He has completed physical therapy       LUIS ALBERTO Mesa-CNP 09/04/24 11:04 AM